# Patient Record
Sex: FEMALE | Race: WHITE | Employment: OTHER | URBAN - NONMETROPOLITAN AREA
[De-identification: names, ages, dates, MRNs, and addresses within clinical notes are randomized per-mention and may not be internally consistent; named-entity substitution may affect disease eponyms.]

---

## 2017-05-01 ENCOUNTER — TELEPHONE (OUTPATIENT)
Dept: GENERAL RADIOLOGY | Age: 67
End: 2017-05-01

## 2017-05-01 DIAGNOSIS — R92.8 ABNORMALITY OF RIGHT BREAST ON SCREENING MAMMOGRAM: Primary | ICD-10-CM

## 2017-05-08 ENCOUNTER — TELEPHONE (OUTPATIENT)
Dept: GENERAL RADIOLOGY | Age: 67
End: 2017-05-08

## 2017-05-13 ENCOUNTER — TELEPHONE (OUTPATIENT)
Dept: GENERAL RADIOLOGY | Age: 67
End: 2017-05-13

## 2017-05-16 ENCOUNTER — HOSPITAL ENCOUNTER (OUTPATIENT)
Age: 67
Setting detail: SPECIMEN
Discharge: HOME | End: 2017-05-16
Payer: MEDICARE

## 2017-05-16 ENCOUNTER — OFFICE VISIT (OUTPATIENT)
Dept: SURGERY | Age: 67
End: 2017-05-16
Payer: MEDICARE

## 2017-05-16 VITALS
DIASTOLIC BLOOD PRESSURE: 74 MMHG | WEIGHT: 188 LBS | HEIGHT: 67 IN | SYSTOLIC BLOOD PRESSURE: 112 MMHG | HEART RATE: 62 BPM | BODY MASS INDEX: 29.51 KG/M2

## 2017-05-16 DIAGNOSIS — R92.8 ABNORMAL MAMMOGRAM OF RIGHT BREAST: Primary | ICD-10-CM

## 2017-05-16 PROCEDURE — 4040F PNEUMOC VAC/ADMIN/RCVD: CPT | Performed by: SURGERY

## 2017-05-16 PROCEDURE — 3014F SCREEN MAMMO DOC REV: CPT | Performed by: SURGERY

## 2017-05-16 PROCEDURE — 1090F PRES/ABSN URINE INCON ASSESS: CPT | Performed by: SURGERY

## 2017-05-16 PROCEDURE — G8400 PT W/DXA NO RESULTS DOC: HCPCS | Performed by: SURGERY

## 2017-05-16 PROCEDURE — 1123F ACP DISCUSS/DSCN MKR DOCD: CPT | Performed by: SURGERY

## 2017-05-16 PROCEDURE — 99214 OFFICE O/P EST MOD 30 MIN: CPT | Performed by: SURGERY

## 2017-05-16 PROCEDURE — G8428 CUR MEDS NOT DOCUMENT: HCPCS | Performed by: SURGERY

## 2017-05-16 PROCEDURE — 1036F TOBACCO NON-USER: CPT | Performed by: SURGERY

## 2017-05-16 PROCEDURE — 3017F COLORECTAL CA SCREEN DOC REV: CPT | Performed by: SURGERY

## 2017-05-16 PROCEDURE — G8420 CALC BMI NORM PARAMETERS: HCPCS | Performed by: SURGERY

## 2017-05-19 LAB — SURGICAL PATHOLOGY REPORT: NORMAL

## 2017-06-01 ENCOUNTER — OFFICE VISIT (OUTPATIENT)
Dept: SURGERY | Age: 67
End: 2017-06-01
Payer: MEDICARE

## 2017-06-01 VITALS
HEART RATE: 60 BPM | DIASTOLIC BLOOD PRESSURE: 80 MMHG | SYSTOLIC BLOOD PRESSURE: 128 MMHG | HEIGHT: 68 IN | TEMPERATURE: 97 F | WEIGHT: 189 LBS | BODY MASS INDEX: 28.64 KG/M2

## 2017-06-01 DIAGNOSIS — R92.8 ABNORMAL MAMMOGRAM OF RIGHT BREAST: Primary | ICD-10-CM

## 2017-06-01 PROCEDURE — 99213 OFFICE O/P EST LOW 20 MIN: CPT | Performed by: SURGERY

## 2017-06-01 PROCEDURE — G8400 PT W/DXA NO RESULTS DOC: HCPCS | Performed by: SURGERY

## 2017-06-01 PROCEDURE — 1123F ACP DISCUSS/DSCN MKR DOCD: CPT | Performed by: SURGERY

## 2017-06-01 PROCEDURE — 3014F SCREEN MAMMO DOC REV: CPT | Performed by: SURGERY

## 2017-06-01 PROCEDURE — 1090F PRES/ABSN URINE INCON ASSESS: CPT | Performed by: SURGERY

## 2017-06-01 PROCEDURE — G8427 DOCREV CUR MEDS BY ELIG CLIN: HCPCS | Performed by: SURGERY

## 2017-06-01 PROCEDURE — 1036F TOBACCO NON-USER: CPT | Performed by: SURGERY

## 2017-06-01 PROCEDURE — 3017F COLORECTAL CA SCREEN DOC REV: CPT | Performed by: SURGERY

## 2017-06-01 PROCEDURE — G8420 CALC BMI NORM PARAMETERS: HCPCS | Performed by: SURGERY

## 2017-06-01 PROCEDURE — 4040F PNEUMOC VAC/ADMIN/RCVD: CPT | Performed by: SURGERY

## 2017-06-05 ENCOUNTER — NURSE ONLY (OUTPATIENT)
Dept: SURGERY | Age: 67
End: 2017-06-05

## 2017-06-05 ENCOUNTER — TELEPHONE (OUTPATIENT)
Dept: SURGERY | Age: 67
End: 2017-06-05

## 2017-06-05 DIAGNOSIS — N63.10 BREAST MASS, RIGHT: ICD-10-CM

## 2017-06-05 DIAGNOSIS — Z01.818 PRE-OP EXAM: Primary | ICD-10-CM

## 2017-06-05 DIAGNOSIS — Z01.818 PRE-OP EXAM: ICD-10-CM

## 2017-06-07 ENCOUNTER — APPOINTMENT (OUTPATIENT)
Dept: MAMMOGRAPHY | Age: 67
End: 2017-06-07
Attending: SURGERY
Payer: MEDICARE

## 2017-06-07 ENCOUNTER — ANESTHESIA EVENT (OUTPATIENT)
Dept: OPERATING ROOM | Age: 67
End: 2017-06-07
Payer: MEDICARE

## 2017-06-07 ENCOUNTER — HOSPITAL ENCOUNTER (OUTPATIENT)
Age: 67
Setting detail: OUTPATIENT SURGERY
Discharge: HOME | End: 2017-06-07
Attending: SURGERY | Admitting: SURGERY
Payer: MEDICARE

## 2017-06-07 ENCOUNTER — ANESTHESIA (OUTPATIENT)
Dept: OPERATING ROOM | Age: 67
End: 2017-06-07
Payer: MEDICARE

## 2017-06-07 VITALS
SYSTOLIC BLOOD PRESSURE: 105 MMHG | TEMPERATURE: 95.4 F | OXYGEN SATURATION: 99 % | RESPIRATION RATE: 6 BRPM | DIASTOLIC BLOOD PRESSURE: 69 MMHG

## 2017-06-07 VITALS
WEIGHT: 187.6 LBS | DIASTOLIC BLOOD PRESSURE: 58 MMHG | OXYGEN SATURATION: 99 % | SYSTOLIC BLOOD PRESSURE: 126 MMHG | HEIGHT: 68 IN | RESPIRATION RATE: 16 BRPM | TEMPERATURE: 97.9 F | BODY MASS INDEX: 28.43 KG/M2 | HEART RATE: 52 BPM

## 2017-06-07 PROCEDURE — 00400 ANES INTEGUMENTARY SYS NOS: CPT | Performed by: NURSE ANESTHETIST, CERTIFIED REGISTERED

## 2017-06-07 PROCEDURE — 3700000001 HC ADD 15 MINUTES (ANESTHESIA): Performed by: SURGERY

## 2017-06-07 PROCEDURE — 19125 EXCISION BREAST LESION: CPT | Performed by: SURGERY

## 2017-06-07 PROCEDURE — 2580000003 HC RX 258: Performed by: SURGERY

## 2017-06-07 PROCEDURE — 3600000003 HC SURGERY LEVEL 3 BASE: Performed by: SURGERY

## 2017-06-07 PROCEDURE — 76098 X-RAY EXAM SURGICAL SPECIMEN: CPT

## 2017-06-07 PROCEDURE — 7100000000 HC PACU RECOVERY - FIRST 15 MIN: Performed by: SURGERY

## 2017-06-07 PROCEDURE — 3600000013 HC SURGERY LEVEL 3 ADDTL 15MIN: Performed by: SURGERY

## 2017-06-07 PROCEDURE — 7100000011 HC PHASE II RECOVERY - ADDTL 15 MIN: Performed by: SURGERY

## 2017-06-07 PROCEDURE — 3700000000 HC ANESTHESIA ATTENDED CARE: Performed by: SURGERY

## 2017-06-07 PROCEDURE — 6360000002 HC RX W HCPCS: Performed by: NURSE ANESTHETIST, CERTIFIED REGISTERED

## 2017-06-07 PROCEDURE — 7100000001 HC PACU RECOVERY - ADDTL 15 MIN: Performed by: SURGERY

## 2017-06-07 PROCEDURE — 2500000003 HC RX 250 WO HCPCS: Performed by: SURGERY

## 2017-06-07 PROCEDURE — 88307 TISSUE EXAM BY PATHOLOGIST: CPT

## 2017-06-07 PROCEDURE — 76098 X-RAY EXAM SURGICAL SPECIMEN: CPT | Performed by: RADIOLOGY

## 2017-06-07 PROCEDURE — 19281 PERQ DEVICE BREAST 1ST IMAG: CPT | Performed by: RADIOLOGY

## 2017-06-07 PROCEDURE — 7100000010 HC PHASE II RECOVERY - FIRST 15 MIN: Performed by: SURGERY

## 2017-06-07 PROCEDURE — A4648 IMPLANTABLE TISSUE MARKER: HCPCS

## 2017-06-07 RX ORDER — SODIUM CHLORIDE 0.9 % (FLUSH) 0.9 %
10 SYRINGE (ML) INJECTION 2 TIMES DAILY
Status: DISCONTINUED | OUTPATIENT
Start: 2017-06-07 | End: 2017-06-07 | Stop reason: HOSPADM

## 2017-06-07 RX ORDER — SODIUM CHLORIDE, SODIUM LACTATE, POTASSIUM CHLORIDE, CALCIUM CHLORIDE 600; 310; 30; 20 MG/100ML; MG/100ML; MG/100ML; MG/100ML
INJECTION, SOLUTION INTRAVENOUS CONTINUOUS
Status: CANCELLED | OUTPATIENT
Start: 2017-06-07

## 2017-06-07 RX ORDER — PROPOFOL 10 MG/ML
INJECTION, EMULSION INTRAVENOUS PRN
Status: DISCONTINUED | OUTPATIENT
Start: 2017-06-07 | End: 2017-06-07 | Stop reason: SDUPTHER

## 2017-06-07 RX ORDER — HYDROCODONE BITARTRATE AND ACETAMINOPHEN 5; 325 MG/1; MG/1
1 TABLET ORAL EVERY 6 HOURS PRN
Qty: 25 TABLET | Refills: 0 | Status: SHIPPED | OUTPATIENT
Start: 2017-06-07 | End: 2017-11-06 | Stop reason: ALTCHOICE

## 2017-06-07 RX ORDER — MIDAZOLAM HYDROCHLORIDE 1 MG/ML
INJECTION INTRAMUSCULAR; INTRAVENOUS PRN
Status: DISCONTINUED | OUTPATIENT
Start: 2017-06-07 | End: 2017-06-07 | Stop reason: SDUPTHER

## 2017-06-07 RX ORDER — ONDANSETRON 2 MG/ML
INJECTION INTRAMUSCULAR; INTRAVENOUS PRN
Status: DISCONTINUED | OUTPATIENT
Start: 2017-06-07 | End: 2017-06-07 | Stop reason: SDUPTHER

## 2017-06-07 RX ORDER — SODIUM CHLORIDE, SODIUM LACTATE, POTASSIUM CHLORIDE, CALCIUM CHLORIDE 600; 310; 30; 20 MG/100ML; MG/100ML; MG/100ML; MG/100ML
INJECTION, SOLUTION INTRAVENOUS CONTINUOUS
Status: DISCONTINUED | OUTPATIENT
Start: 2017-06-07 | End: 2017-06-07 | Stop reason: HOSPADM

## 2017-06-07 RX ADMIN — Medication 10 ML: at 11:40

## 2017-06-07 RX ADMIN — Medication 2 G: at 13:07

## 2017-06-07 RX ADMIN — PROPOFOL 200 MG: 10 INJECTION, EMULSION INTRAVENOUS at 13:09

## 2017-06-07 RX ADMIN — ONDANSETRON 4 MG: 2 INJECTION INTRAMUSCULAR; INTRAVENOUS at 13:33

## 2017-06-07 RX ADMIN — SODIUM CHLORIDE, POTASSIUM CHLORIDE, SODIUM LACTATE AND CALCIUM CHLORIDE: 600; 310; 30; 20 INJECTION, SOLUTION INTRAVENOUS at 12:58

## 2017-06-07 RX ADMIN — MIDAZOLAM 2 MG: 1 INJECTION INTRAMUSCULAR; INTRAVENOUS at 13:07

## 2017-06-07 ASSESSMENT — PULMONARY FUNCTION TESTS
PIF_VALUE: 3
PIF_VALUE: 4
PIF_VALUE: 10
PIF_VALUE: 1
PIF_VALUE: 3
PIF_VALUE: 5
PIF_VALUE: 5
PIF_VALUE: 3
PIF_VALUE: 3
PIF_VALUE: 4
PIF_VALUE: 9
PIF_VALUE: 2
PIF_VALUE: 2
PIF_VALUE: 4
PIF_VALUE: 2
PIF_VALUE: 3
PIF_VALUE: 3
PIF_VALUE: 13
PIF_VALUE: 3
PIF_VALUE: 1
PIF_VALUE: 1
PIF_VALUE: 4
PIF_VALUE: 2
PIF_VALUE: 1
PIF_VALUE: 2
PIF_VALUE: 1
PIF_VALUE: 9
PIF_VALUE: 2
PIF_VALUE: 1
PIF_VALUE: 12

## 2017-06-07 ASSESSMENT — PAIN SCALES - GENERAL
PAINLEVEL_OUTOF10: 0

## 2017-06-07 ASSESSMENT — PAIN - FUNCTIONAL ASSESSMENT: PAIN_FUNCTIONAL_ASSESSMENT: 0-10

## 2017-06-09 LAB — SURGICAL PATHOLOGY REPORT: NORMAL

## 2017-06-20 ENCOUNTER — OFFICE VISIT (OUTPATIENT)
Dept: SURGERY | Age: 67
End: 2017-06-20

## 2017-06-20 VITALS
BODY MASS INDEX: 28.95 KG/M2 | HEIGHT: 68 IN | DIASTOLIC BLOOD PRESSURE: 70 MMHG | TEMPERATURE: 98.9 F | SYSTOLIC BLOOD PRESSURE: 120 MMHG | WEIGHT: 191 LBS | HEART RATE: 64 BPM

## 2017-06-20 DIAGNOSIS — Z09 POSTOP CHECK: Primary | ICD-10-CM

## 2017-06-20 PROCEDURE — 1123F ACP DISCUSS/DSCN MKR DOCD: CPT | Performed by: SURGERY

## 2017-06-20 PROCEDURE — 1090F PRES/ABSN URINE INCON ASSESS: CPT | Performed by: SURGERY

## 2017-06-20 PROCEDURE — 4040F PNEUMOC VAC/ADMIN/RCVD: CPT | Performed by: SURGERY

## 2017-06-20 PROCEDURE — G8419 CALC BMI OUT NRM PARAM NOF/U: HCPCS | Performed by: SURGERY

## 2017-06-20 PROCEDURE — 3017F COLORECTAL CA SCREEN DOC REV: CPT | Performed by: SURGERY

## 2017-06-20 PROCEDURE — G8427 DOCREV CUR MEDS BY ELIG CLIN: HCPCS | Performed by: SURGERY

## 2017-06-20 PROCEDURE — 99024 POSTOP FOLLOW-UP VISIT: CPT | Performed by: SURGERY

## 2017-06-20 PROCEDURE — 1036F TOBACCO NON-USER: CPT | Performed by: SURGERY

## 2017-06-20 PROCEDURE — 3014F SCREEN MAMMO DOC REV: CPT | Performed by: SURGERY

## 2017-06-20 PROCEDURE — G8400 PT W/DXA NO RESULTS DOC: HCPCS | Performed by: SURGERY

## 2017-11-02 ENCOUNTER — HOSPITAL ENCOUNTER (OUTPATIENT)
Dept: LAB | Age: 67
Setting detail: SPECIMEN
Discharge: HOME | End: 2017-11-02
Payer: MEDICARE

## 2017-11-02 DIAGNOSIS — Z13.1 SCREENING FOR DIABETES MELLITUS: ICD-10-CM

## 2017-11-02 DIAGNOSIS — E55.9 VITAMIN D DEFICIENCY: ICD-10-CM

## 2017-11-02 DIAGNOSIS — Z13.220 SCREENING FOR HYPERLIPIDEMIA: ICD-10-CM

## 2017-11-02 DIAGNOSIS — Z11.59 NEED FOR HEPATITIS C SCREENING TEST: ICD-10-CM

## 2017-11-02 LAB
ANION GAP SERPL CALCULATED.3IONS-SCNC: 13 MMOL/L (ref 9–17)
BUN BLDV-MCNC: 17 MG/DL (ref 8–23)
BUN/CREAT BLD: 31 (ref 9–20)
CALCIUM SERPL-MCNC: 9.4 MG/DL (ref 8.6–10.4)
CHLORIDE BLD-SCNC: 108 MMOL/L (ref 98–107)
CHOLESTEROL/HDL RATIO: 2.4
CHOLESTEROL: 175 MG/DL
CO2: 25 MMOL/L (ref 20–31)
CREAT SERPL-MCNC: 0.54 MG/DL (ref 0.5–0.9)
GFR AFRICAN AMERICAN: >60 ML/MIN
GFR NON-AFRICAN AMERICAN: >60 ML/MIN
GFR SERPL CREATININE-BSD FRML MDRD: ABNORMAL ML/MIN/{1.73_M2}
GFR SERPL CREATININE-BSD FRML MDRD: ABNORMAL ML/MIN/{1.73_M2}
GLUCOSE BLD-MCNC: 94 MG/DL (ref 70–99)
HDLC SERPL-MCNC: 72 MG/DL
HEPATITIS C ANTIBODY: NONREACTIVE
LDL CHOLESTEROL: 91 MG/DL (ref 0–130)
POTASSIUM SERPL-SCNC: 4.1 MMOL/L (ref 3.7–5.3)
SODIUM BLD-SCNC: 146 MMOL/L (ref 135–144)
TRIGL SERPL-MCNC: 58 MG/DL
VITAMIN D 25-HYDROXY: 28.6 NG/ML (ref 30–100)
VLDLC SERPL CALC-MCNC: NORMAL MG/DL (ref 1–30)

## 2017-11-02 PROCEDURE — 82306 VITAMIN D 25 HYDROXY: CPT

## 2017-11-02 PROCEDURE — 80048 BASIC METABOLIC PNL TOTAL CA: CPT

## 2017-11-02 PROCEDURE — 36415 COLL VENOUS BLD VENIPUNCTURE: CPT

## 2017-11-02 PROCEDURE — 86803 HEPATITIS C AB TEST: CPT

## 2017-11-02 PROCEDURE — 80061 LIPID PANEL: CPT

## 2017-11-06 ENCOUNTER — OFFICE VISIT (OUTPATIENT)
Dept: FAMILY MEDICINE CLINIC | Age: 67
End: 2017-11-06
Payer: MEDICARE

## 2017-11-06 VITALS
BODY MASS INDEX: 29.13 KG/M2 | WEIGHT: 192.2 LBS | SYSTOLIC BLOOD PRESSURE: 124 MMHG | DIASTOLIC BLOOD PRESSURE: 78 MMHG | OXYGEN SATURATION: 97 % | HEIGHT: 68 IN | HEART RATE: 62 BPM | TEMPERATURE: 97.9 F

## 2017-11-06 DIAGNOSIS — M85.80 OSTEOPENIA, UNSPECIFIED LOCATION: Primary | ICD-10-CM

## 2017-11-06 DIAGNOSIS — E55.9 VITAMIN D DEFICIENCY: ICD-10-CM

## 2017-11-06 DIAGNOSIS — Z12.11 COLON CANCER SCREENING: ICD-10-CM

## 2017-11-06 PROCEDURE — 3014F SCREEN MAMMO DOC REV: CPT | Performed by: FAMILY MEDICINE

## 2017-11-06 PROCEDURE — 3017F COLORECTAL CA SCREEN DOC REV: CPT | Performed by: FAMILY MEDICINE

## 2017-11-06 PROCEDURE — 1090F PRES/ABSN URINE INCON ASSESS: CPT | Performed by: FAMILY MEDICINE

## 2017-11-06 PROCEDURE — G8400 PT W/DXA NO RESULTS DOC: HCPCS | Performed by: FAMILY MEDICINE

## 2017-11-06 PROCEDURE — G8484 FLU IMMUNIZE NO ADMIN: HCPCS | Performed by: FAMILY MEDICINE

## 2017-11-06 PROCEDURE — 99213 OFFICE O/P EST LOW 20 MIN: CPT | Performed by: FAMILY MEDICINE

## 2017-11-06 PROCEDURE — G8427 DOCREV CUR MEDS BY ELIG CLIN: HCPCS | Performed by: FAMILY MEDICINE

## 2017-11-06 PROCEDURE — 1036F TOBACCO NON-USER: CPT | Performed by: FAMILY MEDICINE

## 2017-11-06 PROCEDURE — 4040F PNEUMOC VAC/ADMIN/RCVD: CPT | Performed by: FAMILY MEDICINE

## 2017-11-06 PROCEDURE — G8417 CALC BMI ABV UP PARAM F/U: HCPCS | Performed by: FAMILY MEDICINE

## 2017-11-06 PROCEDURE — 1123F ACP DISCUSS/DSCN MKR DOCD: CPT | Performed by: FAMILY MEDICINE

## 2017-11-06 ASSESSMENT — ENCOUNTER SYMPTOMS
DIARRHEA: 0
CONSTIPATION: 0
BACK PAIN: 0
WHEEZING: 0
SHORTNESS OF BREATH: 0
COUGH: 0
ABDOMINAL PAIN: 0
CHEST TIGHTNESS: 0
SINUS PRESSURE: 0

## 2017-11-06 ASSESSMENT — PATIENT HEALTH QUESTIONNAIRE - PHQ9
SUM OF ALL RESPONSES TO PHQ9 QUESTIONS 1 & 2: 0
1. LITTLE INTEREST OR PLEASURE IN DOING THINGS: 0
2. FEELING DOWN, DEPRESSED OR HOPELESS: 0
SUM OF ALL RESPONSES TO PHQ QUESTIONS 1-9: 0

## 2017-11-06 NOTE — PROGRESS NOTES
[address] [facility]  Dept: [phone]  Dept Fax: [phone]  Loc: [phone]    Aishwarya Jean is a 74 y.o. female who presents today for her medical conditions/complaints as noted below. Aishwarya Jean is c/o of   Chief Complaint   Patient presents with    Annual Exam       HPI:     HPI Here today for her annual physical and to follow up on her osteopenia and Vit D deficiency. Osteopenia: she feels like she is doing well. She has not had any fractures. she stays active and tries to make sure she is exercising daily. She eats a fairly healthy diet with the occasional chocolate twinkie. She tries to eat a variety of foods containing calcium. Vit D def: stable; she has not had any issues with fatigue or bone pain. She does not tolerate the vit D because it makes her muscles ache. She has not been taking it this summer. No issues with vaginal dryness. Past Medical History:   Diagnosis Date    Diverticula of colon     Mild, sigmoid, asymptomatic.  Osteopenia     With low vitamin D level. Insurance doesn't cover routine DEXA scans. Pt took bisphosphonates for a year and didn't tolerate.  Pure hypercholesterolemia     Mild, diet controlled.  Unspecified vitamin D deficiency     Varicose veins of lower extremities           Social History   Substance Use Topics    Smoking status: Former Smoker     Packs/day: 0.50     Years: 7.00     Types: Cigarettes     Quit date: 1/1/1978    Smokeless tobacco: Never Used      Comment: Quit smoking in 1978.  Alcohol use Yes      Comment: Occasional alcohol. Current Outpatient Prescriptions   Medication Sig Dispense Refill    Probiotic Product (PROBIOTIC DAILY PO) Take by mouth       No current facility-administered medications for this visit. Allergies   Allergen Reactions    Bisphosphonates Other (See Comments)     Jaw pain.  Took both Fosamax and Boniva and tolerated neither over a years period for osteopenia. Subjective:      Review of Systems   Constitutional: Negative for activity change, appetite change, chills, fatigue and fever. HENT: Negative for congestion, sinus pressure and sneezing. Eyes: Negative for visual disturbance. Respiratory: Negative for cough, chest tightness, shortness of breath and wheezing. Cardiovascular: Negative for chest pain, palpitations and leg swelling. Gastrointestinal: Negative for abdominal pain, constipation and diarrhea. Endocrine: Negative for polydipsia, polyphagia and polyuria. Genitourinary: Negative for difficulty urinating. Musculoskeletal: Negative for back pain and myalgias. Skin: Negative for rash. Allergic/Immunologic: Negative for environmental allergies. Neurological: Negative for dizziness, syncope, weakness, light-headedness and headaches. Psychiatric/Behavioral: Negative for dysphoric mood and sleep disturbance. The patient is not nervous/anxious. Objective:     Physical Exam   Constitutional: She is oriented to person, place, and time. She appears well-developed and well-nourished. No distress. HENT:   Mouth/Throat: Oropharynx is clear and moist.   Eyes: Conjunctivae and EOM are normal. Pupils are equal, round, and reactive to light. Neck: Normal range of motion. Neck supple. No thyromegaly present. Cardiovascular: Normal rate, regular rhythm, normal heart sounds and intact distal pulses. No murmur heard. Pulmonary/Chest: Effort normal and breath sounds normal. No respiratory distress. She has no wheezes. She has no rales. Abdominal: Soft. Bowel sounds are normal. She exhibits no distension. There is no tenderness. There is no guarding. Musculoskeletal: Normal range of motion. She exhibits no edema. Lymphadenopathy:     She has no cervical adenopathy. Neurological: She is alert and oriented to person, place, and time. Skin: Skin is warm and dry. No rash noted. Psychiatric: She has a normal mood and affect. Her behavior is normal. Judgment normal.   Nursing note and vitals reviewed. /78   Pulse 62   Temp 97.9 °F (36.6 °C) (Tympanic)   Ht 5' 8\" (1.727 m)   Wt 192 lb 3.2 oz (87.2 kg)   SpO2 97%   BMI 29.22 kg/m²     Assessment:      1. Osteopenia, unspecified location     2. Vitamin D deficiency     3. Colon cancer screening  Blood Occult Screening FIT      Hospital Outpatient Visit on 11/02/2017   Component Date Value Ref Range Status    Hepatitis C Ab 11/02/2017 NONREACTIVE  NR Final    Cholesterol 11/02/2017 175  <200 mg/dL Final    HDL 11/02/2017 72  >40 mg/dL Final    LDL Cholesterol 11/02/2017 91  0 - 130 mg/dL Final    Chol/HDL Ratio 11/02/2017 2.4  <5 Final    Triglycerides 11/02/2017 58  <150 mg/dL Final    VLDL 11/02/2017 NOT REPORTED  1 - 30 mg/dL Final    Glucose 11/02/2017 94  70 - 99 mg/dL Final    BUN 11/02/2017 17  8 - 23 mg/dL Final    CREATININE 11/02/2017 0.54  0.50 - 0.90 mg/dL Final    Bun/Cre Ratio 11/02/2017 31* 9 - 20 Final    Calcium 11/02/2017 9.4  8.6 - 10.4 mg/dL Final    Sodium 11/02/2017 146* 135 - 144 mmol/L Final    Potassium 11/02/2017 4.1  3.7 - 5.3 mmol/L Final    Chloride 11/02/2017 108* 98 - 107 mmol/L Final    CO2 11/02/2017 25  20 - 31 mmol/L Final    Anion Gap 11/02/2017 13  9 - 17 mmol/L Final    GFR Non- 11/02/2017 >60  >60 mL/min Final    GFR  11/02/2017 >60  >60 mL/min Final    GFR Comment 11/02/2017        Final    GFR Staging 11/02/2017 NOT REPORTED   Final    Vit D, 25-Hydroxy 11/02/2017 28.6* 30.0 - 100.0 ng/mL Final            Plan:       Osteopenia: stable; she has not had any recent issues. I reminded her of the importance of getting enough calcium and Vit D and doing strength training exercises to help her bones stay strong.     Vit D def: stable; her vit D level is a tiny bit low so I recommended a multi vit hoping that would help improve her vit D level without causing the muscle aches. Health Maintenance reviewed - she was given a FIT test for colon cancer screening. Return in about 1 year (around 11/6/2018) for [address] Orders Placed This Encounter   Procedures    Blood Occult Screening FIT     Standing Status:   Future     Standing Expiration Date:   11/6/2018       Patient given educational materials - see patient instructions. Discussed use, benefit, and side effects of prescribed medications. All patient questions answered. Pt voiced understanding. Reviewed health maintenance. Instructed to continue current medications, diet and exercise. Patient agreed with treatment plan. Follow up as directed.      Electronically signed by Anastacia Turner MD on 11/6/2017 at 6:15 PM

## 2017-11-16 ENCOUNTER — TELEPHONE (OUTPATIENT)
Dept: PRIMARY CARE CLINIC | Age: 67
End: 2017-11-16

## 2018-09-06 ENCOUNTER — OFFICE VISIT (OUTPATIENT)
Dept: PRIMARY CARE CLINIC | Age: 68
End: 2018-09-06
Payer: MEDICARE

## 2018-09-06 ENCOUNTER — HOSPITAL ENCOUNTER (OUTPATIENT)
Age: 68
Setting detail: SPECIMEN
Discharge: HOME | End: 2018-09-06
Payer: MEDICARE

## 2018-09-06 VITALS
OXYGEN SATURATION: 97 % | DIASTOLIC BLOOD PRESSURE: 72 MMHG | SYSTOLIC BLOOD PRESSURE: 130 MMHG | HEART RATE: 64 BPM | HEIGHT: 68 IN | WEIGHT: 200.8 LBS | TEMPERATURE: 98.4 F | BODY MASS INDEX: 30.43 KG/M2

## 2018-09-06 DIAGNOSIS — N30.01 ACUTE CYSTITIS WITH HEMATURIA: Primary | ICD-10-CM

## 2018-09-06 DIAGNOSIS — Z12.39 SCREENING FOR BREAST CANCER: ICD-10-CM

## 2018-09-06 DIAGNOSIS — N39.0 URINARY TRACT INFECTION WITHOUT HEMATURIA, SITE UNSPECIFIED: ICD-10-CM

## 2018-09-06 DIAGNOSIS — R30.0 DYSURIA: ICD-10-CM

## 2018-09-06 LAB
-: ABNORMAL
AMORPHOUS: ABNORMAL
BACTERIA: ABNORMAL
BILIRUBIN URINE: NEGATIVE
CASTS UA: ABNORMAL /LPF (ref 0–2)
COLOR: ABNORMAL
COMMENT UA: ABNORMAL
CRYSTALS, UA: ABNORMAL /HPF
EPITHELIAL CELLS UA: ABNORMAL /HPF (ref 0–5)
GLUCOSE URINE: NEGATIVE
KETONES, URINE: NEGATIVE
LEUKOCYTE ESTERASE, URINE: ABNORMAL
MUCUS: ABNORMAL
NITRITE, URINE: NEGATIVE
OTHER OBSERVATIONS UA: ABNORMAL
PH UA: 6 (ref 5–6)
PROTEIN UA: NEGATIVE
RBC UA: ABNORMAL /HPF (ref 0–4)
RENAL EPITHELIAL, UA: ABNORMAL /HPF
SPECIFIC GRAVITY UA: 1 (ref 1.01–1.02)
TRICHOMONAS: ABNORMAL
TURBIDITY: ABNORMAL
URINE HGB: ABNORMAL
UROBILINOGEN, URINE: NORMAL
WBC UA: >100 /HPF (ref 0–4)
YEAST: ABNORMAL

## 2018-09-06 PROCEDURE — 99214 OFFICE O/P EST MOD 30 MIN: CPT | Performed by: FAMILY MEDICINE

## 2018-09-06 PROCEDURE — 81001 URINALYSIS AUTO W/SCOPE: CPT

## 2018-09-06 PROCEDURE — 87086 URINE CULTURE/COLONY COUNT: CPT

## 2018-09-06 RX ORDER — NITROFURANTOIN 25; 75 MG/1; MG/1
100 CAPSULE ORAL 2 TIMES DAILY
Qty: 14 CAPSULE | Refills: 0 | Status: SHIPPED | OUTPATIENT
Start: 2018-09-06 | End: 2018-09-13

## 2018-09-06 ASSESSMENT — ENCOUNTER SYMPTOMS
COUGH: 0
NAUSEA: 0
BACK PAIN: 0
VOMITING: 0
SHORTNESS OF BREATH: 0

## 2018-09-06 ASSESSMENT — PATIENT HEALTH QUESTIONNAIRE - PHQ9
SUM OF ALL RESPONSES TO PHQ9 QUESTIONS 1 & 2: 0
SUM OF ALL RESPONSES TO PHQ QUESTIONS 1-9: 0
1. LITTLE INTEREST OR PLEASURE IN DOING THINGS: 0
SUM OF ALL RESPONSES TO PHQ QUESTIONS 1-9: 0
2. FEELING DOWN, DEPRESSED OR HOPELESS: 0

## 2018-09-06 NOTE — PROGRESS NOTES
[facility]      Hospital Outpatient Visit on 09/06/2018   Component Date Value Ref Range Status    Color, UA 09/06/2018 NOT REPORTED  YEL Final    Turbidity UA 09/06/2018 NOT REPORTED  CLEAR Final    Glucose, Ur 09/06/2018 NEGATIVE  NEG Final    Bilirubin Urine 09/06/2018 NEGATIVE  NEG Final    Ketones, Urine 09/06/2018 NEGATIVE  NEG Final    Specific Gravity, UA 09/06/2018 1.005* 1.010 - 1.025 Final    Urine Hgb 09/06/2018 3+* NEG Final    pH, UA 09/06/2018 6.0  5.0 - 6.0 Final    Protein, UA 09/06/2018 NEGATIVE  NEG Final    Urobilinogen, Urine 09/06/2018 Normal  NORM Final    Nitrite, Urine 09/06/2018 NEGATIVE  NEG Final    Leukocyte Esterase, Urine 09/06/2018 3+* NEG Final    Urinalysis Comments 09/06/2018 NOT REPORTED   Final    - 09/06/2018        Final    WBC, UA 09/06/2018 >100  0 - 4 /HPF Final    RBC, UA 09/06/2018 10 TO 25  0 - 4 /HPF Final    Casts UA 09/06/2018 NOT REPORTED  0 - 2 /LPF Final    Crystals UA 09/06/2018 NOT REPORTED  NONE /HPF Final    Epithelial Cells UA 09/06/2018 0 TO 4  0 - 5 /HPF Final    Renal Epithelial, Urine 09/06/2018 NOT REPORTED  0 /HPF Final    Bacteria, UA 09/06/2018 2+* NONE Final    Mucus, UA 09/06/2018 NOT REPORTED  NONE Final    Trichomonas, UA 09/06/2018 NOT REPORTED  NONE Final    Amorphous, UA 09/06/2018 NOT REPORTED  NONE Final    Other Observations UA 09/06/2018 Specimen Cultured* NREQ Final    Yeast, UA 09/06/2018 NOT REPORTED  NONE Final              Plan:       UTI: new; her UA shows she has a UTI. I will treat with macrobid for 7 days and follow up on the culture. If the culture shows resistance to macrobid then she will be notified and her antibiotic will be changed. I encouraged her to use tylenol or ibuprofen for pain. she was told to return to the clinic or ER if symptoms worsen, if she develops fevers or severe back pain. she understood and agreed with the plan.     Health Maintenance reviewed - mammogram ordered, patient to schedule appointment. Return if symptoms worsen or fail to improve. Orders Placed This Encounter   Procedures    NORIS DIGITAL SCREEN W OR WO CAD BILATERAL     Standing Status:   Future     Standing Expiration Date:   11/6/2019    Urinalysis Reflex to Culture     Standing Status:   Future     Number of Occurrences:   1     Standing Expiration Date:   10/6/2018     Order Specific Question:   SPECIFY(EX-CATH,MIDSTREAM,CYSTO,ETC)? Answer:   midstream     Orders Placed This Encounter   Medications    nitrofurantoin, macrocrystal-monohydrate, (MACROBID) 100 MG capsule     Sig: Take 1 capsule by mouth 2 times daily for 7 days     Dispense:  14 capsule     Refill:  0       Patient given educational materials - see patient instructions. Discussed use, benefit, and side effects of prescribed medications. All patient questions answered. Pt voiced understanding. Reviewed health maintenance. Instructed to continue current medications, diet and exercise. Patient agreed with treatment plan. Follow up as directed.      Electronically signed by Nandini Hughes MD on 9/6/2018 at 10:53 AM

## 2018-09-07 LAB
CULTURE: NO GROWTH
Lab: NORMAL
SPECIMEN DESCRIPTION: NORMAL
STATUS: NORMAL

## 2018-09-10 ENCOUNTER — HOSPITAL ENCOUNTER (OUTPATIENT)
Dept: ULTRASOUND IMAGING | Age: 68
Discharge: HOME | End: 2018-09-12
Payer: MEDICARE

## 2018-09-10 ENCOUNTER — TELEPHONE (OUTPATIENT)
Dept: SURGERY | Age: 68
End: 2018-09-10

## 2018-09-10 ENCOUNTER — HOSPITAL ENCOUNTER (OUTPATIENT)
Age: 68
Setting detail: SPECIMEN
Discharge: HOME | End: 2018-09-10
Payer: MEDICARE

## 2018-09-10 ENCOUNTER — HOSPITAL ENCOUNTER (OUTPATIENT)
Dept: MAMMOGRAPHY | Age: 68
Discharge: HOME | End: 2018-09-12
Payer: MEDICARE

## 2018-09-10 DIAGNOSIS — N63.0 BREAST LUMP IN FEMALE: Primary | ICD-10-CM

## 2018-09-10 DIAGNOSIS — R92.8 ABNORMAL MAMMOGRAM OF LEFT BREAST: Primary | ICD-10-CM

## 2018-09-10 DIAGNOSIS — R92.8 ABNORMAL MAMMOGRAM: ICD-10-CM

## 2018-09-10 DIAGNOSIS — R92.8 ABNORMAL MAMMOGRAM OF LEFT BREAST: ICD-10-CM

## 2018-09-10 DIAGNOSIS — D49.3 NEOPLASM OF BREAST: ICD-10-CM

## 2018-09-10 DIAGNOSIS — R92.8 ABNORMAL MAMMOGRAM OF RIGHT BREAST: ICD-10-CM

## 2018-09-10 DIAGNOSIS — N63.0 BREAST LUMP IN FEMALE: ICD-10-CM

## 2018-09-10 DIAGNOSIS — Z12.39 SCREENING FOR BREAST CANCER: ICD-10-CM

## 2018-09-10 PROCEDURE — 88360 TUMOR IMMUNOHISTOCHEM/MANUAL: CPT

## 2018-09-10 PROCEDURE — 76642 ULTRASOUND BREAST LIMITED: CPT

## 2018-09-10 PROCEDURE — 19084 BX BREAST ADD LESION US IMAG: CPT

## 2018-09-10 PROCEDURE — 77066 DX MAMMO INCL CAD BI: CPT

## 2018-09-10 PROCEDURE — 19083 BX BREAST 1ST LESION US IMAG: CPT

## 2018-09-10 PROCEDURE — 88377 M/PHMTRC ALYS ISHQUANT/SEMIQ: CPT

## 2018-09-10 PROCEDURE — 77065 DX MAMMO INCL CAD UNI: CPT

## 2018-09-10 PROCEDURE — 88305 TISSUE EXAM BY PATHOLOGIST: CPT

## 2018-09-12 ENCOUNTER — INITIAL CONSULT (OUTPATIENT)
Dept: SURGERY | Age: 68
End: 2018-09-12
Payer: MEDICARE

## 2018-09-12 ENCOUNTER — HOSPITAL ENCOUNTER (OUTPATIENT)
Dept: LAB | Age: 68
Setting detail: SPECIMEN
Discharge: HOME | End: 2018-09-12
Payer: MEDICARE

## 2018-09-12 VITALS
HEIGHT: 68 IN | WEIGHT: 202 LBS | HEART RATE: 76 BPM | TEMPERATURE: 98.5 F | DIASTOLIC BLOOD PRESSURE: 80 MMHG | BODY MASS INDEX: 30.62 KG/M2 | SYSTOLIC BLOOD PRESSURE: 128 MMHG

## 2018-09-12 DIAGNOSIS — C50.919 MALIGNANT NEOPLASM OF FEMALE BREAST, UNSPECIFIED ESTROGEN RECEPTOR STATUS, UNSPECIFIED LATERALITY, UNSPECIFIED SITE OF BREAST (HCC): Primary | ICD-10-CM

## 2018-09-12 DIAGNOSIS — C50.919 MALIGNANT NEOPLASM OF FEMALE BREAST, UNSPECIFIED ESTROGEN RECEPTOR STATUS, UNSPECIFIED LATERALITY, UNSPECIFIED SITE OF BREAST (HCC): ICD-10-CM

## 2018-09-12 LAB
ABSOLUTE EOS #: 0.1 K/UL (ref 0–0.4)
ABSOLUTE IMMATURE GRANULOCYTE: NORMAL K/UL (ref 0–0.3)
ABSOLUTE LYMPH #: 2.1 K/UL (ref 1–4.8)
ABSOLUTE MONO #: 0.6 K/UL (ref 0.1–1.2)
ALBUMIN SERPL-MCNC: 4.5 G/DL (ref 3.5–5.2)
ALBUMIN/GLOBULIN RATIO: 1.6 (ref 1–2.5)
ALP BLD-CCNC: 68 U/L (ref 35–104)
ALT SERPL-CCNC: 21 U/L (ref 5–33)
ANION GAP SERPL CALCULATED.3IONS-SCNC: 11 MMOL/L (ref 9–17)
AST SERPL-CCNC: 22 U/L
BASOPHILS # BLD: 1 % (ref 0–1)
BASOPHILS ABSOLUTE: 0 K/UL (ref 0–0.2)
BILIRUB SERPL-MCNC: 0.27 MG/DL (ref 0.3–1.2)
BUN BLDV-MCNC: 14 MG/DL (ref 8–23)
BUN/CREAT BLD: 22 (ref 9–20)
CALCIUM SERPL-MCNC: 9.7 MG/DL (ref 8.6–10.4)
CHLORIDE BLD-SCNC: 103 MMOL/L (ref 98–107)
CO2: 28 MMOL/L (ref 20–31)
CREAT SERPL-MCNC: 0.65 MG/DL (ref 0.5–0.9)
DIFFERENTIAL TYPE: NORMAL
EOSINOPHILS RELATIVE PERCENT: 1 % (ref 1–7)
GFR AFRICAN AMERICAN: >60 ML/MIN
GFR NON-AFRICAN AMERICAN: >60 ML/MIN
GFR SERPL CREATININE-BSD FRML MDRD: ABNORMAL ML/MIN/{1.73_M2}
GFR SERPL CREATININE-BSD FRML MDRD: ABNORMAL ML/MIN/{1.73_M2}
GLUCOSE BLD-MCNC: 100 MG/DL (ref 70–99)
HCT VFR BLD CALC: 41.3 % (ref 36–46)
HEMOGLOBIN: 13.8 G/DL (ref 12–16)
IMMATURE GRANULOCYTES: NORMAL %
LYMPHOCYTES # BLD: 33 % (ref 16–46)
MCH RBC QN AUTO: 31 PG (ref 26–34)
MCHC RBC AUTO-ENTMCNC: 33.4 G/DL (ref 31–37)
MCV RBC AUTO: 92.7 FL (ref 80–100)
MONOCYTES # BLD: 10 % (ref 4–11)
NRBC AUTOMATED: NORMAL PER 100 WBC
PDW BLD-RTO: 13.2 % (ref 11–14.5)
PLATELET # BLD: 248 K/UL (ref 140–450)
PLATELET ESTIMATE: NORMAL
PMV BLD AUTO: 9.4 FL (ref 6–12)
POTASSIUM SERPL-SCNC: 4.1 MMOL/L (ref 3.7–5.3)
RBC # BLD: 4.45 M/UL (ref 4–5.2)
RBC # BLD: NORMAL 10*6/UL
SEG NEUTROPHILS: 55 % (ref 43–77)
SEGMENTED NEUTROPHILS ABSOLUTE COUNT: 3.4 K/UL (ref 1.8–7.7)
SODIUM BLD-SCNC: 142 MMOL/L (ref 135–144)
TOTAL PROTEIN: 7.3 G/DL (ref 6.4–8.3)
WBC # BLD: 6.2 K/UL (ref 3.5–11)
WBC # BLD: NORMAL 10*3/UL

## 2018-09-12 PROCEDURE — 99214 OFFICE O/P EST MOD 30 MIN: CPT | Performed by: SURGERY

## 2018-09-12 PROCEDURE — 36415 COLL VENOUS BLD VENIPUNCTURE: CPT

## 2018-09-12 PROCEDURE — 85025 COMPLETE CBC W/AUTO DIFF WBC: CPT

## 2018-09-12 PROCEDURE — 80053 COMPREHEN METABOLIC PANEL: CPT

## 2018-09-12 NOTE — PROGRESS NOTES
tatianna      CC:    Chief Complaint   Patient presents with    Follow-up     bilateral breast biopsies         HPI:    Ronal Will is a 79 y.o. female who presents bilateral breast masses. she is status post multiple bilateral  US guided bx's. The following path is:      -- Diagnosis --   1.  RIGHT BREAST, CORE NEEDLE BIOPSIES:            -  WELL-DIFFERENTIATED INFILTRATING DUCTAL CARCINOMA, 0.6 CM   IN LARGEST INTACT CORE FRAGMENT.            -  ESTROGEN AND PROGESTERONE RECEPTOR IMMUNOSTAINS POSITIVE   IN INVASIVE TUMOR.       -  HER-2 FISH PENDING. 2.  LEFT BREAST, 2:00, N+4, CORE NEEDLE BIOPSIES:            -  MODERATELY DIFFERENTIATED INFILTRATING DUCTAL CARCINOMA,   0.6 CM IN LARGEST INTACT CORE FRAGMENT.       -  FOCAL DUCTAL CARCINOMA IN SITU, INTERMEDIATE GRADE, CRIBRIFORM   AND SOLID TYPE.            -  ESTROGEN AND PROGESTERONE RECEPTOR IMMUNOSTAINS POSITIVE   IN INVASIVE TUMOR.       -  HER-2 FISH PENDING. 3.  LEFT BREAST, 1:00, N+6, CORE NEEDLE BIOPSIES:            -  POORLY DIFFERENTIATED INFILTRATING DUCTAL CARCINOMA, 1.3   CM GREATEST DIMENSION IN LARGEST INTACT CORE FRAGMENT.       -  FOCAL DUCTAL CARCINOMA IN SITU, INTERMEDIATE GRADE, SOLID   TYPE.           -  ESTROGEN AND PROGESTERONE RECEPTOR IMMUNOSTAINS POSITIVE   IN INVASIVE TUMOR.       -  HER-2 FISH PENDING. 4.  LYMPH NODE, LEFT AXILLA, CORE NEEDLE BIOPSIES:       -  INFILTRATING DUCTAL CARCINOMA. -  INVASIVE TUMOR POSITIVE FOR ESTROGEN RECEPTOR AND   NEGATIVE FOR PROGESTERONE RECEPTOR IMMUNOSTAINS.       -  LYMPH NODE AND BENIGN BREAST ARCHITECTURE NOT IDENTIFIED. -- Diagnosis Comment --   SPECIMEN #4: CORRELATION WITH THE RADIOLOGIC PLACEMENT OF THE BIOPSY   WOULD BE NECESSARY TO DETERMINE IF THIS REPRESENTS A LYMPH NODE WITH   METASTATIC CARCINOMA, VERSUS CARCINOMA INVOLVING THE AXILLARY TAIL OF   THE BREAST. NO INTRINSIC LYMPH NODE OR BENIGN BREAST ARCHITECTURE IS   PRESENT FOR CONFIRMATION.                Pain: No Cyclical: NA    Nipple Drainage: No      BREAST IMAGING:    Mammograms and ultrasounds    BREAST HISTORY:    Last Menstrual Period: 20    Menarche: age 17    1st Term Pregnancy: age 25    HRT? Yes,  How long? 1 year after hysterectomy    Previous Breast Biopsy(ies): No,  Atypia: no    Previous Breast Cancer: No  Ovarian: No  Endometrial: No    Family History of Breast and Gyn Cancer: (list any, or put none):    Sister at age 58       Review of Systems:    General:  Fever: Negative  Weight Change:Negative  Night Sweats: Negative    Eye:  Blurry Vision:Negative  Double Vision: Negative    Ent:  Headaches: Negative  Sore throat: Negative    Allergy/Immunology:  Hives: Negative  Latex allergy: Negative    Hematology/Lymphatic:  Bleeding Problems: Negative  Blood Clots: Negative  Swollen Lymph Nodes: Negative    Lungs:  Cough: Negative  SOB: Negative  Wheezing:Negative    Cardiovascular:  Chest Pain: Negative  Palpitations:Negative    GI:   Decreased Appetite: Negative  Heartburn: positive for occasional  Dysphagia: Negative  Nausea/Vomiting: Negative  Abdominal Pain: Negative  Change in Bowels:Negative  Constipation: Negative  Diarrhea: Negative  Rectal Bleeding: Negative    :   Dysuria: recent UTI  Increase Urinary Frequency/Urgency: Negative    Neuro:  Seizures: Negative  Confusion: Negative      PAST MEDICAL HISTORY:    Family History   Problem Relation Age of Onset    Hypertension Mother         Renal failure.  Diabetes Mother         Type 2 at end of life.  Heart Failure Father     Coronary Art Dis Father         Status post CABG    Diabetes Father         Type 2, very late in life.     Breast Cancer Sister     Hypertension Brother     Hypertension Sister        Social History     Social History    Marital status:      Spouse name: N/A    Number of children: N/A    Years of education: N/A     Occupational History    retired  [employer]

## 2018-09-14 ENCOUNTER — INITIAL CONSULT (OUTPATIENT)
Dept: ONCOLOGY | Age: 68
End: 2018-09-14
Payer: MEDICARE

## 2018-09-14 VITALS
SYSTOLIC BLOOD PRESSURE: 133 MMHG | WEIGHT: 198.56 LBS | DIASTOLIC BLOOD PRESSURE: 72 MMHG | HEART RATE: 63 BPM | BODY MASS INDEX: 31.17 KG/M2 | TEMPERATURE: 97.9 F | HEIGHT: 67 IN

## 2018-09-14 DIAGNOSIS — Z51.11 ENCOUNTER FOR ANTINEOPLASTIC CHEMOTHERAPY: ICD-10-CM

## 2018-09-14 DIAGNOSIS — C50.211 CARCINOMA OF UPPER-INNER QUADRANT OF RIGHT BREAST IN FEMALE, ESTROGEN RECEPTOR POSITIVE (HCC): ICD-10-CM

## 2018-09-14 DIAGNOSIS — C50.412 CARCINOMA OF UPPER-OUTER QUADRANT OF FEMALE BREAST, LEFT (HCC): Primary | ICD-10-CM

## 2018-09-14 DIAGNOSIS — Z17.0 CARCINOMA OF UPPER-INNER QUADRANT OF RIGHT BREAST IN FEMALE, ESTROGEN RECEPTOR POSITIVE (HCC): ICD-10-CM

## 2018-09-14 PROCEDURE — 99205 OFFICE O/P NEW HI 60 MIN: CPT | Performed by: INTERNAL MEDICINE

## 2018-09-14 PROCEDURE — 99201 HC NEW PT, E/M LEVEL 1: CPT | Performed by: INTERNAL MEDICINE

## 2018-09-14 NOTE — PROGRESS NOTES
Patient ID: Filemon Vivar, 1950, [id number], 79 y.o. Referred by : Dr. Mahendra Quezada  Reason for consultation:   Diagnosis of bilateral breast cancer  Left invasive ductal carcinoma, ER/SD positive and HER-2/kelsy pending, 2 foci 2.0 cm and 2.1 cm, clinical staging T2 N1 M0, stage II  Right breast cancer T1b N0 M0, stage I, ER/SD positive HER-2 pending  HISTORY OF PRESENT ILLNESS:    Oncologic History:  Lyndon Tom is a 74-year-old female with a family history of breast cancer was seen during initial consultation visit for newly diagnosed bilateral breast cancer. Patient had abnormal mammogram about a year ago and the biopsy showed ductal papilloma so she had excision and was being followed by Gen. surgery. Recently she noted a lump in her left breast and had a mammogram and ultrasound which confirmed 2 lesions in her left breast measuring 2.0 and 2.1 cm and one lesion in the right breast measuring 0.8 cm. Also 2 lymph nodes noted in the left axilla. Patient underwent biopsy of these agents including axillary lymph node which confirmed presence of invasive ductal carcinoma and on the biopsies. She is here for further recommendations. She does not have many medical conditions and her ECOG performance status is 0. She is physically active and not on any regular medications. She has history of breast cancer in her sister was diagnosed at the age of 58 and also reports history of breast cancer in her grandmother. She quit smoking in 1978 and drinks alcohol occasionally. She has had hysterectomy and oophorectomy. Past Medical History:   Diagnosis Date    Diverticula of colon     Mild, sigmoid, asymptomatic.  Osteopenia     With low vitamin D level. Insurance doesn't cover routine DEXA scans. Pt took bisphosphonates for a year and didn't tolerate.     Unspecified vitamin D deficiency     Varicose veins of lower extremities      Past Surgical History:   Procedure Laterality Date    BREAST BIOPSY Right 6/7/2017    Right Breast biopsy w/ needle placement -arrive 11am -to x-ray 12pm performed by Mary Saldivar MD at [address] [place] [address]  3/21/2005    Screening. Negative. Due for repeat 2015.  DILATION AND CURETTAGE OF UTERUS  7/22/1976    Status post miscarriage.  DILATION AND CURETTAGE OF UTERUS  9/1/1995    bleeding    HYSTERECTOMY, TOTAL ABDOMINAL      KENIA AND BSO  11/7/1995    fibroids    TONSILLECTOMY  1978    Subsequent hemorrhage and cautery. Allergies   Allergen Reactions    Bisphosphonates Other (See Comments)     Jaw pain. Took both Fosamax and Boniva and tolerated neither over a years period for osteopenia. No current outpatient prescriptions on file. No current facility-administered medications for this visit. Social History     Social History    Marital status:      Spouse name: N/A    Number of children: N/A    Years of education: N/A     Occupational History    retired [employer]     Social History Main Topics    Smoking status: Former Smoker     Packs/day: 0.50     Years: 7.00     Types: Cigarettes     Quit date: 1/1/1978    Smokeless tobacco: Never Used      Comment: Quit smoking in 1978.  Alcohol use Yes      Comment: Occasional alcohol.  Drug use: No    Sexual activity: Not on file     Other Topics Concern    Not on file     Social History Narrative    No narrative on file       Family History   Problem Relation Age of Onset    Hypertension Mother         Renal failure.  Diabetes Mother         Type 2 at end of life.  Heart Failure Father     Coronary Art Dis Father         Status post CABG    Diabetes Father         Type 2, very late in life.  Breast Cancer Sister     Hypertension Brother     Hypertension Sister      REVIEW OF SYSTEM:   Constitutional: No fever or chills.  No night sweats, no weight loss   Eyes: No eye discharge, double vision, or eye pain   HEENT: negative for sore mouth, sore throat, hoarseness and voice change   Respiratory: negative for cough , sputum, dyspnea, wheezing, hemoptysis, chest pain   Cardiovascular: negative for chest pain, dyspnea, palpitations, orthopnea, PND   Gastrointestinal: negative for nausea, vomiting, diarrhea, constipation, abdominal pain, Dysphagia, hematemesis and hematochezia   Genitourinary: negative for frequency, dysuria, nocturia, urinary incontinence, and hematuria   Integument: negative for rash, skin lesions, bruises.    Hematologic/Lymphatic: negative for easy bruising, bleeding, lymphadenopathy, petechiae and swelling/edema   Endocrine: negative for heat or cold intolerance, tremor, weight changes, change in bowel habits and hair loss   Musculoskeletal: negative for myalgias, arthralgias, pain, joint swelling,and bone pain   Neurological: negative for headaches, dizziness, seizures, weakness, numbness   OBJECTIVE:         Vitals:    09/14/18 1407   BP: 133/72   Pulse: 63   Temp: 97.9 °F (36.6 °C)   PHYSICAL EXAM:   General appearance - well appearing, no in pain or distress   Mental status - alert and cooperative   Eyes - pupils equal and reactive, extraocular eye movements intact   Ears - bilateral TM's and external ear canals normal   Mouth - mucous membranes moist, pharynx normal without lesions   Neck - supple, no significant adenopathy   Lymphatics - Mild palpable lymphadenopathy in the left axilla, no hepatosplenomegaly   Chest - clear to auscultation, no wheezes, rales or rhonchi, symmetric air entry   2 cm lump palpable in left breast  Heart - normal rate, regular rhythm, normal S1, S2, no murmurs, rubs, clicks or gallops   Abdomen - soft, nontender, nondistended, no masses or organomegaly   Neurological - alert, oriented, normal speech, no focal findings or movement disorder noted   Musculoskeletal - no joint tenderness, deformity or swelling   Extremities - peripheral pulses normal, no pedal edema, no clubbing or cyanosis   Skin - normal coloration and turgor, no rashes, no suspicious skin lesions noted   LABORATORY DATA:     Lab Results   Component Value Date    WBC 6.2 09/12/2018    HGB 13.8 09/12/2018    HCT 41.3 09/12/2018    MCV 92.7 09/12/2018     09/12/2018    LYMPHOPCT 33 09/12/2018    RBC 4.45 09/12/2018    MCH 31.0 09/12/2018    MCHC 33.4 09/12/2018    RDW 13.2 09/12/2018    MONOPCT 10 09/12/2018    BASOPCT 1 09/12/2018    NEUTROABS 3.40 09/12/2018    LYMPHSABS 2.10 09/12/2018    MONOSABS 0.60 09/12/2018    EOSABS 0.10 09/12/2018    BASOSABS 0.00 09/12/2018       Chemistry        Component Value Date/Time     09/12/2018 1547    K 4.1 09/12/2018 1547     09/12/2018 1547    CO2 28 09/12/2018 1547    BUN 14 09/12/2018 1547    CREATININE 0.65 09/12/2018 1547        Component Value Date/Time    CALCIUM 9.7 09/12/2018 1547    ALKPHOS 68 09/12/2018 1547    AST 22 09/12/2018 1547    ALT 21 09/12/2018 1547    BILITOT 0.27 (L) 09/12/2018 1547        PATHOLOGY DATA:   Received: 9/11/2018   Reported: 9/12/2018 17:30     -- Diagnosis --   1.  RIGHT BREAST, CORE NEEDLE BIOPSIES:            -  WELL-DIFFERENTIATED INFILTRATING DUCTAL CARCINOMA, 0.6 CM   IN LARGEST INTACT CORE FRAGMENT.            -  ESTROGEN AND PROGESTERONE RECEPTOR IMMUNOSTAINS POSITIVE   IN INVASIVE TUMOR.       -  HER-2 FISH PENDING. 2.  LEFT BREAST, 2:00, N+4, CORE NEEDLE BIOPSIES:            -  MODERATELY DIFFERENTIATED INFILTRATING DUCTAL CARCINOMA,   0.6 CM IN LARGEST INTACT CORE FRAGMENT.       -  FOCAL DUCTAL CARCINOMA IN SITU, INTERMEDIATE GRADE, CRIBRIFORM   AND SOLID TYPE.            -  ESTROGEN AND PROGESTERONE RECEPTOR IMMUNOSTAINS POSITIVE   IN INVASIVE TUMOR.       -  HER-2 FISH PENDING.      3.  LEFT BREAST, 1:00, N+6, CORE NEEDLE BIOPSIES:            -  POORLY DIFFERENTIATED INFILTRATING DUCTAL CARCINOMA, 1.3   CM GREATEST DIMENSION IN LARGEST INTACT CORE FRAGMENT.       -  FOCAL DUCTAL CARCINOMA IN SITU, INTERMEDIATE GRADE, SOLID   TYPE.            -  ESTROGEN AND PROGESTERONE RECEPTOR IMMUNOSTAINS POSITIVE   IN INVASIVE TUMOR.       -  HER-2 FISH PENDING. 4.  LYMPH NODE, LEFT AXILLA, CORE NEEDLE BIOPSIES:       -  INFILTRATING DUCTAL CARCINOMA.          -  INVASIVE TUMOR POSITIVE FOR ESTROGEN RECEPTOR AND   NEGATIVE FOR PROGESTERONE RECEPTOR IMMUNOSTAINS.     -  LYMPH NODE AND BENIGN BREAST ARCHITECTURE NOT IDENTIFIED. -- Diagnosis Comment --   SPECIMEN #4: CORRELATION WITH THE RADIOLOGIC PLACEMENT OF THE BIOPSY   WOULD BE NECESSARY TO DETERMINE IF THIS REPRESENTS A LYMPH NODE WITH   METASTATIC CARCINOMA, VERSUS CARCINOMA INVOLVING THE AXILLARY TAIL OF   THE BREAST.  NO INTRINSIC LYMPH NODE OR BENIGN BREAST ARCHITECTURE IS   PRESENT FOR CONFIRMATION.        IMAGING DATA:    Ultrasound breast 9/10/18  FINDINGS:   DIAGNOSTIC MAMMOGRAM: The breasts are composed of scattered fibroglandular   tissue.  Underlying the area palpable concern in the upper outer left breast,   2 masses are identified measuring up to 1.6 cm anteriorly and with a more   suspicious spiculated 2.4 cm mass with adjacent distortion.  No abnormal   calcifications are identified.  In the right breast, a spiculated 1 cm mass   is identified in the lower inner quadrant, 6 cm from the nipple.  No   associated microcalcifications.       TARGETED LEFT ULTRASOUND: Ultrasound was performed in the area of palpable   concern, identifying 2 adjacent irregular hypoechoic masses measuring 1.7 x   2.0 x 1.4 cm in the 2 o'clock position, 4 cm from the nipple and 1.8 x 2.1 x   2.0 cm in the 1 o'clock position, 6 cm from the nipple.       Ultrasound in the left axilla was also performed identifying a probable   abnormal lymph node in the axillary tail measuring 2.1 x 1.6 x 2.2 cm.  This   has an irregular appearance with a hyperechoic rim and no identifiable fatty   hilum. Wallace Dover is an adjacent abnormal lymph node measuring 1.7 x 1.0 x 2.0 cm   with a small fatty hilum and focal cortical thickening.       TARGETED RIGHT ULTRASOUND: Ultrasound was performed in the area of   mammographic concern in the lower inner quadrant.  A suspicious irregular   hypoechoic mass measuring 0.7 x 0.8 x 0.8 cm is present in the 3 o'clock   position, 7 cm from the nipple.  This corresponds to the mammographic finding.       Ultrasound imaging of the right axilla was also performed without   identifiable lymph nodes.           Impression   1. 2 noncalcified suspicious masses in the area palpable concern in the 1-2   o'clock position of the left breast with sonographic correlate measuring up   to 2.0 cm and 2.1 cm.       2. Left axillary ultrasound identifies 2 abnormal lymph nodes.       3.  New suspicious noncalcified mass in the lower inner quadrant of the right   breast with corresponding sonographic 0.8 cm mass in the 3 o'clock position,   7 cm from the nipple.       4.  No identifiable lymph nodes in the right axilla.       Ultrasound-guided biopsy of the left breast masses, a left axillary lymph   node, and right breast mass is recommended.       BIRADS:   BIRADS - CATEGORY 5       Findings are highly suggestive of malignancy.  Biopsy should be considered at   this time. ASSESSMENT:      In total, we spent greater than 80 minutes in face-to-face consultation with the patient and the majority of this time was spent in education, counseling, and coordination of care. During our encounter, we personally reviewed the above history and evaluation, including radiology and pathology data, in detail  Tawanda Garrett is a 42-year-old female with bilateral breast cancer, she has a family history of breast cancer in her sister and also grandmother. She would qualify genetic testing and I will refer her to genetic counselor. I discussed the pathology results, imaging studies, diagnosis, prognosis, treatment options with patient and her .   Tawanda Garrett has clinical stage II, T2 N1 M0 disease in her left breast and she has clinical stage I T1 N0 M0 disease in her right breast.  Given the fact that these lesions appeared within very short period of time as last year she had excisional biopsy of the lesion and negative mammograms. I would consider staging scans at this point to rule out any metastatic disease  If the workup is negative for metastatic disease. options include neoadjuvant chemotherapy followed by surgery which could include possible mastectomy on left side and possible lumpectomy on right. Patient is considering bilateral mastectomy which is reasonable however the results of genetic counseling would be helpful in this regard. We do not have the results of HER-2/kelsy testing yet and the choice of neoadjuvant chemotherapy would be based on that. In anticipation of neoadjuvant chemotherapy. I will plan for port placement. I discussed the risk and benefits, side effects with patient and her . During today's visit, the patient and the family had a number of reasonable questions which were answered to their satisfaction. They verbalized understanding of the information provided and they agreed to proceed as outlined above. PLAN:   Plan for new adjuvant chemotherapy  HER-2/kelsy testing awaited  Referral to genetic counseling  Port placement with Gen. Surgery  Echocardiogram  I will get CT scan of the chest abdomen pelvis and bone scan  I will see her in [place] in 2 weeks to discuss further chemotherapy    Benson Wiggins MD  Hematologist/Medical Oncologist          This note is created with the assistance of a speech recognition program.  While intending to generate a document that actually reflects the content of the visit, the document can still have some errors including those of syntax and sound a like substitutions which may escape proof reading. It such instances, actual meaning can be extrapolated by contextual diversion.

## 2018-09-17 ENCOUNTER — TELEPHONE (OUTPATIENT)
Dept: ONCOLOGY | Age: 68
End: 2018-09-17

## 2018-09-17 ENCOUNTER — NURSE ONLY (OUTPATIENT)
Dept: SURGERY | Age: 68
End: 2018-09-17

## 2018-09-17 ENCOUNTER — TELEPHONE (OUTPATIENT)
Dept: SURGERY | Age: 68
End: 2018-09-17

## 2018-09-17 VITALS
BODY MASS INDEX: 31.23 KG/M2 | HEART RATE: 60 BPM | HEIGHT: 67 IN | DIASTOLIC BLOOD PRESSURE: 78 MMHG | WEIGHT: 199 LBS | SYSTOLIC BLOOD PRESSURE: 130 MMHG

## 2018-09-17 DIAGNOSIS — C50.911 BILATERAL MALIGNANT NEOPLASM OF BREAST IN FEMALE, UNSPECIFIED ESTROGEN RECEPTOR STATUS, UNSPECIFIED SITE OF BREAST (HCC): Primary | ICD-10-CM

## 2018-09-17 DIAGNOSIS — C50.912 BILATERAL MALIGNANT NEOPLASM OF BREAST IN FEMALE, UNSPECIFIED ESTROGEN RECEPTOR STATUS, UNSPECIFIED SITE OF BREAST (HCC): Primary | ICD-10-CM

## 2018-09-17 LAB — SURGICAL PATHOLOGY REPORT: NORMAL

## 2018-09-17 NOTE — PROGRESS NOTES
Instructions given, paperwork completed for port placement scheduled on 9/19/2018. Patient verbalizes understanding.

## 2018-09-17 NOTE — TELEPHONE ENCOUNTER
Per AVS, pt to have CT scans, bone scan, port placement and follow up at [facility] at the end of Sept. When all exams are complete. All correspondence faxed to Shi Nick at [facility] at [facility] received.

## 2018-09-17 NOTE — PROGRESS NOTES
Beulah Hunt is a 79 y.o. female          CC:    . Breast Cancer (Here to schedule port placement)      HISTORY OF PRESENT ILLNESS:              PAST MEDICAL HISTORY:        Family History   Problem Relation Age of Onset    Hypertension Mother         Renal failure.  Diabetes Mother         Type 2 at end of life.  Heart Failure Father     Coronary Art Dis Father         Status post CABG    Diabetes Father         Type 2, very late in life.  Breast Cancer Sister     Hypertension Brother     Hypertension Sister      Social History     Social History    Marital status:      Spouse name: N/A    Number of children: N/A    Years of education: N/A     Occupational History    retired [employer]     Social History Main Topics    Smoking status: Former Smoker     Packs/day: 0.50     Years: 7.00     Types: Cigarettes     Quit date: 1/1/1978    Smokeless tobacco: Never Used      Comment: Quit smoking in 1978.  Alcohol use Yes      Comment: Occasional alcohol.  Drug use: No    Sexual activity: Not on file     Other Topics Concern    Not on file     Social History Narrative    No narrative on file     Past Surgical History:   Procedure Laterality Date    BREAST BIOPSY Right 6/7/2017    Right Breast biopsy w/ needle placement -arrive 11am -to x-ray 12pm performed by Sandra Miner MD at [address] [place] [address]  3/21/2005    Screening. Negative. Due for repeat 2015.  DILATION AND CURETTAGE OF UTERUS  7/22/1976    Status post miscarriage.  DILATION AND CURETTAGE OF UTERUS  9/1/1995    bleeding    HYSTERECTOMY, TOTAL ABDOMINAL      KENIA AND BSO  11/7/1995    fibroids    TONSILLECTOMY  1978    Subsequent hemorrhage and cautery. Past Medical History:   Diagnosis Date    Diverticula of colon     Mild, sigmoid, asymptomatic.  Osteopenia     With low vitamin D level. Insurance doesn't cover routine DEXA scans. Pt took bisphosphonates for a year and didn't tolerate.     Unspecified vitamin D deficiency     Varicose veins of lower extremities      No current outpatient prescriptions on file prior to visit. No current facility-administered medications on file prior to visit.       Allergies as of 09/17/2018 - Review Complete 09/17/2018   Allergen Reaction Noted    Bisphosphonates Other (See Comments) 10/07/2014           ASSESS MENT:          PLAN: Port placement

## 2018-09-17 NOTE — TELEPHONE ENCOUNTER
[facility] SPECIALTY [facility]    Pre-Operative Evaluation/Consultation    Name:  Alicia Addison                                         Age:  79 y.o. MRN:  [id number]       :  1950   Date:  2018         Sex: female    There were no encounter diagnoses. Surgeon:  Dr. Royal Oquendo  Procedure (Planned):  Port placement  Date Scheduled surgery: 2018    Attending : No att. providers found    Primary Physician: [place]  Cardiologist: None    Type of Anesthesia Requested: whatever you and Dr Royal Oquendo decide    Patient Medical history: Allergies   Allergen Reactions    Bisphosphonates Other (See Comments)     Jaw pain. Took both Fosamax and Boniva and tolerated neither over a years period for osteopenia. Patient Active Problem List   Diagnosis    Osteopenia    Diverticula of colon    Varicose veins of both lower extremities    Vitamin D deficiency     Past Medical History:   Diagnosis Date    Diverticula of colon     Mild, sigmoid, asymptomatic.  Osteopenia     With low vitamin D level. Insurance doesn't cover routine DEXA scans. Pt took bisphosphonates for a year and didn't tolerate.  Unspecified vitamin D deficiency     Varicose veins of lower extremities      Past Surgical History:   Procedure Laterality Date    BREAST BIOPSY Right 2017    Right Breast biopsy w/ needle placement -arrive 11am -to x-ray 12pm performed by Fab Amaya MD at [facility]  3/21/2005    Screening. Negative. Due for repeat 2015.  DILATION AND CURETTAGE OF UTERUS  1976    Status post miscarriage.  DILATION AND CURETTAGE OF UTERUS  1995    bleeding    HYSTERECTOMY, TOTAL ABDOMINAL      KENIA AND BSO  1995    fibroids    TONSILLECTOMY      Subsequent hemorrhage and cautery.      Social History   Substance Use Topics    Smoking status: Former Smoker     Packs/day: 0.50     Years: 7.00     Types: Cigarettes     Quit date: 1978    Smokeless tobacco: Never Used      Comment: Quit smoking in 1978.  Alcohol use Yes      Comment: Occasional alcohol. Medications:  No current outpatient prescriptions on file. No current facility-administered medications for this visit. Scheduled Meds:  Continuous Infusions:  PRN Meds:. Prior to Admission medications    Not on File     Vital Signs (Current) [unfilled]    Weight:   Wt Readings from Last 1 Encounters:   09/17/18 199 lb (90.3 kg)     Height:   Ht Readings from Last 1 Encounters:   09/17/18 5' 7.01\" (1.702 m)      BMI:  There is no height or weight on file to calculate BMI. Estimated body mass index is 31.16 kg/m² as calculated from the following:    Height as of an earlier encounter on 9/17/18: 5' 7.01\" (1.702 m). Weight as of an earlier encounter on 9/17/18: 199 lb (90.3 kg). body mass index is unknown because there is no height or weight on file. Cardiac Clearance: None   Medical Clearance:None   Appointment for surgery Clearance scheduled for:None     Preoperative Testing: These are the current and completed labs:  CBC:   Lab Results   Component Value Date    WBC 6.2 09/12/2018    RBC 4.45 09/12/2018    HGB 13.8 09/12/2018    HCT 41.3 09/12/2018    MCV 92.7 09/12/2018    RDW 13.2 09/12/2018     09/12/2018     CMP:   Lab Results   Component Value Date     09/12/2018    K 4.1 09/12/2018     09/12/2018    CO2 28 09/12/2018    BUN 14 09/12/2018    CREATININE 0.65 09/12/2018    GFRAA >60 09/12/2018    LABGLOM >60 09/12/2018    GLUCOSE 100 09/12/2018    PROT 7.3 09/12/2018    CALCIUM 9.7 09/12/2018    BILITOT 0.27 09/12/2018    ALKPHOS 68 09/12/2018    AST 22 09/12/2018    ALT 21 09/12/2018     POC Tests: No results for input(s): POCGLU, POCNA, POCK, POCCL, POCBUN, POCHEMO, POCHCT in the last 72 hours.   Coags  No results found for: PROTIME, INR, APTT  HCG (If Applicable) No results found for: PREGTESTUR, PREGSERUM, HCG, HCGQUANT   ABGs No results found for: PHART, PO2ART, [id number], [id number], BEART,

## 2018-09-19 ENCOUNTER — ANESTHESIA (OUTPATIENT)
Dept: OPERATING ROOM | Age: 68
End: 2018-09-19
Payer: MEDICARE

## 2018-09-19 ENCOUNTER — HOSPITAL ENCOUNTER (OUTPATIENT)
Age: 68
Setting detail: OUTPATIENT SURGERY
Discharge: HOME | End: 2018-09-19
Attending: SURGERY | Admitting: SURGERY
Payer: MEDICARE

## 2018-09-19 ENCOUNTER — ANESTHESIA EVENT (OUTPATIENT)
Dept: OPERATING ROOM | Age: 68
End: 2018-09-19
Payer: MEDICARE

## 2018-09-19 ENCOUNTER — APPOINTMENT (OUTPATIENT)
Dept: GENERAL RADIOLOGY | Age: 68
End: 2018-09-19
Attending: SURGERY
Payer: MEDICARE

## 2018-09-19 VITALS
DIASTOLIC BLOOD PRESSURE: 51 MMHG | RESPIRATION RATE: 16 BRPM | SYSTOLIC BLOOD PRESSURE: 104 MMHG | OXYGEN SATURATION: 97 %

## 2018-09-19 VITALS
OXYGEN SATURATION: 99 % | WEIGHT: 196.8 LBS | DIASTOLIC BLOOD PRESSURE: 62 MMHG | TEMPERATURE: 97.2 F | HEIGHT: 67 IN | SYSTOLIC BLOOD PRESSURE: 129 MMHG | RESPIRATION RATE: 16 BRPM | HEART RATE: 60 BPM | BODY MASS INDEX: 30.89 KG/M2

## 2018-09-19 DIAGNOSIS — G89.18 POSTOPERATIVE PAIN: Primary | ICD-10-CM

## 2018-09-19 DIAGNOSIS — J93.9 PNEUMOTHORAX ON RIGHT: Primary | ICD-10-CM

## 2018-09-19 PROCEDURE — 77001 FLUOROGUIDE FOR VEIN DEVICE: CPT | Performed by: SURGERY

## 2018-09-19 PROCEDURE — 2580000003 HC RX 258: Performed by: SURGERY

## 2018-09-19 PROCEDURE — 6360000002 HC RX W HCPCS: Performed by: SURGERY

## 2018-09-19 PROCEDURE — 3700000000 HC ANESTHESIA ATTENDED CARE: Performed by: SURGERY

## 2018-09-19 PROCEDURE — 77001 FLUOROGUIDE FOR VEIN DEVICE: CPT

## 2018-09-19 PROCEDURE — 3700000001 HC ADD 15 MINUTES (ANESTHESIA): Performed by: SURGERY

## 2018-09-19 PROCEDURE — 6360000002 HC RX W HCPCS: Performed by: NURSE ANESTHETIST, CERTIFIED REGISTERED

## 2018-09-19 PROCEDURE — 71045 X-RAY EXAM CHEST 1 VIEW: CPT

## 2018-09-19 PROCEDURE — 00532 ANES ACCESS CTR VENOUS CRCJ: CPT | Performed by: NURSE ANESTHETIST, CERTIFIED REGISTERED

## 2018-09-19 PROCEDURE — 2709999900 HC NON-CHARGEABLE SUPPLY: Performed by: SURGERY

## 2018-09-19 PROCEDURE — 3600000012 HC SURGERY LEVEL 2 ADDTL 15MIN: Performed by: SURGERY

## 2018-09-19 PROCEDURE — 7100000010 HC PHASE II RECOVERY - FIRST 15 MIN: Performed by: SURGERY

## 2018-09-19 PROCEDURE — 36561 INSERT TUNNELED CV CATH: CPT | Performed by: SURGERY

## 2018-09-19 PROCEDURE — 3600000002 HC SURGERY LEVEL 2 BASE: Performed by: SURGERY

## 2018-09-19 PROCEDURE — C1788 PORT, INDWELLING, IMP: HCPCS | Performed by: SURGERY

## 2018-09-19 PROCEDURE — 6360000002 HC RX W HCPCS

## 2018-09-19 PROCEDURE — 7100000011 HC PHASE II RECOVERY - ADDTL 15 MIN: Performed by: SURGERY

## 2018-09-19 DEVICE — PORT PWR INJ TI SIL ATTCH SIL CATHETER 96FRX66CM L W 10FR: Type: IMPLANTABLE DEVICE | Site: NECK | Status: FUNCTIONAL

## 2018-09-19 RX ORDER — FENTANYL CITRATE 50 UG/ML
25 INJECTION, SOLUTION INTRAMUSCULAR; INTRAVENOUS EVERY 5 MIN PRN
Status: DISCONTINUED | OUTPATIENT
Start: 2018-09-19 | End: 2018-09-19 | Stop reason: HOSPADM

## 2018-09-19 RX ORDER — MORPHINE SULFATE 2 MG/ML
2 INJECTION, SOLUTION INTRAMUSCULAR; INTRAVENOUS EVERY 5 MIN PRN
Status: DISCONTINUED | OUTPATIENT
Start: 2018-09-19 | End: 2018-09-19 | Stop reason: HOSPADM

## 2018-09-19 RX ORDER — ONDANSETRON 2 MG/ML
INJECTION INTRAMUSCULAR; INTRAVENOUS PRN
Status: DISCONTINUED | OUTPATIENT
Start: 2018-09-19 | End: 2018-09-19 | Stop reason: SDUPTHER

## 2018-09-19 RX ORDER — SODIUM CHLORIDE 0.9 % (FLUSH) 0.9 %
10 SYRINGE (ML) INJECTION EVERY 12 HOURS SCHEDULED
Status: DISCONTINUED | OUTPATIENT
Start: 2018-09-19 | End: 2018-09-19 | Stop reason: HOSPADM

## 2018-09-19 RX ORDER — FENTANYL CITRATE 50 UG/ML
50 INJECTION, SOLUTION INTRAMUSCULAR; INTRAVENOUS EVERY 5 MIN PRN
Status: DISCONTINUED | OUTPATIENT
Start: 2018-09-19 | End: 2018-09-19 | Stop reason: HOSPADM

## 2018-09-19 RX ORDER — ONDANSETRON 2 MG/ML
4 INJECTION INTRAMUSCULAR; INTRAVENOUS
Status: DISCONTINUED | OUTPATIENT
Start: 2018-09-19 | End: 2018-09-19 | Stop reason: HOSPADM

## 2018-09-19 RX ORDER — PROPOFOL 10 MG/ML
INJECTION, EMULSION INTRAVENOUS PRN
Status: DISCONTINUED | OUTPATIENT
Start: 2018-09-19 | End: 2018-09-19 | Stop reason: SDUPTHER

## 2018-09-19 RX ORDER — FENTANYL CITRATE 50 UG/ML
INJECTION, SOLUTION INTRAMUSCULAR; INTRAVENOUS PRN
Status: DISCONTINUED | OUTPATIENT
Start: 2018-09-19 | End: 2018-09-19 | Stop reason: SDUPTHER

## 2018-09-19 RX ORDER — MIDAZOLAM HYDROCHLORIDE 1 MG/ML
INJECTION INTRAMUSCULAR; INTRAVENOUS PRN
Status: DISCONTINUED | OUTPATIENT
Start: 2018-09-19 | End: 2018-09-19 | Stop reason: SDUPTHER

## 2018-09-19 RX ORDER — DIPHENHYDRAMINE HYDROCHLORIDE 50 MG/ML
12.5 INJECTION INTRAMUSCULAR; INTRAVENOUS
Status: DISCONTINUED | OUTPATIENT
Start: 2018-09-19 | End: 2018-09-19 | Stop reason: HOSPADM

## 2018-09-19 RX ORDER — HYDROCODONE BITARTRATE AND ACETAMINOPHEN 5; 325 MG/1; MG/1
2 TABLET ORAL PRN
Status: DISCONTINUED | OUTPATIENT
Start: 2018-09-19 | End: 2018-09-19 | Stop reason: HOSPADM

## 2018-09-19 RX ORDER — MORPHINE SULFATE 2 MG/ML
1 INJECTION, SOLUTION INTRAMUSCULAR; INTRAVENOUS EVERY 5 MIN PRN
Status: DISCONTINUED | OUTPATIENT
Start: 2018-09-19 | End: 2018-09-19 | Stop reason: HOSPADM

## 2018-09-19 RX ORDER — HYDROCODONE BITARTRATE AND ACETAMINOPHEN 5; 325 MG/1; MG/1
1 TABLET ORAL PRN
Status: DISCONTINUED | OUTPATIENT
Start: 2018-09-19 | End: 2018-09-19 | Stop reason: HOSPADM

## 2018-09-19 RX ORDER — HEPARIN SODIUM 5000 [USP'U]/ML
INJECTION, SOLUTION INTRAVENOUS; SUBCUTANEOUS PRN
Status: DISCONTINUED | OUTPATIENT
Start: 2018-09-19 | End: 2018-09-19 | Stop reason: HOSPADM

## 2018-09-19 RX ORDER — SODIUM CHLORIDE 0.9 % (FLUSH) 0.9 %
10 SYRINGE (ML) INJECTION PRN
Status: DISCONTINUED | OUTPATIENT
Start: 2018-09-19 | End: 2018-09-19 | Stop reason: HOSPADM

## 2018-09-19 RX ORDER — PROPOFOL 10 MG/ML
INJECTION, EMULSION INTRAVENOUS CONTINUOUS PRN
Status: DISCONTINUED | OUTPATIENT
Start: 2018-09-19 | End: 2018-09-19 | Stop reason: SDUPTHER

## 2018-09-19 RX ORDER — SODIUM CHLORIDE, SODIUM LACTATE, POTASSIUM CHLORIDE, CALCIUM CHLORIDE 600; 310; 30; 20 MG/100ML; MG/100ML; MG/100ML; MG/100ML
INJECTION, SOLUTION INTRAVENOUS CONTINUOUS
Status: DISCONTINUED | OUTPATIENT
Start: 2018-09-19 | End: 2018-09-19 | Stop reason: HOSPADM

## 2018-09-19 RX ORDER — MEPERIDINE HYDROCHLORIDE 50 MG/ML
12.5 INJECTION INTRAMUSCULAR; INTRAVENOUS; SUBCUTANEOUS EVERY 5 MIN PRN
Status: DISCONTINUED | OUTPATIENT
Start: 2018-09-19 | End: 2018-09-19 | Stop reason: HOSPADM

## 2018-09-19 RX ORDER — HYDROCODONE BITARTRATE AND ACETAMINOPHEN 5; 325 MG/1; MG/1
1 TABLET ORAL EVERY 6 HOURS PRN
Qty: 25 TABLET | Refills: 0 | Status: SHIPPED | OUTPATIENT
Start: 2018-09-19 | End: 2018-09-24

## 2018-09-19 RX ORDER — KETOROLAC TROMETHAMINE 30 MG/ML
INJECTION, SOLUTION INTRAMUSCULAR; INTRAVENOUS PRN
Status: DISCONTINUED | OUTPATIENT
Start: 2018-09-19 | End: 2018-09-19 | Stop reason: SDUPTHER

## 2018-09-19 RX ORDER — DEXAMETHASONE SODIUM PHOSPHATE 4 MG/ML
INJECTION, SOLUTION INTRA-ARTICULAR; INTRALESIONAL; INTRAMUSCULAR; INTRAVENOUS; SOFT TISSUE PRN
Status: DISCONTINUED | OUTPATIENT
Start: 2018-09-19 | End: 2018-09-19 | Stop reason: SDUPTHER

## 2018-09-19 RX ADMIN — SODIUM CHLORIDE, POTASSIUM CHLORIDE, SODIUM LACTATE AND CALCIUM CHLORIDE: 600; 310; 30; 20 INJECTION, SOLUTION INTRAVENOUS at 11:57

## 2018-09-19 RX ADMIN — PROPOFOL 50 MG: 10 INJECTION, EMULSION INTRAVENOUS at 14:42

## 2018-09-19 RX ADMIN — SODIUM CHLORIDE, POTASSIUM CHLORIDE, SODIUM LACTATE AND CALCIUM CHLORIDE: 600; 310; 30; 20 INJECTION, SOLUTION INTRAVENOUS at 15:34

## 2018-09-19 RX ADMIN — DEXAMETHASONE SODIUM PHOSPHATE 4 MG: 4 INJECTION, SOLUTION INTRAMUSCULAR; INTRAVENOUS at 15:24

## 2018-09-19 RX ADMIN — FENTANYL CITRATE 100 MCG: 50 INJECTION, SOLUTION INTRAMUSCULAR; INTRAVENOUS at 14:35

## 2018-09-19 RX ADMIN — ENOXAPARIN SODIUM 40 MG: 40 INJECTION SUBCUTANEOUS at 15:09

## 2018-09-19 RX ADMIN — PHENYLEPHRINE HYDROCHLORIDE 200 MCG: 10 INJECTION INTRAVENOUS at 15:06

## 2018-09-19 RX ADMIN — Medication 2 G: at 15:00

## 2018-09-19 RX ADMIN — PHENYLEPHRINE HYDROCHLORIDE 200 MCG: 10 INJECTION INTRAVENOUS at 15:15

## 2018-09-19 RX ADMIN — ONDANSETRON 4 MG: 2 INJECTION INTRAMUSCULAR; INTRAVENOUS at 15:24

## 2018-09-19 RX ADMIN — MIDAZOLAM HYDROCHLORIDE 2 MG: 1 INJECTION, SOLUTION INTRAMUSCULAR; INTRAVENOUS at 14:35

## 2018-09-19 RX ADMIN — KETOROLAC TROMETHAMINE 30 MG: 30 INJECTION, SOLUTION INTRAMUSCULAR; INTRAVENOUS at 15:37

## 2018-09-19 RX ADMIN — PROPOFOL 200 MCG/KG/MIN: 10 INJECTION, EMULSION INTRAVENOUS at 14:42

## 2018-09-19 ASSESSMENT — PAIN - FUNCTIONAL ASSESSMENT: PAIN_FUNCTIONAL_ASSESSMENT: 0-10

## 2018-09-19 ASSESSMENT — PAIN SCALES - GENERAL
PAINLEVEL_OUTOF10: 0

## 2018-09-19 NOTE — PROGRESS NOTES
Pt post op CXR showed small apical PTX. The case went very smooth and vein was canulated on first pass. But clearly has small PTX, she is asymptomatic. Will dc home, if any cp or sob RTC to ER  Otherwise, will have come back to get cXR in am.  Discussed with my office and will check on it.

## 2018-09-19 NOTE — ANESTHESIA PRE PROCEDURE
Department of Anesthesiology  Preprocedure Note       Name:  Sloane Rosales   Age:  79 y.o.  :  1950                                          MRN:  [id number]         Date:  2018      Surgeon: Ranjith Solo):  Nelia Cason MD    Procedure: Procedure(s): Port Placement    Medications prior to admission:   Prior to Admission medications    Not on File       Current medications:    No current facility-administered medications for this visit. No current outpatient prescriptions on file. Facility-Administered Medications Ordered in Other Visits   Medication Dose Route Frequency Provider Last Rate Last Dose    lactated ringers infusion   Intravenous Continuous Nelia Cason  mL/hr at 18 1157         Allergies: Allergies   Allergen Reactions    Bisphosphonates Other (See Comments)     Jaw pain. Took both Fosamax and Boniva and tolerated neither over a years period for osteopenia. Problem List:    Patient Active Problem List   Diagnosis Code    Osteopenia M85.80    Diverticula of colon K57.30    Varicose veins of both lower extremities I83.93    Vitamin D deficiency E55.9       Past Medical History:        Diagnosis Date    Diverticula of colon     Mild, sigmoid, asymptomatic.  Osteopenia     With low vitamin D level. Insurance doesn't cover routine DEXA scans. Pt took bisphosphonates for a year and didn't tolerate.  Unspecified vitamin D deficiency     Varicose veins of lower extremities        Past Surgical History:        Procedure Laterality Date    BREAST BIOPSY Right 2017    Right Breast biopsy w/ needle placement -arrive 11am -to x-ray 12pm performed by Nelia Cason MD at [address] [place] [address]  3/21/2005    Screening. Negative. Due for repeat 2015.  DILATION AND CURETTAGE OF UTERUS  1976    Status post miscarriage.     DILATION AND CURETTAGE OF UTERUS  1995    bleeding    HYSTERECTOMY, TOTAL ABDOMINAL      KENIA AND BSO  1995    fibroids    TONSILLECTOMY  1978    Subsequent hemorrhage and cautery. Social History:    Social History   Substance Use Topics    Smoking status: Former Smoker     Packs/day: 0.50     Years: 7.00     Types: Cigarettes     Quit date: 1/1/1978    Smokeless tobacco: Never Used      Comment: Quit smoking in 1978.  Alcohol use Yes      Comment: Occasional alcohol. Counseling given: Not Answered      Vital Signs (Current): There were no vitals filed for this visit. BP Readings from Last 3 Encounters:   09/19/18 (!) 148/67   09/17/18 130/78   09/14/18 133/72       NPO Status:                                                                                 BMI:   Wt Readings from Last 3 Encounters:   09/19/18 196 lb 12.8 oz (89.3 kg)   09/17/18 199 lb (90.3 kg)   09/14/18 198 lb 9 oz (90.1 kg)     There is no height or weight on file to calculate BMI.    CBC:   Lab Results   Component Value Date    WBC 6.2 09/12/2018    RBC 4.45 09/12/2018    HGB 13.8 09/12/2018    HCT 41.3 09/12/2018    MCV 92.7 09/12/2018    RDW 13.2 09/12/2018     09/12/2018       CMP:   Lab Results   Component Value Date     09/12/2018    K 4.1 09/12/2018     09/12/2018    CO2 28 09/12/2018    BUN 14 09/12/2018    CREATININE 0.65 09/12/2018    GFRAA >60 09/12/2018    LABGLOM >60 09/12/2018    GLUCOSE 100 09/12/2018    PROT 7.3 09/12/2018    CALCIUM 9.7 09/12/2018    BILITOT 0.27 09/12/2018    ALKPHOS 68 09/12/2018    AST 22 09/12/2018    ALT 21 09/12/2018       POC Tests: No results for input(s): POCGLU, POCNA, POCK, POCCL, POCBUN, POCHEMO, POCHCT in the last 72 hours.     Coags: No results found for: PROTIME, INR, APTT    HCG (If Applicable): No results found for: PREGTESTUR, PREGSERUM, HCG, HCGQUANT     ABGs: No results found for: PHART, PO2ART, TLQ3AEW, PKY6YEH, BEART, J5CXNVOM     Type & Screen (If Applicable):  No results found for: LABABO, from the medications is complete and the patient has returned to baseline status. Pt verbalized an understanding and agreed to proceed.    )  Induction: intravenous. Anesthetic plan and risks discussed with patient.       Plan discussed with surgical team.                  ELICIA Wilson - CRNA   9/19/2018

## 2018-09-19 NOTE — PROGRESS NOTES
Dr. Marylene Blamer notified of chest xray results - small right pneumothorax. Dr. Marylene Blamer in to speak with patient and spouse. Patient is to be discharged home, then return tomorrow morning for outpatient chest x-ray. Discharge instructions reviewed with patient and family - all voice understanding.

## 2018-09-20 ENCOUNTER — HOSPITAL ENCOUNTER (OUTPATIENT)
Dept: GENERAL RADIOLOGY | Age: 68
Discharge: HOME | End: 2018-09-22
Payer: MEDICARE

## 2018-09-20 ENCOUNTER — TELEPHONE (OUTPATIENT)
Dept: SURGERY | Age: 68
End: 2018-09-20

## 2018-09-20 DIAGNOSIS — J93.9 PNEUMOTHORAX ON RIGHT: Primary | ICD-10-CM

## 2018-09-20 DIAGNOSIS — J93.9 PNEUMOTHORAX ON RIGHT: ICD-10-CM

## 2018-09-20 PROCEDURE — 71046 X-RAY EXAM CHEST 2 VIEWS: CPT

## 2018-09-21 ENCOUNTER — HOSPITAL ENCOUNTER (OUTPATIENT)
Dept: NUCLEAR MEDICINE | Age: 68
Discharge: HOME | End: 2018-09-23
Payer: MEDICARE

## 2018-09-21 ENCOUNTER — HOSPITAL ENCOUNTER (OUTPATIENT)
Dept: CT IMAGING | Age: 68
Discharge: HOME | End: 2018-09-23
Payer: MEDICARE

## 2018-09-21 DIAGNOSIS — Z17.0 MALIGNANT NEOPLASM OF UPPER-OUTER QUADRANT OF BOTH BREASTS IN FEMALE, ESTROGEN RECEPTOR POSITIVE (HCC): ICD-10-CM

## 2018-09-21 DIAGNOSIS — C50.412 CARCINOMA OF UPPER-OUTER QUADRANT OF FEMALE BREAST, LEFT (HCC): ICD-10-CM

## 2018-09-21 DIAGNOSIS — C50.411 MALIGNANT NEOPLASM OF UPPER-OUTER QUADRANT OF BOTH BREASTS IN FEMALE, ESTROGEN RECEPTOR POSITIVE (HCC): ICD-10-CM

## 2018-09-21 DIAGNOSIS — C50.412 MALIGNANT NEOPLASM OF UPPER-OUTER QUADRANT OF BOTH BREASTS IN FEMALE, ESTROGEN RECEPTOR POSITIVE (HCC): ICD-10-CM

## 2018-09-21 PROCEDURE — 78306 BONE IMAGING WHOLE BODY: CPT

## 2018-09-21 PROCEDURE — 6360000004 HC RX CONTRAST MEDICATION: Performed by: INTERNAL MEDICINE

## 2018-09-21 PROCEDURE — 74177 CT ABD & PELVIS W/CONTRAST: CPT

## 2018-09-21 PROCEDURE — 2580000003 HC RX 258: Performed by: INTERNAL MEDICINE

## 2018-09-21 PROCEDURE — 3430000000 HC RX DIAGNOSTIC RADIOPHARMACEUTICAL: Performed by: INTERNAL MEDICINE

## 2018-09-21 PROCEDURE — 71260 CT THORAX DX C+: CPT

## 2018-09-21 PROCEDURE — A9503 TC99M MEDRONATE: HCPCS | Performed by: INTERNAL MEDICINE

## 2018-09-21 RX ORDER — 0.9 % SODIUM CHLORIDE 0.9 %
60 INTRAVENOUS SOLUTION INTRAVENOUS ONCE
Status: COMPLETED | OUTPATIENT
Start: 2018-09-21 | End: 2018-09-21

## 2018-09-21 RX ORDER — SODIUM CHLORIDE 0.9 % (FLUSH) 0.9 %
10 SYRINGE (ML) INJECTION PRN
Status: DISCONTINUED | OUTPATIENT
Start: 2018-09-21 | End: 2018-09-24 | Stop reason: HOSPADM

## 2018-09-21 RX ORDER — TC 99M MEDRONATE 20 MG/10ML
25.9 INJECTION, POWDER, LYOPHILIZED, FOR SOLUTION INTRAVENOUS
Status: COMPLETED | OUTPATIENT
Start: 2018-09-21 | End: 2018-09-21

## 2018-09-21 RX ADMIN — SODIUM CHLORIDE 60 ML: 9 INJECTION, SOLUTION INTRAVENOUS at 11:13

## 2018-09-21 RX ADMIN — Medication 10 ML: at 11:14

## 2018-09-21 RX ADMIN — IOHEXOL 50 ML: 240 INJECTION, SOLUTION INTRATHECAL; INTRAVASCULAR; INTRAVENOUS; ORAL at 11:14

## 2018-09-21 RX ADMIN — Medication 25.9 MILLICURIE: at 09:50

## 2018-09-21 RX ADMIN — IOPAMIDOL 100 ML: 755 INJECTION, SOLUTION INTRAVENOUS at 11:14

## 2018-09-24 ENCOUNTER — TELEPHONE (OUTPATIENT)
Dept: ONCOLOGY | Age: 68
End: 2018-09-24

## 2018-09-24 ENCOUNTER — HOSPITAL ENCOUNTER (OUTPATIENT)
Dept: GENERAL RADIOLOGY | Age: 68
Discharge: HOME | End: 2018-09-26
Payer: MEDICARE

## 2018-09-24 ENCOUNTER — OFFICE VISIT (OUTPATIENT)
Dept: ONCOLOGY | Age: 68
End: 2018-09-24
Payer: MEDICARE

## 2018-09-24 VITALS
TEMPERATURE: 98.2 F | HEART RATE: 78 BPM | WEIGHT: 198.2 LBS | DIASTOLIC BLOOD PRESSURE: 80 MMHG | BODY MASS INDEX: 31.11 KG/M2 | HEIGHT: 67 IN | SYSTOLIC BLOOD PRESSURE: 138 MMHG

## 2018-09-24 DIAGNOSIS — Z17.0 MALIGNANT NEOPLASM OF UPPER-OUTER QUADRANT OF BOTH BREASTS IN FEMALE, ESTROGEN RECEPTOR POSITIVE (HCC): Primary | ICD-10-CM

## 2018-09-24 DIAGNOSIS — C50.412 MALIGNANT NEOPLASM OF UPPER-OUTER QUADRANT OF BOTH BREASTS IN FEMALE, ESTROGEN RECEPTOR POSITIVE (HCC): Primary | ICD-10-CM

## 2018-09-24 DIAGNOSIS — C50.411 MALIGNANT NEOPLASM OF UPPER-OUTER QUADRANT OF BOTH BREASTS IN FEMALE, ESTROGEN RECEPTOR POSITIVE (HCC): Primary | ICD-10-CM

## 2018-09-24 DIAGNOSIS — J93.9 PNEUMOTHORAX ON RIGHT: ICD-10-CM

## 2018-09-24 PROCEDURE — 99215 OFFICE O/P EST HI 40 MIN: CPT | Performed by: INTERNAL MEDICINE

## 2018-09-24 PROCEDURE — 71046 X-RAY EXAM CHEST 2 VIEWS: CPT

## 2018-09-24 NOTE — PROGRESS NOTES
left breast  Heart - normal rate, regular rhythm, normal S1, S2, no murmurs, rubs, clicks or gallops   Abdomen - soft, nontender, nondistended, no masses or organomegaly   Neurological - alert, oriented, normal speech, no focal findings or movement disorder noted   Musculoskeletal - no joint tenderness, deformity or swelling   Extremities - peripheral pulses normal, no pedal edema, no clubbing or cyanosis   Skin - normal coloration and turgor, no rashes, no suspicious skin lesions noted   LABORATORY DATA:     Lab Results   Component Value Date    WBC 6.2 09/12/2018    HGB 13.8 09/12/2018    HCT 41.3 09/12/2018    MCV 92.7 09/12/2018     09/12/2018    LYMPHOPCT 33 09/12/2018    RBC 4.45 09/12/2018    MCH 31.0 09/12/2018    MCHC 33.4 09/12/2018    RDW 13.2 09/12/2018    MONOPCT 10 09/12/2018    BASOPCT 1 09/12/2018    NEUTROABS 3.40 09/12/2018    LYMPHSABS 2.10 09/12/2018    MONOSABS 0.60 09/12/2018    EOSABS 0.10 09/12/2018    BASOSABS 0.00 09/12/2018       Chemistry        Component Value Date/Time     09/12/2018 1547    K 4.1 09/12/2018 1547     09/12/2018 1547    CO2 28 09/12/2018 1547    BUN 14 09/12/2018 1547    CREATININE 0.65 09/12/2018 1547        Component Value Date/Time    CALCIUM 9.7 09/12/2018 1547    ALKPHOS 68 09/12/2018 1547    AST 22 09/12/2018 1547    ALT 21 09/12/2018 1547    BILITOT 0.27 (L) 09/12/2018 1547        PATHOLOGY DATA:   Received: 9/11/2018   Reported: 9/12/2018 17:30     -- Diagnosis --   1.  RIGHT BREAST, CORE NEEDLE BIOPSIES:            -  WELL-DIFFERENTIATED INFILTRATING DUCTAL CARCINOMA, 0.6 CM   IN LARGEST INTACT CORE FRAGMENT.            -  ESTROGEN AND PROGESTERONE RECEPTOR IMMUNOSTAINS POSITIVE   IN INVASIVE TUMOR.       -  HER-2 FISH PENDING.      2.  LEFT BREAST, 2:00, N+4, CORE NEEDLE BIOPSIES:            -  MODERATELY DIFFERENTIATED INFILTRATING DUCTAL CARCINOMA,   0.6 CM IN LARGEST INTACT CORE FRAGMENT.       -  FOCAL DUCTAL CARCINOMA IN SITU, 2.1 x   2.0 cm in the 1 o'clock position, 6 cm from the nipple.       Ultrasound in the left axilla was also performed identifying a probable   abnormal lymph node in the axillary tail measuring 2.1 x 1.6 x 2.2 cm.  This   has an irregular appearance with a hyperechoic rim and no identifiable fatty   hilum. Venda Rosebud is an adjacent abnormal lymph node measuring 1.7 x 1.0 x 2.0 cm   with a small fatty hilum and focal cortical thickening.       TARGETED RIGHT ULTRASOUND: Ultrasound was performed in the area of   mammographic concern in the lower inner quadrant.  A suspicious irregular   hypoechoic mass measuring 0.7 x 0.8 x 0.8 cm is present in the 3 o'clock   position, 7 cm from the nipple.  This corresponds to the mammographic finding.       Ultrasound imaging of the right axilla was also performed without   identifiable lymph nodes.           Impression   1. 2 noncalcified suspicious masses in the area palpable concern in the 1-2   o'clock position of the left breast with sonographic correlate measuring up   to 2.0 cm and 2.1 cm.       2. Left axillary ultrasound identifies 2 abnormal lymph nodes.       3.  New suspicious noncalcified mass in the lower inner quadrant of the right   breast with corresponding sonographic 0.8 cm mass in the 3 o'clock position,   7 cm from the nipple.       4.  No identifiable lymph nodes in the right axilla.       Ultrasound-guided biopsy of the left breast masses, a left axillary lymph   node, and right breast mass is recommended.       BIRADS:   BIRADS - CATEGORY 5       Findings are highly suggestive of malignancy.  Biopsy should be considered at   this time. Bone scan 9/21/18  Impression   No evidence to suggest osseous metastatic disease.      CT chest abdomen pelvis 9/21/18  Impression   1.  Known bilateral breast masses and left axillary lymphadenopathy are   demonstrated.  No evidence for distant metastatic disease in the chest,   abdomen, or pelvis.       2.  Tiny right history  Echocardiogram scheduled for this week  Return to clinic one week after cycle 1    Benson Campbell MD  Hematologist/Medical Oncologist          This note is created with the assistance of a speech recognition program.  While intending to generate a document that actually reflects the content of the visit, the document can still have some errors including those of syntax and sound a like substitutions which may escape proof reading. It such instances, actual meaning can be extrapolated by contextual diversion.

## 2018-09-25 ENCOUNTER — HOSPITAL ENCOUNTER (OUTPATIENT)
Dept: NON INVASIVE DIAGNOSTICS | Age: 68
Discharge: HOME | End: 2018-09-25
Payer: MEDICARE

## 2018-09-25 LAB
LV EF: 60 %
LVEF MODALITY: NORMAL

## 2018-09-25 PROCEDURE — 93306 TTE W/DOPPLER COMPLETE: CPT

## 2018-10-01 ENCOUNTER — TELEPHONE (OUTPATIENT)
Dept: ONCOLOGY | Age: 68
End: 2018-10-01

## 2018-10-03 ENCOUNTER — HOSPITAL ENCOUNTER (OUTPATIENT)
Age: 68
Setting detail: OBSERVATION
Discharge: HOME | End: 2018-10-04
Attending: EMERGENCY MEDICINE | Admitting: FAMILY MEDICINE
Payer: MEDICARE

## 2018-10-03 ENCOUNTER — HOSPITAL ENCOUNTER (OUTPATIENT)
Dept: INFUSION THERAPY | Age: 68
Discharge: HOME | End: 2018-10-03
Payer: MEDICARE

## 2018-10-03 VITALS
HEIGHT: 67 IN | BODY MASS INDEX: 30.92 KG/M2 | OXYGEN SATURATION: 97 % | WEIGHT: 197 LBS | DIASTOLIC BLOOD PRESSURE: 69 MMHG | RESPIRATION RATE: 16 BRPM | HEART RATE: 70 BPM | TEMPERATURE: 98.2 F | SYSTOLIC BLOOD PRESSURE: 135 MMHG

## 2018-10-03 DIAGNOSIS — E86.0 DEHYDRATION: ICD-10-CM

## 2018-10-03 DIAGNOSIS — C50.411 MALIGNANT NEOPLASM OF UPPER-OUTER QUADRANT OF BOTH BREASTS IN FEMALE, ESTROGEN RECEPTOR POSITIVE (HCC): ICD-10-CM

## 2018-10-03 DIAGNOSIS — T50.905A DRUG SIDE EFFECTS, INITIAL ENCOUNTER: ICD-10-CM

## 2018-10-03 DIAGNOSIS — Z17.0 MALIGNANT NEOPLASM OF UPPER-OUTER QUADRANT OF BOTH BREASTS IN FEMALE, ESTROGEN RECEPTOR POSITIVE (HCC): ICD-10-CM

## 2018-10-03 DIAGNOSIS — R11.2 NAUSEA AND VOMITING, INTRACTABILITY OF VOMITING NOT SPECIFIED, UNSPECIFIED VOMITING TYPE: Primary | ICD-10-CM

## 2018-10-03 DIAGNOSIS — C50.412 MALIGNANT NEOPLASM OF UPPER-OUTER QUADRANT OF BOTH BREASTS IN FEMALE, ESTROGEN RECEPTOR POSITIVE (HCC): ICD-10-CM

## 2018-10-03 LAB
ABSOLUTE EOS #: 0.1 K/UL (ref 0–0.4)
ABSOLUTE IMMATURE GRANULOCYTE: NORMAL K/UL (ref 0–0.3)
ABSOLUTE LYMPH #: 1.7 K/UL (ref 1–4.8)
ABSOLUTE MONO #: 0.7 K/UL (ref 0.1–1.2)
ALBUMIN SERPL-MCNC: 4.5 G/DL (ref 3.5–5.2)
ALBUMIN/GLOBULIN RATIO: 1.7 (ref 1–2.5)
ALP BLD-CCNC: 67 U/L (ref 35–104)
ALT SERPL-CCNC: 22 U/L (ref 5–33)
ANION GAP SERPL CALCULATED.3IONS-SCNC: 10 MMOL/L (ref 9–17)
AST SERPL-CCNC: 21 U/L
BASOPHILS # BLD: 1 % (ref 0–1)
BASOPHILS ABSOLUTE: 0.1 K/UL (ref 0–0.2)
BILIRUB SERPL-MCNC: 0.41 MG/DL (ref 0.3–1.2)
BUN BLDV-MCNC: 14 MG/DL (ref 8–23)
BUN/CREAT BLD: 23 (ref 9–20)
CALCIUM SERPL-MCNC: 9.5 MG/DL (ref 8.6–10.4)
CHLORIDE BLD-SCNC: 106 MMOL/L (ref 98–107)
CO2: 25 MMOL/L (ref 20–31)
CREAT SERPL-MCNC: 0.61 MG/DL (ref 0.5–0.9)
DIFFERENTIAL TYPE: NORMAL
EOSINOPHILS RELATIVE PERCENT: 1 % (ref 1–7)
GFR AFRICAN AMERICAN: >60 ML/MIN
GFR NON-AFRICAN AMERICAN: >60 ML/MIN
GFR SERPL CREATININE-BSD FRML MDRD: ABNORMAL ML/MIN/{1.73_M2}
GFR SERPL CREATININE-BSD FRML MDRD: ABNORMAL ML/MIN/{1.73_M2}
GLUCOSE BLD-MCNC: 93 MG/DL (ref 70–99)
HCT VFR BLD CALC: 41.1 % (ref 36–46)
HEMOGLOBIN: 13.6 G/DL (ref 12–16)
IMMATURE GRANULOCYTES: NORMAL %
LYMPHOCYTES # BLD: 26 % (ref 16–46)
MCH RBC QN AUTO: 30.7 PG (ref 26–34)
MCHC RBC AUTO-ENTMCNC: 33.2 G/DL (ref 31–37)
MCV RBC AUTO: 92.7 FL (ref 80–100)
MONOCYTES # BLD: 10 % (ref 4–11)
NRBC AUTOMATED: NORMAL PER 100 WBC
PDW BLD-RTO: 13.6 % (ref 11–14.5)
PLATELET # BLD: 239 K/UL (ref 140–450)
PLATELET ESTIMATE: NORMAL
PMV BLD AUTO: 9.7 FL (ref 6–12)
POTASSIUM SERPL-SCNC: 4.4 MMOL/L (ref 3.7–5.3)
RBC # BLD: 4.43 M/UL (ref 4–5.2)
RBC # BLD: NORMAL 10*6/UL
SEG NEUTROPHILS: 62 % (ref 43–77)
SEGMENTED NEUTROPHILS ABSOLUTE COUNT: 4.2 K/UL (ref 1.8–7.7)
SODIUM BLD-SCNC: 141 MMOL/L (ref 135–144)
TOTAL PROTEIN: 7.2 G/DL (ref 6.4–8.3)
WBC # BLD: 6.7 K/UL (ref 3.5–11)
WBC # BLD: NORMAL 10*3/UL

## 2018-10-03 PROCEDURE — 84484 ASSAY OF TROPONIN QUANT: CPT

## 2018-10-03 PROCEDURE — 36591 DRAW BLOOD OFF VENOUS DEVICE: CPT

## 2018-10-03 PROCEDURE — 2580000003 HC RX 258: Performed by: INTERNAL MEDICINE

## 2018-10-03 PROCEDURE — 80053 COMPREHEN METABOLIC PANEL: CPT

## 2018-10-03 PROCEDURE — 2580000003 HC RX 258: Performed by: EMERGENCY MEDICINE

## 2018-10-03 PROCEDURE — 99285 EMERGENCY DEPT VISIT HI MDM: CPT

## 2018-10-03 PROCEDURE — 96374 THER/PROPH/DIAG INJ IV PUSH: CPT

## 2018-10-03 PROCEDURE — 96367 TX/PROPH/DG ADDL SEQ IV INF: CPT

## 2018-10-03 PROCEDURE — 96375 TX/PRO/DX INJ NEW DRUG ADDON: CPT

## 2018-10-03 PROCEDURE — 96377 APPLICATON ON-BODY INJECTOR: CPT

## 2018-10-03 PROCEDURE — 6360000002 HC RX W HCPCS: Performed by: INTERNAL MEDICINE

## 2018-10-03 PROCEDURE — 85025 COMPLETE CBC W/AUTO DIFF WBC: CPT

## 2018-10-03 PROCEDURE — 96409 CHEMO IV PUSH SNGL DRUG: CPT

## 2018-10-03 PROCEDURE — 6360000002 HC RX W HCPCS: Performed by: EMERGENCY MEDICINE

## 2018-10-03 PROCEDURE — 96413 CHEMO IV INFUSION 1 HR: CPT

## 2018-10-03 PROCEDURE — 93005 ELECTROCARDIOGRAM TRACING: CPT

## 2018-10-03 RX ORDER — HEPARIN SODIUM (PORCINE) LOCK FLUSH IV SOLN 100 UNIT/ML 100 UNIT/ML
500 SOLUTION INTRAVENOUS PRN
Status: DISCONTINUED | OUTPATIENT
Start: 2018-10-03 | End: 2018-10-04 | Stop reason: HOSPADM

## 2018-10-03 RX ORDER — DIPHENHYDRAMINE HYDROCHLORIDE 50 MG/ML
50 INJECTION INTRAMUSCULAR; INTRAVENOUS ONCE
Status: CANCELLED | OUTPATIENT
Start: 2018-10-03 | End: 2018-10-03

## 2018-10-03 RX ORDER — PROCHLORPERAZINE MALEATE 10 MG
10 TABLET ORAL EVERY 6 HOURS PRN
COMMUNITY
End: 2018-12-03 | Stop reason: SINTOL

## 2018-10-03 RX ORDER — METHYLPREDNISOLONE SODIUM SUCCINATE 125 MG/2ML
125 INJECTION, POWDER, LYOPHILIZED, FOR SOLUTION INTRAMUSCULAR; INTRAVENOUS ONCE
Status: CANCELLED | OUTPATIENT
Start: 2018-10-03 | End: 2018-10-03

## 2018-10-03 RX ORDER — 0.9 % SODIUM CHLORIDE 0.9 %
10 VIAL (ML) INJECTION ONCE
Status: CANCELLED | OUTPATIENT
Start: 2018-10-03 | End: 2018-10-03

## 2018-10-03 RX ORDER — SODIUM CHLORIDE 9 MG/ML
INJECTION, SOLUTION INTRAVENOUS CONTINUOUS
Status: CANCELLED | OUTPATIENT
Start: 2018-10-03

## 2018-10-03 RX ORDER — SODIUM CHLORIDE 0.9 % (FLUSH) 0.9 %
10 SYRINGE (ML) INJECTION PRN
Status: DISCONTINUED | OUTPATIENT
Start: 2018-10-03 | End: 2018-10-04 | Stop reason: HOSPADM

## 2018-10-03 RX ORDER — SODIUM CHLORIDE 0.9 % (FLUSH) 0.9 %
5 SYRINGE (ML) INJECTION PRN
Status: CANCELLED | OUTPATIENT
Start: 2018-10-03

## 2018-10-03 RX ORDER — ONDANSETRON 2 MG/ML
4 INJECTION INTRAMUSCULAR; INTRAVENOUS ONCE
Status: COMPLETED | OUTPATIENT
Start: 2018-10-03 | End: 2018-10-03

## 2018-10-03 RX ORDER — 0.9 % SODIUM CHLORIDE 0.9 %
1000 INTRAVENOUS SOLUTION INTRAVENOUS ONCE
Status: COMPLETED | OUTPATIENT
Start: 2018-10-03 | End: 2018-10-04

## 2018-10-03 RX ORDER — PALONOSETRON 0.05 MG/ML
0.25 INJECTION, SOLUTION INTRAVENOUS ONCE
Status: COMPLETED | OUTPATIENT
Start: 2018-10-03 | End: 2018-10-03

## 2018-10-03 RX ORDER — EPINEPHRINE 1 MG/ML
0.3 INJECTION, SOLUTION, CONCENTRATE INTRAVENOUS PRN
Status: CANCELLED | OUTPATIENT
Start: 2018-10-03

## 2018-10-03 RX ORDER — SODIUM CHLORIDE 9 MG/ML
INJECTION, SOLUTION INTRAVENOUS ONCE
Status: COMPLETED | OUTPATIENT
Start: 2018-10-03 | End: 2018-10-03

## 2018-10-03 RX ORDER — DOXORUBICIN HYDROCHLORIDE 2 MG/ML
60 INJECTION, SOLUTION INTRAVENOUS ONCE
Status: COMPLETED | OUTPATIENT
Start: 2018-10-03 | End: 2018-10-03

## 2018-10-03 RX ADMIN — HEPARIN SODIUM (PORCINE) LOCK FLUSH IV SOLN 100 UNIT/ML 500 UNITS: 100 SOLUTION at 11:00

## 2018-10-03 RX ADMIN — SODIUM CHLORIDE 150 MG: 9 INJECTION, SOLUTION INTRAVENOUS at 13:56

## 2018-10-03 RX ADMIN — PEGFILGRASTIM 6 MG: KIT SUBCUTANEOUS at 15:29

## 2018-10-03 RX ADMIN — SODIUM CHLORIDE: 9 INJECTION, SOLUTION INTRAVENOUS at 11:00

## 2018-10-03 RX ADMIN — HEPARIN SODIUM (PORCINE) LOCK FLUSH IV SOLN 100 UNIT/ML 500 UNITS: 100 SOLUTION at 15:33

## 2018-10-03 RX ADMIN — CYCLOPHOSPHAMIDE 1240 MG: 1 INJECTION, POWDER, FOR SOLUTION INTRAVENOUS; ORAL at 14:50

## 2018-10-03 RX ADMIN — DEXAMETHASONE SODIUM PHOSPHATE 12 MG: 10 INJECTION, SOLUTION INTRAMUSCULAR; INTRAVENOUS at 13:23

## 2018-10-03 RX ADMIN — ONDANSETRON HYDROCHLORIDE 4 MG: 2 SOLUTION INTRAMUSCULAR; INTRAVENOUS at 23:45

## 2018-10-03 RX ADMIN — PALONOSETRON HYDROCHLORIDE 0.25 MG: 0.25 INJECTION INTRAVENOUS at 13:16

## 2018-10-03 RX ADMIN — SODIUM CHLORIDE 1000 ML: 9 INJECTION, SOLUTION INTRAVENOUS at 23:45

## 2018-10-03 RX ADMIN — DOXORUBICIN HYDROCHLORIDE 124 MG: 2 INJECTION, SOLUTION INTRAVENOUS at 14:32

## 2018-10-03 NOTE — PROGRESS NOTES
Patient on unit for C1D1. Patient and her  here for chemotherapy teaching. Spent 60 minutes with them   Teaching sheets from [facility] and verbal information given on chemotherapy agents, action, administration and side effects. Provided chemotherapy folder, chemotherapy booklet and handouts, and nutrition handouts, handouts on fatigue, neutropenia, thrombocytopenia, bowel regimine, disposal of chemo waste. Drugs discussed: taxol, cytoxan, adriamycin  Discussed implanted port and explained each step with her as Port was accessed. No antiemetic ordered. Called Dr. James Michael to sign orders and order antiemetic for her message left.       Questions answered, support given

## 2018-10-03 NOTE — PROGRESS NOTES
Patient Stable all of chemo infused. Neulasta on body was administered per Jacobo Márquez RN. Patient and  was educated and sent home with care card about neulasta on body that comes with injector. Patient stable and discharged to home. Encouraged to call with any questions.

## 2018-10-04 ENCOUNTER — TELEPHONE (OUTPATIENT)
Dept: FAMILY MEDICINE CLINIC | Age: 68
End: 2018-10-04

## 2018-10-04 ENCOUNTER — APPOINTMENT (OUTPATIENT)
Dept: CT IMAGING | Age: 68
End: 2018-10-04
Payer: MEDICARE

## 2018-10-04 VITALS
SYSTOLIC BLOOD PRESSURE: 93 MMHG | TEMPERATURE: 97.6 F | RESPIRATION RATE: 16 BRPM | HEART RATE: 63 BPM | WEIGHT: 201 LBS | BODY MASS INDEX: 31.55 KG/M2 | OXYGEN SATURATION: 100 % | HEIGHT: 67 IN | DIASTOLIC BLOOD PRESSURE: 48 MMHG

## 2018-10-04 PROBLEM — R11.2 NAUSEA & VOMITING: Status: ACTIVE | Noted: 2018-10-04

## 2018-10-04 PROBLEM — K44.9 HIATAL HERNIA: Status: ACTIVE | Noted: 2018-10-04

## 2018-10-04 LAB
ABSOLUTE EOS #: 0 K/UL (ref 0–0.4)
ABSOLUTE EOS #: 0 K/UL (ref 0–0.4)
ABSOLUTE IMMATURE GRANULOCYTE: ABNORMAL K/UL (ref 0–0.3)
ABSOLUTE IMMATURE GRANULOCYTE: ABNORMAL K/UL (ref 0–0.3)
ABSOLUTE LYMPH #: 0.5 K/UL (ref 1–4.8)
ABSOLUTE LYMPH #: 0.6 K/UL (ref 1–4.8)
ABSOLUTE MONO #: 0 K/UL (ref 0.1–1.2)
ABSOLUTE MONO #: 0.3 K/UL (ref 0.1–1.2)
ALBUMIN SERPL-MCNC: 4.3 G/DL (ref 3.5–5.2)
ALBUMIN/GLOBULIN RATIO: 1.7 (ref 1–2.5)
ALP BLD-CCNC: 65 U/L (ref 35–104)
ALT SERPL-CCNC: 21 U/L (ref 5–33)
ANION GAP SERPL CALCULATED.3IONS-SCNC: 16 MMOL/L (ref 9–17)
ANION GAP SERPL CALCULATED.3IONS-SCNC: 8 MMOL/L (ref 9–17)
AST SERPL-CCNC: 20 U/L
BASOPHILS # BLD: 0 % (ref 0–1)
BASOPHILS # BLD: 0 % (ref 0–1)
BASOPHILS ABSOLUTE: 0 K/UL (ref 0–0.2)
BASOPHILS ABSOLUTE: 0 K/UL (ref 0–0.2)
BILIRUB SERPL-MCNC: 0.4 MG/DL (ref 0.3–1.2)
BUN BLDV-MCNC: 15 MG/DL (ref 8–23)
BUN BLDV-MCNC: 18 MG/DL (ref 8–23)
BUN/CREAT BLD: 24 (ref 9–20)
BUN/CREAT BLD: 28 (ref 9–20)
CALCIUM SERPL-MCNC: 8.9 MG/DL (ref 8.6–10.4)
CALCIUM SERPL-MCNC: 9.2 MG/DL (ref 8.6–10.4)
CHLORIDE BLD-SCNC: 103 MMOL/L (ref 98–107)
CHLORIDE BLD-SCNC: 108 MMOL/L (ref 98–107)
CO2: 19 MMOL/L (ref 20–31)
CO2: 23 MMOL/L (ref 20–31)
CREAT SERPL-MCNC: 0.62 MG/DL (ref 0.5–0.9)
CREAT SERPL-MCNC: 0.65 MG/DL (ref 0.5–0.9)
DIFFERENTIAL TYPE: ABNORMAL
DIFFERENTIAL TYPE: ABNORMAL
EOSINOPHILS RELATIVE PERCENT: 0 % (ref 1–7)
EOSINOPHILS RELATIVE PERCENT: 0 % (ref 1–7)
GFR AFRICAN AMERICAN: >60 ML/MIN
GFR AFRICAN AMERICAN: >60 ML/MIN
GFR NON-AFRICAN AMERICAN: >60 ML/MIN
GFR NON-AFRICAN AMERICAN: >60 ML/MIN
GFR SERPL CREATININE-BSD FRML MDRD: ABNORMAL ML/MIN/{1.73_M2}
GLUCOSE BLD-MCNC: 136 MG/DL (ref 70–99)
GLUCOSE BLD-MCNC: 211 MG/DL (ref 70–99)
HCT VFR BLD CALC: 36.5 % (ref 36–46)
HCT VFR BLD CALC: 38.7 % (ref 36–46)
HEMOGLOBIN: 11.8 G/DL (ref 12–16)
HEMOGLOBIN: 12.9 G/DL (ref 12–16)
IMMATURE GRANULOCYTES: ABNORMAL %
IMMATURE GRANULOCYTES: ABNORMAL %
LYMPHOCYTES # BLD: 10 % (ref 16–46)
LYMPHOCYTES # BLD: 10 % (ref 16–46)
MCH RBC QN AUTO: 30.3 PG (ref 26–34)
MCH RBC QN AUTO: 30.9 PG (ref 26–34)
MCHC RBC AUTO-ENTMCNC: 32.4 G/DL (ref 31–37)
MCHC RBC AUTO-ENTMCNC: 33.2 G/DL (ref 31–37)
MCV RBC AUTO: 93 FL (ref 80–100)
MCV RBC AUTO: 93.7 FL (ref 80–100)
MONOCYTES # BLD: 1 % (ref 4–11)
MONOCYTES # BLD: 4 % (ref 4–11)
NRBC AUTOMATED: ABNORMAL PER 100 WBC
NRBC AUTOMATED: ABNORMAL PER 100 WBC
PDW BLD-RTO: 13.2 % (ref 11–14.5)
PDW BLD-RTO: 13.4 % (ref 11–14.5)
PLATELET # BLD: 188 K/UL (ref 140–450)
PLATELET # BLD: 214 K/UL (ref 140–450)
PLATELET ESTIMATE: ABNORMAL
PLATELET ESTIMATE: ABNORMAL
PMV BLD AUTO: 10.3 FL (ref 6–12)
PMV BLD AUTO: 10.4 FL (ref 6–12)
POTASSIUM SERPL-SCNC: 3.6 MMOL/L (ref 3.7–5.3)
POTASSIUM SERPL-SCNC: 4.3 MMOL/L (ref 3.7–5.3)
RBC # BLD: 3.9 M/UL (ref 4–5.2)
RBC # BLD: 4.16 M/UL (ref 4–5.2)
RBC # BLD: ABNORMAL 10*6/UL
RBC # BLD: ABNORMAL 10*6/UL
SEG NEUTROPHILS: 86 % (ref 43–77)
SEG NEUTROPHILS: 89 % (ref 43–77)
SEGMENTED NEUTROPHILS ABSOLUTE COUNT: 4.8 K/UL (ref 1.8–7.7)
SEGMENTED NEUTROPHILS ABSOLUTE COUNT: 5.4 K/UL (ref 1.8–7.7)
SODIUM BLD-SCNC: 138 MMOL/L (ref 135–144)
SODIUM BLD-SCNC: 139 MMOL/L (ref 135–144)
TOTAL PROTEIN: 6.9 G/DL (ref 6.4–8.3)
TROPONIN INTERP: NORMAL
TROPONIN INTERP: NORMAL
TROPONIN T: <0.03 NG/ML
TROPONIN T: <0.03 NG/ML
WBC # BLD: 5.4 K/UL (ref 3.5–11)
WBC # BLD: 6.3 K/UL (ref 3.5–11)
WBC # BLD: ABNORMAL 10*3/UL
WBC # BLD: ABNORMAL 10*3/UL

## 2018-10-04 PROCEDURE — 96376 TX/PRO/DX INJ SAME DRUG ADON: CPT

## 2018-10-04 PROCEDURE — 2500000003 HC RX 250 WO HCPCS: Performed by: EMERGENCY MEDICINE

## 2018-10-04 PROCEDURE — S0028 INJECTION, FAMOTIDINE, 20 MG: HCPCS | Performed by: EMERGENCY MEDICINE

## 2018-10-04 PROCEDURE — G0378 HOSPITAL OBSERVATION PER HR: HCPCS

## 2018-10-04 PROCEDURE — 2500000003 HC RX 250 WO HCPCS: Performed by: NURSE PRACTITIONER

## 2018-10-04 PROCEDURE — 2580000003 HC RX 258: Performed by: NURSE PRACTITIONER

## 2018-10-04 PROCEDURE — 96375 TX/PRO/DX INJ NEW DRUG ADDON: CPT

## 2018-10-04 PROCEDURE — 85025 COMPLETE CBC W/AUTO DIFF WBC: CPT

## 2018-10-04 PROCEDURE — 99217 PR OBSERVATION CARE DISCHARGE MANAGEMENT: CPT | Performed by: INTERNAL MEDICINE

## 2018-10-04 PROCEDURE — 6360000002 HC RX W HCPCS: Performed by: EMERGENCY MEDICINE

## 2018-10-04 PROCEDURE — 6360000004 HC RX CONTRAST MEDICATION: Performed by: EMERGENCY MEDICINE

## 2018-10-04 PROCEDURE — 6360000002 HC RX W HCPCS: Performed by: NURSE PRACTITIONER

## 2018-10-04 PROCEDURE — 93005 ELECTROCARDIOGRAM TRACING: CPT

## 2018-10-04 PROCEDURE — 84484 ASSAY OF TROPONIN QUANT: CPT

## 2018-10-04 PROCEDURE — 6370000000 HC RX 637 (ALT 250 FOR IP): Performed by: EMERGENCY MEDICINE

## 2018-10-04 PROCEDURE — 36415 COLL VENOUS BLD VENIPUNCTURE: CPT

## 2018-10-04 PROCEDURE — S0028 INJECTION, FAMOTIDINE, 20 MG: HCPCS | Performed by: NURSE PRACTITIONER

## 2018-10-04 PROCEDURE — 96372 THER/PROPH/DIAG INJ SC/IM: CPT

## 2018-10-04 PROCEDURE — 71260 CT THORAX DX C+: CPT

## 2018-10-04 PROCEDURE — 80048 BASIC METABOLIC PNL TOTAL CA: CPT

## 2018-10-04 RX ORDER — LORATADINE 10 MG/1
10 CAPSULE, LIQUID FILLED ORAL ONCE
Status: COMPLETED | OUTPATIENT
Start: 2018-10-04 | End: 2018-10-04

## 2018-10-04 RX ORDER — ONDANSETRON 2 MG/ML
4 INJECTION INTRAMUSCULAR; INTRAVENOUS ONCE
Status: COMPLETED | OUTPATIENT
Start: 2018-10-04 | End: 2018-10-04

## 2018-10-04 RX ORDER — SODIUM CHLORIDE 0.9 % (FLUSH) 0.9 %
10 SYRINGE (ML) INJECTION PRN
Status: DISCONTINUED | OUTPATIENT
Start: 2018-10-04 | End: 2018-10-04 | Stop reason: HOSPADM

## 2018-10-04 RX ORDER — COVID-19 ANTIGEN TEST
440 KIT MISCELLANEOUS DAILY
Status: DISCONTINUED | OUTPATIENT
Start: 2018-10-04 | End: 2018-10-04 | Stop reason: HOSPADM

## 2018-10-04 RX ORDER — MAGNESIUM HYDROXIDE/ALUMINUM HYDROXICE/SIMETHICONE 120; 1200; 1200 MG/30ML; MG/30ML; MG/30ML
30 SUSPENSION ORAL EVERY 6 HOURS PRN
Status: DISCONTINUED | OUTPATIENT
Start: 2018-10-04 | End: 2018-10-04 | Stop reason: HOSPADM

## 2018-10-04 RX ORDER — SODIUM CHLORIDE 0.9 % (FLUSH) 0.9 %
10 SYRINGE (ML) INJECTION EVERY 12 HOURS SCHEDULED
Status: DISCONTINUED | OUTPATIENT
Start: 2018-10-04 | End: 2018-10-04 | Stop reason: HOSPADM

## 2018-10-04 RX ORDER — CALCIUM CARBONATE 200(500)MG
500 TABLET,CHEWABLE ORAL 3 TIMES DAILY PRN
Qty: 30 TABLET | Refills: 0 | COMMUNITY
Start: 2018-10-04 | End: 2018-11-03

## 2018-10-04 RX ORDER — LORATADINE 10 MG/1
10 CAPSULE, LIQUID FILLED ORAL DAILY
Status: ON HOLD | COMMUNITY
End: 2018-10-04 | Stop reason: HOSPADM

## 2018-10-04 RX ORDER — CALCIUM CARBONATE 200(500)MG
500 TABLET,CHEWABLE ORAL 3 TIMES DAILY PRN
Status: DISCONTINUED | OUTPATIENT
Start: 2018-10-04 | End: 2018-10-04 | Stop reason: HOSPADM

## 2018-10-04 RX ORDER — ONDANSETRON 2 MG/ML
4 INJECTION INTRAMUSCULAR; INTRAVENOUS EVERY 6 HOURS PRN
Status: DISCONTINUED | OUTPATIENT
Start: 2018-10-04 | End: 2018-10-04 | Stop reason: HOSPADM

## 2018-10-04 RX ORDER — COVID-19 ANTIGEN TEST
440 KIT MISCELLANEOUS DAILY
Status: ON HOLD | COMMUNITY
End: 2018-10-04 | Stop reason: HOSPADM

## 2018-10-04 RX ORDER — SODIUM CHLORIDE AND POTASSIUM CHLORIDE .9; .15 G/100ML; G/100ML
SOLUTION INTRAVENOUS CONTINUOUS
Status: DISCONTINUED | OUTPATIENT
Start: 2018-10-04 | End: 2018-10-04 | Stop reason: HOSPADM

## 2018-10-04 RX ADMIN — ONDANSETRON HYDROCHLORIDE 4 MG: 2 SOLUTION INTRAMUSCULAR; INTRAVENOUS at 02:25

## 2018-10-04 RX ADMIN — LORATADINE 10 MG: 10 CAPSULE, LIQUID FILLED ORAL at 11:27

## 2018-10-04 RX ADMIN — FAMOTIDINE 20 MG: 10 INJECTION INTRAVENOUS at 09:18

## 2018-10-04 RX ADMIN — Medication 10 ML: at 09:18

## 2018-10-04 RX ADMIN — Medication 440 MG: at 11:27

## 2018-10-04 RX ADMIN — FAMOTIDINE 20 MG: 10 INJECTION INTRAVENOUS at 00:12

## 2018-10-04 RX ADMIN — IOPAMIDOL 80 ML: 755 INJECTION, SOLUTION INTRAVENOUS at 01:11

## 2018-10-04 RX ADMIN — ANTACID TABLETS 500 MG: 500 TABLET, CHEWABLE ORAL at 02:37

## 2018-10-04 RX ADMIN — ENOXAPARIN SODIUM 40 MG: 40 INJECTION SUBCUTANEOUS at 09:18

## 2018-10-04 RX ADMIN — POTASSIUM CHLORIDE AND SODIUM CHLORIDE: 900; 150 INJECTION, SOLUTION INTRAVENOUS at 03:19

## 2018-10-04 ASSESSMENT — PAIN SCALES - GENERAL
PAINLEVEL_OUTOF10: 0
PAINLEVEL_OUTOF10: 0

## 2018-10-04 ASSESSMENT — ENCOUNTER SYMPTOMS
NAUSEA: 1
VOMITING: 1

## 2018-10-04 NOTE — DISCHARGE INSTR - DIET

## 2018-10-04 NOTE — PLAN OF CARE
Problem: Nausea/Vomiting:  Goal: Absence of nausea/vomiting  Absence of nausea/vomiting   Outcome: Ongoing    Goal: Able to drink  Able to drink   Outcome: Ongoing    Goal: Able to eat  Able to eat   Outcome: Ongoing    Goal: Ability to achieve adequate nutritional intake will improve  Ability to achieve adequate nutritional intake will improve   Outcome: Ongoing

## 2018-10-04 NOTE — PROGRESS NOTES
Hospitalist Progress Note    Patient:  Eris Ngo     YOB: 1950    MRN: [id number]   Admit date: 10/3/2018     Acct: [de-identified]     PCP: Marta Gunter MD    CC--Interval History: Nausea--vomiting--resolved----post chemotherapy for breast carcinoma----rec'd IVF antiemetics--orthostatics OK---home---10.4.2018    rec'd Neulasta    See below     All other ROS negative except noted in HPI    Diet:  DIET GENERAL;    Medications:  Scheduled Meds:   sodium chloride flush  10 mL Intravenous 2 times per day    enoxaparin  40 mg Subcutaneous Daily    famotidine (PEPCID) injection  20 mg Intravenous BID    Naproxen Sodium  440 mg Oral Daily     Continuous Infusions:   0.9% NaCl with KCl 20 mEq 100 mL/hr at 10/04/18 0319     PRN Meds:calcium carbonate, sodium chloride flush, magnesium hydroxide, ondansetron, aluminum & magnesium hydroxide-simethicone, prochlorperazine, GI cocktail    Objective:  Labs:  CBC with Differential:    Lab Results   Component Value Date    WBC 6.3 10/04/2018    RBC 3.90 10/04/2018    HGB 11.8 10/04/2018    HCT 36.5 10/04/2018     10/04/2018    MCV 93.7 10/04/2018    MCH 30.3 10/04/2018    MCHC 32.4 10/04/2018    RDW 13.4 10/04/2018    LYMPHOPCT 10 10/04/2018    MONOPCT 4 10/04/2018    BASOPCT 0 10/04/2018    MONOSABS 0.30 10/04/2018    LYMPHSABS 0.60 10/04/2018    EOSABS 0.00 10/04/2018    BASOSABS 0.00 10/04/2018    DIFFTYPE NOT REPORTED 10/04/2018     BMP:    Lab Results   Component Value Date     10/04/2018    K 4.3 10/04/2018     10/04/2018    CO2 23 10/04/2018    BUN 15 10/04/2018    LABALBU 4.3 10/03/2018    CREATININE 0.62 10/04/2018    CALCIUM 8.9 10/04/2018    GFRAA >60 10/04/2018    LABGLOM >60 10/04/2018    GLUCOSE 136 10/04/2018           Physical Exam:  Vitals: BP (!) 93/48   Pulse 63   Temp 97.6 °F (36.4 °C) (Tympanic)   Resp 16   Ht 5' 7\" (1.702 m)   Wt 201 lb (91.2 kg)   SpO2 100%   BMI 31.48 kg/m²   24 hour intake/output:  Intake/Output Summary (Last 24 hours) at 10/04/18 1412  Last data filed at 10/04/18 0806   Gross per 24 hour   Intake              514 ml   Output                0 ml   Net              514 ml     Last 3 weights: Wt Readings from Last 3 Encounters:   10/04/18 201 lb (91.2 kg)   10/03/18 197 lb (89.4 kg)   09/24/18 198 lb 3.2 oz (89.9 kg)     HEENT: Normocephalic and Atraumatic  Neck: Supple, No Masses, Tenderness, Nodularity and No Lymphadenopathy  Chest/Lungs: Clearer  to Auscultation ---expiratory Wheezes  Cardiac: Regular Rate and Rhythm  GI/Abdomen: Bowel Sounds Present and Soft, Non-tender, without Guarding or Rebound Tenderness  : Not examined  EXT/Skin: No Edema, No Cyanosis and No Clubbing  Neuro: Alert and Oriented, to Person, to Time, to Place, to Situation and No Localizing Signs/Symptoms      Assessment:    Active Problems:    Nausea & vomiting  Resolved Problems:    * No resolved hospital problems. *          Plan:  1. Home---10.4.2018  2. Home medications reviewed---no changes  3. Follow up Dr. Sonia Medina, [facility]  4. Follow up Dr. Brad Douglass,   5.   See orders    Electronically signed by Bruno Brewer on 10/4/2018 at 2:12 PM    Hospitalist

## 2018-10-04 NOTE — ED PROVIDER NOTES
normal.   Cardiovascular: Normal rate, regular rhythm, normal heart sounds and intact distal pulses. No murmur heard. Pulmonary/Chest: Breath sounds normal. She has no wheezes. She has no rales. Mild tachypnea and somewhat labored breathing. Abdominal: Soft. There is no tenderness. There is no rebound and no guarding. Musculoskeletal: Normal range of motion. She exhibits no edema or tenderness. Neurological: She is alert and oriented to person, place, and time. No cranial nerve deficit. She exhibits normal muscle tone. Coordination normal.   Skin: Skin is warm. No rash noted. She is diaphoretic. Psychiatric: She has a normal mood and affect. Her behavior is normal.   Vitals reviewed. DIFFERENTIAL DIAGNOSIS / MDM / EMERGENCY DEPARTMENT COURSE:     Patient is given IV fluids and antiemetics with only minimal relief. The patient continues to rub her chest and states that she feels that she has a significant reflux. She doesn't specifically complain of chest pain. The patient is high risk for pulmonary embolism due to her malignancy history as well as surgery within the last 4 weeks. Patient does not meet the perc criteria, the patient is considered green 16.2 and 30% risk between the modified Wells and Fleming scores for PE. The risks and benefits were discussed with the patient after she was feeling improved and the initial rounds of tests were available. She was agreeable to CTA of the chest is also evaluate other causes of her chest pain as well. The patient also recently had pneumothorax in the right chest related to her port placement. The study was negative for pulmonary embolism. The patient persisted to have symptoms and required additional doses of antiemetics. She continued to appear very unwell and diaphoretic during her several hour stay in the emergency department.   I discussed the case with her oncologist who recommended supportive care including IV hydration and electrolytes and Staging NOT REPORTED    Troponin   Result Value Ref Range    Troponin T <0.03 <0.03 ng/mL    Troponin Interp         Troponin   Result Value Ref Range    Troponin T <0.03 <0.03 ng/mL    Troponin Interp         EKG 12 Lead   Result Value Ref Range    Ventricular Rate 65 BPM    Atrial Rate 65 BPM    P-R Interval 166 ms    QRS Duration 92 ms    Q-T Interval 486 ms    QTc Calculation (Bazett) 505 ms    P Axis 54 degrees    R Axis 23 degrees    T Axis 59 degrees       EMERGENCY DEPARTMENT COURSE:   Vitals:    Vitals:    10/04/18 0122 10/04/18 0152 10/04/18 0222 10/04/18 0304   BP: (!) 124/58 131/60 (!) 116/58 (!) 122/55   Pulse: 68 63  62   Resp: 18   16   Temp:    97.3 °F (36.3 °C)   TempSrc:       SpO2: 96% 96% 96% 98%   Weight:    201 lb (91.2 kg)   Height:    5' 7\" (1.702 m)     -------------------------  BP: (!) 122/55, Temp: 97.3 °F (36.3 °C), Pulse: 62, Resp: 16      CONSULTS:  Oncology, hospitalist service    PROCEDURES:  None    FINAL IMPRESSION      1. Nausea and vomiting, intractability of vomiting not specified, unspecified vomiting type    2. Dehydration    3.  Drug side effects, initial encounter          DISPOSITION/PLAN   DISPOSITION Admitted 10/04/2018 02:08:59 AM      PATIENT REFERRED TO:  Susan King MD  [address] [place] [address]  [phone]            DISCHARGE MEDICATIONS:  Current Discharge Medication List        Active Hospital Problems    Diagnosis Date Noted    Nausea & vomiting [R11.2] 10/04/2018       (Please note that portions of this note were completed with a voice recognition program.  Efforts were made to edit the dictations but occasionally words are mis-transcribed.)    Colleen Mckeon MD, [facility]  Attending Emergency Medicine Physician        Colleen Mckeon MD  10/04/18 6044

## 2018-10-05 NOTE — DISCHARGE SUMMARY
[address]                   [address] [place] [address], [address] [place] [address]                                 DISCHARGE SUMMARY    PATIENT NAME: Hansel Schofield                       :        1950  MED REC NO:   [id number]                             ROOM:       0205  ACCOUNT NO:   [de-identified]                           ADMIT DATE: 10/03/2018  PROVIDER:     Karina Singh                  DISCHARGE DATE: 10/04/2018    ATTENDING PHYSICIAN OF HOSPITALIZATION:  Mary Oneill MD at discharge. PERSONAL PHYSICIAN:  Danya Duran MD.    The patient was seen by Dr. Viktoriya Coughlin, Hematology/Oncology, and followed by  Taylor Claire, General Surgery in the outpatient setting. DIAGNOSES:  1.  Nausea and vomiting following chemotherapy, 2018. 2.  Malignant neoplasm upper outer quadrant, both breasts, estrogen  receptor positive. Other medical problems, not a significant factor at this hospitalization. Osteopenia, diverticula of the colon, varicose veins lower extremities,  vitamin D deficiency, hiatal hernia. HISTORY OF PRESENT ILLNESS AND HOSPITAL COURSE:  The patient is a  61-year-old white female with bilateral breast carcinoma, ER positive. The  patient presented after chemotherapy with uncontrolled nausea and vomiting. The patient received IV fluids and antiemetics. The patient was back to  her baseline on 10/04/2018, and able to be discharged to home. The patient has had prior breast biopsies, at this point is receiving  chemotherapy with  surgery bilateral breast to follow. Prior smoker, none, quit in . LABORATORY DATA:  Around the time of discharge, white cell count 6.3,  hemoglobin 11.8, hematocrit 36.5, platelets 539,301. Sodium 139, potassium  4.3, chloride 108, CO2 23, BUN 15, creatinine 0.62, glucose 136. DISCHARGE INSTRUCTIONS/FOLLOWUP:  Discharged to home on 10/04/2018. DIET:  General, as tolerated. ACTIVITY:  As tolerated.     MEDICATIONS,

## 2018-10-07 LAB
EKG ATRIAL RATE: 61 BPM
EKG ATRIAL RATE: 65 BPM
EKG P AXIS: 47 DEGREES
EKG P AXIS: 54 DEGREES
EKG P-R INTERVAL: 166 MS
EKG P-R INTERVAL: 168 MS
EKG Q-T INTERVAL: 486 MS
EKG Q-T INTERVAL: 496 MS
EKG QRS DURATION: 92 MS
EKG QRS DURATION: 92 MS
EKG QTC CALCULATION (BAZETT): 499 MS
EKG QTC CALCULATION (BAZETT): 505 MS
EKG R AXIS: 19 DEGREES
EKG R AXIS: 23 DEGREES
EKG T AXIS: 49 DEGREES
EKG T AXIS: 59 DEGREES
EKG VENTRICULAR RATE: 61 BPM
EKG VENTRICULAR RATE: 65 BPM

## 2018-10-08 ENCOUNTER — OFFICE VISIT (OUTPATIENT)
Dept: ONCOLOGY | Age: 68
End: 2018-10-08
Payer: MEDICARE

## 2018-10-08 VITALS
OXYGEN SATURATION: 100 % | HEIGHT: 67 IN | DIASTOLIC BLOOD PRESSURE: 72 MMHG | BODY MASS INDEX: 31.23 KG/M2 | HEART RATE: 63 BPM | TEMPERATURE: 98.8 F | WEIGHT: 199 LBS | SYSTOLIC BLOOD PRESSURE: 131 MMHG

## 2018-10-08 DIAGNOSIS — Z17.0 MALIGNANT NEOPLASM OF UPPER-OUTER QUADRANT OF BOTH BREASTS IN FEMALE, ESTROGEN RECEPTOR POSITIVE (HCC): Primary | ICD-10-CM

## 2018-10-08 DIAGNOSIS — C50.411 MALIGNANT NEOPLASM OF UPPER-OUTER QUADRANT OF BOTH BREASTS IN FEMALE, ESTROGEN RECEPTOR POSITIVE (HCC): Primary | ICD-10-CM

## 2018-10-08 DIAGNOSIS — C50.412 MALIGNANT NEOPLASM OF UPPER-OUTER QUADRANT OF BOTH BREASTS IN FEMALE, ESTROGEN RECEPTOR POSITIVE (HCC): Primary | ICD-10-CM

## 2018-10-08 DIAGNOSIS — R11.2 NAUSEA AND VOMITING, INTRACTABILITY OF VOMITING NOT SPECIFIED, UNSPECIFIED VOMITING TYPE: ICD-10-CM

## 2018-10-08 PROCEDURE — 99215 OFFICE O/P EST HI 40 MIN: CPT | Performed by: INTERNAL MEDICINE

## 2018-10-08 RX ORDER — DEXAMETHASONE 4 MG/1
4 TABLET ORAL DAILY
COMMUNITY
End: 2019-01-24 | Stop reason: ALTCHOICE

## 2018-10-08 RX ORDER — MEPERIDINE HYDROCHLORIDE 50 MG/ML
12.5 INJECTION INTRAMUSCULAR; INTRAVENOUS; SUBCUTANEOUS ONCE
Status: CANCELLED | OUTPATIENT
Start: 2018-10-17 | End: 2018-10-17

## 2018-10-08 RX ORDER — DIPHENHYDRAMINE HYDROCHLORIDE 50 MG/ML
50 INJECTION INTRAMUSCULAR; INTRAVENOUS ONCE
Status: CANCELLED | OUTPATIENT
Start: 2018-10-17 | End: 2018-10-17

## 2018-10-08 RX ORDER — 0.9 % SODIUM CHLORIDE 0.9 %
10 VIAL (ML) INJECTION ONCE
Status: CANCELLED | OUTPATIENT
Start: 2018-10-17 | End: 2018-10-17

## 2018-10-08 RX ORDER — METHYLPREDNISOLONE SODIUM SUCCINATE 125 MG/2ML
125 INJECTION, POWDER, LYOPHILIZED, FOR SOLUTION INTRAMUSCULAR; INTRAVENOUS ONCE
Status: CANCELLED | OUTPATIENT
Start: 2018-10-17 | End: 2018-10-17

## 2018-10-08 RX ORDER — SODIUM CHLORIDE 9 MG/ML
INJECTION, SOLUTION INTRAVENOUS ONCE
Status: CANCELLED | OUTPATIENT
Start: 2018-10-17 | End: 2018-10-17

## 2018-10-08 RX ORDER — SODIUM CHLORIDE 0.9 % (FLUSH) 0.9 %
5 SYRINGE (ML) INJECTION PRN
Status: CANCELLED | OUTPATIENT
Start: 2018-10-17

## 2018-10-08 RX ORDER — ONDANSETRON 2 MG/ML
8 INJECTION INTRAMUSCULAR; INTRAVENOUS ONCE
Status: CANCELLED | OUTPATIENT
Start: 2018-10-17

## 2018-10-08 RX ORDER — HEPARIN SODIUM (PORCINE) LOCK FLUSH IV SOLN 100 UNIT/ML 100 UNIT/ML
500 SOLUTION INTRAVENOUS PRN
Status: CANCELLED | OUTPATIENT
Start: 2018-10-17

## 2018-10-08 RX ORDER — ONDANSETRON 4 MG/1
4 TABLET, ORALLY DISINTEGRATING ORAL EVERY 8 HOURS PRN
Qty: 90 TABLET | Refills: 0 | Status: SHIPPED | OUTPATIENT
Start: 2018-10-08 | End: 2019-02-05 | Stop reason: ALTCHOICE

## 2018-10-08 RX ORDER — SODIUM CHLORIDE 0.9 % (FLUSH) 0.9 %
10 SYRINGE (ML) INJECTION PRN
Status: CANCELLED | OUTPATIENT
Start: 2018-10-17

## 2018-10-08 RX ORDER — SODIUM CHLORIDE 9 MG/ML
INJECTION, SOLUTION INTRAVENOUS CONTINUOUS
Status: CANCELLED | OUTPATIENT
Start: 2018-10-17

## 2018-10-08 NOTE — PROGRESS NOTES
30 tablet 0    prochlorperazine (COMPAZINE) 10 MG tablet Take 10 mg by mouth every 6 hours as needed      dexamethasone (DECADRON) 4 MG tablet Take 4 mg by mouth daily Take 20mg 12 hours prior to chemo, and take 12mg 6 hours prior to chemo       No current facility-administered medications for this visit. Social History     Social History    Marital status:      Spouse name: N/A    Number of children: N/A    Years of education: N/A     Occupational History    retired [employer]     Social History Main Topics    Smoking status: Former Smoker     Packs/day: 0.50     Years: 7.00     Types: Cigarettes     Quit date: 1/1/1978    Smokeless tobacco: Never Used      Comment: Quit smoking in 1978.  Alcohol use Yes      Comment: Occasional alcohol.  Drug use: No    Sexual activity: Not on file     Other Topics Concern    Not on file     Social History Narrative    No narrative on file       Family History   Problem Relation Age of Onset    Hypertension Mother         Renal failure.  Diabetes Mother         Type 2 at end of life.  Heart Failure Father     Coronary Art Dis Father         Status post CABG    Diabetes Father         Type 2, very late in life.  Breast Cancer Sister     Hypertension Brother     Hypertension Sister      REVIEW OF SYSTEM:   Constitutional: No fever or chills.  No night sweats, no weight loss   Eyes: No eye discharge, double vision, or eye pain   HEENT: negative for sore mouth, sore throat, hoarseness and voice change   Respiratory: negative for cough , sputum, dyspnea, wheezing, hemoptysis, chest pain   Cardiovascular: negative for chest pain, dyspnea, palpitations, orthopnea, PND   Gastrointestinal: negative for nausea, vomiting, diarrhea, constipation, abdominal pain, Dysphagia, hematemesis and hematochezia   Genitourinary: negative for frequency, dysuria, nocturia, urinary incontinence, and hematuria   Integument: negative for rash, skin lesions, bruises.    Hematologic/Lymphatic: negative for easy bruising, bleeding, lymphadenopathy, petechiae and swelling/edema   Endocrine: negative for heat or cold intolerance, tremor, weight changes, change in bowel habits and hair loss   Musculoskeletal: negative for myalgias, arthralgias, pain, joint swelling,and bone pain   Neurological: negative for headaches, dizziness, seizures, weakness, numbness   OBJECTIVE:         Vitals:    10/08/18 1423   BP: 131/72   Pulse: 63   Temp: 98.8 °F (37.1 °C)   SpO2: 100%   PHYSICAL EXAM:   General appearance - well appearing, no in pain or distress   Mental status - alert and cooperative   Eyes - pupils equal and reactive, extraocular eye movements intact   Ears - bilateral TM's and external ear canals normal   Mouth - mucous membranes moist, pharynx normal without lesions   Neck - supple, no significant adenopathy   Lymphatics - Mild palpable lymphadenopathy in the left axilla, no hepatosplenomegaly   Chest - clear to auscultation, no wheezes, rales or rhonchi, symmetric air entry   2 cm lump palpable in left breast  Heart - normal rate, regular rhythm, normal S1, S2, no murmurs, rubs, clicks or gallops   Abdomen - soft, nontender, nondistended, no masses or organomegaly   Neurological - alert, oriented, normal speech, no focal findings or movement disorder noted   Musculoskeletal - no joint tenderness, deformity or swelling   Extremities - peripheral pulses normal, no pedal edema, no clubbing or cyanosis   Skin - normal coloration and turgor, no rashes, no suspicious skin lesions noted   LABORATORY DATA:     Lab Results   Component Value Date    WBC 6.3 10/04/2018    HGB 11.8 (L) 10/04/2018    HCT 36.5 10/04/2018    MCV 93.7 10/04/2018     10/04/2018    LYMPHOPCT 10 (L) 10/04/2018    RBC 3.90 (L) 10/04/2018    MCH 30.3 10/04/2018    MCHC 32.4 10/04/2018    RDW 13.4 10/04/2018    MONOPCT 4 10/04/2018    BASOPCT 0 10/04/2018    NEUTROABS 5.40 10/04/2018    LYMPHSABS

## 2018-10-15 ENCOUNTER — OFFICE VISIT (OUTPATIENT)
Dept: FAMILY MEDICINE CLINIC | Age: 68
End: 2018-10-15
Payer: MEDICARE

## 2018-10-15 VITALS
TEMPERATURE: 98.5 F | HEART RATE: 69 BPM | SYSTOLIC BLOOD PRESSURE: 102 MMHG | OXYGEN SATURATION: 99 % | HEIGHT: 67 IN | WEIGHT: 195.2 LBS | DIASTOLIC BLOOD PRESSURE: 70 MMHG | BODY MASS INDEX: 30.64 KG/M2

## 2018-10-15 DIAGNOSIS — Z17.0 MALIGNANT NEOPLASM OF UPPER-OUTER QUADRANT OF BOTH BREASTS IN FEMALE, ESTROGEN RECEPTOR POSITIVE (HCC): Primary | ICD-10-CM

## 2018-10-15 DIAGNOSIS — C50.411 MALIGNANT NEOPLASM OF UPPER-OUTER QUADRANT OF BOTH BREASTS IN FEMALE, ESTROGEN RECEPTOR POSITIVE (HCC): Primary | ICD-10-CM

## 2018-10-15 DIAGNOSIS — C50.412 MALIGNANT NEOPLASM OF UPPER-OUTER QUADRANT OF BOTH BREASTS IN FEMALE, ESTROGEN RECEPTOR POSITIVE (HCC): Primary | ICD-10-CM

## 2018-10-15 DIAGNOSIS — R11.2 NAUSEA AND VOMITING, INTRACTABILITY OF VOMITING NOT SPECIFIED, UNSPECIFIED VOMITING TYPE: ICD-10-CM

## 2018-10-15 PROCEDURE — 99495 TRANSJ CARE MGMT MOD F2F 14D: CPT | Performed by: FAMILY MEDICINE

## 2018-10-15 PROCEDURE — 1111F DSCHRG MED/CURRENT MED MERGE: CPT | Performed by: FAMILY MEDICINE

## 2018-10-15 ASSESSMENT — ENCOUNTER SYMPTOMS
ABDOMINAL PAIN: 0
VOMITING: 0
WHEEZING: 0
DIARRHEA: 0
NAUSEA: 0
CONSTIPATION: 0
CHEST TIGHTNESS: 0
COUGH: 0
SHORTNESS OF BREATH: 0

## 2018-10-15 NOTE — PROGRESS NOTES
Fadia June MD   Medication Sig Dispense Refill    ondansetron (ZOFRAN ODT) 4 MG disintegrating tablet Take 1 tablet by mouth every 8 hours as needed for Nausea or Vomiting 90 tablet 0    dexamethasone (DECADRON) 4 MG tablet Take 4 mg by mouth daily Take 20mg 12 hours prior to chemo, and take 12mg 6 hours prior to chemo      calcium carbonate (TUMS) 500 MG chewable tablet Take 1 tablet by mouth 3 times daily as needed for Heartburn 30 tablet 0        Medications patient taking as of now reconciled against medications ordered at time of hospital discharge: Yes    Chief Complaint   Patient presents with    Follow-Up from Hospital     PCU- nausea/ heartburn DOD 10-4-2018- today feeling well today but goes for Chemo Wednesday once a week for 12 weeks        HPI Here today for a hospital follow up. She was admitted on 10/3/18 for n/v and dehydration. She is feeling a lot better today. She is eating better since she has been home. She has not had any diarrhea. She had some heartburn periodically but a tums is working well to treat it. She is feeling pretty good emotionally. She has a good support system at home. She is sleeping well. Inpatient course: Discharge summary reviewed- see chart. Interval history/Current status: improving    Review of Systems   Constitutional: Negative for activity change, appetite change, chills, fatigue and fever. Eyes: Negative for visual disturbance. Respiratory: Negative for cough, chest tightness, shortness of breath and wheezing. Cardiovascular: Negative for chest pain, palpitations and leg swelling. Gastrointestinal: Negative for abdominal pain, constipation, diarrhea, nausea (resolved) and vomiting (resolved). Genitourinary: Negative for difficulty urinating. Neurological: Negative for dizziness, syncope, weakness, light-headedness and headaches. Psychiatric/Behavioral: Negative for dysphoric mood and sleep disturbance. The patient is not nervous/anxious. and she is not having any diarrhea. I encouraged her to call if she is having any issues with acid reflux.          Medical Decision Making: moderate complexity    Mateo Callejas MD  10/15/2018  2:21 PM

## 2018-10-17 ENCOUNTER — HOSPITAL ENCOUNTER (OUTPATIENT)
Dept: INFUSION THERAPY | Age: 68
Discharge: HOME | End: 2018-10-17
Payer: MEDICARE

## 2018-10-17 VITALS
WEIGHT: 196.2 LBS | OXYGEN SATURATION: 96 % | BODY MASS INDEX: 30.79 KG/M2 | RESPIRATION RATE: 16 BRPM | HEART RATE: 83 BPM | TEMPERATURE: 97.3 F | DIASTOLIC BLOOD PRESSURE: 78 MMHG | SYSTOLIC BLOOD PRESSURE: 118 MMHG | HEIGHT: 67 IN

## 2018-10-17 DIAGNOSIS — Z17.0 MALIGNANT NEOPLASM OF UPPER-OUTER QUADRANT OF BOTH BREASTS IN FEMALE, ESTROGEN RECEPTOR POSITIVE (HCC): ICD-10-CM

## 2018-10-17 DIAGNOSIS — C50.412 MALIGNANT NEOPLASM OF UPPER-OUTER QUADRANT OF BOTH BREASTS IN FEMALE, ESTROGEN RECEPTOR POSITIVE (HCC): ICD-10-CM

## 2018-10-17 DIAGNOSIS — C50.411 MALIGNANT NEOPLASM OF UPPER-OUTER QUADRANT OF BOTH BREASTS IN FEMALE, ESTROGEN RECEPTOR POSITIVE (HCC): ICD-10-CM

## 2018-10-17 LAB
ABSOLUTE BANDS #: 0.77 K/UL
ABSOLUTE EOS #: 0 K/UL (ref 0–0.4)
ABSOLUTE IMMATURE GRANULOCYTE: ABNORMAL K/UL (ref 0–0.3)
ABSOLUTE LYMPH #: 1.41 K/UL (ref 1–4.8)
ABSOLUTE MONO #: 0.13 K/UL (ref 0.1–1.2)
ALBUMIN SERPL-MCNC: 4.5 G/DL (ref 3.5–5.2)
ALBUMIN/GLOBULIN RATIO: 1.7 (ref 1–2.5)
ALP BLD-CCNC: 100 U/L (ref 35–104)
ALT SERPL-CCNC: 26 U/L (ref 5–33)
ANION GAP SERPL CALCULATED.3IONS-SCNC: 16 MMOL/L (ref 9–17)
AST SERPL-CCNC: 16 U/L
BANDS: 6 %
BASOPHILS # BLD: 0 % (ref 0–1)
BASOPHILS ABSOLUTE: 0 K/UL (ref 0–0.2)
BILIRUB SERPL-MCNC: 0.16 MG/DL (ref 0.3–1.2)
BUN BLDV-MCNC: 19 MG/DL (ref 8–23)
BUN/CREAT BLD: 31 (ref 9–20)
CALCIUM SERPL-MCNC: 9.6 MG/DL (ref 8.6–10.4)
CHLORIDE BLD-SCNC: 102 MMOL/L (ref 98–107)
CO2: 22 MMOL/L (ref 20–31)
CREAT SERPL-MCNC: 0.62 MG/DL (ref 0.5–0.9)
DIFFERENTIAL TYPE: ABNORMAL
EOSINOPHILS RELATIVE PERCENT: 0 % (ref 1–7)
GFR AFRICAN AMERICAN: >60 ML/MIN
GFR NON-AFRICAN AMERICAN: >60 ML/MIN
GFR SERPL CREATININE-BSD FRML MDRD: ABNORMAL ML/MIN/{1.73_M2}
GFR SERPL CREATININE-BSD FRML MDRD: ABNORMAL ML/MIN/{1.73_M2}
GLUCOSE BLD-MCNC: 224 MG/DL (ref 70–99)
HCT VFR BLD CALC: 38.6 % (ref 36–46)
HEMOGLOBIN: 12.7 G/DL (ref 12–16)
IMMATURE GRANULOCYTES: ABNORMAL %
LYMPHOCYTES # BLD: 11 % (ref 16–46)
MCH RBC QN AUTO: 30.9 PG (ref 26–34)
MCHC RBC AUTO-ENTMCNC: 33 G/DL (ref 31–37)
MCV RBC AUTO: 93.4 FL (ref 80–100)
METAMYELOCYTES ABSOLUTE COUNT: 0.26 K/UL
METAMYELOCYTES: 2 %
MONOCYTES # BLD: 1 % (ref 4–11)
MORPHOLOGY: ABNORMAL
MYELOCYTES ABSOLUTE COUNT: 0.13 K/UL
MYELOCYTES: 1 %
NRBC AUTOMATED: ABNORMAL PER 100 WBC
PDW BLD-RTO: 13.7 % (ref 11–14.5)
PLATELET # BLD: 164 K/UL (ref 140–450)
PLATELET ESTIMATE: ABNORMAL
PMV BLD AUTO: 9.7 FL (ref 6–12)
POTASSIUM SERPL-SCNC: 4.2 MMOL/L (ref 3.7–5.3)
RBC # BLD: 4.13 M/UL (ref 4–5.2)
RBC # BLD: ABNORMAL 10*6/UL
SEG NEUTROPHILS: 79 % (ref 43–77)
SEGMENTED NEUTROPHILS ABSOLUTE COUNT: 10.1 K/UL (ref 1.8–7.7)
SODIUM BLD-SCNC: 140 MMOL/L (ref 135–144)
TOTAL PROTEIN: 7.1 G/DL (ref 6.4–8.3)
TSH SERPL DL<=0.05 MIU/L-ACNC: 0.37 MIU/L (ref 0.3–5)
WBC # BLD: 12.8 K/UL (ref 3.5–11)
WBC # BLD: ABNORMAL 10*3/UL

## 2018-10-17 PROCEDURE — 80053 COMPREHEN METABOLIC PANEL: CPT

## 2018-10-17 PROCEDURE — 6360000002 HC RX W HCPCS: Performed by: INTERNAL MEDICINE

## 2018-10-17 PROCEDURE — 2500000003 HC RX 250 WO HCPCS: Performed by: INTERNAL MEDICINE

## 2018-10-17 PROCEDURE — 2580000003 HC RX 258: Performed by: INTERNAL MEDICINE

## 2018-10-17 PROCEDURE — 85025 COMPLETE CBC W/AUTO DIFF WBC: CPT

## 2018-10-17 PROCEDURE — 84443 ASSAY THYROID STIM HORMONE: CPT

## 2018-10-17 PROCEDURE — S0028 INJECTION, FAMOTIDINE, 20 MG: HCPCS | Performed by: INTERNAL MEDICINE

## 2018-10-17 PROCEDURE — 96415 CHEMO IV INFUSION ADDL HR: CPT

## 2018-10-17 PROCEDURE — 96413 CHEMO IV INFUSION 1 HR: CPT

## 2018-10-17 PROCEDURE — 96375 TX/PRO/DX INJ NEW DRUG ADDON: CPT

## 2018-10-17 PROCEDURE — 36591 DRAW BLOOD OFF VENOUS DEVICE: CPT

## 2018-10-17 RX ORDER — DIPHENHYDRAMINE HYDROCHLORIDE 50 MG/ML
50 INJECTION INTRAMUSCULAR; INTRAVENOUS ONCE
Status: COMPLETED | OUTPATIENT
Start: 2018-10-17 | End: 2018-10-17

## 2018-10-17 RX ORDER — SODIUM CHLORIDE 9 MG/ML
INJECTION, SOLUTION INTRAVENOUS ONCE
Status: COMPLETED | OUTPATIENT
Start: 2018-10-17 | End: 2018-10-17

## 2018-10-17 RX ORDER — DEXAMETHASONE SODIUM PHOSPHATE 10 MG/ML
10 INJECTION, SOLUTION INTRAMUSCULAR; INTRAVENOUS ONCE
Status: COMPLETED | OUTPATIENT
Start: 2018-10-17 | End: 2018-10-17

## 2018-10-17 RX ORDER — SODIUM CHLORIDE 0.9 % (FLUSH) 0.9 %
10 SYRINGE (ML) INJECTION PRN
Status: DISCONTINUED | OUTPATIENT
Start: 2018-10-17 | End: 2018-10-18 | Stop reason: HOSPADM

## 2018-10-17 RX ORDER — HEPARIN SODIUM (PORCINE) LOCK FLUSH IV SOLN 100 UNIT/ML 100 UNIT/ML
500 SOLUTION INTRAVENOUS PRN
Status: DISCONTINUED | OUTPATIENT
Start: 2018-10-17 | End: 2018-10-18 | Stop reason: HOSPADM

## 2018-10-17 RX ORDER — ONDANSETRON 2 MG/ML
8 INJECTION INTRAMUSCULAR; INTRAVENOUS ONCE
Status: COMPLETED | OUTPATIENT
Start: 2018-10-17 | End: 2018-10-17

## 2018-10-17 RX ADMIN — HEPARIN SODIUM (PORCINE) LOCK FLUSH IV SOLN 100 UNIT/ML 500 UNITS: 100 SOLUTION at 13:02

## 2018-10-17 RX ADMIN — FAMOTIDINE 20 MG: 10 INJECTION, SOLUTION INTRAVENOUS at 10:49

## 2018-10-17 RX ADMIN — PACLITAXEL 162 MG: 6 INJECTION, SOLUTION INTRAVENOUS at 11:27

## 2018-10-17 RX ADMIN — SODIUM CHLORIDE: 9 INJECTION, SOLUTION INTRAVENOUS at 09:35

## 2018-10-17 RX ADMIN — DEXAMETHASONE SODIUM PHOSPHATE 10 MG: 10 INJECTION, SOLUTION INTRAMUSCULAR; INTRAVENOUS at 10:51

## 2018-10-17 RX ADMIN — DIPHENHYDRAMINE HYDROCHLORIDE 50 MG: 50 INJECTION, SOLUTION INTRAMUSCULAR; INTRAVENOUS at 10:46

## 2018-10-17 RX ADMIN — ONDANSETRON 8 MG: 2 INJECTION INTRAMUSCULAR; INTRAVENOUS at 10:55

## 2018-10-17 RX ADMIN — Medication 10 ML: at 09:34

## 2018-10-17 NOTE — PROGRESS NOTES
Chemotherapy infused and d/c'd. Patient tolerated infusion without any difficulty. Mediport flushed with 30 ml NSS and 5ml heparin. Brown needle d/c'd, band aid to site. Patient talking with staff and denies any complaints.

## 2018-10-23 RX ORDER — SODIUM CHLORIDE 0.9 % (FLUSH) 0.9 %
5 SYRINGE (ML) INJECTION PRN
Status: CANCELLED | OUTPATIENT
Start: 2018-10-24

## 2018-10-23 RX ORDER — MEPERIDINE HYDROCHLORIDE 50 MG/ML
12.5 INJECTION INTRAMUSCULAR; INTRAVENOUS; SUBCUTANEOUS ONCE
Status: CANCELLED | OUTPATIENT
Start: 2018-10-24 | End: 2018-10-24

## 2018-10-23 RX ORDER — 0.9 % SODIUM CHLORIDE 0.9 %
10 VIAL (ML) INJECTION ONCE
Status: CANCELLED | OUTPATIENT
Start: 2018-10-24 | End: 2018-10-24

## 2018-10-23 RX ORDER — METHYLPREDNISOLONE SODIUM SUCCINATE 125 MG/2ML
125 INJECTION, POWDER, LYOPHILIZED, FOR SOLUTION INTRAMUSCULAR; INTRAVENOUS ONCE
Status: CANCELLED | OUTPATIENT
Start: 2018-10-24 | End: 2018-10-24

## 2018-10-23 RX ORDER — EPINEPHRINE 1 MG/ML
0.3 INJECTION, SOLUTION, CONCENTRATE INTRAVENOUS PRN
Status: CANCELLED | OUTPATIENT
Start: 2018-10-24

## 2018-10-23 RX ORDER — SODIUM CHLORIDE 9 MG/ML
INJECTION, SOLUTION INTRAVENOUS CONTINUOUS
Status: CANCELLED | OUTPATIENT
Start: 2018-10-24

## 2018-10-23 RX ORDER — DIPHENHYDRAMINE HYDROCHLORIDE 50 MG/ML
50 INJECTION INTRAMUSCULAR; INTRAVENOUS ONCE
Status: CANCELLED | OUTPATIENT
Start: 2018-10-24 | End: 2018-10-24

## 2018-10-24 ENCOUNTER — HOSPITAL ENCOUNTER (OUTPATIENT)
Dept: INFUSION THERAPY | Age: 68
Discharge: HOME | End: 2018-10-24
Payer: MEDICARE

## 2018-10-24 VITALS
DIASTOLIC BLOOD PRESSURE: 65 MMHG | TEMPERATURE: 98.2 F | HEIGHT: 67 IN | SYSTOLIC BLOOD PRESSURE: 149 MMHG | BODY MASS INDEX: 30.51 KG/M2 | RESPIRATION RATE: 16 BRPM | HEART RATE: 97 BPM | WEIGHT: 194.4 LBS | OXYGEN SATURATION: 97 %

## 2018-10-24 DIAGNOSIS — C50.411 MALIGNANT NEOPLASM OF UPPER-OUTER QUADRANT OF BOTH BREASTS IN FEMALE, ESTROGEN RECEPTOR POSITIVE (HCC): ICD-10-CM

## 2018-10-24 DIAGNOSIS — C50.412 MALIGNANT NEOPLASM OF UPPER-OUTER QUADRANT OF BOTH BREASTS IN FEMALE, ESTROGEN RECEPTOR POSITIVE (HCC): ICD-10-CM

## 2018-10-24 DIAGNOSIS — Z17.0 MALIGNANT NEOPLASM OF UPPER-OUTER QUADRANT OF BOTH BREASTS IN FEMALE, ESTROGEN RECEPTOR POSITIVE (HCC): ICD-10-CM

## 2018-10-24 LAB
ABSOLUTE EOS #: 0 K/UL (ref 0–0.4)
ABSOLUTE IMMATURE GRANULOCYTE: ABNORMAL K/UL (ref 0–0.3)
ABSOLUTE LYMPH #: 0.5 K/UL (ref 1–4.8)
ABSOLUTE MONO #: 0 K/UL (ref 0.1–1.2)
ALBUMIN SERPL-MCNC: 4.4 G/DL (ref 3.5–5.2)
ALBUMIN/GLOBULIN RATIO: 1.8 (ref 1–2.5)
ALP BLD-CCNC: 67 U/L (ref 35–104)
ALT SERPL-CCNC: 31 U/L (ref 5–33)
ANION GAP SERPL CALCULATED.3IONS-SCNC: 13 MMOL/L (ref 9–17)
AST SERPL-CCNC: 18 U/L
BASOPHILS # BLD: 0 % (ref 0–1)
BASOPHILS ABSOLUTE: 0 K/UL (ref 0–0.2)
BILIRUB SERPL-MCNC: 0.39 MG/DL (ref 0.3–1.2)
BUN BLDV-MCNC: 22 MG/DL (ref 8–23)
BUN/CREAT BLD: 40 (ref 9–20)
CALCIUM SERPL-MCNC: 9.5 MG/DL (ref 8.6–10.4)
CHLORIDE BLD-SCNC: 103 MMOL/L (ref 98–107)
CO2: 22 MMOL/L (ref 20–31)
CREAT SERPL-MCNC: 0.55 MG/DL (ref 0.5–0.9)
DIFFERENTIAL TYPE: ABNORMAL
EOSINOPHILS RELATIVE PERCENT: 0 % (ref 1–7)
GFR AFRICAN AMERICAN: >60 ML/MIN
GFR NON-AFRICAN AMERICAN: >60 ML/MIN
GFR SERPL CREATININE-BSD FRML MDRD: ABNORMAL ML/MIN/{1.73_M2}
GFR SERPL CREATININE-BSD FRML MDRD: ABNORMAL ML/MIN/{1.73_M2}
GLUCOSE BLD-MCNC: 218 MG/DL (ref 70–99)
HCT VFR BLD CALC: 37.2 % (ref 36–46)
HEMOGLOBIN: 12.1 G/DL (ref 12–16)
IMMATURE GRANULOCYTES: ABNORMAL %
LYMPHOCYTES # BLD: 10 % (ref 16–46)
MCH RBC QN AUTO: 30 PG (ref 26–34)
MCHC RBC AUTO-ENTMCNC: 32.5 G/DL (ref 31–37)
MCV RBC AUTO: 92.4 FL (ref 80–100)
MONOCYTES # BLD: 0 % (ref 4–11)
NRBC AUTOMATED: ABNORMAL PER 100 WBC
PDW BLD-RTO: 13.6 % (ref 11–14.5)
PLATELET # BLD: 385 K/UL (ref 140–450)
PLATELET ESTIMATE: ABNORMAL
PMV BLD AUTO: 9.6 FL (ref 6–12)
POTASSIUM SERPL-SCNC: 4 MMOL/L (ref 3.7–5.3)
RBC # BLD: 4.03 M/UL (ref 4–5.2)
RBC # BLD: ABNORMAL 10*6/UL
SEG NEUTROPHILS: 90 % (ref 43–77)
SEGMENTED NEUTROPHILS ABSOLUTE COUNT: 4.1 K/UL (ref 1.8–7.7)
SODIUM BLD-SCNC: 138 MMOL/L (ref 135–144)
TOTAL PROTEIN: 6.8 G/DL (ref 6.4–8.3)
WBC # BLD: 4.7 K/UL (ref 3.5–11)
WBC # BLD: ABNORMAL 10*3/UL

## 2018-10-24 PROCEDURE — 80053 COMPREHEN METABOLIC PANEL: CPT

## 2018-10-24 PROCEDURE — 96375 TX/PRO/DX INJ NEW DRUG ADDON: CPT

## 2018-10-24 PROCEDURE — 85025 COMPLETE CBC W/AUTO DIFF WBC: CPT

## 2018-10-24 PROCEDURE — 36591 DRAW BLOOD OFF VENOUS DEVICE: CPT

## 2018-10-24 PROCEDURE — 2500000003 HC RX 250 WO HCPCS: Performed by: INTERNAL MEDICINE

## 2018-10-24 PROCEDURE — 2580000003 HC RX 258: Performed by: INTERNAL MEDICINE

## 2018-10-24 PROCEDURE — 96413 CHEMO IV INFUSION 1 HR: CPT

## 2018-10-24 PROCEDURE — S0028 INJECTION, FAMOTIDINE, 20 MG: HCPCS | Performed by: INTERNAL MEDICINE

## 2018-10-24 PROCEDURE — 36415 COLL VENOUS BLD VENIPUNCTURE: CPT

## 2018-10-24 PROCEDURE — 6360000002 HC RX W HCPCS: Performed by: INTERNAL MEDICINE

## 2018-10-24 RX ORDER — DEXAMETHASONE SODIUM PHOSPHATE 10 MG/ML
10 INJECTION, SOLUTION INTRAMUSCULAR; INTRAVENOUS ONCE
Status: COMPLETED | OUTPATIENT
Start: 2018-10-24 | End: 2018-10-24

## 2018-10-24 RX ORDER — HEPARIN SODIUM (PORCINE) LOCK FLUSH IV SOLN 100 UNIT/ML 100 UNIT/ML
500 SOLUTION INTRAVENOUS PRN
Status: DISCONTINUED | OUTPATIENT
Start: 2018-10-24 | End: 2018-10-25 | Stop reason: HOSPADM

## 2018-10-24 RX ORDER — SODIUM CHLORIDE 9 MG/ML
INJECTION, SOLUTION INTRAVENOUS ONCE
Status: COMPLETED | OUTPATIENT
Start: 2018-10-24 | End: 2018-10-24

## 2018-10-24 RX ORDER — ONDANSETRON 2 MG/ML
8 INJECTION INTRAMUSCULAR; INTRAVENOUS ONCE
Status: COMPLETED | OUTPATIENT
Start: 2018-10-24 | End: 2018-10-24

## 2018-10-24 RX ORDER — SODIUM CHLORIDE 0.9 % (FLUSH) 0.9 %
10 SYRINGE (ML) INJECTION PRN
Status: DISCONTINUED | OUTPATIENT
Start: 2018-10-24 | End: 2018-10-25 | Stop reason: HOSPADM

## 2018-10-24 RX ORDER — DIPHENHYDRAMINE HYDROCHLORIDE 50 MG/ML
50 INJECTION INTRAMUSCULAR; INTRAVENOUS ONCE
Status: COMPLETED | OUTPATIENT
Start: 2018-10-24 | End: 2018-10-24

## 2018-10-24 RX ADMIN — DEXAMETHASONE SODIUM PHOSPHATE 10 MG: 10 INJECTION, SOLUTION INTRAMUSCULAR; INTRAVENOUS at 10:58

## 2018-10-24 RX ADMIN — PACLITAXEL 162 MG: 6 INJECTION, SOLUTION INTRAVENOUS at 11:32

## 2018-10-24 RX ADMIN — HEPARIN SODIUM (PORCINE) LOCK FLUSH IV SOLN 100 UNIT/ML 500 UNITS: 100 SOLUTION at 12:41

## 2018-10-24 RX ADMIN — SODIUM CHLORIDE: 9 INJECTION, SOLUTION INTRAVENOUS at 09:35

## 2018-10-24 RX ADMIN — DIPHENHYDRAMINE HYDROCHLORIDE 50 MG: 50 INJECTION, SOLUTION INTRAMUSCULAR; INTRAVENOUS at 10:56

## 2018-10-24 RX ADMIN — ONDANSETRON 8 MG: 2 INJECTION INTRAMUSCULAR; INTRAVENOUS at 10:56

## 2018-10-24 RX ADMIN — FAMOTIDINE 20 MG: 10 INJECTION, SOLUTION INTRAVENOUS at 11:01

## 2018-10-31 ENCOUNTER — HOSPITAL ENCOUNTER (OUTPATIENT)
Dept: INFUSION THERAPY | Age: 68
Discharge: HOME | End: 2018-10-31
Payer: MEDICARE

## 2018-10-31 VITALS
TEMPERATURE: 99 F | SYSTOLIC BLOOD PRESSURE: 128 MMHG | HEIGHT: 67 IN | WEIGHT: 195.4 LBS | HEART RATE: 94 BPM | BODY MASS INDEX: 30.67 KG/M2 | DIASTOLIC BLOOD PRESSURE: 58 MMHG | RESPIRATION RATE: 16 BRPM

## 2018-10-31 DIAGNOSIS — Z17.0 MALIGNANT NEOPLASM OF UPPER-OUTER QUADRANT OF BOTH BREASTS IN FEMALE, ESTROGEN RECEPTOR POSITIVE (HCC): ICD-10-CM

## 2018-10-31 DIAGNOSIS — C50.412 MALIGNANT NEOPLASM OF UPPER-OUTER QUADRANT OF BOTH BREASTS IN FEMALE, ESTROGEN RECEPTOR POSITIVE (HCC): ICD-10-CM

## 2018-10-31 DIAGNOSIS — C50.411 MALIGNANT NEOPLASM OF UPPER-OUTER QUADRANT OF BOTH BREASTS IN FEMALE, ESTROGEN RECEPTOR POSITIVE (HCC): ICD-10-CM

## 2018-10-31 LAB
ABSOLUTE EOS #: 0 K/UL (ref 0–0.4)
ABSOLUTE IMMATURE GRANULOCYTE: ABNORMAL K/UL (ref 0–0.3)
ABSOLUTE LYMPH #: 0.5 K/UL (ref 1–4.8)
ABSOLUTE MONO #: 0 K/UL (ref 0.1–1.2)
ALBUMIN SERPL-MCNC: 4.4 G/DL (ref 3.5–5.2)
ALBUMIN/GLOBULIN RATIO: 1.8 (ref 1–2.5)
ALP BLD-CCNC: 64 U/L (ref 35–104)
ALT SERPL-CCNC: 59 U/L (ref 5–33)
ANION GAP SERPL CALCULATED.3IONS-SCNC: 15 MMOL/L (ref 9–17)
AST SERPL-CCNC: 19 U/L
BASOPHILS # BLD: 0 % (ref 0–1)
BASOPHILS ABSOLUTE: 0 K/UL (ref 0–0.2)
BILIRUB SERPL-MCNC: 0.31 MG/DL (ref 0.3–1.2)
BUN BLDV-MCNC: 20 MG/DL (ref 8–23)
BUN/CREAT BLD: 34 (ref 9–20)
CALCIUM SERPL-MCNC: 9.4 MG/DL (ref 8.6–10.4)
CHLORIDE BLD-SCNC: 105 MMOL/L (ref 98–107)
CO2: 21 MMOL/L (ref 20–31)
CREAT SERPL-MCNC: 0.59 MG/DL (ref 0.5–0.9)
DIFFERENTIAL TYPE: ABNORMAL
EOSINOPHILS RELATIVE PERCENT: 0 % (ref 1–7)
GFR AFRICAN AMERICAN: >60 ML/MIN
GFR NON-AFRICAN AMERICAN: >60 ML/MIN
GFR SERPL CREATININE-BSD FRML MDRD: ABNORMAL ML/MIN/{1.73_M2}
GFR SERPL CREATININE-BSD FRML MDRD: ABNORMAL ML/MIN/{1.73_M2}
GLUCOSE BLD-MCNC: 192 MG/DL (ref 70–99)
HCT VFR BLD CALC: 36.4 % (ref 36–46)
HEMOGLOBIN: 11.8 G/DL (ref 12–16)
IMMATURE GRANULOCYTES: ABNORMAL %
LYMPHOCYTES # BLD: 10 % (ref 16–46)
MCH RBC QN AUTO: 30.7 PG (ref 26–34)
MCHC RBC AUTO-ENTMCNC: 32.5 G/DL (ref 31–37)
MCV RBC AUTO: 94.3 FL (ref 80–100)
MONOCYTES # BLD: 1 % (ref 4–11)
NRBC AUTOMATED: ABNORMAL PER 100 WBC
PDW BLD-RTO: 14 % (ref 11–14.5)
PLATELET # BLD: 379 K/UL (ref 140–450)
PLATELET ESTIMATE: ABNORMAL
PMV BLD AUTO: 9.2 FL (ref 6–12)
POTASSIUM SERPL-SCNC: 4.2 MMOL/L (ref 3.7–5.3)
RBC # BLD: 3.86 M/UL (ref 4–5.2)
RBC # BLD: ABNORMAL 10*6/UL
SEG NEUTROPHILS: 89 % (ref 43–77)
SEGMENTED NEUTROPHILS ABSOLUTE COUNT: 3.9 K/UL (ref 1.8–7.7)
SODIUM BLD-SCNC: 141 MMOL/L (ref 135–144)
TOTAL PROTEIN: 6.8 G/DL (ref 6.4–8.3)
WBC # BLD: 4.4 K/UL (ref 3.5–11)
WBC # BLD: ABNORMAL 10*3/UL

## 2018-10-31 PROCEDURE — 96413 CHEMO IV INFUSION 1 HR: CPT

## 2018-10-31 PROCEDURE — 2500000003 HC RX 250 WO HCPCS: Performed by: INTERNAL MEDICINE

## 2018-10-31 PROCEDURE — 96375 TX/PRO/DX INJ NEW DRUG ADDON: CPT

## 2018-10-31 PROCEDURE — 80053 COMPREHEN METABOLIC PANEL: CPT

## 2018-10-31 PROCEDURE — 2580000003 HC RX 258: Performed by: INTERNAL MEDICINE

## 2018-10-31 PROCEDURE — 36415 COLL VENOUS BLD VENIPUNCTURE: CPT

## 2018-10-31 PROCEDURE — 85025 COMPLETE CBC W/AUTO DIFF WBC: CPT

## 2018-10-31 PROCEDURE — 36591 DRAW BLOOD OFF VENOUS DEVICE: CPT

## 2018-10-31 PROCEDURE — S0028 INJECTION, FAMOTIDINE, 20 MG: HCPCS | Performed by: INTERNAL MEDICINE

## 2018-10-31 PROCEDURE — 6360000002 HC RX W HCPCS: Performed by: INTERNAL MEDICINE

## 2018-10-31 RX ORDER — SODIUM CHLORIDE 9 MG/ML
INJECTION, SOLUTION INTRAVENOUS ONCE
Status: COMPLETED | OUTPATIENT
Start: 2018-10-31 | End: 2018-10-31

## 2018-10-31 RX ORDER — METHYLPREDNISOLONE SODIUM SUCCINATE 125 MG/2ML
125 INJECTION, POWDER, LYOPHILIZED, FOR SOLUTION INTRAMUSCULAR; INTRAVENOUS ONCE
Status: CANCELLED | OUTPATIENT
Start: 2018-10-31 | End: 2018-10-31

## 2018-10-31 RX ORDER — 0.9 % SODIUM CHLORIDE 0.9 %
10 VIAL (ML) INJECTION ONCE
Status: CANCELLED | OUTPATIENT
Start: 2018-10-31 | End: 2018-10-31

## 2018-10-31 RX ORDER — MEPERIDINE HYDROCHLORIDE 50 MG/ML
12.5 INJECTION INTRAMUSCULAR; INTRAVENOUS; SUBCUTANEOUS ONCE
Status: CANCELLED | OUTPATIENT
Start: 2018-10-31 | End: 2018-10-31

## 2018-10-31 RX ORDER — SODIUM CHLORIDE 9 MG/ML
INJECTION, SOLUTION INTRAVENOUS CONTINUOUS
Status: CANCELLED | OUTPATIENT
Start: 2018-10-31

## 2018-10-31 RX ORDER — DEXAMETHASONE SODIUM PHOSPHATE 10 MG/ML
10 INJECTION, SOLUTION INTRAMUSCULAR; INTRAVENOUS EVERY 6 HOURS
Status: DISCONTINUED | OUTPATIENT
Start: 2018-10-31 | End: 2018-11-01 | Stop reason: HOSPADM

## 2018-10-31 RX ORDER — EPINEPHRINE 1 MG/ML
0.3 INJECTION, SOLUTION, CONCENTRATE INTRAVENOUS PRN
Status: CANCELLED | OUTPATIENT
Start: 2018-10-31

## 2018-10-31 RX ORDER — HEPARIN SODIUM (PORCINE) LOCK FLUSH IV SOLN 100 UNIT/ML 100 UNIT/ML
500 SOLUTION INTRAVENOUS PRN
Status: DISCONTINUED | OUTPATIENT
Start: 2018-10-31 | End: 2018-11-01 | Stop reason: HOSPADM

## 2018-10-31 RX ORDER — DIPHENHYDRAMINE HYDROCHLORIDE 50 MG/ML
50 INJECTION INTRAMUSCULAR; INTRAVENOUS ONCE
Status: COMPLETED | OUTPATIENT
Start: 2018-10-31 | End: 2018-10-31

## 2018-10-31 RX ORDER — DIPHENHYDRAMINE HYDROCHLORIDE 50 MG/ML
50 INJECTION INTRAMUSCULAR; INTRAVENOUS ONCE
Status: CANCELLED | OUTPATIENT
Start: 2018-10-31 | End: 2018-10-31

## 2018-10-31 RX ORDER — SODIUM CHLORIDE 0.9 % (FLUSH) 0.9 %
10 SYRINGE (ML) INJECTION PRN
Status: DISCONTINUED | OUTPATIENT
Start: 2018-10-31 | End: 2018-11-01 | Stop reason: HOSPADM

## 2018-10-31 RX ORDER — ONDANSETRON 2 MG/ML
8 INJECTION INTRAMUSCULAR; INTRAVENOUS ONCE
Status: COMPLETED | OUTPATIENT
Start: 2018-10-31 | End: 2018-10-31

## 2018-10-31 RX ORDER — DEXAMETHASONE SODIUM PHOSPHATE 10 MG/ML
10 INJECTION, SOLUTION INTRAMUSCULAR; INTRAVENOUS ONCE
Status: DISCONTINUED | OUTPATIENT
Start: 2018-10-31 | End: 2018-10-31 | Stop reason: RX

## 2018-10-31 RX ORDER — SODIUM CHLORIDE 0.9 % (FLUSH) 0.9 %
5 SYRINGE (ML) INJECTION PRN
Status: CANCELLED | OUTPATIENT
Start: 2018-10-31

## 2018-10-31 RX ADMIN — DEXAMETHASONE SODIUM PHOSPHATE 10 MG: 10 INJECTION, SOLUTION INTRAMUSCULAR; INTRAVENOUS at 10:29

## 2018-10-31 RX ADMIN — SODIUM CHLORIDE: 9 INJECTION, SOLUTION INTRAVENOUS at 09:25

## 2018-10-31 RX ADMIN — PACLITAXEL 162 MG: 6 INJECTION, SOLUTION INTRAVENOUS at 11:07

## 2018-10-31 RX ADMIN — HEPARIN SODIUM (PORCINE) LOCK FLUSH IV SOLN 100 UNIT/ML 500 UNITS: 100 SOLUTION at 12:21

## 2018-10-31 RX ADMIN — ONDANSETRON 8 MG: 2 INJECTION INTRAMUSCULAR; INTRAVENOUS at 10:33

## 2018-10-31 RX ADMIN — DIPHENHYDRAMINE HYDROCHLORIDE 50 MG: 50 INJECTION, SOLUTION INTRAMUSCULAR; INTRAVENOUS at 10:36

## 2018-10-31 RX ADMIN — FAMOTIDINE 20 MG: 10 INJECTION, SOLUTION INTRAVENOUS at 10:27

## 2018-10-31 ASSESSMENT — PAIN SCALES - GENERAL: PAINLEVEL_OUTOF10: 0

## 2018-10-31 NOTE — PROGRESS NOTES
Labs within limits to treat. Orders are signed and released. Sitting in chair talking to sister and .

## 2018-10-31 NOTE — PROGRESS NOTES
VAD accessed per protocol with 3/4 inch 20 gauge gramajo needle. Flushed easily with saline 10 ml. Good blood return. Labs drawn. Secured accessed port with transparent dressing. IV infusing NSS. Patient has bright affect and no complaints.

## 2018-11-05 ENCOUNTER — OFFICE VISIT (OUTPATIENT)
Dept: ONCOLOGY | Age: 68
End: 2018-11-05
Payer: MEDICARE

## 2018-11-05 VITALS
HEIGHT: 67 IN | BODY MASS INDEX: 30.45 KG/M2 | DIASTOLIC BLOOD PRESSURE: 72 MMHG | SYSTOLIC BLOOD PRESSURE: 130 MMHG | HEART RATE: 74 BPM | WEIGHT: 194 LBS | OXYGEN SATURATION: 98 %

## 2018-11-05 DIAGNOSIS — C50.412 MALIGNANT NEOPLASM OF UPPER-OUTER QUADRANT OF BOTH BREASTS IN FEMALE, ESTROGEN RECEPTOR POSITIVE (HCC): Primary | ICD-10-CM

## 2018-11-05 DIAGNOSIS — Z17.0 MALIGNANT NEOPLASM OF UPPER-OUTER QUADRANT OF BOTH BREASTS IN FEMALE, ESTROGEN RECEPTOR POSITIVE (HCC): Primary | ICD-10-CM

## 2018-11-05 DIAGNOSIS — C50.411 MALIGNANT NEOPLASM OF UPPER-OUTER QUADRANT OF BOTH BREASTS IN FEMALE, ESTROGEN RECEPTOR POSITIVE (HCC): Primary | ICD-10-CM

## 2018-11-05 PROCEDURE — 99215 OFFICE O/P EST HI 40 MIN: CPT | Performed by: INTERNAL MEDICINE

## 2018-11-05 NOTE — PROGRESS NOTES
medications for this visit. Social History     Social History    Marital status:      Spouse name: N/A    Number of children: N/A    Years of education: N/A     Occupational History    retired [employer]     Social History Main Topics    Smoking status: Former Smoker     Packs/day: 0.50     Years: 7.00     Types: Cigarettes     Quit date: 1/1/1978    Smokeless tobacco: Never Used      Comment: Quit smoking in 1978.  Alcohol use Yes      Comment: Occasional alcohol.  Drug use: No    Sexual activity: Not on file     Other Topics Concern    Not on file     Social History Narrative    No narrative on file       Family History   Problem Relation Age of Onset    Hypertension Mother         Renal failure.  Diabetes Mother         Type 2 at end of life.  Heart Failure Father     Coronary Art Dis Father         Status post CABG    Diabetes Father         Type 2, very late in life.  Breast Cancer Sister     Hypertension Brother     Hypertension Sister      REVIEW OF SYSTEM:   Constitutional: No fever or chills. No night sweats, no weight loss   Eyes: No eye discharge, double vision, or eye pain   HEENT: negative for sore mouth, sore throat, hoarseness and voice change   Respiratory: negative for cough , sputum, dyspnea, wheezing, hemoptysis, chest pain   Cardiovascular: negative for chest pain, dyspnea, palpitations, orthopnea, PND   Gastrointestinal: negative for nausea, vomiting, diarrhea, constipation, abdominal pain, Dysphagia, hematemesis and hematochezia   Genitourinary: negative for frequency, dysuria, nocturia, urinary incontinence, and hematuria   Integument: negative for rash, skin lesions, bruises.    Hematologic/Lymphatic: negative for easy bruising, bleeding, lymphadenopathy, petechiae and swelling/edema   Endocrine: negative for heat or cold intolerance, tremor, weight changes, change in bowel habits and hair loss   Musculoskeletal: negative for myalgias,

## 2018-11-07 ENCOUNTER — HOSPITAL ENCOUNTER (OUTPATIENT)
Dept: INFUSION THERAPY | Age: 68
Discharge: HOME | End: 2018-11-07
Payer: MEDICARE

## 2018-11-07 VITALS
OXYGEN SATURATION: 95 % | BODY MASS INDEX: 30.54 KG/M2 | HEIGHT: 67 IN | RESPIRATION RATE: 16 BRPM | TEMPERATURE: 98.6 F | HEART RATE: 84 BPM | DIASTOLIC BLOOD PRESSURE: 59 MMHG | WEIGHT: 194.6 LBS | SYSTOLIC BLOOD PRESSURE: 130 MMHG

## 2018-11-07 DIAGNOSIS — C50.411 MALIGNANT NEOPLASM OF UPPER-OUTER QUADRANT OF BOTH BREASTS IN FEMALE, ESTROGEN RECEPTOR POSITIVE (HCC): ICD-10-CM

## 2018-11-07 DIAGNOSIS — C50.412 MALIGNANT NEOPLASM OF UPPER-OUTER QUADRANT OF BOTH BREASTS IN FEMALE, ESTROGEN RECEPTOR POSITIVE (HCC): ICD-10-CM

## 2018-11-07 DIAGNOSIS — Z17.0 MALIGNANT NEOPLASM OF UPPER-OUTER QUADRANT OF BOTH BREASTS IN FEMALE, ESTROGEN RECEPTOR POSITIVE (HCC): ICD-10-CM

## 2018-11-07 LAB
ABSOLUTE EOS #: 0 K/UL (ref 0–0.4)
ABSOLUTE IMMATURE GRANULOCYTE: ABNORMAL K/UL (ref 0–0.3)
ABSOLUTE LYMPH #: 0.45 K/UL (ref 1–4.8)
ABSOLUTE MONO #: 0.03 K/UL (ref 0.1–1.2)
ALBUMIN SERPL-MCNC: 4.4 G/DL (ref 3.5–5.2)
ALBUMIN/GLOBULIN RATIO: 1.6 (ref 1–2.5)
ALP BLD-CCNC: 65 U/L (ref 35–104)
ALT SERPL-CCNC: 48 U/L (ref 5–33)
ANION GAP SERPL CALCULATED.3IONS-SCNC: 12 MMOL/L (ref 9–17)
AST SERPL-CCNC: 19 U/L
BASOPHILS # BLD: 0 % (ref 0–1)
BASOPHILS ABSOLUTE: 0 K/UL (ref 0–0.2)
BILIRUB SERPL-MCNC: 0.28 MG/DL (ref 0.3–1.2)
BUN BLDV-MCNC: 23 MG/DL (ref 8–23)
BUN/CREAT BLD: 37 (ref 9–20)
CALCIUM SERPL-MCNC: 9.7 MG/DL (ref 8.6–10.4)
CHLORIDE BLD-SCNC: 104 MMOL/L (ref 98–107)
CO2: 22 MMOL/L (ref 20–31)
CREAT SERPL-MCNC: 0.62 MG/DL (ref 0.5–0.9)
DIFFERENTIAL TYPE: ABNORMAL
EOSINOPHILS RELATIVE PERCENT: 0 % (ref 1–7)
GFR AFRICAN AMERICAN: >60 ML/MIN
GFR NON-AFRICAN AMERICAN: >60 ML/MIN
GFR SERPL CREATININE-BSD FRML MDRD: ABNORMAL ML/MIN/{1.73_M2}
GFR SERPL CREATININE-BSD FRML MDRD: ABNORMAL ML/MIN/{1.73_M2}
GLUCOSE BLD-MCNC: 180 MG/DL (ref 70–99)
HCT VFR BLD CALC: 36.2 % (ref 36–46)
HEMOGLOBIN: 11.8 G/DL (ref 12–16)
IMMATURE GRANULOCYTES: ABNORMAL %
LYMPHOCYTES # BLD: 14 % (ref 16–46)
MCH RBC QN AUTO: 30.9 PG (ref 26–34)
MCHC RBC AUTO-ENTMCNC: 32.7 G/DL (ref 31–37)
MCV RBC AUTO: 94.4 FL (ref 80–100)
MONOCYTES # BLD: 1 % (ref 4–11)
MORPHOLOGY: ABNORMAL
NRBC AUTOMATED: ABNORMAL PER 100 WBC
PDW BLD-RTO: 14.4 % (ref 11–14.5)
PLATELET # BLD: 276 K/UL (ref 140–450)
PLATELET ESTIMATE: ABNORMAL
PMV BLD AUTO: 9.2 FL (ref 6–12)
POTASSIUM SERPL-SCNC: 4.3 MMOL/L (ref 3.7–5.3)
RBC # BLD: 3.83 M/UL (ref 4–5.2)
RBC # BLD: ABNORMAL 10*6/UL
SEG NEUTROPHILS: 85 % (ref 43–77)
SEGMENTED NEUTROPHILS ABSOLUTE COUNT: 2.72 K/UL (ref 1.8–7.7)
SODIUM BLD-SCNC: 138 MMOL/L (ref 135–144)
TOTAL PROTEIN: 7.1 G/DL (ref 6.4–8.3)
WBC # BLD: 3.2 K/UL (ref 3.5–11)
WBC # BLD: ABNORMAL 10*3/UL

## 2018-11-07 PROCEDURE — 96413 CHEMO IV INFUSION 1 HR: CPT

## 2018-11-07 PROCEDURE — S0028 INJECTION, FAMOTIDINE, 20 MG: HCPCS | Performed by: INTERNAL MEDICINE

## 2018-11-07 PROCEDURE — 2500000003 HC RX 250 WO HCPCS: Performed by: INTERNAL MEDICINE

## 2018-11-07 PROCEDURE — 36591 DRAW BLOOD OFF VENOUS DEVICE: CPT

## 2018-11-07 PROCEDURE — 85025 COMPLETE CBC W/AUTO DIFF WBC: CPT

## 2018-11-07 PROCEDURE — 80053 COMPREHEN METABOLIC PANEL: CPT

## 2018-11-07 PROCEDURE — 96375 TX/PRO/DX INJ NEW DRUG ADDON: CPT

## 2018-11-07 PROCEDURE — 2580000003 HC RX 258: Performed by: INTERNAL MEDICINE

## 2018-11-07 PROCEDURE — 6360000002 HC RX W HCPCS: Performed by: INTERNAL MEDICINE

## 2018-11-07 RX ORDER — DEXAMETHASONE SODIUM PHOSPHATE 10 MG/ML
10 INJECTION, SOLUTION INTRAMUSCULAR; INTRAVENOUS EVERY 6 HOURS
Status: DISCONTINUED | OUTPATIENT
Start: 2018-11-07 | End: 2018-11-08 | Stop reason: HOSPADM

## 2018-11-07 RX ORDER — DIPHENHYDRAMINE HYDROCHLORIDE 50 MG/ML
50 INJECTION INTRAMUSCULAR; INTRAVENOUS ONCE
Status: CANCELLED | OUTPATIENT
Start: 2018-11-07 | End: 2018-11-07

## 2018-11-07 RX ORDER — SODIUM CHLORIDE 0.9 % (FLUSH) 0.9 %
5 SYRINGE (ML) INJECTION PRN
Status: CANCELLED | OUTPATIENT
Start: 2018-11-07

## 2018-11-07 RX ORDER — SODIUM CHLORIDE 9 MG/ML
INJECTION, SOLUTION INTRAVENOUS ONCE
Status: DISCONTINUED | OUTPATIENT
Start: 2018-11-07 | End: 2018-11-08 | Stop reason: HOSPADM

## 2018-11-07 RX ORDER — SODIUM CHLORIDE 0.9 % (FLUSH) 0.9 %
10 SYRINGE (ML) INJECTION PRN
Status: DISCONTINUED | OUTPATIENT
Start: 2018-11-07 | End: 2018-11-08 | Stop reason: HOSPADM

## 2018-11-07 RX ORDER — MEPERIDINE HYDROCHLORIDE 50 MG/ML
12.5 INJECTION INTRAMUSCULAR; INTRAVENOUS; SUBCUTANEOUS ONCE
Status: CANCELLED | OUTPATIENT
Start: 2018-11-07 | End: 2018-11-07

## 2018-11-07 RX ORDER — DEXAMETHASONE SODIUM PHOSPHATE 10 MG/ML
10 INJECTION, SOLUTION INTRAMUSCULAR; INTRAVENOUS ONCE
Status: DISCONTINUED | OUTPATIENT
Start: 2018-11-07 | End: 2018-11-07 | Stop reason: RX

## 2018-11-07 RX ORDER — HEPARIN SODIUM (PORCINE) LOCK FLUSH IV SOLN 100 UNIT/ML 100 UNIT/ML
500 SOLUTION INTRAVENOUS PRN
Status: DISCONTINUED | OUTPATIENT
Start: 2018-11-07 | End: 2018-11-08 | Stop reason: HOSPADM

## 2018-11-07 RX ORDER — EPINEPHRINE 1 MG/ML
0.3 INJECTION, SOLUTION, CONCENTRATE INTRAVENOUS PRN
Status: CANCELLED | OUTPATIENT
Start: 2018-11-07

## 2018-11-07 RX ORDER — ONDANSETRON 2 MG/ML
8 INJECTION INTRAMUSCULAR; INTRAVENOUS ONCE
Status: COMPLETED | OUTPATIENT
Start: 2018-11-07 | End: 2018-11-07

## 2018-11-07 RX ORDER — DIPHENHYDRAMINE HYDROCHLORIDE 50 MG/ML
50 INJECTION INTRAMUSCULAR; INTRAVENOUS ONCE
Status: COMPLETED | OUTPATIENT
Start: 2018-11-07 | End: 2018-11-07

## 2018-11-07 RX ORDER — METHYLPREDNISOLONE SODIUM SUCCINATE 125 MG/2ML
125 INJECTION, POWDER, LYOPHILIZED, FOR SOLUTION INTRAMUSCULAR; INTRAVENOUS ONCE
Status: CANCELLED | OUTPATIENT
Start: 2018-11-07 | End: 2018-11-07

## 2018-11-07 RX ORDER — 0.9 % SODIUM CHLORIDE 0.9 %
10 VIAL (ML) INJECTION ONCE
Status: CANCELLED | OUTPATIENT
Start: 2018-11-07 | End: 2018-11-07

## 2018-11-07 RX ORDER — SODIUM CHLORIDE 9 MG/ML
INJECTION, SOLUTION INTRAVENOUS CONTINUOUS
Status: CANCELLED | OUTPATIENT
Start: 2018-11-07

## 2018-11-07 RX ADMIN — ONDANSETRON 8 MG: 2 INJECTION INTRAMUSCULAR; INTRAVENOUS at 11:30

## 2018-11-07 RX ADMIN — DIPHENHYDRAMINE HYDROCHLORIDE 50 MG: 50 INJECTION, SOLUTION INTRAMUSCULAR; INTRAVENOUS at 11:26

## 2018-11-07 RX ADMIN — HEPARIN SODIUM (PORCINE) LOCK FLUSH IV SOLN 100 UNIT/ML 500 UNITS: 100 SOLUTION at 13:09

## 2018-11-07 RX ADMIN — FAMOTIDINE 20 MG: 10 INJECTION, SOLUTION INTRAVENOUS at 11:24

## 2018-11-07 RX ADMIN — PACLITAXEL 162 MG: 6 INJECTION, SOLUTION INTRAVENOUS at 12:00

## 2018-11-07 RX ADMIN — SODIUM CHLORIDE: 9 INJECTION, SOLUTION INTRAVENOUS at 10:10

## 2018-11-07 RX ADMIN — DEXAMETHASONE SODIUM PHOSPHATE 10 MG: 10 INJECTION, SOLUTION INTRAMUSCULAR; INTRAVENOUS at 11:22

## 2018-11-07 NOTE — PROGRESS NOTES
Infusion complete. Flushed VAD and IV  with 50 ml of saline and 5 ml of heparin flush. D/C gramajo needle. Band aid to site. Patient stable.
Labs back called and left message with Dr. Lola Araya to sign orders.
Patient sitting talking to sister chemo infusing no complaints.
Patient stable and discharged form unit.
Premedications given.
Taxol infusing. Patient talking with sister, will continue to monitor.
VAD accessed per protocol with 3/4 inch 20 gauge gramajo needle. Flushed easily with saline 10 ml. Good blood return. Labs drawn. Secured accessed port with transparent dressing. IV infusing NSS. Patient saw Dr. Karin Wilburn yesterday. Reports bloody nose when blows nose notices blood. And vomits x1 the day after chemo.
Stable

## 2018-11-14 ENCOUNTER — HOSPITAL ENCOUNTER (OUTPATIENT)
Dept: INFUSION THERAPY | Age: 68
Discharge: HOME | End: 2018-11-14
Payer: MEDICARE

## 2018-11-14 VITALS
SYSTOLIC BLOOD PRESSURE: 155 MMHG | BODY MASS INDEX: 30.42 KG/M2 | DIASTOLIC BLOOD PRESSURE: 61 MMHG | WEIGHT: 193.8 LBS | TEMPERATURE: 97.9 F | RESPIRATION RATE: 16 BRPM | HEIGHT: 67 IN | HEART RATE: 82 BPM | OXYGEN SATURATION: 97 %

## 2018-11-14 DIAGNOSIS — C50.412 MALIGNANT NEOPLASM OF UPPER-OUTER QUADRANT OF BOTH BREASTS IN FEMALE, ESTROGEN RECEPTOR POSITIVE (HCC): ICD-10-CM

## 2018-11-14 DIAGNOSIS — Z17.0 MALIGNANT NEOPLASM OF UPPER-OUTER QUADRANT OF BOTH BREASTS IN FEMALE, ESTROGEN RECEPTOR POSITIVE (HCC): ICD-10-CM

## 2018-11-14 DIAGNOSIS — C50.411 MALIGNANT NEOPLASM OF UPPER-OUTER QUADRANT OF BOTH BREASTS IN FEMALE, ESTROGEN RECEPTOR POSITIVE (HCC): ICD-10-CM

## 2018-11-14 LAB
ABSOLUTE EOS #: 0 K/UL (ref 0–0.4)
ABSOLUTE IMMATURE GRANULOCYTE: ABNORMAL K/UL (ref 0–0.3)
ABSOLUTE LYMPH #: 0.5 K/UL (ref 1–4.8)
ABSOLUTE MONO #: 0.1 K/UL (ref 0.1–1.2)
ALBUMIN SERPL-MCNC: 4.1 G/DL (ref 3.5–5.2)
ALBUMIN/GLOBULIN RATIO: 1.4 (ref 1–2.5)
ALP BLD-CCNC: 58 U/L (ref 35–104)
ALT SERPL-CCNC: 33 U/L (ref 5–33)
ANION GAP SERPL CALCULATED.3IONS-SCNC: 13 MMOL/L (ref 9–17)
AST SERPL-CCNC: 17 U/L
BASOPHILS # BLD: 0 % (ref 0–1)
BASOPHILS ABSOLUTE: 0 K/UL (ref 0–0.2)
BILIRUB SERPL-MCNC: 0.31 MG/DL (ref 0.3–1.2)
BUN BLDV-MCNC: 26 MG/DL (ref 8–23)
BUN/CREAT BLD: 43 (ref 9–20)
CALCIUM SERPL-MCNC: 9.6 MG/DL (ref 8.6–10.4)
CHLORIDE BLD-SCNC: 102 MMOL/L (ref 98–107)
CO2: 23 MMOL/L (ref 20–31)
CREAT SERPL-MCNC: 0.6 MG/DL (ref 0.5–0.9)
DIFFERENTIAL TYPE: ABNORMAL
EOSINOPHILS RELATIVE PERCENT: 0 % (ref 1–7)
GFR AFRICAN AMERICAN: >60 ML/MIN
GFR NON-AFRICAN AMERICAN: >60 ML/MIN
GFR SERPL CREATININE-BSD FRML MDRD: ABNORMAL ML/MIN/{1.73_M2}
GFR SERPL CREATININE-BSD FRML MDRD: ABNORMAL ML/MIN/{1.73_M2}
GLUCOSE BLD-MCNC: 184 MG/DL (ref 70–99)
HCT VFR BLD CALC: 36 % (ref 36–46)
HEMOGLOBIN: 11.9 G/DL (ref 12–16)
IMMATURE GRANULOCYTES: ABNORMAL %
LYMPHOCYTES # BLD: 12 % (ref 16–46)
MCH RBC QN AUTO: 31.1 PG (ref 26–34)
MCHC RBC AUTO-ENTMCNC: 33 G/DL (ref 31–37)
MCV RBC AUTO: 94.2 FL (ref 80–100)
MONOCYTES # BLD: 1 % (ref 4–11)
NRBC AUTOMATED: ABNORMAL PER 100 WBC
PDW BLD-RTO: 14.5 % (ref 11–14.5)
PLATELET # BLD: 257 K/UL (ref 140–450)
PLATELET ESTIMATE: ABNORMAL
PMV BLD AUTO: 9.3 FL (ref 6–12)
POTASSIUM SERPL-SCNC: 4 MMOL/L (ref 3.7–5.3)
RBC # BLD: 3.83 M/UL (ref 4–5.2)
RBC # BLD: ABNORMAL 10*6/UL
SEG NEUTROPHILS: 87 % (ref 43–77)
SEGMENTED NEUTROPHILS ABSOLUTE COUNT: 3.5 K/UL (ref 1.8–7.7)
SODIUM BLD-SCNC: 138 MMOL/L (ref 135–144)
TOTAL PROTEIN: 7 G/DL (ref 6.4–8.3)
WBC # BLD: 4.1 K/UL (ref 3.5–11)
WBC # BLD: ABNORMAL 10*3/UL

## 2018-11-14 PROCEDURE — 96375 TX/PRO/DX INJ NEW DRUG ADDON: CPT

## 2018-11-14 PROCEDURE — 36591 DRAW BLOOD OFF VENOUS DEVICE: CPT

## 2018-11-14 PROCEDURE — 6360000002 HC RX W HCPCS: Performed by: INTERNAL MEDICINE

## 2018-11-14 PROCEDURE — 96413 CHEMO IV INFUSION 1 HR: CPT

## 2018-11-14 PROCEDURE — 2500000003 HC RX 250 WO HCPCS: Performed by: INTERNAL MEDICINE

## 2018-11-14 PROCEDURE — 80053 COMPREHEN METABOLIC PANEL: CPT

## 2018-11-14 PROCEDURE — S0028 INJECTION, FAMOTIDINE, 20 MG: HCPCS | Performed by: INTERNAL MEDICINE

## 2018-11-14 PROCEDURE — 85025 COMPLETE CBC W/AUTO DIFF WBC: CPT

## 2018-11-14 PROCEDURE — 2580000003 HC RX 258: Performed by: INTERNAL MEDICINE

## 2018-11-14 RX ORDER — DEXAMETHASONE SODIUM PHOSPHATE 10 MG/ML
10 INJECTION, SOLUTION INTRAMUSCULAR; INTRAVENOUS ONCE
Status: COMPLETED | OUTPATIENT
Start: 2018-11-14 | End: 2018-11-14

## 2018-11-14 RX ORDER — ONDANSETRON 2 MG/ML
8 INJECTION INTRAMUSCULAR; INTRAVENOUS ONCE
Status: COMPLETED | OUTPATIENT
Start: 2018-11-14 | End: 2018-11-14

## 2018-11-14 RX ORDER — HEPARIN SODIUM (PORCINE) LOCK FLUSH IV SOLN 100 UNIT/ML 100 UNIT/ML
500 SOLUTION INTRAVENOUS PRN
Status: DISCONTINUED | OUTPATIENT
Start: 2018-11-14 | End: 2018-11-15 | Stop reason: HOSPADM

## 2018-11-14 RX ORDER — MEPERIDINE HYDROCHLORIDE 50 MG/ML
12.5 INJECTION INTRAMUSCULAR; INTRAVENOUS; SUBCUTANEOUS ONCE
Status: CANCELLED | OUTPATIENT
Start: 2018-11-14 | End: 2018-11-14

## 2018-11-14 RX ORDER — EPINEPHRINE 1 MG/ML
0.3 INJECTION, SOLUTION, CONCENTRATE INTRAVENOUS PRN
Status: CANCELLED | OUTPATIENT
Start: 2018-11-14

## 2018-11-14 RX ORDER — DIPHENHYDRAMINE HYDROCHLORIDE 50 MG/ML
50 INJECTION INTRAMUSCULAR; INTRAVENOUS ONCE
Status: CANCELLED | OUTPATIENT
Start: 2018-11-14 | End: 2018-11-14

## 2018-11-14 RX ORDER — 0.9 % SODIUM CHLORIDE 0.9 %
10 VIAL (ML) INJECTION ONCE
Status: CANCELLED | OUTPATIENT
Start: 2018-11-14 | End: 2018-11-14

## 2018-11-14 RX ORDER — METHYLPREDNISOLONE SODIUM SUCCINATE 125 MG/2ML
125 INJECTION, POWDER, LYOPHILIZED, FOR SOLUTION INTRAMUSCULAR; INTRAVENOUS ONCE
Status: CANCELLED | OUTPATIENT
Start: 2018-11-14 | End: 2018-11-14

## 2018-11-14 RX ORDER — SODIUM CHLORIDE 0.9 % (FLUSH) 0.9 %
10 SYRINGE (ML) INJECTION PRN
Status: DISCONTINUED | OUTPATIENT
Start: 2018-11-14 | End: 2018-11-15 | Stop reason: HOSPADM

## 2018-11-14 RX ORDER — SODIUM CHLORIDE 0.9 % (FLUSH) 0.9 %
5 SYRINGE (ML) INJECTION PRN
Status: CANCELLED | OUTPATIENT
Start: 2018-11-14

## 2018-11-14 RX ORDER — DIPHENHYDRAMINE HYDROCHLORIDE 50 MG/ML
50 INJECTION INTRAMUSCULAR; INTRAVENOUS ONCE
Status: COMPLETED | OUTPATIENT
Start: 2018-11-14 | End: 2018-11-14

## 2018-11-14 RX ORDER — SODIUM CHLORIDE 9 MG/ML
INJECTION, SOLUTION INTRAVENOUS CONTINUOUS
Status: CANCELLED | OUTPATIENT
Start: 2018-11-14

## 2018-11-14 RX ORDER — SODIUM CHLORIDE 9 MG/ML
INJECTION, SOLUTION INTRAVENOUS ONCE
Status: COMPLETED | OUTPATIENT
Start: 2018-11-14 | End: 2018-11-14

## 2018-11-14 RX ADMIN — DEXAMETHASONE SODIUM PHOSPHATE 10 MG: 10 INJECTION, SOLUTION INTRAMUSCULAR; INTRAVENOUS at 11:08

## 2018-11-14 RX ADMIN — DIPHENHYDRAMINE HYDROCHLORIDE 50 MG: 50 INJECTION, SOLUTION INTRAMUSCULAR; INTRAVENOUS at 10:57

## 2018-11-14 RX ADMIN — SODIUM CHLORIDE: 9 INJECTION, SOLUTION INTRAVENOUS at 10:15

## 2018-11-14 RX ADMIN — PACLITAXEL 162 MG: 6 INJECTION, SOLUTION INTRAVENOUS at 11:40

## 2018-11-14 RX ADMIN — HEPARIN SODIUM (PORCINE) LOCK FLUSH IV SOLN 100 UNIT/ML 500 UNITS: 100 SOLUTION at 13:01

## 2018-11-14 RX ADMIN — Medication 10 ML: at 10:14

## 2018-11-14 RX ADMIN — ONDANSETRON 8 MG: 2 INJECTION INTRAMUSCULAR; INTRAVENOUS at 11:01

## 2018-11-14 RX ADMIN — FAMOTIDINE 20 MG: 10 INJECTION, SOLUTION INTRAVENOUS at 11:04

## 2018-11-14 ASSESSMENT — PAIN SCALES - GENERAL: PAINLEVEL_OUTOF10: 0

## 2018-11-14 NOTE — PROGRESS NOTES
Infusion complete. Flushed VAD with 40 ml of saline and 5 ml of heparin flush. D/C gramajo needle. Band aid to site. Patient stable no complaints. Discharged to home with sister.

## 2018-11-21 ENCOUNTER — HOSPITAL ENCOUNTER (OUTPATIENT)
Dept: INFUSION THERAPY | Age: 68
Discharge: HOME | End: 2018-11-21
Payer: MEDICARE

## 2018-11-21 VITALS
OXYGEN SATURATION: 96 % | HEART RATE: 81 BPM | RESPIRATION RATE: 16 BRPM | BODY MASS INDEX: 30.32 KG/M2 | WEIGHT: 193.2 LBS | HEIGHT: 67 IN | SYSTOLIC BLOOD PRESSURE: 142 MMHG | TEMPERATURE: 97.5 F | DIASTOLIC BLOOD PRESSURE: 63 MMHG

## 2018-11-21 DIAGNOSIS — C50.412 MALIGNANT NEOPLASM OF UPPER-OUTER QUADRANT OF BOTH BREASTS IN FEMALE, ESTROGEN RECEPTOR POSITIVE (HCC): ICD-10-CM

## 2018-11-21 DIAGNOSIS — C50.411 MALIGNANT NEOPLASM OF UPPER-OUTER QUADRANT OF BOTH BREASTS IN FEMALE, ESTROGEN RECEPTOR POSITIVE (HCC): ICD-10-CM

## 2018-11-21 DIAGNOSIS — Z17.0 MALIGNANT NEOPLASM OF UPPER-OUTER QUADRANT OF BOTH BREASTS IN FEMALE, ESTROGEN RECEPTOR POSITIVE (HCC): ICD-10-CM

## 2018-11-21 LAB
ABSOLUTE EOS #: 0 K/UL (ref 0–0.4)
ABSOLUTE IMMATURE GRANULOCYTE: ABNORMAL K/UL (ref 0–0.3)
ABSOLUTE LYMPH #: 0.5 K/UL (ref 1–4.8)
ABSOLUTE MONO #: 0.1 K/UL (ref 0.1–1.2)
ALBUMIN SERPL-MCNC: 4.1 G/DL (ref 3.5–5.2)
ALBUMIN/GLOBULIN RATIO: 1.4 (ref 1–2.5)
ALP BLD-CCNC: 61 U/L (ref 35–104)
ALT SERPL-CCNC: 28 U/L (ref 5–33)
ANION GAP SERPL CALCULATED.3IONS-SCNC: 15 MMOL/L (ref 9–17)
AST SERPL-CCNC: 18 U/L
BASOPHILS # BLD: 0 % (ref 0–1)
BASOPHILS ABSOLUTE: 0 K/UL (ref 0–0.2)
BILIRUB SERPL-MCNC: 0.28 MG/DL (ref 0.3–1.2)
BUN BLDV-MCNC: 18 MG/DL (ref 8–23)
BUN/CREAT BLD: 32 (ref 9–20)
CALCIUM SERPL-MCNC: 9.6 MG/DL (ref 8.6–10.4)
CHLORIDE BLD-SCNC: 104 MMOL/L (ref 98–107)
CO2: 23 MMOL/L (ref 20–31)
CREAT SERPL-MCNC: 0.57 MG/DL (ref 0.5–0.9)
DIFFERENTIAL TYPE: ABNORMAL
EOSINOPHILS RELATIVE PERCENT: 0 % (ref 1–7)
GFR AFRICAN AMERICAN: >60 ML/MIN
GFR NON-AFRICAN AMERICAN: >60 ML/MIN
GFR SERPL CREATININE-BSD FRML MDRD: ABNORMAL ML/MIN/{1.73_M2}
GFR SERPL CREATININE-BSD FRML MDRD: ABNORMAL ML/MIN/{1.73_M2}
GLUCOSE BLD-MCNC: 212 MG/DL (ref 70–99)
HCT VFR BLD CALC: 35.2 % (ref 36–46)
HEMOGLOBIN: 11.7 G/DL (ref 12–16)
IMMATURE GRANULOCYTES: ABNORMAL %
LYMPHOCYTES # BLD: 16 % (ref 16–46)
MCH RBC QN AUTO: 31.2 PG (ref 26–34)
MCHC RBC AUTO-ENTMCNC: 33.3 G/DL (ref 31–37)
MCV RBC AUTO: 93.5 FL (ref 80–100)
MONOCYTES # BLD: 2 % (ref 4–11)
NRBC AUTOMATED: ABNORMAL PER 100 WBC
PDW BLD-RTO: 14.9 % (ref 11–14.5)
PLATELET # BLD: 269 K/UL (ref 140–450)
PLATELET ESTIMATE: ABNORMAL
PMV BLD AUTO: 9.2 FL (ref 6–12)
POTASSIUM SERPL-SCNC: 4.1 MMOL/L (ref 3.7–5.3)
RBC # BLD: 3.77 M/UL (ref 4–5.2)
RBC # BLD: ABNORMAL 10*6/UL
SEG NEUTROPHILS: 82 % (ref 43–77)
SEGMENTED NEUTROPHILS ABSOLUTE COUNT: 2.6 K/UL (ref 1.8–7.7)
SODIUM BLD-SCNC: 142 MMOL/L (ref 135–144)
TOTAL PROTEIN: 7 G/DL (ref 6.4–8.3)
WBC # BLD: 3.2 K/UL (ref 3.5–11)
WBC # BLD: ABNORMAL 10*3/UL

## 2018-11-21 PROCEDURE — 96413 CHEMO IV INFUSION 1 HR: CPT

## 2018-11-21 PROCEDURE — 2500000003 HC RX 250 WO HCPCS: Performed by: INTERNAL MEDICINE

## 2018-11-21 PROCEDURE — 6360000002 HC RX W HCPCS: Performed by: INTERNAL MEDICINE

## 2018-11-21 PROCEDURE — 85025 COMPLETE CBC W/AUTO DIFF WBC: CPT

## 2018-11-21 PROCEDURE — 2580000003 HC RX 258: Performed by: INTERNAL MEDICINE

## 2018-11-21 PROCEDURE — 80053 COMPREHEN METABOLIC PANEL: CPT

## 2018-11-21 PROCEDURE — 96375 TX/PRO/DX INJ NEW DRUG ADDON: CPT

## 2018-11-21 PROCEDURE — S0028 INJECTION, FAMOTIDINE, 20 MG: HCPCS | Performed by: INTERNAL MEDICINE

## 2018-11-21 PROCEDURE — 36591 DRAW BLOOD OFF VENOUS DEVICE: CPT

## 2018-11-21 RX ORDER — 0.9 % SODIUM CHLORIDE 0.9 %
10 VIAL (ML) INJECTION ONCE
Status: CANCELLED | OUTPATIENT
Start: 2018-11-21 | End: 2018-11-21

## 2018-11-21 RX ORDER — DEXAMETHASONE SODIUM PHOSPHATE 10 MG/ML
10 INJECTION, SOLUTION INTRAMUSCULAR; INTRAVENOUS ONCE
Status: COMPLETED | OUTPATIENT
Start: 2018-11-21 | End: 2018-11-21

## 2018-11-21 RX ORDER — MEPERIDINE HYDROCHLORIDE 50 MG/ML
12.5 INJECTION INTRAMUSCULAR; INTRAVENOUS; SUBCUTANEOUS ONCE
Status: CANCELLED | OUTPATIENT
Start: 2018-11-28 | End: 2018-11-28

## 2018-11-21 RX ORDER — SODIUM CHLORIDE 0.9 % (FLUSH) 0.9 %
5 SYRINGE (ML) INJECTION PRN
Status: CANCELLED | OUTPATIENT
Start: 2018-11-21

## 2018-11-21 RX ORDER — SODIUM CHLORIDE 9 MG/ML
INJECTION, SOLUTION INTRAVENOUS ONCE
Status: CANCELLED | OUTPATIENT
Start: 2018-11-28 | End: 2018-11-28

## 2018-11-21 RX ORDER — HEPARIN SODIUM (PORCINE) LOCK FLUSH IV SOLN 100 UNIT/ML 100 UNIT/ML
500 SOLUTION INTRAVENOUS PRN
Status: CANCELLED | OUTPATIENT
Start: 2018-11-28

## 2018-11-21 RX ORDER — DIPHENHYDRAMINE HYDROCHLORIDE 50 MG/ML
50 INJECTION INTRAMUSCULAR; INTRAVENOUS ONCE
Status: CANCELLED | OUTPATIENT
Start: 2018-11-28 | End: 2018-11-28

## 2018-11-21 RX ORDER — 0.9 % SODIUM CHLORIDE 0.9 %
10 VIAL (ML) INJECTION ONCE
Status: CANCELLED | OUTPATIENT
Start: 2018-11-28 | End: 2018-11-28

## 2018-11-21 RX ORDER — SODIUM CHLORIDE 9 MG/ML
INJECTION, SOLUTION INTRAVENOUS CONTINUOUS
Status: CANCELLED | OUTPATIENT
Start: 2018-11-28

## 2018-11-21 RX ORDER — MEPERIDINE HYDROCHLORIDE 50 MG/ML
12.5 INJECTION INTRAMUSCULAR; INTRAVENOUS; SUBCUTANEOUS ONCE
Status: CANCELLED | OUTPATIENT
Start: 2018-11-21 | End: 2018-11-21

## 2018-11-21 RX ORDER — DEXAMETHASONE SODIUM PHOSPHATE 10 MG/ML
10 INJECTION, SOLUTION INTRAMUSCULAR; INTRAVENOUS ONCE
Status: CANCELLED | OUTPATIENT
Start: 2018-11-28

## 2018-11-21 RX ORDER — ONDANSETRON 2 MG/ML
8 INJECTION INTRAMUSCULAR; INTRAVENOUS ONCE
Status: CANCELLED | OUTPATIENT
Start: 2018-11-28

## 2018-11-21 RX ORDER — SODIUM CHLORIDE 0.9 % (FLUSH) 0.9 %
10 SYRINGE (ML) INJECTION PRN
Status: DISCONTINUED | OUTPATIENT
Start: 2018-11-21 | End: 2018-11-22 | Stop reason: HOSPADM

## 2018-11-21 RX ORDER — DIPHENHYDRAMINE HYDROCHLORIDE 50 MG/ML
50 INJECTION INTRAMUSCULAR; INTRAVENOUS ONCE
Status: COMPLETED | OUTPATIENT
Start: 2018-11-21 | End: 2018-11-21

## 2018-11-21 RX ORDER — SODIUM CHLORIDE 9 MG/ML
INJECTION, SOLUTION INTRAVENOUS CONTINUOUS
Status: CANCELLED | OUTPATIENT
Start: 2018-11-21

## 2018-11-21 RX ORDER — METHYLPREDNISOLONE SODIUM SUCCINATE 125 MG/2ML
125 INJECTION, POWDER, LYOPHILIZED, FOR SOLUTION INTRAMUSCULAR; INTRAVENOUS ONCE
Status: CANCELLED | OUTPATIENT
Start: 2018-11-21 | End: 2018-11-21

## 2018-11-21 RX ORDER — SODIUM CHLORIDE 0.9 % (FLUSH) 0.9 %
5 SYRINGE (ML) INJECTION PRN
Status: CANCELLED | OUTPATIENT
Start: 2018-11-28

## 2018-11-21 RX ORDER — EPINEPHRINE 1 MG/ML
0.3 INJECTION, SOLUTION, CONCENTRATE INTRAVENOUS PRN
Status: CANCELLED | OUTPATIENT
Start: 2018-11-21

## 2018-11-21 RX ORDER — HEPARIN SODIUM (PORCINE) LOCK FLUSH IV SOLN 100 UNIT/ML 100 UNIT/ML
500 SOLUTION INTRAVENOUS PRN
Status: DISCONTINUED | OUTPATIENT
Start: 2018-11-21 | End: 2018-11-22 | Stop reason: HOSPADM

## 2018-11-21 RX ORDER — METHYLPREDNISOLONE SODIUM SUCCINATE 125 MG/2ML
125 INJECTION, POWDER, LYOPHILIZED, FOR SOLUTION INTRAMUSCULAR; INTRAVENOUS ONCE
Status: CANCELLED | OUTPATIENT
Start: 2018-11-28 | End: 2018-11-28

## 2018-11-21 RX ORDER — SODIUM CHLORIDE 0.9 % (FLUSH) 0.9 %
10 SYRINGE (ML) INJECTION PRN
Status: CANCELLED | OUTPATIENT
Start: 2018-11-28

## 2018-11-21 RX ORDER — EPINEPHRINE 1 MG/ML
0.3 INJECTION, SOLUTION, CONCENTRATE INTRAVENOUS PRN
Status: CANCELLED | OUTPATIENT
Start: 2018-11-28

## 2018-11-21 RX ORDER — DIPHENHYDRAMINE HYDROCHLORIDE 50 MG/ML
50 INJECTION INTRAMUSCULAR; INTRAVENOUS ONCE
Status: CANCELLED | OUTPATIENT
Start: 2018-11-21 | End: 2018-11-21

## 2018-11-21 RX ORDER — SODIUM CHLORIDE 9 MG/ML
INJECTION, SOLUTION INTRAVENOUS ONCE
Status: COMPLETED | OUTPATIENT
Start: 2018-11-21 | End: 2018-11-21

## 2018-11-21 RX ORDER — ONDANSETRON 2 MG/ML
8 INJECTION INTRAMUSCULAR; INTRAVENOUS ONCE
Status: COMPLETED | OUTPATIENT
Start: 2018-11-21 | End: 2018-11-21

## 2018-11-21 RX ADMIN — DEXAMETHASONE SODIUM PHOSPHATE 10 MG: 10 INJECTION, SOLUTION INTRAMUSCULAR; INTRAVENOUS at 11:03

## 2018-11-21 RX ADMIN — FAMOTIDINE 20 MG: 10 INJECTION INTRAVENOUS at 11:09

## 2018-11-21 RX ADMIN — PACLITAXEL 162 MG: 6 INJECTION, SOLUTION INTRAVENOUS at 11:40

## 2018-11-21 RX ADMIN — ONDANSETRON 8 MG: 2 INJECTION INTRAMUSCULAR; INTRAVENOUS at 11:01

## 2018-11-21 RX ADMIN — SODIUM CHLORIDE: 9 INJECTION, SOLUTION INTRAVENOUS at 09:38

## 2018-11-21 RX ADMIN — HEPARIN SODIUM (PORCINE) LOCK FLUSH IV SOLN 100 UNIT/ML 500 UNITS: 100 SOLUTION at 12:50

## 2018-11-21 RX ADMIN — DIPHENHYDRAMINE HYDROCHLORIDE 50 MG: 50 INJECTION, SOLUTION INTRAMUSCULAR; INTRAVENOUS at 11:06

## 2018-11-21 NOTE — PROGRESS NOTES
Port flushed with 5 ml of heparin and de-accessed. Pt. Tolerated well and denies any concerns at this time. Pt. Left ambulatory from infusion center with  at side.

## 2018-11-21 NOTE — PROGRESS NOTES
VAD accessed with a 3/4\" gramajo needle under sterile technique. Good blood return noted, flushed with 10 ml of NS and discarded 10 ml of blood. Blood specimen obtained for lab. NS infusing per order. Secured with a tegaderm dressing. Patient tolerated well. Resting in chair with feet elevated. Denies any needs at this time.

## 2018-11-28 ENCOUNTER — HOSPITAL ENCOUNTER (OUTPATIENT)
Dept: INFUSION THERAPY | Age: 68
Discharge: HOME | End: 2018-11-28
Payer: MEDICARE

## 2018-11-28 VITALS
BODY MASS INDEX: 30.04 KG/M2 | DIASTOLIC BLOOD PRESSURE: 58 MMHG | OXYGEN SATURATION: 94 % | TEMPERATURE: 97.4 F | HEIGHT: 67 IN | RESPIRATION RATE: 16 BRPM | SYSTOLIC BLOOD PRESSURE: 119 MMHG | WEIGHT: 191.4 LBS | HEART RATE: 81 BPM

## 2018-11-28 DIAGNOSIS — C50.411 MALIGNANT NEOPLASM OF UPPER-OUTER QUADRANT OF BOTH BREASTS IN FEMALE, ESTROGEN RECEPTOR POSITIVE (HCC): ICD-10-CM

## 2018-11-28 DIAGNOSIS — Z17.0 MALIGNANT NEOPLASM OF UPPER-OUTER QUADRANT OF BOTH BREASTS IN FEMALE, ESTROGEN RECEPTOR POSITIVE (HCC): ICD-10-CM

## 2018-11-28 DIAGNOSIS — C50.412 MALIGNANT NEOPLASM OF UPPER-OUTER QUADRANT OF BOTH BREASTS IN FEMALE, ESTROGEN RECEPTOR POSITIVE (HCC): ICD-10-CM

## 2018-11-28 LAB
ABSOLUTE EOS #: 0 K/UL (ref 0–0.4)
ABSOLUTE IMMATURE GRANULOCYTE: ABNORMAL K/UL (ref 0–0.3)
ABSOLUTE LYMPH #: 0.6 K/UL (ref 1–4.8)
ABSOLUTE MONO #: 0.1 K/UL (ref 0.1–1.2)
ALBUMIN SERPL-MCNC: 4.4 G/DL (ref 3.5–5.2)
ALBUMIN/GLOBULIN RATIO: 1.6 (ref 1–2.5)
ALP BLD-CCNC: 62 U/L (ref 35–104)
ALT SERPL-CCNC: 24 U/L (ref 5–33)
ANION GAP SERPL CALCULATED.3IONS-SCNC: 16 MMOL/L (ref 9–17)
AST SERPL-CCNC: 17 U/L
BASOPHILS # BLD: 0 % (ref 0–1)
BASOPHILS ABSOLUTE: 0 K/UL (ref 0–0.2)
BILIRUB SERPL-MCNC: 0.36 MG/DL (ref 0.3–1.2)
BUN BLDV-MCNC: 20 MG/DL (ref 8–23)
BUN/CREAT BLD: 31 (ref 9–20)
CALCIUM SERPL-MCNC: 9.8 MG/DL (ref 8.6–10.4)
CHLORIDE BLD-SCNC: 103 MMOL/L (ref 98–107)
CO2: 22 MMOL/L (ref 20–31)
CREAT SERPL-MCNC: 0.65 MG/DL (ref 0.5–0.9)
DIFFERENTIAL TYPE: ABNORMAL
EOSINOPHILS RELATIVE PERCENT: 0 % (ref 1–7)
GFR AFRICAN AMERICAN: >60 ML/MIN
GFR NON-AFRICAN AMERICAN: >60 ML/MIN
GFR SERPL CREATININE-BSD FRML MDRD: ABNORMAL ML/MIN/{1.73_M2}
GFR SERPL CREATININE-BSD FRML MDRD: ABNORMAL ML/MIN/{1.73_M2}
GLUCOSE BLD-MCNC: 188 MG/DL (ref 70–99)
HCT VFR BLD CALC: 36 % (ref 36–46)
HEMOGLOBIN: 11.8 G/DL (ref 12–16)
IMMATURE GRANULOCYTES: ABNORMAL %
LYMPHOCYTES # BLD: 14 % (ref 16–46)
MCH RBC QN AUTO: 31 PG (ref 26–34)
MCHC RBC AUTO-ENTMCNC: 32.8 G/DL (ref 31–37)
MCV RBC AUTO: 94.6 FL (ref 80–100)
MONOCYTES # BLD: 2 % (ref 4–11)
NRBC AUTOMATED: ABNORMAL PER 100 WBC
PDW BLD-RTO: 15.2 % (ref 11–14.5)
PLATELET # BLD: 301 K/UL (ref 140–450)
PLATELET ESTIMATE: ABNORMAL
PMV BLD AUTO: 8.9 FL (ref 6–12)
POTASSIUM SERPL-SCNC: 4.2 MMOL/L (ref 3.7–5.3)
RBC # BLD: 3.81 M/UL (ref 4–5.2)
RBC # BLD: ABNORMAL 10*6/UL
SEG NEUTROPHILS: 84 % (ref 43–77)
SEGMENTED NEUTROPHILS ABSOLUTE COUNT: 3.2 K/UL (ref 1.8–7.7)
SODIUM BLD-SCNC: 141 MMOL/L (ref 135–144)
TOTAL PROTEIN: 7.1 G/DL (ref 6.4–8.3)
WBC # BLD: 3.9 K/UL (ref 3.5–11)
WBC # BLD: ABNORMAL 10*3/UL

## 2018-11-28 PROCEDURE — 85025 COMPLETE CBC W/AUTO DIFF WBC: CPT

## 2018-11-28 PROCEDURE — 96375 TX/PRO/DX INJ NEW DRUG ADDON: CPT

## 2018-11-28 PROCEDURE — S0028 INJECTION, FAMOTIDINE, 20 MG: HCPCS | Performed by: INTERNAL MEDICINE

## 2018-11-28 PROCEDURE — 36591 DRAW BLOOD OFF VENOUS DEVICE: CPT

## 2018-11-28 PROCEDURE — 2580000003 HC RX 258: Performed by: INTERNAL MEDICINE

## 2018-11-28 PROCEDURE — 6360000002 HC RX W HCPCS: Performed by: INTERNAL MEDICINE

## 2018-11-28 PROCEDURE — 80053 COMPREHEN METABOLIC PANEL: CPT

## 2018-11-28 PROCEDURE — 2500000003 HC RX 250 WO HCPCS: Performed by: INTERNAL MEDICINE

## 2018-11-28 PROCEDURE — 96413 CHEMO IV INFUSION 1 HR: CPT

## 2018-11-28 RX ORDER — DEXAMETHASONE SODIUM PHOSPHATE 10 MG/ML
10 INJECTION, SOLUTION INTRAMUSCULAR; INTRAVENOUS ONCE
Status: COMPLETED | OUTPATIENT
Start: 2018-11-28 | End: 2018-11-28

## 2018-11-28 RX ORDER — 0.9 % SODIUM CHLORIDE 0.9 %
10 VIAL (ML) INJECTION ONCE
Status: DISCONTINUED | OUTPATIENT
Start: 2018-11-28 | End: 2018-11-29 | Stop reason: HOSPADM

## 2018-11-28 RX ORDER — SODIUM CHLORIDE 0.9 % (FLUSH) 0.9 %
10 SYRINGE (ML) INJECTION PRN
Status: DISCONTINUED | OUTPATIENT
Start: 2018-11-28 | End: 2018-11-29 | Stop reason: HOSPADM

## 2018-11-28 RX ORDER — ONDANSETRON 2 MG/ML
8 INJECTION INTRAMUSCULAR; INTRAVENOUS ONCE
Status: COMPLETED | OUTPATIENT
Start: 2018-11-28 | End: 2018-11-28

## 2018-11-28 RX ORDER — HEPARIN SODIUM (PORCINE) LOCK FLUSH IV SOLN 100 UNIT/ML 100 UNIT/ML
500 SOLUTION INTRAVENOUS PRN
Status: DISCONTINUED | OUTPATIENT
Start: 2018-11-28 | End: 2018-11-29 | Stop reason: HOSPADM

## 2018-11-28 RX ORDER — DIPHENHYDRAMINE HYDROCHLORIDE 50 MG/ML
50 INJECTION INTRAMUSCULAR; INTRAVENOUS ONCE
Status: COMPLETED | OUTPATIENT
Start: 2018-11-28 | End: 2018-11-28

## 2018-11-28 RX ORDER — SODIUM CHLORIDE 9 MG/ML
INJECTION, SOLUTION INTRAVENOUS ONCE
Status: COMPLETED | OUTPATIENT
Start: 2018-11-28 | End: 2018-11-28

## 2018-11-28 RX ADMIN — FAMOTIDINE 20 MG: 10 INJECTION, SOLUTION INTRAVENOUS at 10:25

## 2018-11-28 RX ADMIN — DEXAMETHASONE SODIUM PHOSPHATE 10 MG: 10 INJECTION INTRAMUSCULAR; INTRAVENOUS at 10:23

## 2018-11-28 RX ADMIN — SODIUM CHLORIDE: 9 INJECTION, SOLUTION INTRAVENOUS at 09:35

## 2018-11-28 RX ADMIN — Medication 10 ML: at 09:34

## 2018-11-28 RX ADMIN — PACLITAXEL 162 MG: 6 INJECTION INTRAVENOUS at 11:13

## 2018-11-28 RX ADMIN — DIPHENHYDRAMINE HYDROCHLORIDE 50 MG: 50 INJECTION, SOLUTION INTRAMUSCULAR; INTRAVENOUS at 10:31

## 2018-11-28 RX ADMIN — HEPARIN SODIUM (PORCINE) LOCK FLUSH IV SOLN 100 UNIT/ML 500 UNITS: 100 SOLUTION at 12:22

## 2018-11-28 RX ADMIN — ONDANSETRON 8 MG: 2 INJECTION INTRAMUSCULAR; INTRAVENOUS at 10:28

## 2018-11-28 ASSESSMENT — PAIN SCALES - GENERAL: PAINLEVEL_OUTOF10: 0

## 2018-11-28 NOTE — PROGRESS NOTES
Patient at infusion center for chemotherapy. VAD accessed per protocol using 20 gauge 3/4\" gramajo needle. Flushes easily. Labs drawn. NSS infusing. Patient states food hasn't been tasting good and is down 2 pounds. Other than taste changes and fatigue patient states she is feeling fine.

## 2018-11-28 NOTE — PROGRESS NOTES
Chemotherapy infused and d/c'd. Patient tolerated infusion without any difficulty. Mediport flushed with 30 ml NSS and 5 ml heparin. Brown needle d/c'd, band aid to site. Patient denies any complaints and discharged to home with sister.

## 2018-12-03 ENCOUNTER — OFFICE VISIT (OUTPATIENT)
Dept: ONCOLOGY | Age: 68
End: 2018-12-03
Payer: MEDICARE

## 2018-12-03 VITALS
OXYGEN SATURATION: 98 % | HEART RATE: 98 BPM | DIASTOLIC BLOOD PRESSURE: 60 MMHG | SYSTOLIC BLOOD PRESSURE: 104 MMHG | BODY MASS INDEX: 29.82 KG/M2 | WEIGHT: 190 LBS | TEMPERATURE: 99.8 F | HEIGHT: 67 IN

## 2018-12-03 DIAGNOSIS — Z17.0 MALIGNANT NEOPLASM OF UPPER-OUTER QUADRANT OF BOTH BREASTS IN FEMALE, ESTROGEN RECEPTOR POSITIVE (HCC): Primary | ICD-10-CM

## 2018-12-03 DIAGNOSIS — C50.411 MALIGNANT NEOPLASM OF UPPER-OUTER QUADRANT OF BOTH BREASTS IN FEMALE, ESTROGEN RECEPTOR POSITIVE (HCC): Primary | ICD-10-CM

## 2018-12-03 DIAGNOSIS — C50.412 MALIGNANT NEOPLASM OF UPPER-OUTER QUADRANT OF BOTH BREASTS IN FEMALE, ESTROGEN RECEPTOR POSITIVE (HCC): Primary | ICD-10-CM

## 2018-12-03 PROCEDURE — 99215 OFFICE O/P EST HI 40 MIN: CPT | Performed by: INTERNAL MEDICINE

## 2018-12-03 RX ORDER — ONDANSETRON 2 MG/ML
8 INJECTION INTRAMUSCULAR; INTRAVENOUS ONCE
Status: CANCELLED | OUTPATIENT
Start: 2018-12-12

## 2018-12-03 RX ORDER — SODIUM CHLORIDE 9 MG/ML
INJECTION, SOLUTION INTRAVENOUS ONCE
Status: CANCELLED | OUTPATIENT
Start: 2018-12-05 | End: 2018-12-05

## 2018-12-03 RX ORDER — DIPHENHYDRAMINE HYDROCHLORIDE 50 MG/ML
50 INJECTION INTRAMUSCULAR; INTRAVENOUS ONCE
Status: CANCELLED | OUTPATIENT
Start: 2018-12-05 | End: 2018-12-05

## 2018-12-03 RX ORDER — DIPHENHYDRAMINE HYDROCHLORIDE 50 MG/ML
50 INJECTION INTRAMUSCULAR; INTRAVENOUS ONCE
Status: CANCELLED | OUTPATIENT
Start: 2018-12-19 | End: 2018-12-19

## 2018-12-03 RX ORDER — SODIUM CHLORIDE 9 MG/ML
INJECTION, SOLUTION INTRAVENOUS CONTINUOUS
Status: CANCELLED | OUTPATIENT
Start: 2018-12-12

## 2018-12-03 RX ORDER — SODIUM CHLORIDE 0.9 % (FLUSH) 0.9 %
10 SYRINGE (ML) INJECTION PRN
Status: CANCELLED | OUTPATIENT
Start: 2018-12-12

## 2018-12-03 RX ORDER — 0.9 % SODIUM CHLORIDE 0.9 %
10 VIAL (ML) INJECTION ONCE
Status: CANCELLED | OUTPATIENT
Start: 2018-12-05 | End: 2018-12-05

## 2018-12-03 RX ORDER — ONDANSETRON 2 MG/ML
8 INJECTION INTRAMUSCULAR; INTRAVENOUS ONCE
Status: CANCELLED | OUTPATIENT
Start: 2018-12-19

## 2018-12-03 RX ORDER — METHYLPREDNISOLONE SODIUM SUCCINATE 125 MG/2ML
125 INJECTION, POWDER, LYOPHILIZED, FOR SOLUTION INTRAMUSCULAR; INTRAVENOUS ONCE
Status: CANCELLED | OUTPATIENT
Start: 2018-12-19 | End: 2018-12-19

## 2018-12-03 RX ORDER — 0.9 % SODIUM CHLORIDE 0.9 %
10 VIAL (ML) INJECTION ONCE
Status: CANCELLED | OUTPATIENT
Start: 2018-12-19 | End: 2018-12-19

## 2018-12-03 RX ORDER — DIPHENHYDRAMINE HYDROCHLORIDE 50 MG/ML
50 INJECTION INTRAMUSCULAR; INTRAVENOUS ONCE
Status: CANCELLED | OUTPATIENT
Start: 2018-12-12 | End: 2018-12-12

## 2018-12-03 RX ORDER — MEPERIDINE HYDROCHLORIDE 50 MG/ML
12.5 INJECTION INTRAMUSCULAR; INTRAVENOUS; SUBCUTANEOUS ONCE
Status: CANCELLED | OUTPATIENT
Start: 2018-12-05 | End: 2018-12-05

## 2018-12-03 RX ORDER — MEPERIDINE HYDROCHLORIDE 50 MG/ML
12.5 INJECTION INTRAMUSCULAR; INTRAVENOUS; SUBCUTANEOUS ONCE
Status: CANCELLED | OUTPATIENT
Start: 2018-12-12 | End: 2018-12-12

## 2018-12-03 RX ORDER — ONDANSETRON 2 MG/ML
8 INJECTION INTRAMUSCULAR; INTRAVENOUS ONCE
Status: CANCELLED | OUTPATIENT
Start: 2018-12-05

## 2018-12-03 RX ORDER — SODIUM CHLORIDE 0.9 % (FLUSH) 0.9 %
5 SYRINGE (ML) INJECTION PRN
Status: CANCELLED | OUTPATIENT
Start: 2018-12-05

## 2018-12-03 RX ORDER — HEPARIN SODIUM (PORCINE) LOCK FLUSH IV SOLN 100 UNIT/ML 100 UNIT/ML
500 SOLUTION INTRAVENOUS PRN
Status: CANCELLED | OUTPATIENT
Start: 2018-12-19

## 2018-12-03 RX ORDER — METHYLPREDNISOLONE SODIUM SUCCINATE 125 MG/2ML
125 INJECTION, POWDER, LYOPHILIZED, FOR SOLUTION INTRAMUSCULAR; INTRAVENOUS ONCE
Status: CANCELLED | OUTPATIENT
Start: 2018-12-05 | End: 2018-12-05

## 2018-12-03 RX ORDER — SODIUM CHLORIDE 9 MG/ML
INJECTION, SOLUTION INTRAVENOUS ONCE
Status: CANCELLED | OUTPATIENT
Start: 2018-12-19 | End: 2018-12-19

## 2018-12-03 RX ORDER — SODIUM CHLORIDE 0.9 % (FLUSH) 0.9 %
5 SYRINGE (ML) INJECTION PRN
Status: CANCELLED | OUTPATIENT
Start: 2018-12-19

## 2018-12-03 RX ORDER — SODIUM CHLORIDE 0.9 % (FLUSH) 0.9 %
10 SYRINGE (ML) INJECTION PRN
Status: CANCELLED | OUTPATIENT
Start: 2018-12-05

## 2018-12-03 RX ORDER — MEPERIDINE HYDROCHLORIDE 50 MG/ML
12.5 INJECTION INTRAMUSCULAR; INTRAVENOUS; SUBCUTANEOUS ONCE
Status: CANCELLED | OUTPATIENT
Start: 2018-12-19 | End: 2018-12-19

## 2018-12-03 RX ORDER — METHYLPREDNISOLONE SODIUM SUCCINATE 125 MG/2ML
125 INJECTION, POWDER, LYOPHILIZED, FOR SOLUTION INTRAMUSCULAR; INTRAVENOUS ONCE
Status: CANCELLED | OUTPATIENT
Start: 2018-12-12 | End: 2018-12-12

## 2018-12-03 RX ORDER — SODIUM CHLORIDE 0.9 % (FLUSH) 0.9 %
5 SYRINGE (ML) INJECTION PRN
Status: CANCELLED | OUTPATIENT
Start: 2018-12-12

## 2018-12-03 RX ORDER — SODIUM CHLORIDE 9 MG/ML
INJECTION, SOLUTION INTRAVENOUS CONTINUOUS
Status: CANCELLED | OUTPATIENT
Start: 2018-12-05

## 2018-12-03 RX ORDER — HEPARIN SODIUM (PORCINE) LOCK FLUSH IV SOLN 100 UNIT/ML 100 UNIT/ML
500 SOLUTION INTRAVENOUS PRN
Status: CANCELLED | OUTPATIENT
Start: 2018-12-05

## 2018-12-03 RX ORDER — SODIUM CHLORIDE 0.9 % (FLUSH) 0.9 %
10 SYRINGE (ML) INJECTION PRN
Status: CANCELLED | OUTPATIENT
Start: 2018-12-19

## 2018-12-03 RX ORDER — HEPARIN SODIUM (PORCINE) LOCK FLUSH IV SOLN 100 UNIT/ML 100 UNIT/ML
500 SOLUTION INTRAVENOUS PRN
Status: CANCELLED | OUTPATIENT
Start: 2018-12-12

## 2018-12-03 RX ORDER — SODIUM CHLORIDE 9 MG/ML
INJECTION, SOLUTION INTRAVENOUS CONTINUOUS
Status: CANCELLED | OUTPATIENT
Start: 2018-12-19

## 2018-12-03 RX ORDER — 0.9 % SODIUM CHLORIDE 0.9 %
10 VIAL (ML) INJECTION ONCE
Status: CANCELLED | OUTPATIENT
Start: 2018-12-12 | End: 2018-12-12

## 2018-12-03 RX ORDER — SODIUM CHLORIDE 9 MG/ML
INJECTION, SOLUTION INTRAVENOUS ONCE
Status: CANCELLED | OUTPATIENT
Start: 2018-12-12 | End: 2018-12-12

## 2018-12-03 NOTE — PROGRESS NOTES
Smoking status: Former Smoker     Packs/day: 0.50     Years: 7.00     Types: Cigarettes     Quit date: 1/1/1978    Smokeless tobacco: Never Used      Comment: Quit smoking in 1978.  Alcohol use Yes      Comment: Occasional alcohol.  Drug use: No    Sexual activity: Not on file     Other Topics Concern    Not on file     Social History Narrative    No narrative on file       Family History   Problem Relation Age of Onset    Hypertension Mother         Renal failure.  Diabetes Mother         Type 2 at end of life.  Heart Failure Father     Coronary Art Dis Father         Status post CABG    Diabetes Father         Type 2, very late in life.  Breast Cancer Sister     Hypertension Brother     Hypertension Sister      REVIEW OF SYSTEM:   Constitutional: No fever or chills. No night sweats, no weight loss   Eyes: No eye discharge, double vision, or eye pain   HEENT: negative for sore mouth, sore throat, hoarseness and voice change   Respiratory: negative for cough , sputum, dyspnea, wheezing, hemoptysis, chest pain   Cardiovascular: negative for chest pain, dyspnea, palpitations, orthopnea, PND   Gastrointestinal: negative for nausea, vomiting, diarrhea, constipation, abdominal pain, Dysphagia, hematemesis and hematochezia   Genitourinary: negative for frequency, dysuria, nocturia, urinary incontinence, and hematuria   Integument: negative for rash, skin lesions, bruises.    Hematologic/Lymphatic: negative for easy bruising, bleeding, lymphadenopathy, petechiae and swelling/edema   Endocrine: negative for heat or cold intolerance, tremor, weight changes, change in bowel habits and hair loss   Musculoskeletal: negative for myalgias, arthralgias, pain, joint swelling,and bone pain   Neurological: negative for headaches, dizziness, seizures, weakness, numbness   OBJECTIVE:         Vitals:    12/03/18 0924   BP: 104/60   Pulse: 98   Temp: 99.8 °F (37.7 °C)   SpO2: 98%   PHYSICAL EXAM:   General appearance - well appearing, no in pain or distress   Mental status - alert and cooperative   Eyes - pupils equal and reactive, extraocular eye movements intact   Ears - bilateral TM's and external ear canals normal   Mouth - mucous membranes moist, pharynx normal without lesions   Neck - supple, no significant adenopathy   Lymphatics - Mild palpable lymphadenopathy in the left axilla, no hepatosplenomegaly   Chest - clear to auscultation, no wheezes, rales or rhonchi, symmetric air entry   2 cm lump palpable in left breast  Heart - normal rate, regular rhythm, normal S1, S2, no murmurs, rubs, clicks or gallops   Abdomen - soft, nontender, nondistended, no masses or organomegaly   Neurological - alert, oriented, normal speech, no focal findings or movement disorder noted   Musculoskeletal - no joint tenderness, deformity or swelling   Extremities - peripheral pulses normal, no pedal edema, no clubbing or cyanosis   Skin - normal coloration and turgor, no rashes, no suspicious skin lesions noted   LABORATORY DATA:     Lab Results   Component Value Date    WBC 3.9 11/28/2018    HGB 11.8 (L) 11/28/2018    HCT 36.0 11/28/2018    MCV 94.6 11/28/2018     11/28/2018    LYMPHOPCT 14 (L) 11/28/2018    RBC 3.81 (L) 11/28/2018    MCH 31.0 11/28/2018    MCHC 32.8 11/28/2018    RDW 15.2 (H) 11/28/2018    MONOPCT 2 (L) 11/28/2018    BASOPCT 0 11/28/2018    NEUTROABS 3.20 11/28/2018    LYMPHSABS 0.60 (L) 11/28/2018    MONOSABS 0.10 11/28/2018    EOSABS 0.00 11/28/2018    BASOSABS 0.00 11/28/2018       Chemistry        Component Value Date/Time     11/28/2018 0928    K 4.2 11/28/2018 0928     11/28/2018 0928    CO2 22 11/28/2018 0928    BUN 20 11/28/2018 0928    CREATININE 0.65 11/28/2018 0928        Component Value Date/Time    CALCIUM 9.8 11/28/2018 0928    ALKPHOS 62 11/28/2018 0928    AST 17 11/28/2018 0928    ALT 24 11/28/2018 0928    BILITOT 0.36 11/28/2018 0928        PATHOLOGY DATA:   Received: 9/11/2018 left axillary lymph   node, and right breast mass is recommended.       BIRADS:   BIRADS - CATEGORY 5       Findings are highly suggestive of malignancy.  Biopsy should be considered at   this time. Bone scan 9/21/18  Impression   No evidence to suggest osseous metastatic disease. CT chest abdomen pelvis 9/21/18  Impression   1.  Known bilateral breast masses and left axillary lymphadenopathy are   demonstrated.  No evidence for distant metastatic disease in the chest,   abdomen, or pelvis.       2.  Tiny right hydropneumothorax, presumed related to recent right chest port   placement.       3.  Tiny hiatal hernia.       4.  There is a 1.5 cm right thyroid nodule.  Thyroid ultrasound is   recommended.       The result was communicated to the ordering physician, Dr. Kaylen Lay, at 1:42   p.m. on 09/21/2018.  This was communicated by the radiology communication   center Olivia Alonzo, as I was not available when Dr. Kaylen Lay returned the   phone call. ASSESSMENT:      I discussed the pathology results, imaging studies, diagnosis, prognosis, treatment options with patient and her . 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  Given the fact that these lesions appeared within very short period of time as last year she had excisional biopsy of the lesion and negative mammograms. I discussed option of neoadjuvant chemotherapy followed by surgery and patient is considering bilateral mastectomies. She will follow with radiation oncology after her surgery to consider adjuvant radiation. Unfortunately she had significant reaction to first cycle of AC chemotherapy. I will plan to use preoperative  weekly paclitaxel for 12 cycles. During today's visit, the patient and the family had a number of reasonable questions which were answered to their satisfaction.   They verbalized understanding of the information provided and

## 2018-12-05 ENCOUNTER — HOSPITAL ENCOUNTER (OUTPATIENT)
Dept: INFUSION THERAPY | Age: 68
Discharge: HOME | End: 2018-12-05
Payer: MEDICARE

## 2018-12-05 VITALS
OXYGEN SATURATION: 96 % | TEMPERATURE: 79 F | SYSTOLIC BLOOD PRESSURE: 119 MMHG | BODY MASS INDEX: 29.79 KG/M2 | DIASTOLIC BLOOD PRESSURE: 59 MMHG | HEART RATE: 79 BPM | RESPIRATION RATE: 16 BRPM | HEIGHT: 67 IN | WEIGHT: 189.8 LBS

## 2018-12-05 DIAGNOSIS — C50.411 MALIGNANT NEOPLASM OF UPPER-OUTER QUADRANT OF BOTH BREASTS IN FEMALE, ESTROGEN RECEPTOR POSITIVE (HCC): ICD-10-CM

## 2018-12-05 DIAGNOSIS — Z17.0 MALIGNANT NEOPLASM OF UPPER-OUTER QUADRANT OF BOTH BREASTS IN FEMALE, ESTROGEN RECEPTOR POSITIVE (HCC): ICD-10-CM

## 2018-12-05 DIAGNOSIS — C50.412 MALIGNANT NEOPLASM OF UPPER-OUTER QUADRANT OF BOTH BREASTS IN FEMALE, ESTROGEN RECEPTOR POSITIVE (HCC): ICD-10-CM

## 2018-12-05 LAB
ABSOLUTE EOS #: 0 K/UL (ref 0–0.4)
ABSOLUTE IMMATURE GRANULOCYTE: ABNORMAL K/UL (ref 0–0.3)
ABSOLUTE LYMPH #: 0.6 K/UL (ref 1–4.8)
ABSOLUTE MONO #: 0.1 K/UL (ref 0.1–1.2)
ALBUMIN SERPL-MCNC: 4.1 G/DL (ref 3.5–5.2)
ALBUMIN/GLOBULIN RATIO: 1.4 (ref 1–2.5)
ALP BLD-CCNC: 64 U/L (ref 35–104)
ALT SERPL-CCNC: 22 U/L (ref 5–33)
ANION GAP SERPL CALCULATED.3IONS-SCNC: 12 MMOL/L (ref 9–17)
AST SERPL-CCNC: 16 U/L
BASOPHILS # BLD: 0 % (ref 0–1)
BASOPHILS ABSOLUTE: 0 K/UL (ref 0–0.2)
BILIRUB SERPL-MCNC: 0.32 MG/DL (ref 0.3–1.2)
BUN BLDV-MCNC: 22 MG/DL (ref 8–23)
BUN/CREAT BLD: 33 (ref 9–20)
CALCIUM SERPL-MCNC: 9.7 MG/DL (ref 8.6–10.4)
CHLORIDE BLD-SCNC: 105 MMOL/L (ref 98–107)
CO2: 22 MMOL/L (ref 20–31)
CREAT SERPL-MCNC: 0.67 MG/DL (ref 0.5–0.9)
DIFFERENTIAL TYPE: ABNORMAL
EOSINOPHILS RELATIVE PERCENT: 0 % (ref 1–7)
GFR AFRICAN AMERICAN: >60 ML/MIN
GFR NON-AFRICAN AMERICAN: >60 ML/MIN
GFR SERPL CREATININE-BSD FRML MDRD: ABNORMAL ML/MIN/{1.73_M2}
GFR SERPL CREATININE-BSD FRML MDRD: ABNORMAL ML/MIN/{1.73_M2}
GLUCOSE BLD-MCNC: 198 MG/DL (ref 70–99)
HCT VFR BLD CALC: 35.7 % (ref 36–46)
HEMOGLOBIN: 11.6 G/DL (ref 12–16)
IMMATURE GRANULOCYTES: ABNORMAL %
LYMPHOCYTES # BLD: 14 % (ref 16–46)
MCH RBC QN AUTO: 30.6 PG (ref 26–34)
MCHC RBC AUTO-ENTMCNC: 32.6 G/DL (ref 31–37)
MCV RBC AUTO: 94 FL (ref 80–100)
MONOCYTES # BLD: 3 % (ref 4–11)
NRBC AUTOMATED: ABNORMAL PER 100 WBC
PDW BLD-RTO: 15.3 % (ref 11–14.5)
PLATELET # BLD: 312 K/UL (ref 140–450)
PLATELET ESTIMATE: ABNORMAL
PMV BLD AUTO: 9.1 FL (ref 6–12)
POTASSIUM SERPL-SCNC: 4.4 MMOL/L (ref 3.7–5.3)
RBC # BLD: 3.8 M/UL (ref 4–5.2)
RBC # BLD: ABNORMAL 10*6/UL
SEG NEUTROPHILS: 83 % (ref 43–77)
SEGMENTED NEUTROPHILS ABSOLUTE COUNT: 3.5 K/UL (ref 1.8–7.7)
SODIUM BLD-SCNC: 139 MMOL/L (ref 135–144)
TOTAL PROTEIN: 7 G/DL (ref 6.4–8.3)
WBC # BLD: 4.2 K/UL (ref 3.5–11)
WBC # BLD: ABNORMAL 10*3/UL

## 2018-12-05 PROCEDURE — S0028 INJECTION, FAMOTIDINE, 20 MG: HCPCS | Performed by: INTERNAL MEDICINE

## 2018-12-05 PROCEDURE — 96375 TX/PRO/DX INJ NEW DRUG ADDON: CPT

## 2018-12-05 PROCEDURE — 85025 COMPLETE CBC W/AUTO DIFF WBC: CPT

## 2018-12-05 PROCEDURE — 36591 DRAW BLOOD OFF VENOUS DEVICE: CPT

## 2018-12-05 PROCEDURE — 80053 COMPREHEN METABOLIC PANEL: CPT

## 2018-12-05 PROCEDURE — 6360000002 HC RX W HCPCS: Performed by: INTERNAL MEDICINE

## 2018-12-05 PROCEDURE — 2500000003 HC RX 250 WO HCPCS: Performed by: INTERNAL MEDICINE

## 2018-12-05 PROCEDURE — 2580000003 HC RX 258: Performed by: INTERNAL MEDICINE

## 2018-12-05 PROCEDURE — 96413 CHEMO IV INFUSION 1 HR: CPT

## 2018-12-05 RX ORDER — HEPARIN SODIUM (PORCINE) LOCK FLUSH IV SOLN 100 UNIT/ML 100 UNIT/ML
500 SOLUTION INTRAVENOUS PRN
Status: CANCELLED | OUTPATIENT
Start: 2018-12-26

## 2018-12-05 RX ORDER — DEXAMETHASONE SODIUM PHOSPHATE 10 MG/ML
10 INJECTION, SOLUTION INTRAMUSCULAR; INTRAVENOUS ONCE
Status: CANCELLED | OUTPATIENT
Start: 2018-12-26

## 2018-12-05 RX ORDER — METHYLPREDNISOLONE SODIUM SUCCINATE 125 MG/2ML
125 INJECTION, POWDER, LYOPHILIZED, FOR SOLUTION INTRAMUSCULAR; INTRAVENOUS ONCE
Status: CANCELLED | OUTPATIENT
Start: 2018-12-26 | End: 2018-12-26

## 2018-12-05 RX ORDER — DIPHENHYDRAMINE HYDROCHLORIDE 50 MG/ML
50 INJECTION INTRAMUSCULAR; INTRAVENOUS ONCE
Status: COMPLETED | OUTPATIENT
Start: 2018-12-05 | End: 2018-12-05

## 2018-12-05 RX ORDER — MEPERIDINE HYDROCHLORIDE 50 MG/ML
12.5 INJECTION INTRAMUSCULAR; INTRAVENOUS; SUBCUTANEOUS ONCE
Status: CANCELLED | OUTPATIENT
Start: 2018-12-26 | End: 2018-12-26

## 2018-12-05 RX ORDER — DIPHENHYDRAMINE HYDROCHLORIDE 50 MG/ML
50 INJECTION INTRAMUSCULAR; INTRAVENOUS ONCE
Status: CANCELLED | OUTPATIENT
Start: 2018-12-26 | End: 2018-12-26

## 2018-12-05 RX ORDER — SODIUM CHLORIDE 0.9 % (FLUSH) 0.9 %
5 SYRINGE (ML) INJECTION PRN
Status: CANCELLED | OUTPATIENT
Start: 2018-12-26

## 2018-12-05 RX ORDER — SODIUM CHLORIDE 0.9 % (FLUSH) 0.9 %
10 SYRINGE (ML) INJECTION PRN
Status: CANCELLED | OUTPATIENT
Start: 2018-12-26

## 2018-12-05 RX ORDER — 0.9 % SODIUM CHLORIDE 0.9 %
10 VIAL (ML) INJECTION ONCE
Status: CANCELLED | OUTPATIENT
Start: 2018-12-26 | End: 2018-12-26

## 2018-12-05 RX ORDER — SODIUM CHLORIDE 9 MG/ML
INJECTION, SOLUTION INTRAVENOUS CONTINUOUS
Status: CANCELLED | OUTPATIENT
Start: 2018-12-26

## 2018-12-05 RX ORDER — SODIUM CHLORIDE 9 MG/ML
INJECTION, SOLUTION INTRAVENOUS ONCE
Status: COMPLETED | OUTPATIENT
Start: 2018-12-05 | End: 2018-12-05

## 2018-12-05 RX ORDER — ONDANSETRON 2 MG/ML
8 INJECTION INTRAMUSCULAR; INTRAVENOUS ONCE
Status: CANCELLED | OUTPATIENT
Start: 2018-12-26

## 2018-12-05 RX ORDER — ONDANSETRON 2 MG/ML
8 INJECTION INTRAMUSCULAR; INTRAVENOUS ONCE
Status: COMPLETED | OUTPATIENT
Start: 2018-12-05 | End: 2018-12-05

## 2018-12-05 RX ORDER — EPINEPHRINE 1 MG/ML
0.3 INJECTION, SOLUTION, CONCENTRATE INTRAVENOUS PRN
Status: CANCELLED | OUTPATIENT
Start: 2018-12-26

## 2018-12-05 RX ORDER — DEXAMETHASONE SODIUM PHOSPHATE 10 MG/ML
10 INJECTION, SOLUTION INTRAMUSCULAR; INTRAVENOUS ONCE
Status: COMPLETED | OUTPATIENT
Start: 2018-12-05 | End: 2018-12-05

## 2018-12-05 RX ORDER — HEPARIN SODIUM (PORCINE) LOCK FLUSH IV SOLN 100 UNIT/ML 100 UNIT/ML
500 SOLUTION INTRAVENOUS PRN
Status: DISCONTINUED | OUTPATIENT
Start: 2018-12-05 | End: 2018-12-06 | Stop reason: HOSPADM

## 2018-12-05 RX ORDER — SODIUM CHLORIDE 9 MG/ML
INJECTION, SOLUTION INTRAVENOUS ONCE
Status: CANCELLED | OUTPATIENT
Start: 2018-12-26 | End: 2018-12-26

## 2018-12-05 RX ORDER — SODIUM CHLORIDE 0.9 % (FLUSH) 0.9 %
10 SYRINGE (ML) INJECTION PRN
Status: DISCONTINUED | OUTPATIENT
Start: 2018-12-05 | End: 2018-12-06 | Stop reason: HOSPADM

## 2018-12-05 RX ADMIN — DIPHENHYDRAMINE HYDROCHLORIDE 50 MG: 50 INJECTION, SOLUTION INTRAMUSCULAR; INTRAVENOUS at 10:40

## 2018-12-05 RX ADMIN — PACLITAXEL 162 MG: 6 INJECTION, SOLUTION INTRAVENOUS at 11:33

## 2018-12-05 RX ADMIN — Medication 10 ML: at 09:44

## 2018-12-05 RX ADMIN — FAMOTIDINE 20 MG: 10 INJECTION INTRAVENOUS at 10:44

## 2018-12-05 RX ADMIN — DEXAMETHASONE SODIUM PHOSPHATE 10 MG: 10 INJECTION, SOLUTION INTRAMUSCULAR; INTRAVENOUS at 10:34

## 2018-12-05 RX ADMIN — SODIUM CHLORIDE: 9 INJECTION, SOLUTION INTRAVENOUS at 09:45

## 2018-12-05 RX ADMIN — HEPARIN SODIUM (PORCINE) LOCK FLUSH IV SOLN 100 UNIT/ML 500 UNITS: 100 SOLUTION at 12:39

## 2018-12-05 RX ADMIN — ONDANSETRON 8 MG: 2 INJECTION INTRAMUSCULAR; INTRAVENOUS at 10:37

## 2018-12-05 ASSESSMENT — PAIN SCALES - GENERAL: PAINLEVEL_OUTOF10: 0

## 2018-12-05 NOTE — PROGRESS NOTES
Infusion complete. Flushed VAD with 35 ml of saline and 5 ml of heparin flush. D/C gramajo needle. Band aid to site. Patient stable and discharged to home.

## 2018-12-05 NOTE — PROGRESS NOTES
VAD accessed per protocol with 3/4 inch 20 gauge gramajo needle. Flushed easily with saline 10 ml. Good blood return. Labs drawn. Secured accessed port with transparent dressing.   IV infusing NSS

## 2018-12-12 ENCOUNTER — HOSPITAL ENCOUNTER (OUTPATIENT)
Dept: INFUSION THERAPY | Age: 68
Discharge: HOME | End: 2018-12-12
Payer: MEDICARE

## 2018-12-12 VITALS
HEART RATE: 82 BPM | BODY MASS INDEX: 29.73 KG/M2 | HEIGHT: 67 IN | SYSTOLIC BLOOD PRESSURE: 114 MMHG | WEIGHT: 189.4 LBS | TEMPERATURE: 96.6 F | RESPIRATION RATE: 16 BRPM | DIASTOLIC BLOOD PRESSURE: 59 MMHG

## 2018-12-12 DIAGNOSIS — C50.412 MALIGNANT NEOPLASM OF UPPER-OUTER QUADRANT OF BOTH BREASTS IN FEMALE, ESTROGEN RECEPTOR POSITIVE (HCC): ICD-10-CM

## 2018-12-12 DIAGNOSIS — C50.411 MALIGNANT NEOPLASM OF UPPER-OUTER QUADRANT OF BOTH BREASTS IN FEMALE, ESTROGEN RECEPTOR POSITIVE (HCC): ICD-10-CM

## 2018-12-12 DIAGNOSIS — Z17.0 MALIGNANT NEOPLASM OF UPPER-OUTER QUADRANT OF BOTH BREASTS IN FEMALE, ESTROGEN RECEPTOR POSITIVE (HCC): ICD-10-CM

## 2018-12-12 LAB
ABSOLUTE EOS #: 0 K/UL (ref 0–0.4)
ABSOLUTE IMMATURE GRANULOCYTE: ABNORMAL K/UL (ref 0–0.3)
ABSOLUTE LYMPH #: 0.7 K/UL (ref 1–4.8)
ABSOLUTE MONO #: 0.08 K/UL (ref 0.1–1.2)
ALBUMIN SERPL-MCNC: 4.2 G/DL (ref 3.5–5.2)
ALBUMIN/GLOBULIN RATIO: 1.5 (ref 1–2.5)
ALP BLD-CCNC: 65 U/L (ref 35–104)
ALT SERPL-CCNC: 22 U/L (ref 5–33)
ANION GAP SERPL CALCULATED.3IONS-SCNC: 14 MMOL/L (ref 9–17)
AST SERPL-CCNC: 17 U/L
BASOPHILS # BLD: 0 % (ref 0–1)
BASOPHILS ABSOLUTE: 0 K/UL (ref 0–0.2)
BILIRUB SERPL-MCNC: 0.32 MG/DL (ref 0.3–1.2)
BUN BLDV-MCNC: 18 MG/DL (ref 8–23)
BUN/CREAT BLD: 26 (ref 9–20)
CALCIUM SERPL-MCNC: 9.7 MG/DL (ref 8.6–10.4)
CHLORIDE BLD-SCNC: 104 MMOL/L (ref 98–107)
CO2: 23 MMOL/L (ref 20–31)
CREAT SERPL-MCNC: 0.69 MG/DL (ref 0.5–0.9)
DIFFERENTIAL TYPE: ABNORMAL
EOSINOPHILS RELATIVE PERCENT: 0 % (ref 1–7)
GFR AFRICAN AMERICAN: >60 ML/MIN
GFR NON-AFRICAN AMERICAN: >60 ML/MIN
GFR SERPL CREATININE-BSD FRML MDRD: ABNORMAL ML/MIN/{1.73_M2}
GFR SERPL CREATININE-BSD FRML MDRD: ABNORMAL ML/MIN/{1.73_M2}
GLUCOSE BLD-MCNC: 197 MG/DL (ref 70–99)
HCT VFR BLD CALC: 35.5 % (ref 36–46)
HEMOGLOBIN: 11.6 G/DL (ref 12–16)
IMMATURE GRANULOCYTES: ABNORMAL %
LYMPHOCYTES # BLD: 18 % (ref 16–46)
MCH RBC QN AUTO: 30.5 PG (ref 26–34)
MCHC RBC AUTO-ENTMCNC: 32.6 G/DL (ref 31–37)
MCV RBC AUTO: 93.8 FL (ref 80–100)
MONOCYTES # BLD: 2 % (ref 4–11)
MORPHOLOGY: ABNORMAL
NRBC AUTOMATED: ABNORMAL PER 100 WBC
PDW BLD-RTO: 15.3 % (ref 11–14.5)
PLATELET # BLD: 319 K/UL (ref 140–450)
PLATELET ESTIMATE: ABNORMAL
PMV BLD AUTO: 9 FL (ref 6–12)
POTASSIUM SERPL-SCNC: 4.3 MMOL/L (ref 3.7–5.3)
RBC # BLD: 3.78 M/UL (ref 4–5.2)
RBC # BLD: ABNORMAL 10*6/UL
SEG NEUTROPHILS: 80 % (ref 43–77)
SEGMENTED NEUTROPHILS ABSOLUTE COUNT: 3.12 K/UL (ref 1.8–7.7)
SODIUM BLD-SCNC: 141 MMOL/L (ref 135–144)
TOTAL PROTEIN: 7 G/DL (ref 6.4–8.3)
WBC # BLD: 3.9 K/UL (ref 3.5–11)
WBC # BLD: ABNORMAL 10*3/UL

## 2018-12-12 PROCEDURE — 96375 TX/PRO/DX INJ NEW DRUG ADDON: CPT

## 2018-12-12 PROCEDURE — 96413 CHEMO IV INFUSION 1 HR: CPT

## 2018-12-12 PROCEDURE — 80053 COMPREHEN METABOLIC PANEL: CPT

## 2018-12-12 PROCEDURE — 6360000002 HC RX W HCPCS: Performed by: INTERNAL MEDICINE

## 2018-12-12 PROCEDURE — 2500000003 HC RX 250 WO HCPCS: Performed by: INTERNAL MEDICINE

## 2018-12-12 PROCEDURE — 85025 COMPLETE CBC W/AUTO DIFF WBC: CPT

## 2018-12-12 PROCEDURE — S0028 INJECTION, FAMOTIDINE, 20 MG: HCPCS | Performed by: INTERNAL MEDICINE

## 2018-12-12 PROCEDURE — 36591 DRAW BLOOD OFF VENOUS DEVICE: CPT

## 2018-12-12 PROCEDURE — 2580000003 HC RX 258: Performed by: INTERNAL MEDICINE

## 2018-12-12 RX ORDER — DIPHENHYDRAMINE HYDROCHLORIDE 50 MG/ML
50 INJECTION INTRAMUSCULAR; INTRAVENOUS ONCE
Status: COMPLETED | OUTPATIENT
Start: 2018-12-12 | End: 2018-12-12

## 2018-12-12 RX ORDER — HEPARIN SODIUM (PORCINE) LOCK FLUSH IV SOLN 100 UNIT/ML 100 UNIT/ML
500 SOLUTION INTRAVENOUS PRN
Status: DISCONTINUED | OUTPATIENT
Start: 2018-12-12 | End: 2018-12-13 | Stop reason: HOSPADM

## 2018-12-12 RX ORDER — 0.9 % SODIUM CHLORIDE 0.9 %
10 VIAL (ML) INJECTION ONCE
Status: DISCONTINUED | OUTPATIENT
Start: 2018-12-12 | End: 2018-12-13 | Stop reason: HOSPADM

## 2018-12-12 RX ORDER — ONDANSETRON 2 MG/ML
8 INJECTION INTRAMUSCULAR; INTRAVENOUS ONCE
Status: COMPLETED | OUTPATIENT
Start: 2018-12-12 | End: 2018-12-12

## 2018-12-12 RX ORDER — SODIUM CHLORIDE 0.9 % (FLUSH) 0.9 %
10 SYRINGE (ML) INJECTION PRN
Status: DISCONTINUED | OUTPATIENT
Start: 2018-12-12 | End: 2018-12-13 | Stop reason: HOSPADM

## 2018-12-12 RX ORDER — SODIUM CHLORIDE 9 MG/ML
INJECTION, SOLUTION INTRAVENOUS ONCE
Status: COMPLETED | OUTPATIENT
Start: 2018-12-12 | End: 2018-12-12

## 2018-12-12 RX ORDER — DEXAMETHASONE SODIUM PHOSPHATE 10 MG/ML
10 INJECTION, SOLUTION INTRAMUSCULAR; INTRAVENOUS ONCE
Status: COMPLETED | OUTPATIENT
Start: 2018-12-12 | End: 2018-12-12

## 2018-12-12 RX ADMIN — SODIUM CHLORIDE: 9 INJECTION, SOLUTION INTRAVENOUS at 09:35

## 2018-12-12 RX ADMIN — DEXAMETHASONE SODIUM PHOSPHATE 10 MG: 10 INJECTION, SOLUTION INTRAMUSCULAR; INTRAVENOUS at 10:13

## 2018-12-12 RX ADMIN — HEPARIN SODIUM (PORCINE) LOCK FLUSH IV SOLN 100 UNIT/ML 500 UNITS: 100 SOLUTION at 11:50

## 2018-12-12 RX ADMIN — DIPHENHYDRAMINE HYDROCHLORIDE 50 MG: 50 INJECTION, SOLUTION INTRAMUSCULAR; INTRAVENOUS at 10:18

## 2018-12-12 RX ADMIN — PACLITAXEL 162 MG: 300 INJECTION, SOLUTION INTRAVENOUS at 10:48

## 2018-12-12 RX ADMIN — FAMOTIDINE 20 MG: 10 INJECTION INTRAVENOUS at 10:16

## 2018-12-12 RX ADMIN — ONDANSETRON 8 MG: 2 INJECTION INTRAMUSCULAR; INTRAVENOUS at 10:10

## 2018-12-12 ASSESSMENT — PAIN SCALES - GENERAL: PAINLEVEL_OUTOF10: 0

## 2018-12-12 NOTE — PROGRESS NOTES
Taxol infused and d/c'd. Mediport flushed with 30 ml NSS and 5 ml heparin. Patient tolerated infusion without any difficulty. Brown needle d/c'd, band aid to site. Patient denies any complaints and discharged to home with sister.

## 2018-12-12 NOTE — PROGRESS NOTES
Patient at infusion center for chemotherapy. VAD accessed per protocol using 20 gauge 3/4\" gramajo needle. Flushes easily. Labs drawn. NSS infusing. Patient states that she felt more tired over the weekend than usual but states that she is feeling better today.

## 2018-12-17 ENCOUNTER — TELEPHONE (OUTPATIENT)
Dept: FAMILY MEDICINE CLINIC | Age: 68
End: 2018-12-17

## 2018-12-17 ENCOUNTER — HOSPITAL ENCOUNTER (OUTPATIENT)
Dept: LAB | Age: 68
Discharge: HOME | End: 2018-12-17
Payer: MEDICARE

## 2018-12-17 DIAGNOSIS — R30.0 DYSURIA: ICD-10-CM

## 2018-12-17 DIAGNOSIS — N30.00 ACUTE CYSTITIS WITHOUT HEMATURIA: Primary | ICD-10-CM

## 2018-12-17 LAB
-: ABNORMAL
AMORPHOUS: ABNORMAL
BACTERIA: ABNORMAL
BILIRUBIN URINE: NEGATIVE
CASTS UA: ABNORMAL /LPF (ref 0–2)
COLOR: ABNORMAL
COMMENT UA: ABNORMAL
CRYSTALS, UA: ABNORMAL /HPF
EPITHELIAL CELLS UA: ABNORMAL /HPF (ref 0–5)
GLUCOSE URINE: NEGATIVE
KETONES, URINE: NEGATIVE
LEUKOCYTE ESTERASE, URINE: ABNORMAL
MUCUS: ABNORMAL
NITRITE, URINE: NEGATIVE
OTHER OBSERVATIONS UA: ABNORMAL
PH UA: 7 (ref 5–6)
PROTEIN UA: NEGATIVE
RBC UA: ABNORMAL /HPF (ref 0–4)
RENAL EPITHELIAL, UA: ABNORMAL /HPF
SPECIFIC GRAVITY UA: 1.01 (ref 1.01–1.02)
TRICHOMONAS: ABNORMAL
TURBIDITY: ABNORMAL
URINE HGB: ABNORMAL
UROBILINOGEN, URINE: NORMAL
WBC UA: ABNORMAL /HPF (ref 0–4)
YEAST: ABNORMAL

## 2018-12-17 PROCEDURE — 87086 URINE CULTURE/COLONY COUNT: CPT

## 2018-12-17 PROCEDURE — 87088 URINE BACTERIA CULTURE: CPT

## 2018-12-17 PROCEDURE — 81001 URINALYSIS AUTO W/SCOPE: CPT

## 2018-12-17 PROCEDURE — 87186 SC STD MICRODIL/AGAR DIL: CPT

## 2018-12-17 RX ORDER — CEPHALEXIN 500 MG/1
500 CAPSULE ORAL 3 TIMES DAILY
Qty: 21 CAPSULE | Refills: 0 | Status: SHIPPED | OUTPATIENT
Start: 2018-12-17 | End: 2018-12-24

## 2018-12-19 ENCOUNTER — HOSPITAL ENCOUNTER (OUTPATIENT)
Dept: INFUSION THERAPY | Age: 68
Discharge: HOME | End: 2018-12-19
Payer: MEDICARE

## 2018-12-19 VITALS
HEART RATE: 88 BPM | TEMPERATURE: 97.7 F | OXYGEN SATURATION: 96 % | RESPIRATION RATE: 16 BRPM | WEIGHT: 186.6 LBS | SYSTOLIC BLOOD PRESSURE: 133 MMHG | DIASTOLIC BLOOD PRESSURE: 59 MMHG | HEIGHT: 67 IN | BODY MASS INDEX: 29.29 KG/M2

## 2018-12-19 DIAGNOSIS — C50.412 MALIGNANT NEOPLASM OF UPPER-OUTER QUADRANT OF BOTH BREASTS IN FEMALE, ESTROGEN RECEPTOR POSITIVE (HCC): ICD-10-CM

## 2018-12-19 DIAGNOSIS — Z17.0 MALIGNANT NEOPLASM OF UPPER-OUTER QUADRANT OF BOTH BREASTS IN FEMALE, ESTROGEN RECEPTOR POSITIVE (HCC): ICD-10-CM

## 2018-12-19 DIAGNOSIS — C50.411 MALIGNANT NEOPLASM OF UPPER-OUTER QUADRANT OF BOTH BREASTS IN FEMALE, ESTROGEN RECEPTOR POSITIVE (HCC): ICD-10-CM

## 2018-12-19 LAB
ABSOLUTE EOS #: 0 K/UL (ref 0–0.4)
ABSOLUTE IMMATURE GRANULOCYTE: ABNORMAL K/UL (ref 0–0.3)
ABSOLUTE LYMPH #: 0.71 K/UL (ref 1–4.8)
ABSOLUTE MONO #: 0.08 K/UL (ref 0.1–1.2)
ALBUMIN SERPL-MCNC: 4 G/DL (ref 3.5–5.2)
ALBUMIN/GLOBULIN RATIO: 1.4 (ref 1–2.5)
ALP BLD-CCNC: 66 U/L (ref 35–104)
ALT SERPL-CCNC: 23 U/L (ref 5–33)
ANION GAP SERPL CALCULATED.3IONS-SCNC: 14 MMOL/L (ref 9–17)
AST SERPL-CCNC: 18 U/L
BASOPHILS # BLD: 0 % (ref 0–1)
BASOPHILS ABSOLUTE: 0 K/UL (ref 0–0.2)
BILIRUB SERPL-MCNC: 0.32 MG/DL (ref 0.3–1.2)
BUN BLDV-MCNC: 20 MG/DL (ref 8–23)
BUN/CREAT BLD: 32 (ref 9–20)
CALCIUM SERPL-MCNC: 9.4 MG/DL (ref 8.6–10.4)
CHLORIDE BLD-SCNC: 105 MMOL/L (ref 98–107)
CO2: 22 MMOL/L (ref 20–31)
CREAT SERPL-MCNC: 0.62 MG/DL (ref 0.5–0.9)
CULTURE: ABNORMAL
DIFFERENTIAL TYPE: ABNORMAL
EOSINOPHILS RELATIVE PERCENT: 0 % (ref 1–7)
GFR AFRICAN AMERICAN: >60 ML/MIN
GFR NON-AFRICAN AMERICAN: >60 ML/MIN
GFR SERPL CREATININE-BSD FRML MDRD: ABNORMAL ML/MIN/{1.73_M2}
GFR SERPL CREATININE-BSD FRML MDRD: ABNORMAL ML/MIN/{1.73_M2}
GLUCOSE BLD-MCNC: 207 MG/DL (ref 70–99)
HCT VFR BLD CALC: 34.7 % (ref 36–46)
HEMOGLOBIN: 11.4 G/DL (ref 12–16)
IMMATURE GRANULOCYTES: ABNORMAL %
LYMPHOCYTES # BLD: 17 % (ref 16–46)
Lab: ABNORMAL
MCH RBC QN AUTO: 31 PG (ref 26–34)
MCHC RBC AUTO-ENTMCNC: 32.9 G/DL (ref 31–37)
MCV RBC AUTO: 94.2 FL (ref 80–100)
MONOCYTES # BLD: 2 % (ref 4–11)
MORPHOLOGY: ABNORMAL
NRBC AUTOMATED: ABNORMAL PER 100 WBC
ORGANISM: ABNORMAL
PDW BLD-RTO: 15.6 % (ref 11–14.5)
PLATELET # BLD: 299 K/UL (ref 140–450)
PLATELET ESTIMATE: ABNORMAL
PMV BLD AUTO: 9.2 FL (ref 6–12)
POTASSIUM SERPL-SCNC: 4 MMOL/L (ref 3.7–5.3)
RBC # BLD: 3.69 M/UL (ref 4–5.2)
RBC # BLD: ABNORMAL 10*6/UL
SEG NEUTROPHILS: 81 % (ref 43–77)
SEGMENTED NEUTROPHILS ABSOLUTE COUNT: 3.41 K/UL (ref 1.8–7.7)
SODIUM BLD-SCNC: 141 MMOL/L (ref 135–144)
SPECIMEN DESCRIPTION: ABNORMAL
STATUS: ABNORMAL
TOTAL PROTEIN: 6.8 G/DL (ref 6.4–8.3)
WBC # BLD: 4.2 K/UL (ref 3.5–11)
WBC # BLD: ABNORMAL 10*3/UL

## 2018-12-19 PROCEDURE — 96413 CHEMO IV INFUSION 1 HR: CPT

## 2018-12-19 PROCEDURE — 96375 TX/PRO/DX INJ NEW DRUG ADDON: CPT

## 2018-12-19 PROCEDURE — 2500000003 HC RX 250 WO HCPCS: Performed by: INTERNAL MEDICINE

## 2018-12-19 PROCEDURE — 6360000002 HC RX W HCPCS: Performed by: INTERNAL MEDICINE

## 2018-12-19 PROCEDURE — 36591 DRAW BLOOD OFF VENOUS DEVICE: CPT

## 2018-12-19 PROCEDURE — S0028 INJECTION, FAMOTIDINE, 20 MG: HCPCS | Performed by: INTERNAL MEDICINE

## 2018-12-19 PROCEDURE — 80053 COMPREHEN METABOLIC PANEL: CPT

## 2018-12-19 PROCEDURE — 85025 COMPLETE CBC W/AUTO DIFF WBC: CPT

## 2018-12-19 PROCEDURE — 2580000003 HC RX 258: Performed by: INTERNAL MEDICINE

## 2018-12-19 RX ORDER — SODIUM CHLORIDE 0.9 % (FLUSH) 0.9 %
10 SYRINGE (ML) INJECTION PRN
Status: CANCELLED | OUTPATIENT
Start: 2019-01-02

## 2018-12-19 RX ORDER — HEPARIN SODIUM (PORCINE) LOCK FLUSH IV SOLN 100 UNIT/ML 100 UNIT/ML
500 SOLUTION INTRAVENOUS PRN
Status: CANCELLED | OUTPATIENT
Start: 2019-01-02

## 2018-12-19 RX ORDER — SODIUM CHLORIDE 9 MG/ML
INJECTION, SOLUTION INTRAVENOUS ONCE
Status: COMPLETED | OUTPATIENT
Start: 2018-12-19 | End: 2018-12-19

## 2018-12-19 RX ORDER — DIPHENHYDRAMINE HYDROCHLORIDE 50 MG/ML
50 INJECTION INTRAMUSCULAR; INTRAVENOUS ONCE
Status: CANCELLED | OUTPATIENT
Start: 2019-01-02 | End: 2019-01-02

## 2018-12-19 RX ORDER — DIPHENHYDRAMINE HYDROCHLORIDE 50 MG/ML
50 INJECTION INTRAMUSCULAR; INTRAVENOUS ONCE
Status: COMPLETED | OUTPATIENT
Start: 2018-12-19 | End: 2018-12-19

## 2018-12-19 RX ORDER — EPINEPHRINE 1 MG/ML
0.3 INJECTION, SOLUTION, CONCENTRATE INTRAVENOUS PRN
Status: CANCELLED | OUTPATIENT
Start: 2019-01-02

## 2018-12-19 RX ORDER — METHYLPREDNISOLONE SODIUM SUCCINATE 125 MG/2ML
125 INJECTION, POWDER, LYOPHILIZED, FOR SOLUTION INTRAMUSCULAR; INTRAVENOUS ONCE
Status: CANCELLED | OUTPATIENT
Start: 2019-01-02 | End: 2019-01-02

## 2018-12-19 RX ORDER — MEPERIDINE HYDROCHLORIDE 50 MG/ML
12.5 INJECTION INTRAMUSCULAR; INTRAVENOUS; SUBCUTANEOUS ONCE
Status: CANCELLED | OUTPATIENT
Start: 2019-01-02 | End: 2019-01-02

## 2018-12-19 RX ORDER — SODIUM CHLORIDE 0.9 % (FLUSH) 0.9 %
10 SYRINGE (ML) INJECTION PRN
Status: DISCONTINUED | OUTPATIENT
Start: 2018-12-19 | End: 2018-12-20 | Stop reason: HOSPADM

## 2018-12-19 RX ORDER — DEXAMETHASONE SODIUM PHOSPHATE 10 MG/ML
10 INJECTION, SOLUTION INTRAMUSCULAR; INTRAVENOUS ONCE
Status: CANCELLED | OUTPATIENT
Start: 2019-01-02

## 2018-12-19 RX ORDER — SODIUM CHLORIDE 0.9 % (FLUSH) 0.9 %
5 SYRINGE (ML) INJECTION PRN
Status: CANCELLED | OUTPATIENT
Start: 2019-01-02

## 2018-12-19 RX ORDER — ONDANSETRON 2 MG/ML
8 INJECTION INTRAMUSCULAR; INTRAVENOUS ONCE
Status: COMPLETED | OUTPATIENT
Start: 2018-12-19 | End: 2018-12-19

## 2018-12-19 RX ORDER — HEPARIN SODIUM (PORCINE) LOCK FLUSH IV SOLN 100 UNIT/ML 100 UNIT/ML
500 SOLUTION INTRAVENOUS PRN
Status: DISCONTINUED | OUTPATIENT
Start: 2018-12-19 | End: 2018-12-20 | Stop reason: HOSPADM

## 2018-12-19 RX ORDER — DEXAMETHASONE SODIUM PHOSPHATE 10 MG/ML
10 INJECTION, SOLUTION INTRAMUSCULAR; INTRAVENOUS ONCE
Status: COMPLETED | OUTPATIENT
Start: 2018-12-19 | End: 2018-12-19

## 2018-12-19 RX ORDER — ONDANSETRON 2 MG/ML
8 INJECTION INTRAMUSCULAR; INTRAVENOUS ONCE
Status: CANCELLED | OUTPATIENT
Start: 2019-01-02

## 2018-12-19 RX ORDER — SODIUM CHLORIDE 9 MG/ML
INJECTION, SOLUTION INTRAVENOUS CONTINUOUS
Status: CANCELLED | OUTPATIENT
Start: 2019-01-02

## 2018-12-19 RX ORDER — 0.9 % SODIUM CHLORIDE 0.9 %
10 VIAL (ML) INJECTION ONCE
Status: COMPLETED | OUTPATIENT
Start: 2018-12-19 | End: 2018-12-19

## 2018-12-19 RX ORDER — 0.9 % SODIUM CHLORIDE 0.9 %
10 VIAL (ML) INJECTION ONCE
Status: CANCELLED | OUTPATIENT
Start: 2019-01-02 | End: 2019-01-02

## 2018-12-19 RX ORDER — SODIUM CHLORIDE 9 MG/ML
INJECTION, SOLUTION INTRAVENOUS ONCE
Status: CANCELLED | OUTPATIENT
Start: 2019-01-02 | End: 2019-01-02

## 2018-12-19 RX ADMIN — SODIUM CHLORIDE: 9 INJECTION, SOLUTION INTRAVENOUS at 09:30

## 2018-12-19 RX ADMIN — DIPHENHYDRAMINE HYDROCHLORIDE 50 MG: 50 INJECTION, SOLUTION INTRAMUSCULAR; INTRAVENOUS at 11:07

## 2018-12-19 RX ADMIN — PACLITAXEL 162 MG: 300 INJECTION, SOLUTION INTRAVENOUS at 11:39

## 2018-12-19 RX ADMIN — FAMOTIDINE 20 MG: 10 INJECTION, SOLUTION INTRAVENOUS at 11:05

## 2018-12-19 RX ADMIN — DEXAMETHASONE SODIUM PHOSPHATE 10 MG: 10 INJECTION, SOLUTION INTRAMUSCULAR; INTRAVENOUS at 11:02

## 2018-12-19 RX ADMIN — ONDANSETRON 8 MG: 2 INJECTION INTRAMUSCULAR; INTRAVENOUS at 11:00

## 2018-12-19 RX ADMIN — Medication 10 ML: at 09:30

## 2018-12-19 RX ADMIN — HEPARIN SODIUM (PORCINE) LOCK FLUSH IV SOLN 100 UNIT/ML 500 UNITS: 100 SOLUTION at 12:46

## 2018-12-19 ASSESSMENT — PAIN SCALES - GENERAL
PAINLEVEL_OUTOF10: 0
PAINLEVEL_OUTOF10: 0

## 2018-12-19 NOTE — PROGRESS NOTES
Patient at infusion center for chemotherapy. VAD accessed per protocol using 20 gauge 3/4\" gramajo needle. Flushes easily. Labs drawn. NSS infusing. Patient states that she has been more fatigues this week and has noticed a rash on her nose and side of nose. Patient states that the rash is getting better.

## 2018-12-19 NOTE — PROGRESS NOTES
Infusion complete. Flushed VAD and IV  with 35 ml of saline and VAD with 5 ml of heparin flush. D/C gramajo needle. Band aid to site. Patient stable and discharged to home with sister.

## 2018-12-26 ENCOUNTER — HOSPITAL ENCOUNTER (OUTPATIENT)
Dept: INFUSION THERAPY | Age: 68
Discharge: HOME | End: 2018-12-26
Payer: MEDICARE

## 2018-12-26 VITALS
SYSTOLIC BLOOD PRESSURE: 102 MMHG | OXYGEN SATURATION: 98 % | HEART RATE: 93 BPM | WEIGHT: 186.8 LBS | TEMPERATURE: 97.3 F | RESPIRATION RATE: 16 BRPM | BODY MASS INDEX: 29.32 KG/M2 | DIASTOLIC BLOOD PRESSURE: 65 MMHG | HEIGHT: 67 IN

## 2018-12-26 DIAGNOSIS — C50.411 MALIGNANT NEOPLASM OF UPPER-OUTER QUADRANT OF BOTH BREASTS IN FEMALE, ESTROGEN RECEPTOR POSITIVE (HCC): ICD-10-CM

## 2018-12-26 DIAGNOSIS — Z17.0 MALIGNANT NEOPLASM OF UPPER-OUTER QUADRANT OF BOTH BREASTS IN FEMALE, ESTROGEN RECEPTOR POSITIVE (HCC): ICD-10-CM

## 2018-12-26 DIAGNOSIS — C50.412 MALIGNANT NEOPLASM OF UPPER-OUTER QUADRANT OF BOTH BREASTS IN FEMALE, ESTROGEN RECEPTOR POSITIVE (HCC): ICD-10-CM

## 2018-12-26 LAB
ABSOLUTE EOS #: 0 K/UL (ref 0–0.4)
ABSOLUTE IMMATURE GRANULOCYTE: ABNORMAL K/UL (ref 0–0.3)
ABSOLUTE LYMPH #: 0.87 K/UL (ref 1–4.8)
ABSOLUTE MONO #: 0.05 K/UL (ref 0.1–1.2)
ALBUMIN SERPL-MCNC: 4.1 G/DL (ref 3.5–5.2)
ALBUMIN/GLOBULIN RATIO: 1.5 (ref 1–2.5)
ALP BLD-CCNC: 71 U/L (ref 35–104)
ALT SERPL-CCNC: 30 U/L (ref 5–33)
ANION GAP SERPL CALCULATED.3IONS-SCNC: 15 MMOL/L (ref 9–17)
AST SERPL-CCNC: 20 U/L
BASOPHILS # BLD: 0 % (ref 0–1)
BASOPHILS ABSOLUTE: 0 K/UL (ref 0–0.2)
BILIRUB SERPL-MCNC: 0.32 MG/DL (ref 0.3–1.2)
BUN BLDV-MCNC: 19 MG/DL (ref 8–23)
BUN/CREAT BLD: 32 (ref 9–20)
CALCIUM SERPL-MCNC: 9.6 MG/DL (ref 8.6–10.4)
CHLORIDE BLD-SCNC: 102 MMOL/L (ref 98–107)
CO2: 22 MMOL/L (ref 20–31)
CREAT SERPL-MCNC: 0.6 MG/DL (ref 0.5–0.9)
DIFFERENTIAL TYPE: ABNORMAL
EOSINOPHILS RELATIVE PERCENT: 0 % (ref 1–7)
GFR AFRICAN AMERICAN: >60 ML/MIN
GFR NON-AFRICAN AMERICAN: >60 ML/MIN
GFR SERPL CREATININE-BSD FRML MDRD: ABNORMAL ML/MIN/{1.73_M2}
GFR SERPL CREATININE-BSD FRML MDRD: ABNORMAL ML/MIN/{1.73_M2}
GLUCOSE BLD-MCNC: 200 MG/DL (ref 70–99)
HCT VFR BLD CALC: 35.7 % (ref 36–46)
HEMOGLOBIN: 11.6 G/DL (ref 12–16)
IMMATURE GRANULOCYTES: ABNORMAL %
LYMPHOCYTES # BLD: 19 % (ref 16–46)
MCH RBC QN AUTO: 30.5 PG (ref 26–34)
MCHC RBC AUTO-ENTMCNC: 32.5 G/DL (ref 31–37)
MCV RBC AUTO: 93.9 FL (ref 80–100)
MONOCYTES # BLD: 1 % (ref 4–11)
MORPHOLOGY: ABNORMAL
NRBC AUTOMATED: ABNORMAL PER 100 WBC
PDW BLD-RTO: 16.1 % (ref 11–14.5)
PLATELET # BLD: 319 K/UL (ref 140–450)
PLATELET ESTIMATE: ABNORMAL
PMV BLD AUTO: 9.2 FL (ref 6–12)
POTASSIUM SERPL-SCNC: 4.3 MMOL/L (ref 3.7–5.3)
RBC # BLD: 3.8 M/UL (ref 4–5.2)
RBC # BLD: ABNORMAL 10*6/UL
SEG NEUTROPHILS: 80 % (ref 43–77)
SEGMENTED NEUTROPHILS ABSOLUTE COUNT: 3.68 K/UL (ref 1.8–7.7)
SODIUM BLD-SCNC: 139 MMOL/L (ref 135–144)
TOTAL PROTEIN: 6.9 G/DL (ref 6.4–8.3)
WBC # BLD: 4.6 K/UL (ref 3.5–11)
WBC # BLD: ABNORMAL 10*3/UL

## 2018-12-26 PROCEDURE — 6360000002 HC RX W HCPCS: Performed by: INTERNAL MEDICINE

## 2018-12-26 PROCEDURE — S0028 INJECTION, FAMOTIDINE, 20 MG: HCPCS | Performed by: INTERNAL MEDICINE

## 2018-12-26 PROCEDURE — 36591 DRAW BLOOD OFF VENOUS DEVICE: CPT

## 2018-12-26 PROCEDURE — 2580000003 HC RX 258: Performed by: INTERNAL MEDICINE

## 2018-12-26 PROCEDURE — 85025 COMPLETE CBC W/AUTO DIFF WBC: CPT

## 2018-12-26 PROCEDURE — 80053 COMPREHEN METABOLIC PANEL: CPT

## 2018-12-26 PROCEDURE — 96413 CHEMO IV INFUSION 1 HR: CPT

## 2018-12-26 PROCEDURE — 96375 TX/PRO/DX INJ NEW DRUG ADDON: CPT

## 2018-12-26 PROCEDURE — 2500000003 HC RX 250 WO HCPCS: Performed by: INTERNAL MEDICINE

## 2018-12-26 RX ORDER — HEPARIN SODIUM (PORCINE) LOCK FLUSH IV SOLN 100 UNIT/ML 100 UNIT/ML
500 SOLUTION INTRAVENOUS PRN
Status: DISCONTINUED | OUTPATIENT
Start: 2018-12-26 | End: 2018-12-27 | Stop reason: HOSPADM

## 2018-12-26 RX ORDER — DIPHENHYDRAMINE HYDROCHLORIDE 50 MG/ML
50 INJECTION INTRAMUSCULAR; INTRAVENOUS ONCE
Status: COMPLETED | OUTPATIENT
Start: 2018-12-26 | End: 2018-12-26

## 2018-12-26 RX ORDER — 0.9 % SODIUM CHLORIDE 0.9 %
10 VIAL (ML) INJECTION ONCE
Status: DISCONTINUED | OUTPATIENT
Start: 2018-12-26 | End: 2018-12-27 | Stop reason: HOSPADM

## 2018-12-26 RX ORDER — SODIUM CHLORIDE 0.9 % (FLUSH) 0.9 %
10 SYRINGE (ML) INJECTION PRN
Status: DISCONTINUED | OUTPATIENT
Start: 2018-12-26 | End: 2018-12-27 | Stop reason: HOSPADM

## 2018-12-26 RX ORDER — SODIUM CHLORIDE 9 MG/ML
INJECTION, SOLUTION INTRAVENOUS ONCE
Status: COMPLETED | OUTPATIENT
Start: 2018-12-26 | End: 2018-12-26

## 2018-12-26 RX ORDER — ONDANSETRON 2 MG/ML
8 INJECTION INTRAMUSCULAR; INTRAVENOUS ONCE
Status: COMPLETED | OUTPATIENT
Start: 2018-12-26 | End: 2018-12-26

## 2018-12-26 RX ORDER — DEXAMETHASONE SODIUM PHOSPHATE 10 MG/ML
10 INJECTION, SOLUTION INTRAMUSCULAR; INTRAVENOUS ONCE
Status: COMPLETED | OUTPATIENT
Start: 2018-12-26 | End: 2018-12-26

## 2018-12-26 RX ADMIN — DEXAMETHASONE SODIUM PHOSPHATE 10 MG: 10 INJECTION, SOLUTION INTRAMUSCULAR; INTRAVENOUS at 10:28

## 2018-12-26 RX ADMIN — DIPHENHYDRAMINE HYDROCHLORIDE 50 MG: 50 INJECTION, SOLUTION INTRAMUSCULAR; INTRAVENOUS at 10:44

## 2018-12-26 RX ADMIN — Medication 10 ML: at 09:39

## 2018-12-26 RX ADMIN — ONDANSETRON 8 MG: 2 INJECTION INTRAMUSCULAR; INTRAVENOUS at 10:37

## 2018-12-26 RX ADMIN — SODIUM CHLORIDE: 9 INJECTION, SOLUTION INTRAVENOUS at 09:40

## 2018-12-26 RX ADMIN — HEPARIN SODIUM (PORCINE) LOCK FLUSH IV SOLN 100 UNIT/ML 500 UNITS: 100 SOLUTION at 12:25

## 2018-12-26 RX ADMIN — FAMOTIDINE 20 MG: 10 INJECTION, SOLUTION INTRAVENOUS at 10:33

## 2018-12-26 RX ADMIN — PACLITAXEL 162 MG: 6 INJECTION, SOLUTION INTRAVENOUS at 11:20

## 2018-12-26 ASSESSMENT — PAIN SCALES - GENERAL: PAINLEVEL_OUTOF10: 0

## 2018-12-26 NOTE — PROGRESS NOTES
Patient at infusion center for chemotherapy. VAD accessed per protocol using 20 gauge 3/4\" gramajo needle. Flushes easily. Labs drawn. NSS infusing. Patient complaints of fatigue and taste changes but this has not changed from last treatment.

## 2018-12-26 NOTE — PROGRESS NOTES
Chemotherapy infused and d/c'd. Mediport flushed with 30 ml NSS and 5ml heparin. Brown needle d/c'd, band aid to site. Patient tolerated infusion without any difficulty. Patient denies any complaints and discharged to home with sister.

## 2019-01-03 ENCOUNTER — HOSPITAL ENCOUNTER (OUTPATIENT)
Dept: INFUSION THERAPY | Age: 69
Discharge: HOME | End: 2019-01-03
Payer: MEDICARE

## 2019-01-03 VITALS
TEMPERATURE: 97.2 F | WEIGHT: 186.2 LBS | BODY MASS INDEX: 29.22 KG/M2 | HEART RATE: 91 BPM | SYSTOLIC BLOOD PRESSURE: 108 MMHG | HEIGHT: 67 IN | RESPIRATION RATE: 16 BRPM | DIASTOLIC BLOOD PRESSURE: 66 MMHG

## 2019-01-03 DIAGNOSIS — Z17.0 MALIGNANT NEOPLASM OF UPPER-OUTER QUADRANT OF BOTH BREASTS IN FEMALE, ESTROGEN RECEPTOR POSITIVE (HCC): ICD-10-CM

## 2019-01-03 DIAGNOSIS — C50.411 MALIGNANT NEOPLASM OF UPPER-OUTER QUADRANT OF BOTH BREASTS IN FEMALE, ESTROGEN RECEPTOR POSITIVE (HCC): ICD-10-CM

## 2019-01-03 DIAGNOSIS — C50.412 MALIGNANT NEOPLASM OF UPPER-OUTER QUADRANT OF BOTH BREASTS IN FEMALE, ESTROGEN RECEPTOR POSITIVE (HCC): ICD-10-CM

## 2019-01-03 LAB
ABSOLUTE EOS #: 0 K/UL (ref 0–0.4)
ABSOLUTE IMMATURE GRANULOCYTE: ABNORMAL K/UL (ref 0–0.3)
ABSOLUTE LYMPH #: 0.94 K/UL (ref 1–4.8)
ABSOLUTE MONO #: 0.06 K/UL (ref 0.1–1.2)
ALBUMIN SERPL-MCNC: 4.2 G/DL (ref 3.5–5.2)
ALBUMIN/GLOBULIN RATIO: 1.6 (ref 1–2.5)
ALP BLD-CCNC: 68 U/L (ref 35–104)
ALT SERPL-CCNC: 31 U/L (ref 5–33)
ANION GAP SERPL CALCULATED.3IONS-SCNC: 15 MMOL/L (ref 9–17)
AST SERPL-CCNC: 21 U/L
BASOPHILS # BLD: 0 % (ref 0–1)
BASOPHILS ABSOLUTE: 0 K/UL (ref 0–0.2)
BILIRUB SERPL-MCNC: 0.33 MG/DL (ref 0.3–1.2)
BUN BLDV-MCNC: 22 MG/DL (ref 8–23)
BUN/CREAT BLD: 33 (ref 9–20)
CALCIUM SERPL-MCNC: 9.5 MG/DL (ref 8.6–10.4)
CHLORIDE BLD-SCNC: 103 MMOL/L (ref 98–107)
CO2: 22 MMOL/L (ref 20–31)
CREAT SERPL-MCNC: 0.67 MG/DL (ref 0.5–0.9)
DIFFERENTIAL TYPE: ABNORMAL
EOSINOPHILS RELATIVE PERCENT: 0 % (ref 1–7)
GFR AFRICAN AMERICAN: >60 ML/MIN
GFR NON-AFRICAN AMERICAN: >60 ML/MIN
GFR SERPL CREATININE-BSD FRML MDRD: ABNORMAL ML/MIN/{1.73_M2}
GFR SERPL CREATININE-BSD FRML MDRD: ABNORMAL ML/MIN/{1.73_M2}
GLUCOSE BLD-MCNC: 225 MG/DL (ref 70–99)
HCT VFR BLD CALC: 36.1 % (ref 36–46)
HEMOGLOBIN: 11.7 G/DL (ref 12–16)
IMMATURE GRANULOCYTES: ABNORMAL %
LYMPHOCYTES # BLD: 17 % (ref 16–46)
MCH RBC QN AUTO: 30.6 PG (ref 26–34)
MCHC RBC AUTO-ENTMCNC: 32.4 G/DL (ref 31–37)
MCV RBC AUTO: 94.5 FL (ref 80–100)
MONOCYTES # BLD: 1 % (ref 4–11)
MORPHOLOGY: ABNORMAL
NRBC AUTOMATED: ABNORMAL PER 100 WBC
PDW BLD-RTO: 16.5 % (ref 11–14.5)
PLATELET # BLD: 325 K/UL (ref 140–450)
PLATELET ESTIMATE: ABNORMAL
PMV BLD AUTO: 9.5 FL (ref 6–12)
POTASSIUM SERPL-SCNC: 4.2 MMOL/L (ref 3.7–5.3)
RBC # BLD: 3.82 M/UL (ref 4–5.2)
RBC # BLD: ABNORMAL 10*6/UL
SEG NEUTROPHILS: 82 % (ref 43–77)
SEGMENTED NEUTROPHILS ABSOLUTE COUNT: 4.5 K/UL (ref 1.8–7.7)
SODIUM BLD-SCNC: 140 MMOL/L (ref 135–144)
TOTAL PROTEIN: 6.9 G/DL (ref 6.4–8.3)
WBC # BLD: 5.5 K/UL (ref 3.5–11)
WBC # BLD: ABNORMAL 10*3/UL

## 2019-01-03 PROCEDURE — S0028 INJECTION, FAMOTIDINE, 20 MG: HCPCS | Performed by: INTERNAL MEDICINE

## 2019-01-03 PROCEDURE — 80053 COMPREHEN METABOLIC PANEL: CPT

## 2019-01-03 PROCEDURE — 2580000003 HC RX 258: Performed by: INTERNAL MEDICINE

## 2019-01-03 PROCEDURE — 96375 TX/PRO/DX INJ NEW DRUG ADDON: CPT

## 2019-01-03 PROCEDURE — 85025 COMPLETE CBC W/AUTO DIFF WBC: CPT

## 2019-01-03 PROCEDURE — 96413 CHEMO IV INFUSION 1 HR: CPT

## 2019-01-03 PROCEDURE — 36591 DRAW BLOOD OFF VENOUS DEVICE: CPT

## 2019-01-03 PROCEDURE — 6360000002 HC RX W HCPCS: Performed by: INTERNAL MEDICINE

## 2019-01-03 RX ORDER — 0.9 % SODIUM CHLORIDE 0.9 %
10 VIAL (ML) INJECTION ONCE
Status: DISCONTINUED | OUTPATIENT
Start: 2019-01-03 | End: 2019-01-04 | Stop reason: HOSPADM

## 2019-01-03 RX ORDER — SODIUM CHLORIDE 0.9 % (FLUSH) 0.9 %
10 SYRINGE (ML) INJECTION PRN
Status: DISCONTINUED | OUTPATIENT
Start: 2019-01-03 | End: 2019-01-04 | Stop reason: HOSPADM

## 2019-01-03 RX ORDER — SODIUM CHLORIDE 9 MG/ML
INJECTION, SOLUTION INTRAVENOUS ONCE
Status: COMPLETED | OUTPATIENT
Start: 2019-01-03 | End: 2019-01-03

## 2019-01-03 RX ORDER — HEPARIN SODIUM (PORCINE) LOCK FLUSH IV SOLN 100 UNIT/ML 100 UNIT/ML
500 SOLUTION INTRAVENOUS PRN
Status: DISCONTINUED | OUTPATIENT
Start: 2019-01-03 | End: 2019-01-04 | Stop reason: HOSPADM

## 2019-01-03 RX ORDER — ONDANSETRON 2 MG/ML
8 INJECTION INTRAMUSCULAR; INTRAVENOUS ONCE
Status: COMPLETED | OUTPATIENT
Start: 2019-01-03 | End: 2019-01-03

## 2019-01-03 RX ORDER — DEXAMETHASONE SODIUM PHOSPHATE 10 MG/ML
10 INJECTION, SOLUTION INTRAMUSCULAR; INTRAVENOUS ONCE
Status: COMPLETED | OUTPATIENT
Start: 2019-01-03 | End: 2019-01-03

## 2019-01-03 RX ORDER — DIPHENHYDRAMINE HYDROCHLORIDE 50 MG/ML
50 INJECTION INTRAMUSCULAR; INTRAVENOUS ONCE
Status: COMPLETED | OUTPATIENT
Start: 2019-01-03 | End: 2019-01-03

## 2019-01-03 RX ADMIN — Medication 10 ML: at 11:11

## 2019-01-03 RX ADMIN — DIPHENHYDRAMINE HYDROCHLORIDE 50 MG: 50 INJECTION, SOLUTION INTRAMUSCULAR; INTRAVENOUS at 10:43

## 2019-01-03 RX ADMIN — ONDANSETRON 8 MG: 2 INJECTION INTRAMUSCULAR; INTRAVENOUS at 10:36

## 2019-01-03 RX ADMIN — DEXAMETHASONE SODIUM PHOSPHATE 10 MG: 10 INJECTION, SOLUTION INTRAMUSCULAR; INTRAVENOUS at 10:40

## 2019-01-03 RX ADMIN — SODIUM CHLORIDE: 9 INJECTION, SOLUTION INTRAVENOUS at 09:30

## 2019-01-03 RX ADMIN — HEPARIN SODIUM (PORCINE) LOCK FLUSH IV SOLN 100 UNIT/ML 500 UNITS: 100 SOLUTION at 12:21

## 2019-01-03 RX ADMIN — FAMOTIDINE 20 MG: 10 INJECTION, SOLUTION INTRAVENOUS at 10:39

## 2019-01-03 RX ADMIN — PACLITAXEL 162 MG: 6 INJECTION, SOLUTION INTRAVENOUS at 11:12

## 2019-01-03 ASSESSMENT — PAIN SCALES - GENERAL: PAINLEVEL_OUTOF10: 0

## 2019-01-03 NOTE — PROGRESS NOTES
Chemotherapy infused and d/c'd. Mediport flushed with 30 ml NSS and 5 ml heparin. Patient tolerated infusion without any difficulty. Brown needle d/c'd, band aid to site. Patient denies any complaints. Chemotherapy completion certificate given to patient and discharged to home.

## 2019-01-03 NOTE — PROGRESS NOTES
Patient at infusion center for chemotherapy. VAD accessed per protocol using 20 gauge 3/4\" gramajo needle. Flushes easily. Labs drawn. NSS infusing. Patient states that she feels bad for about two days after treatment and then starts to improve. Patient denies any complaints at this time.

## 2019-01-04 ENCOUNTER — HOSPITAL ENCOUNTER (OUTPATIENT)
Dept: MAMMOGRAPHY | Age: 69
Discharge: HOME | End: 2019-01-06
Payer: MEDICARE

## 2019-01-04 DIAGNOSIS — Z17.0 MALIGNANT NEOPLASM OF UPPER-OUTER QUADRANT OF BOTH BREASTS IN FEMALE, ESTROGEN RECEPTOR POSITIVE (HCC): ICD-10-CM

## 2019-01-04 DIAGNOSIS — C50.411 MALIGNANT NEOPLASM OF UPPER-OUTER QUADRANT OF BOTH BREASTS IN FEMALE, ESTROGEN RECEPTOR POSITIVE (HCC): ICD-10-CM

## 2019-01-04 DIAGNOSIS — C50.412 MALIGNANT NEOPLASM OF UPPER-OUTER QUADRANT OF BOTH BREASTS IN FEMALE, ESTROGEN RECEPTOR POSITIVE (HCC): ICD-10-CM

## 2019-01-04 PROCEDURE — 77066 DX MAMMO INCL CAD BI: CPT

## 2019-01-14 ENCOUNTER — OFFICE VISIT (OUTPATIENT)
Dept: ONCOLOGY | Age: 69
End: 2019-01-14
Payer: MEDICARE

## 2019-01-14 VITALS
HEART RATE: 84 BPM | BODY MASS INDEX: 29.44 KG/M2 | HEIGHT: 67 IN | SYSTOLIC BLOOD PRESSURE: 116 MMHG | DIASTOLIC BLOOD PRESSURE: 68 MMHG | TEMPERATURE: 97.9 F | WEIGHT: 187.6 LBS

## 2019-01-14 DIAGNOSIS — C50.412 MALIGNANT NEOPLASM OF UPPER-OUTER QUADRANT OF BOTH BREASTS IN FEMALE, ESTROGEN RECEPTOR POSITIVE (HCC): Primary | ICD-10-CM

## 2019-01-14 DIAGNOSIS — C50.411 MALIGNANT NEOPLASM OF UPPER-OUTER QUADRANT OF BOTH BREASTS IN FEMALE, ESTROGEN RECEPTOR POSITIVE (HCC): Primary | ICD-10-CM

## 2019-01-14 DIAGNOSIS — Z17.0 MALIGNANT NEOPLASM OF UPPER-OUTER QUADRANT OF BOTH BREASTS IN FEMALE, ESTROGEN RECEPTOR POSITIVE (HCC): Primary | ICD-10-CM

## 2019-01-14 PROCEDURE — 99215 OFFICE O/P EST HI 40 MIN: CPT | Performed by: INTERNAL MEDICINE

## 2019-01-14 RX ORDER — OMEPRAZOLE 20 MG/1
20 CAPSULE, DELAYED RELEASE ORAL DAILY
Qty: 30 CAPSULE | Refills: 3 | Status: SHIPPED | OUTPATIENT
Start: 2019-01-14 | End: 2019-04-12

## 2019-01-24 ENCOUNTER — INITIAL CONSULT (OUTPATIENT)
Dept: SURGERY | Age: 69
End: 2019-01-24
Payer: MEDICARE

## 2019-01-24 VITALS
DIASTOLIC BLOOD PRESSURE: 70 MMHG | SYSTOLIC BLOOD PRESSURE: 122 MMHG | HEIGHT: 67 IN | TEMPERATURE: 98.3 F | WEIGHT: 191 LBS | HEART RATE: 80 BPM | BODY MASS INDEX: 29.98 KG/M2

## 2019-01-24 DIAGNOSIS — C50.111 MALIGNANT NEOPLASM OF CENTRAL PORTION OF BOTH BREASTS IN FEMALE, ESTROGEN RECEPTOR POSITIVE (HCC): Primary | ICD-10-CM

## 2019-01-24 DIAGNOSIS — C50.112 MALIGNANT NEOPLASM OF CENTRAL PORTION OF BOTH BREASTS IN FEMALE, ESTROGEN RECEPTOR POSITIVE (HCC): Primary | ICD-10-CM

## 2019-01-24 DIAGNOSIS — Z17.0 MALIGNANT NEOPLASM OF CENTRAL PORTION OF BOTH BREASTS IN FEMALE, ESTROGEN RECEPTOR POSITIVE (HCC): Primary | ICD-10-CM

## 2019-01-24 PROCEDURE — 99214 OFFICE O/P EST MOD 30 MIN: CPT | Performed by: SURGERY

## 2019-01-28 ENCOUNTER — TELEPHONE (OUTPATIENT)
Dept: ONCOLOGY | Age: 69
End: 2019-01-28

## 2019-01-28 ENCOUNTER — INITIAL CONSULT (OUTPATIENT)
Dept: ONCOLOGY | Age: 69
End: 2019-01-28
Payer: MEDICARE

## 2019-01-28 DIAGNOSIS — Z80.3 FAMILY HISTORY OF BREAST CANCER: ICD-10-CM

## 2019-01-28 DIAGNOSIS — Z17.0 MALIGNANT NEOPLASM OF UPPER-OUTER QUADRANT OF BOTH BREASTS IN FEMALE, ESTROGEN RECEPTOR POSITIVE (HCC): Primary | ICD-10-CM

## 2019-01-28 DIAGNOSIS — C50.411 MALIGNANT NEOPLASM OF UPPER-OUTER QUADRANT OF BOTH BREASTS IN FEMALE, ESTROGEN RECEPTOR POSITIVE (HCC): Primary | ICD-10-CM

## 2019-01-28 DIAGNOSIS — C50.412 MALIGNANT NEOPLASM OF UPPER-OUTER QUADRANT OF BOTH BREASTS IN FEMALE, ESTROGEN RECEPTOR POSITIVE (HCC): Primary | ICD-10-CM

## 2019-01-28 PROCEDURE — 96040 PR GENETIC COUNSELING, EACH 30 MIN: CPT | Performed by: GENETIC COUNSELOR, MS

## 2019-02-05 ENCOUNTER — TELEPHONE (OUTPATIENT)
Dept: SURGERY | Age: 69
End: 2019-02-05

## 2019-02-05 DIAGNOSIS — Z01.810 ENCOUNTER FOR PRE-OPERATIVE CARDIOVASCULAR CLEARANCE: Primary | ICD-10-CM

## 2019-02-05 DIAGNOSIS — Z01.818 PRE-OP TESTING: ICD-10-CM

## 2019-02-05 DIAGNOSIS — Z17.0 MALIGNANT NEOPLASM OF UPPER-OUTER QUADRANT OF LEFT BREAST IN FEMALE, ESTROGEN RECEPTOR POSITIVE (HCC): ICD-10-CM

## 2019-02-05 DIAGNOSIS — C50.412 MALIGNANT NEOPLASM OF UPPER-OUTER QUADRANT OF LEFT BREAST IN FEMALE, ESTROGEN RECEPTOR POSITIVE (HCC): ICD-10-CM

## 2019-02-06 ENCOUNTER — TELEPHONE (OUTPATIENT)
Dept: SURGERY | Age: 69
End: 2019-02-06

## 2019-02-06 VITALS
BODY MASS INDEX: 29.03 KG/M2 | SYSTOLIC BLOOD PRESSURE: 130 MMHG | HEIGHT: 67 IN | TEMPERATURE: 98.7 F | HEART RATE: 68 BPM | DIASTOLIC BLOOD PRESSURE: 70 MMHG | WEIGHT: 184.97 LBS

## 2019-02-06 DIAGNOSIS — Z17.0 ESTROGEN RECEPTOR POSITIVE STATUS (ER+): ICD-10-CM

## 2019-02-06 DIAGNOSIS — C50.411 MALIGNANT NEOPLASM OF UPPER-OUTER QUADRANT OF RIGHT FEMALE BREAST, UNSPECIFIED ESTROGEN RECEPTOR STATUS (HCC): ICD-10-CM

## 2019-02-06 DIAGNOSIS — Z01.818 PRE-OP TESTING: Primary | ICD-10-CM

## 2019-02-06 DIAGNOSIS — C50.412 MALIGNANT NEOPLASM OF UPPER-OUTER QUADRANT OF LEFT FEMALE BREAST, UNSPECIFIED ESTROGEN RECEPTOR STATUS (HCC): ICD-10-CM

## 2019-02-06 DIAGNOSIS — N64.89 OTHER SPECIFIED DISORDERS OF BREAST: ICD-10-CM

## 2019-02-07 ENCOUNTER — HOSPITAL ENCOUNTER (OUTPATIENT)
Dept: LAB | Age: 69
Discharge: HOME | End: 2019-02-07
Payer: MEDICARE

## 2019-02-07 DIAGNOSIS — Z17.0 ESTROGEN RECEPTOR POSITIVE STATUS (ER+): ICD-10-CM

## 2019-02-07 DIAGNOSIS — C50.411 MALIGNANT NEOPLASM OF UPPER-OUTER QUADRANT OF RIGHT FEMALE BREAST, UNSPECIFIED ESTROGEN RECEPTOR STATUS (HCC): ICD-10-CM

## 2019-02-07 DIAGNOSIS — C50.412 MALIGNANT NEOPLASM OF UPPER-OUTER QUADRANT OF LEFT FEMALE BREAST, UNSPECIFIED ESTROGEN RECEPTOR STATUS (HCC): ICD-10-CM

## 2019-02-07 DIAGNOSIS — Z01.818 PRE-OP TESTING: ICD-10-CM

## 2019-02-07 DIAGNOSIS — N64.89 OTHER SPECIFIED DISORDERS OF BREAST: ICD-10-CM

## 2019-02-07 LAB
-: NORMAL
ABSOLUTE EOS #: 0.1 K/UL (ref 0–0.4)
ABSOLUTE IMMATURE GRANULOCYTE: ABNORMAL K/UL (ref 0–0.3)
ABSOLUTE LYMPH #: 1.6 K/UL (ref 1–4.8)
ABSOLUTE MONO #: 0.6 K/UL (ref 0.1–1.2)
ALBUMIN SERPL-MCNC: 4.3 G/DL (ref 3.5–5.2)
ALBUMIN/GLOBULIN RATIO: 2 (ref 1–2.5)
ALP BLD-CCNC: 64 U/L (ref 35–104)
ALT SERPL-CCNC: 24 U/L (ref 5–33)
AMORPHOUS: NORMAL
ANION GAP SERPL CALCULATED.3IONS-SCNC: 12 MMOL/L (ref 9–17)
AST SERPL-CCNC: 24 U/L
BACTERIA: NORMAL
BASOPHILS # BLD: 1 % (ref 0–1)
BASOPHILS ABSOLUTE: 0 K/UL (ref 0–0.2)
BILIRUB SERPL-MCNC: 0.37 MG/DL (ref 0.3–1.2)
BILIRUBIN URINE: NEGATIVE
BUN BLDV-MCNC: 16 MG/DL (ref 8–23)
BUN/CREAT BLD: 25 (ref 9–20)
CALCIUM SERPL-MCNC: 9.4 MG/DL (ref 8.6–10.4)
CASTS UA: NORMAL /LPF (ref 0–2)
CHLORIDE BLD-SCNC: 106 MMOL/L (ref 98–107)
CO2: 26 MMOL/L (ref 20–31)
COLOR: ABNORMAL
COMMENT UA: ABNORMAL
CREAT SERPL-MCNC: 0.64 MG/DL (ref 0.5–0.9)
CRYSTALS, UA: NORMAL /HPF
DIFFERENTIAL TYPE: ABNORMAL
EOSINOPHILS RELATIVE PERCENT: 1 % (ref 1–7)
EPITHELIAL CELLS UA: NORMAL /HPF (ref 0–5)
GFR AFRICAN AMERICAN: >60 ML/MIN
GFR NON-AFRICAN AMERICAN: >60 ML/MIN
GFR SERPL CREATININE-BSD FRML MDRD: ABNORMAL ML/MIN/{1.73_M2}
GFR SERPL CREATININE-BSD FRML MDRD: ABNORMAL ML/MIN/{1.73_M2}
GLUCOSE FASTING: 102 MG/DL (ref 70–99)
GLUCOSE URINE: NEGATIVE
HCT VFR BLD CALC: 37.6 % (ref 36–46)
HEMOGLOBIN: 12.1 G/DL (ref 12–16)
IMMATURE GRANULOCYTES: ABNORMAL %
INR BLD: 1
KETONES, URINE: NEGATIVE
LEUKOCYTE ESTERASE, URINE: NEGATIVE
LYMPHOCYTES # BLD: 31 % (ref 16–46)
MCH RBC QN AUTO: 30.6 PG (ref 26–34)
MCHC RBC AUTO-ENTMCNC: 32.2 G/DL (ref 31–37)
MCV RBC AUTO: 94.8 FL (ref 80–100)
MONOCYTES # BLD: 12 % (ref 4–11)
MUCUS: NORMAL
NITRITE, URINE: NEGATIVE
NRBC AUTOMATED: ABNORMAL PER 100 WBC
OTHER OBSERVATIONS UA: NORMAL
PDW BLD-RTO: 15 % (ref 11–14.5)
PH UA: 7.5 (ref 5–6)
PLATELET # BLD: 207 K/UL (ref 140–450)
PLATELET ESTIMATE: ABNORMAL
PMV BLD AUTO: 9.7 FL (ref 6–12)
POTASSIUM SERPL-SCNC: 4.3 MMOL/L (ref 3.7–5.3)
PROTEIN UA: NEGATIVE
PROTHROMBIN TIME: 10.8 SEC (ref 9.4–11.3)
RBC # BLD: 3.96 M/UL (ref 4–5.2)
RBC # BLD: ABNORMAL 10*6/UL
RBC UA: NORMAL /HPF (ref 0–4)
RENAL EPITHELIAL, UA: NORMAL /HPF
SEG NEUTROPHILS: 55 % (ref 43–77)
SEGMENTED NEUTROPHILS ABSOLUTE COUNT: 2.8 K/UL (ref 1.8–7.7)
SODIUM BLD-SCNC: 144 MMOL/L (ref 135–144)
SPECIFIC GRAVITY UA: 1.01 (ref 1.01–1.02)
TOTAL PROTEIN: 6.5 G/DL (ref 6.4–8.3)
TRICHOMONAS: NORMAL
TURBIDITY: ABNORMAL
URINE HGB: NEGATIVE
UROBILINOGEN, URINE: NORMAL
WBC # BLD: 5.2 K/UL (ref 3.5–11)
WBC # BLD: ABNORMAL 10*3/UL
WBC UA: NORMAL /HPF (ref 0–4)
YEAST: NORMAL

## 2019-02-07 PROCEDURE — 81001 URINALYSIS AUTO W/SCOPE: CPT

## 2019-02-07 PROCEDURE — 87086 URINE CULTURE/COLONY COUNT: CPT

## 2019-02-07 PROCEDURE — 85025 COMPLETE CBC W/AUTO DIFF WBC: CPT

## 2019-02-07 PROCEDURE — 85610 PROTHROMBIN TIME: CPT

## 2019-02-07 PROCEDURE — 36415 COLL VENOUS BLD VENIPUNCTURE: CPT

## 2019-02-07 PROCEDURE — 80053 COMPREHEN METABOLIC PANEL: CPT

## 2019-02-08 ENCOUNTER — OFFICE VISIT (OUTPATIENT)
Dept: CARDIOLOGY | Age: 69
End: 2019-02-08
Payer: MEDICARE

## 2019-02-08 VITALS
HEART RATE: 73 BPM | HEIGHT: 67 IN | WEIGHT: 184 LBS | DIASTOLIC BLOOD PRESSURE: 84 MMHG | BODY MASS INDEX: 28.88 KG/M2 | SYSTOLIC BLOOD PRESSURE: 134 MMHG

## 2019-02-08 DIAGNOSIS — Z01.818 PREOPERATIVE CLEARANCE: Primary | ICD-10-CM

## 2019-02-08 LAB
CULTURE: NORMAL
Lab: NORMAL
SPECIMEN DESCRIPTION: NORMAL
STATUS: NORMAL

## 2019-02-08 PROCEDURE — 99203 OFFICE O/P NEW LOW 30 MIN: CPT | Performed by: INTERNAL MEDICINE

## 2019-02-08 PROCEDURE — 93000 ELECTROCARDIOGRAM COMPLETE: CPT | Performed by: INTERNAL MEDICINE

## 2019-02-08 ASSESSMENT — ENCOUNTER SYMPTOMS
NAUSEA: 0
ABDOMINAL PAIN: 0
SHORTNESS OF BREATH: 0
VOMITING: 0
CHEST TIGHTNESS: 0

## 2019-02-11 ENCOUNTER — HOSPITAL ENCOUNTER (OUTPATIENT)
Dept: INFUSION THERAPY | Age: 69
Discharge: HOME | End: 2019-02-11
Payer: MEDICARE

## 2019-02-11 DIAGNOSIS — Z17.0 MALIGNANT NEOPLASM OF UPPER-OUTER QUADRANT OF LEFT BREAST IN FEMALE, ESTROGEN RECEPTOR POSITIVE (HCC): ICD-10-CM

## 2019-02-11 DIAGNOSIS — C50.412 MALIGNANT NEOPLASM OF UPPER-OUTER QUADRANT OF LEFT BREAST IN FEMALE, ESTROGEN RECEPTOR POSITIVE (HCC): ICD-10-CM

## 2019-02-11 PROCEDURE — 96523 IRRIG DRUG DELIVERY DEVICE: CPT

## 2019-02-11 PROCEDURE — 2580000003 HC RX 258: Performed by: INTERNAL MEDICINE

## 2019-02-11 PROCEDURE — 6360000002 HC RX W HCPCS: Performed by: INTERNAL MEDICINE

## 2019-02-11 RX ORDER — SODIUM CHLORIDE 0.9 % (FLUSH) 0.9 %
10 SYRINGE (ML) INJECTION PRN
Status: DISCONTINUED | OUTPATIENT
Start: 2019-02-11 | End: 2019-02-12 | Stop reason: HOSPADM

## 2019-02-11 RX ORDER — HEPARIN SODIUM (PORCINE) LOCK FLUSH IV SOLN 100 UNIT/ML 100 UNIT/ML
500 SOLUTION INTRAVENOUS PRN
Status: DISCONTINUED | OUTPATIENT
Start: 2019-02-11 | End: 2019-02-12 | Stop reason: HOSPADM

## 2019-02-11 RX ORDER — HEPARIN SODIUM (PORCINE) LOCK FLUSH IV SOLN 100 UNIT/ML 100 UNIT/ML
500 SOLUTION INTRAVENOUS PRN
Status: CANCELLED | OUTPATIENT
Start: 2019-02-11

## 2019-02-11 RX ORDER — SODIUM CHLORIDE 0.9 % (FLUSH) 0.9 %
10 SYRINGE (ML) INJECTION PRN
Status: CANCELLED | OUTPATIENT
Start: 2019-02-11

## 2019-02-11 RX ADMIN — Medication 10 ML: at 09:20

## 2019-02-11 RX ADMIN — HEPARIN SODIUM (PORCINE) LOCK FLUSH IV SOLN 100 UNIT/ML 500 UNITS: 100 SOLUTION at 09:20

## 2019-02-11 NOTE — PROGRESS NOTES
VAD accessed per protocol with 3/4 \" gramajo needle. Flushed easily with saline. Blood return noted. Flushed with 10 ml of NSS and 5 ml of Heparin flush. VAD D/C'd and Band-Aid to site. Patient stable and discharged to home. Patient has surgery scheduled with Dr. Claudell Kemps and Dr. Rock Drop  February 15th.

## 2019-02-13 ENCOUNTER — TELEPHONE (OUTPATIENT)
Dept: GENERAL RADIOLOGY | Age: 69
End: 2019-02-13

## 2019-02-14 ENCOUNTER — TELEPHONE (OUTPATIENT)
Dept: ONCOLOGY | Age: 69
End: 2019-02-14

## 2019-02-15 ENCOUNTER — ANESTHESIA EVENT (OUTPATIENT)
Dept: OPERATING ROOM | Age: 69
End: 2019-02-15
Payer: MEDICARE

## 2019-02-15 ENCOUNTER — HOSPITAL ENCOUNTER (OUTPATIENT)
Age: 69
Discharge: HOME | End: 2019-02-16
Attending: SURGERY | Admitting: SURGERY
Payer: MEDICARE

## 2019-02-15 ENCOUNTER — ANESTHESIA (OUTPATIENT)
Dept: OPERATING ROOM | Age: 69
End: 2019-02-15
Payer: MEDICARE

## 2019-02-15 ENCOUNTER — APPOINTMENT (OUTPATIENT)
Dept: NUCLEAR MEDICINE | Age: 69
End: 2019-02-15
Attending: SURGERY
Payer: MEDICARE

## 2019-02-15 VITALS
SYSTOLIC BLOOD PRESSURE: 129 MMHG | OXYGEN SATURATION: 98 % | DIASTOLIC BLOOD PRESSURE: 60 MMHG | TEMPERATURE: 98.1 F | RESPIRATION RATE: 17 BRPM

## 2019-02-15 DIAGNOSIS — Z90.13 S/P BILATERAL MASTECTOMY: Primary | ICD-10-CM

## 2019-02-15 PROBLEM — C50.919 BREAST CANCER IN FEMALE (HCC): Status: ACTIVE | Noted: 2019-02-15

## 2019-02-15 LAB
ABSOLUTE EOS #: 0 K/UL (ref 0–0.4)
ABSOLUTE IMMATURE GRANULOCYTE: ABNORMAL K/UL (ref 0–0.3)
ABSOLUTE LYMPH #: 0.67 K/UL (ref 1–4.8)
ABSOLUTE MONO #: 0.13 K/UL (ref 0.1–1.2)
ALBUMIN SERPL-MCNC: 3.7 G/DL (ref 3.5–5.2)
ALBUMIN/GLOBULIN RATIO: 2.1 (ref 1–2.5)
ALP BLD-CCNC: 52 U/L (ref 35–104)
ALT SERPL-CCNC: 23 U/L (ref 5–33)
ANION GAP SERPL CALCULATED.3IONS-SCNC: 13 MMOL/L (ref 9–17)
AST SERPL-CCNC: 23 U/L
BASOPHILS # BLD: 0 % (ref 0–1)
BASOPHILS ABSOLUTE: 0 K/UL (ref 0–0.2)
BILIRUB SERPL-MCNC: 0.28 MG/DL (ref 0.3–1.2)
BILIRUBIN DIRECT: 0.13 MG/DL
BILIRUBIN, INDIRECT: 0.15 MG/DL (ref 0–1)
BUN BLDV-MCNC: 17 MG/DL (ref 8–23)
CALCIUM SERPL-MCNC: 9.1 MG/DL (ref 8.6–10.4)
CHLORIDE BLD-SCNC: 107 MMOL/L (ref 98–107)
CO2: 24 MMOL/L (ref 20–31)
CREAT SERPL-MCNC: 0.62 MG/DL (ref 0.5–0.9)
DIFFERENTIAL TYPE: ABNORMAL
EOSINOPHILS RELATIVE PERCENT: 0 % (ref 1–7)
GFR AFRICAN AMERICAN: >60 ML/MIN
GFR NON-AFRICAN AMERICAN: >60 ML/MIN
GFR SERPL CREATININE-BSD FRML MDRD: ABNORMAL ML/MIN/{1.73_M2}
GFR SERPL CREATININE-BSD FRML MDRD: ABNORMAL ML/MIN/{1.73_M2}
GLUCOSE BLD-MCNC: 187 MG/DL (ref 70–99)
HCT VFR BLD CALC: 34.1 % (ref 36–46)
HEMOGLOBIN: 10.8 G/DL (ref 12–16)
IMMATURE GRANULOCYTES: ABNORMAL %
LYMPHOCYTES # BLD: 5 % (ref 16–46)
MAGNESIUM: 1.9 MG/DL (ref 1.6–2.6)
MCH RBC QN AUTO: 30.2 PG (ref 26–34)
MCHC RBC AUTO-ENTMCNC: 31.7 G/DL (ref 31–37)
MCV RBC AUTO: 95.2 FL (ref 80–100)
MONOCYTES # BLD: 1 % (ref 4–11)
NRBC AUTOMATED: ABNORMAL PER 100 WBC
PDW BLD-RTO: 14.3 % (ref 11–14.5)
PLATELET # BLD: 219 K/UL (ref 140–450)
PLATELET ESTIMATE: ABNORMAL
PMV BLD AUTO: 9.8 FL (ref 6–12)
POTASSIUM SERPL-SCNC: 3.8 MMOL/L (ref 3.7–5.3)
RBC # BLD: 3.58 M/UL (ref 4–5.2)
RBC # BLD: ABNORMAL 10*6/UL
SEG NEUTROPHILS: 94 % (ref 43–77)
SEGMENTED NEUTROPHILS ABSOLUTE COUNT: 12.5 K/UL (ref 1.8–7.7)
SODIUM BLD-SCNC: 144 MMOL/L (ref 135–144)
TOTAL PROTEIN: 5.5 G/DL (ref 6.4–8.3)
WBC # BLD: 13.3 K/UL (ref 3.5–11)
WBC # BLD: ABNORMAL 10*3/UL

## 2019-02-15 PROCEDURE — 36415 COLL VENOUS BLD VENIPUNCTURE: CPT

## 2019-02-15 PROCEDURE — A9541 TC99M SULFUR COLLOID: HCPCS | Performed by: SURGERY

## 2019-02-15 PROCEDURE — 2580000003 HC RX 258: Performed by: SURGERY

## 2019-02-15 PROCEDURE — 94150 VITAL CAPACITY TEST: CPT

## 2019-02-15 PROCEDURE — 2500000003 HC RX 250 WO HCPCS: Performed by: SURGERY

## 2019-02-15 PROCEDURE — 3700000000 HC ANESTHESIA ATTENDED CARE: Performed by: SURGERY

## 2019-02-15 PROCEDURE — 94664 DEMO&/EVAL PT USE INHALER: CPT

## 2019-02-15 PROCEDURE — 2709999900 HC NON-CHARGEABLE SUPPLY: Performed by: SURGERY

## 2019-02-15 PROCEDURE — 7100000001 HC PACU RECOVERY - ADDTL 15 MIN: Performed by: SURGERY

## 2019-02-15 PROCEDURE — 6360000002 HC RX W HCPCS

## 2019-02-15 PROCEDURE — 2500000003 HC RX 250 WO HCPCS

## 2019-02-15 PROCEDURE — 94760 N-INVAS EAR/PLS OXIMETRY 1: CPT

## 2019-02-15 PROCEDURE — 38792 RA TRACER ID OF SENTINL NODE: CPT

## 2019-02-15 PROCEDURE — 64450 NJX AA&/STRD OTHER PN/BRANCH: CPT | Performed by: NURSE ANESTHETIST, CERTIFIED REGISTERED

## 2019-02-15 PROCEDURE — C1789 PROSTHESIS, BREAST, IMP: HCPCS | Performed by: SURGERY

## 2019-02-15 PROCEDURE — 6360000002 HC RX W HCPCS: Performed by: SURGERY

## 2019-02-15 PROCEDURE — 19307 MAST MOD RAD: CPT | Performed by: SURGERY

## 2019-02-15 PROCEDURE — 78195 LYMPH SYSTEM IMAGING: CPT

## 2019-02-15 PROCEDURE — 82248 BILIRUBIN DIRECT: CPT

## 2019-02-15 PROCEDURE — 3430000000 HC RX DIAGNOSTIC RADIOPHARMACEUTICAL: Performed by: SURGERY

## 2019-02-15 PROCEDURE — 38525 BIOPSY/REMOVAL LYMPH NODES: CPT | Performed by: SURGERY

## 2019-02-15 PROCEDURE — 3600000014 HC SURGERY LEVEL 4 ADDTL 15MIN: Performed by: SURGERY

## 2019-02-15 PROCEDURE — 80053 COMPREHEN METABOLIC PANEL: CPT

## 2019-02-15 PROCEDURE — 19303 MAST SIMPLE COMPLETE: CPT | Performed by: SURGERY

## 2019-02-15 PROCEDURE — 83735 ASSAY OF MAGNESIUM: CPT

## 2019-02-15 PROCEDURE — 85025 COMPLETE CBC W/AUTO DIFF WBC: CPT

## 2019-02-15 PROCEDURE — 3600000004 HC SURGERY LEVEL 4 BASE: Performed by: SURGERY

## 2019-02-15 PROCEDURE — 3700000001 HC ADD 15 MINUTES (ANESTHESIA): Performed by: SURGERY

## 2019-02-15 PROCEDURE — 88307 TISSUE EXAM BY PATHOLOGIST: CPT

## 2019-02-15 PROCEDURE — 88309 TISSUE EXAM BY PATHOLOGIST: CPT

## 2019-02-15 PROCEDURE — 6360000002 HC RX W HCPCS: Performed by: NURSE ANESTHETIST, CERTIFIED REGISTERED

## 2019-02-15 PROCEDURE — 2780000010 HC IMPLANT OTHER: Performed by: SURGERY

## 2019-02-15 PROCEDURE — 2500000003 HC RX 250 WO HCPCS: Performed by: NURSE ANESTHETIST, CERTIFIED REGISTERED

## 2019-02-15 PROCEDURE — 99232 SBSQ HOSP IP/OBS MODERATE 35: CPT | Performed by: INTERNAL MEDICINE

## 2019-02-15 PROCEDURE — 7100000000 HC PACU RECOVERY - FIRST 15 MIN: Performed by: SURGERY

## 2019-02-15 DEVICE — IMPLANTABLE DEVICE: Type: IMPLANTABLE DEVICE | Site: BREAST | Status: FUNCTIONAL

## 2019-02-15 RX ORDER — LIDOCAINE HYDROCHLORIDE 20 MG/ML
INJECTION, SOLUTION INFILTRATION; PERINEURAL PRN
Status: DISCONTINUED | OUTPATIENT
Start: 2019-02-15 | End: 2019-02-15 | Stop reason: SDUPTHER

## 2019-02-15 RX ORDER — ROCURONIUM BROMIDE 10 MG/ML
INJECTION, SOLUTION INTRAVENOUS PRN
Status: DISCONTINUED | OUTPATIENT
Start: 2019-02-15 | End: 2019-02-15 | Stop reason: SDUPTHER

## 2019-02-15 RX ORDER — DIPHENHYDRAMINE HYDROCHLORIDE 50 MG/ML
INJECTION INTRAMUSCULAR; INTRAVENOUS PRN
Status: DISCONTINUED | OUTPATIENT
Start: 2019-02-15 | End: 2019-02-15 | Stop reason: SDUPTHER

## 2019-02-15 RX ORDER — SODIUM CHLORIDE 0.9 % (FLUSH) 0.9 %
10 SYRINGE (ML) INJECTION EVERY 12 HOURS SCHEDULED
Status: DISCONTINUED | OUTPATIENT
Start: 2019-02-15 | End: 2019-02-16 | Stop reason: HOSPADM

## 2019-02-15 RX ORDER — MEPERIDINE HYDROCHLORIDE 50 MG/ML
12.5 INJECTION INTRAMUSCULAR; INTRAVENOUS; SUBCUTANEOUS EVERY 5 MIN PRN
Status: DISCONTINUED | OUTPATIENT
Start: 2019-02-15 | End: 2019-02-15 | Stop reason: HOSPADM

## 2019-02-15 RX ORDER — ALBUTEROL SULFATE 2.5 MG/3ML
2.5 SOLUTION RESPIRATORY (INHALATION)
Status: DISCONTINUED | OUTPATIENT
Start: 2019-02-15 | End: 2019-02-16 | Stop reason: HOSPADM

## 2019-02-15 RX ORDER — MORPHINE SULFATE 4 MG/ML
4 INJECTION, SOLUTION INTRAMUSCULAR; INTRAVENOUS
Status: DISCONTINUED | OUTPATIENT
Start: 2019-02-15 | End: 2019-02-16

## 2019-02-15 RX ORDER — ONDANSETRON 2 MG/ML
4 INJECTION INTRAMUSCULAR; INTRAVENOUS
Status: DISCONTINUED | OUTPATIENT
Start: 2019-02-15 | End: 2019-02-15 | Stop reason: HOSPADM

## 2019-02-15 RX ORDER — MORPHINE SULFATE 2 MG/ML
2 INJECTION, SOLUTION INTRAMUSCULAR; INTRAVENOUS EVERY 5 MIN PRN
Status: DISCONTINUED | OUTPATIENT
Start: 2019-02-15 | End: 2019-02-15 | Stop reason: HOSPADM

## 2019-02-15 RX ORDER — FENTANYL CITRATE 50 UG/ML
25 INJECTION, SOLUTION INTRAMUSCULAR; INTRAVENOUS EVERY 5 MIN PRN
Status: DISCONTINUED | OUTPATIENT
Start: 2019-02-15 | End: 2019-02-15 | Stop reason: HOSPADM

## 2019-02-15 RX ORDER — DEXAMETHASONE SODIUM PHOSPHATE 10 MG/ML
INJECTION INTRAMUSCULAR; INTRAVENOUS PRN
Status: DISCONTINUED | OUTPATIENT
Start: 2019-02-15 | End: 2019-02-15 | Stop reason: SDUPTHER

## 2019-02-15 RX ORDER — SODIUM CHLORIDE 0.9 % (FLUSH) 0.9 %
10 SYRINGE (ML) INJECTION EVERY 12 HOURS SCHEDULED
Status: DISCONTINUED | OUTPATIENT
Start: 2019-02-15 | End: 2019-02-15 | Stop reason: HOSPADM

## 2019-02-15 RX ORDER — SODIUM CHLORIDE 0.9 % (FLUSH) 0.9 %
10 SYRINGE (ML) INJECTION PRN
Status: DISCONTINUED | OUTPATIENT
Start: 2019-02-15 | End: 2019-02-16 | Stop reason: HOSPADM

## 2019-02-15 RX ORDER — SODIUM CHLORIDE FOR INHALATION 0.9 %
3 VIAL, NEBULIZER (ML) INHALATION
Status: DISCONTINUED | OUTPATIENT
Start: 2019-02-15 | End: 2019-02-16 | Stop reason: HOSPADM

## 2019-02-15 RX ORDER — ROPIVACAINE HYDROCHLORIDE 10 MG/ML
INJECTION EPIDURAL; INFILTRATION; PERINEURAL PRN
Status: DISCONTINUED | OUTPATIENT
Start: 2019-02-15 | End: 2019-02-15 | Stop reason: SDUPTHER

## 2019-02-15 RX ORDER — ALBUTEROL SULFATE 2.5 MG/3ML
2.5 SOLUTION RESPIRATORY (INHALATION)
Status: DISCONTINUED | OUTPATIENT
Start: 2019-02-15 | End: 2019-02-15

## 2019-02-15 RX ORDER — DIPHENHYDRAMINE HYDROCHLORIDE 50 MG/ML
12.5 INJECTION INTRAMUSCULAR; INTRAVENOUS
Status: DISCONTINUED | OUTPATIENT
Start: 2019-02-15 | End: 2019-02-15 | Stop reason: HOSPADM

## 2019-02-15 RX ORDER — BUPIVACAINE HYDROCHLORIDE 2.5 MG/ML
INJECTION, SOLUTION EPIDURAL; INFILTRATION; INTRACAUDAL PRN
Status: DISCONTINUED | OUTPATIENT
Start: 2019-02-15 | End: 2019-02-15 | Stop reason: ALTCHOICE

## 2019-02-15 RX ORDER — ACETAMINOPHEN 325 MG/1
650 TABLET ORAL EVERY 4 HOURS PRN
Status: DISCONTINUED | OUTPATIENT
Start: 2019-02-15 | End: 2019-02-16 | Stop reason: HOSPADM

## 2019-02-15 RX ORDER — MORPHINE SULFATE 2 MG/ML
2 INJECTION, SOLUTION INTRAMUSCULAR; INTRAVENOUS
Status: DISCONTINUED | OUTPATIENT
Start: 2019-02-15 | End: 2019-02-16

## 2019-02-15 RX ORDER — FENTANYL CITRATE 50 UG/ML
50 INJECTION, SOLUTION INTRAMUSCULAR; INTRAVENOUS EVERY 5 MIN PRN
Status: DISCONTINUED | OUTPATIENT
Start: 2019-02-15 | End: 2019-02-15 | Stop reason: HOSPADM

## 2019-02-15 RX ORDER — MIDAZOLAM HYDROCHLORIDE 1 MG/ML
INJECTION INTRAMUSCULAR; INTRAVENOUS PRN
Status: DISCONTINUED | OUTPATIENT
Start: 2019-02-15 | End: 2019-02-15 | Stop reason: SDUPTHER

## 2019-02-15 RX ORDER — FENTANYL CITRATE 50 UG/ML
INJECTION, SOLUTION INTRAMUSCULAR; INTRAVENOUS PRN
Status: DISCONTINUED | OUTPATIENT
Start: 2019-02-15 | End: 2019-02-15 | Stop reason: SDUPTHER

## 2019-02-15 RX ORDER — EPHEDRINE SULFATE 50 MG/ML
INJECTION, SOLUTION INTRAVENOUS PRN
Status: DISCONTINUED | OUTPATIENT
Start: 2019-02-15 | End: 2019-02-15 | Stop reason: SDUPTHER

## 2019-02-15 RX ORDER — SODIUM CHLORIDE, SODIUM LACTATE, POTASSIUM CHLORIDE, CALCIUM CHLORIDE 600; 310; 30; 20 MG/100ML; MG/100ML; MG/100ML; MG/100ML
INJECTION, SOLUTION INTRAVENOUS CONTINUOUS
Status: DISCONTINUED | OUTPATIENT
Start: 2019-02-15 | End: 2019-02-15

## 2019-02-15 RX ORDER — DEXTROSE, SODIUM CHLORIDE, AND POTASSIUM CHLORIDE 5; .45; .15 G/100ML; G/100ML; G/100ML
INJECTION INTRAVENOUS CONTINUOUS
Status: DISCONTINUED | OUTPATIENT
Start: 2019-02-15 | End: 2019-02-16

## 2019-02-15 RX ORDER — IPRATROPIUM BROMIDE AND ALBUTEROL SULFATE 2.5; .5 MG/3ML; MG/3ML
1 SOLUTION RESPIRATORY (INHALATION)
Status: DISCONTINUED | OUTPATIENT
Start: 2019-02-15 | End: 2019-02-15

## 2019-02-15 RX ORDER — ACETAMINOPHEN 10 MG/ML
INJECTION, SOLUTION INTRAVENOUS PRN
Status: DISCONTINUED | OUTPATIENT
Start: 2019-02-15 | End: 2019-02-15 | Stop reason: SDUPTHER

## 2019-02-15 RX ORDER — ONDANSETRON 2 MG/ML
4 INJECTION INTRAMUSCULAR; INTRAVENOUS EVERY 6 HOURS PRN
Status: DISCONTINUED | OUTPATIENT
Start: 2019-02-15 | End: 2019-02-16 | Stop reason: HOSPADM

## 2019-02-15 RX ORDER — HYDROCODONE BITARTRATE AND ACETAMINOPHEN 5; 325 MG/1; MG/1
2 TABLET ORAL PRN
Status: DISCONTINUED | OUTPATIENT
Start: 2019-02-15 | End: 2019-02-15 | Stop reason: HOSPADM

## 2019-02-15 RX ORDER — ISOSULFAN BLUE 50 MG/5ML
INJECTION, SOLUTION SUBCUTANEOUS PRN
Status: DISCONTINUED | OUTPATIENT
Start: 2019-02-15 | End: 2019-02-15 | Stop reason: ALTCHOICE

## 2019-02-15 RX ORDER — MORPHINE SULFATE 2 MG/ML
1 INJECTION, SOLUTION INTRAMUSCULAR; INTRAVENOUS EVERY 5 MIN PRN
Status: DISCONTINUED | OUTPATIENT
Start: 2019-02-15 | End: 2019-02-15 | Stop reason: HOSPADM

## 2019-02-15 RX ORDER — SODIUM CHLORIDE 0.9 % (FLUSH) 0.9 %
10 SYRINGE (ML) INJECTION PRN
Status: DISCONTINUED | OUTPATIENT
Start: 2019-02-15 | End: 2019-02-15 | Stop reason: HOSPADM

## 2019-02-15 RX ORDER — PROPOFOL 10 MG/ML
INJECTION, EMULSION INTRAVENOUS PRN
Status: DISCONTINUED | OUTPATIENT
Start: 2019-02-15 | End: 2019-02-15 | Stop reason: SDUPTHER

## 2019-02-15 RX ORDER — ALBUTEROL SULFATE 2.5 MG/3ML
2.5 SOLUTION RESPIRATORY (INHALATION) EVERY 6 HOURS PRN
Status: DISCONTINUED | OUTPATIENT
Start: 2019-02-15 | End: 2019-02-16 | Stop reason: HOSPADM

## 2019-02-15 RX ORDER — HYDROCODONE BITARTRATE AND ACETAMINOPHEN 5; 325 MG/1; MG/1
1 TABLET ORAL PRN
Status: DISCONTINUED | OUTPATIENT
Start: 2019-02-15 | End: 2019-02-15 | Stop reason: HOSPADM

## 2019-02-15 RX ORDER — ONDANSETRON 2 MG/ML
INJECTION INTRAMUSCULAR; INTRAVENOUS PRN
Status: DISCONTINUED | OUTPATIENT
Start: 2019-02-15 | End: 2019-02-15 | Stop reason: SDUPTHER

## 2019-02-15 RX ADMIN — SODIUM CHLORIDE, POTASSIUM CHLORIDE, SODIUM LACTATE AND CALCIUM CHLORIDE: 600; 310; 30; 20 INJECTION, SOLUTION INTRAVENOUS at 14:00

## 2019-02-15 RX ADMIN — ONDANSETRON 4 MG: 2 INJECTION INTRAMUSCULAR; INTRAVENOUS at 11:57

## 2019-02-15 RX ADMIN — ROPIVACAINE HYDROCHLORIDE 10 ML: 10 INJECTION, SOLUTION EPIDURAL at 12:15

## 2019-02-15 RX ADMIN — CEFAZOLIN SODIUM 1 G: 1 INJECTION, POWDER, FOR SOLUTION INTRAMUSCULAR; INTRAVENOUS at 22:08

## 2019-02-15 RX ADMIN — FENTANYL CITRATE 100 MCG: 50 INJECTION, SOLUTION INTRAMUSCULAR; INTRAVENOUS at 11:45

## 2019-02-15 RX ADMIN — ACETAMINOPHEN 1000 MG: 10 INJECTION, SOLUTION INTRAVENOUS at 12:25

## 2019-02-15 RX ADMIN — LIDOCAINE HYDROCHLORIDE 80 MG: 20 INJECTION, SOLUTION INFILTRATION; PERINEURAL at 11:49

## 2019-02-15 RX ADMIN — ROCURONIUM BROMIDE 50 MG: 10 INJECTION INTRAVENOUS at 11:50

## 2019-02-15 RX ADMIN — SODIUM CHLORIDE, POTASSIUM CHLORIDE, SODIUM LACTATE AND CALCIUM CHLORIDE: 600; 310; 30; 20 INJECTION, SOLUTION INTRAVENOUS at 12:13

## 2019-02-15 RX ADMIN — SODIUM CHLORIDE, POTASSIUM CHLORIDE, SODIUM LACTATE AND CALCIUM CHLORIDE: 600; 310; 30; 20 INJECTION, SOLUTION INTRAVENOUS at 11:44

## 2019-02-15 RX ADMIN — Medication 2 G: at 14:34

## 2019-02-15 RX ADMIN — ONDANSETRON 4 MG: 2 INJECTION INTRAMUSCULAR; INTRAVENOUS at 21:53

## 2019-02-15 RX ADMIN — SODIUM CHLORIDE, POTASSIUM CHLORIDE, SODIUM LACTATE AND CALCIUM CHLORIDE: 600; 310; 30; 20 INJECTION, SOLUTION INTRAVENOUS at 11:00

## 2019-02-15 RX ADMIN — Medication 1 MG: at 13:12

## 2019-02-15 RX ADMIN — PROPOFOL 180 MG: 10 INJECTION, EMULSION INTRAVENOUS at 11:49

## 2019-02-15 RX ADMIN — MIDAZOLAM HYDROCHLORIDE 2 MG: 1 INJECTION, SOLUTION INTRAMUSCULAR; INTRAVENOUS at 11:45

## 2019-02-15 RX ADMIN — POTASSIUM CHLORIDE, DEXTROSE MONOHYDRATE AND SODIUM CHLORIDE: 150; 5; 450 INJECTION, SOLUTION INTRAVENOUS at 17:41

## 2019-02-15 RX ADMIN — EPHEDRINE SULFATE 10 MG: 50 INJECTION, SOLUTION INTRAVENOUS at 12:47

## 2019-02-15 RX ADMIN — PHENYLEPHRINE HYDROCHLORIDE 100 MCG: 10 INJECTION INTRAVENOUS at 12:27

## 2019-02-15 RX ADMIN — ONDANSETRON 4 MG: 2 INJECTION INTRAMUSCULAR; INTRAVENOUS at 15:48

## 2019-02-15 RX ADMIN — Medication 2 G: at 11:55

## 2019-02-15 RX ADMIN — ROPIVACAINE HYDROCHLORIDE 10 ML: 10 INJECTION, SOLUTION EPIDURAL at 12:10

## 2019-02-15 RX ADMIN — EPHEDRINE SULFATE 10 MG: 50 INJECTION, SOLUTION INTRAVENOUS at 12:40

## 2019-02-15 RX ADMIN — DEXAMETHASONE SODIUM PHOSPHATE 10 MG: 10 INJECTION INTRAMUSCULAR; INTRAVENOUS at 11:57

## 2019-02-15 RX ADMIN — PHENYLEPHRINE HYDROCHLORIDE 100 MCG: 10 INJECTION INTRAVENOUS at 12:07

## 2019-02-15 RX ADMIN — Medication 1 MILLICURIE: at 09:10

## 2019-02-15 RX ADMIN — EPHEDRINE SULFATE 10 MG: 50 INJECTION, SOLUTION INTRAVENOUS at 15:20

## 2019-02-15 RX ADMIN — Medication 0.5 MG: at 14:38

## 2019-02-15 RX ADMIN — Medication 0.5 MG: at 13:40

## 2019-02-15 RX ADMIN — DIPHENHYDRAMINE HYDROCHLORIDE 25 MG: 50 INJECTION, SOLUTION INTRAMUSCULAR; INTRAVENOUS at 11:57

## 2019-02-15 RX ADMIN — EPHEDRINE SULFATE 10 MG: 50 INJECTION, SOLUTION INTRAVENOUS at 15:17

## 2019-02-15 RX ADMIN — EPHEDRINE SULFATE 10 MG: 50 INJECTION, SOLUTION INTRAVENOUS at 12:31

## 2019-02-15 ASSESSMENT — PULMONARY FUNCTION TESTS
PIF_VALUE: 3
PIF_VALUE: 23
PIF_VALUE: 28
PIF_VALUE: 4
PIF_VALUE: 27
PIF_VALUE: 29
PIF_VALUE: 28
PIF_VALUE: 26
PIF_VALUE: 26
PIF_VALUE: 28
PIF_VALUE: 22
PIF_VALUE: 27
PIF_VALUE: 26
PIF_VALUE: 25
PIF_VALUE: 28
PIF_VALUE: 26
PIF_VALUE: 22
PIF_VALUE: 22
PIF_VALUE: 25
PIF_VALUE: 25
PIF_VALUE: 26
PIF_VALUE: 28
PIF_VALUE: 25
PIF_VALUE: 24
PIF_VALUE: 25
PIF_VALUE: 22
PIF_VALUE: 27
PIF_VALUE: 22
PIF_VALUE: 28
PIF_VALUE: 22
PIF_VALUE: 28
PIF_VALUE: 23
PIF_VALUE: 29
PIF_VALUE: 29
PIF_VALUE: 26
PIF_VALUE: 27
PIF_VALUE: 28
PIF_VALUE: 25
PIF_VALUE: 23
PIF_VALUE: 29
PIF_VALUE: 28
PIF_VALUE: 27
PIF_VALUE: 28
PIF_VALUE: 23
PIF_VALUE: 27
PIF_VALUE: 28
PIF_VALUE: 28
PIF_VALUE: 25
PIF_VALUE: 23
PIF_VALUE: 27
PIF_VALUE: 28
PIF_VALUE: 28
PIF_VALUE: 25
PIF_VALUE: 29
PIF_VALUE: 27
PIF_VALUE: 25
PIF_VALUE: 23
PIF_VALUE: 23
PIF_VALUE: 28
PIF_VALUE: 22
PIF_VALUE: 4
PIF_VALUE: 28
PIF_VALUE: 26
PIF_VALUE: 27
PIF_VALUE: 24
PIF_VALUE: 29
PIF_VALUE: 26
PIF_VALUE: 28
PIF_VALUE: 26
PIF_VALUE: 27
PIF_VALUE: 28
PIF_VALUE: 28
PIF_VALUE: 23
PIF_VALUE: 23
PIF_VALUE: 28
PIF_VALUE: 28
PIF_VALUE: 23
PIF_VALUE: 25
PIF_VALUE: 28
PIF_VALUE: 27
PIF_VALUE: 26
PIF_VALUE: 28
PIF_VALUE: 27
PIF_VALUE: 27
PIF_VALUE: 26
PIF_VALUE: 27
PIF_VALUE: 28
PIF_VALUE: 4
PIF_VALUE: 23
PIF_VALUE: 22
PIF_VALUE: 28
PIF_VALUE: 28
PIF_VALUE: 24
PIF_VALUE: 28
PIF_VALUE: 25
PIF_VALUE: 26
PIF_VALUE: 27
PIF_VALUE: 27
PIF_VALUE: 23
PIF_VALUE: 28
PIF_VALUE: 27
PIF_VALUE: 28
PIF_VALUE: 4
PIF_VALUE: 15
PIF_VALUE: 28
PIF_VALUE: 29
PIF_VALUE: 27
PIF_VALUE: 27
PIF_VALUE: 28
PIF_VALUE: 26
PIF_VALUE: 23
PIF_VALUE: 23
PIF_VALUE: 24
PIF_VALUE: 27
PIF_VALUE: 21
PIF_VALUE: 27
PIF_VALUE: 29
PIF_VALUE: 28
PIF_VALUE: 22
PIF_VALUE: 22
PIF_VALUE: 29
PIF_VALUE: 4
PIF_VALUE: 23
PIF_VALUE: 27
PIF_VALUE: 26
PIF_VALUE: 28
PIF_VALUE: 28
PIF_VALUE: 27
PIF_VALUE: 27
PIF_VALUE: 28
PIF_VALUE: 27
PIF_VALUE: 23
PIF_VALUE: 28
PIF_VALUE: 28
PIF_VALUE: 24
PIF_VALUE: 23
PIF_VALUE: 28
PIF_VALUE: 27
PIF_VALUE: 29
PIF_VALUE: 24
PIF_VALUE: 24
PIF_VALUE: 23
PIF_VALUE: 28
PIF_VALUE: 22
PIF_VALUE: 22
PIF_VALUE: 24
PIF_VALUE: 26
PIF_VALUE: 22
PIF_VALUE: 29
PIF_VALUE: 15
PIF_VALUE: 21
PIF_VALUE: 28
PIF_VALUE: 28
PIF_VALUE: 25
PIF_VALUE: 22
PIF_VALUE: 17
PIF_VALUE: 22
PIF_VALUE: 26
PIF_VALUE: 26
PIF_VALUE: 21
PIF_VALUE: 28
PIF_VALUE: 27
PIF_VALUE: 28
PIF_VALUE: 28
PIF_VALUE: 24
PIF_VALUE: 28
PIF_VALUE: 22
PIF_VALUE: 29
PIF_VALUE: 27
PIF_VALUE: 28
PIF_VALUE: 28
PIF_VALUE: 22
PIF_VALUE: 28
PIF_VALUE: 22
PIF_VALUE: 28
PIF_VALUE: 26
PIF_VALUE: 25
PIF_VALUE: 27
PIF_VALUE: 29
PIF_VALUE: 29
PIF_VALUE: 23
PIF_VALUE: 28
PIF_VALUE: 27
PIF_VALUE: 22
PIF_VALUE: 27
PIF_VALUE: 21
PIF_VALUE: 26
PIF_VALUE: 28
PIF_VALUE: 22
PIF_VALUE: 25
PIF_VALUE: 22
PIF_VALUE: 22
PIF_VALUE: 23
PIF_VALUE: 22
PIF_VALUE: 24
PIF_VALUE: 25
PIF_VALUE: 28
PIF_VALUE: 28
PIF_VALUE: 15
PIF_VALUE: 28
PIF_VALUE: 28
PIF_VALUE: 27
PIF_VALUE: 28
PIF_VALUE: 28
PIF_VALUE: 24
PIF_VALUE: 28
PIF_VALUE: 25
PIF_VALUE: 28
PIF_VALUE: 28
PIF_VALUE: 29
PIF_VALUE: 28
PIF_VALUE: 23
PIF_VALUE: 28
PIF_VALUE: 22
PIF_VALUE: 22
PIF_VALUE: 27
PIF_VALUE: 28
PIF_VALUE: 28
PIF_VALUE: 27
PIF_VALUE: 22
PIF_VALUE: 23
PIF_VALUE: 28
PIF_VALUE: 22
PIF_VALUE: 23
PIF_VALUE: 29
PIF_VALUE: 25
PIF_VALUE: 29
PIF_VALUE: 24
PIF_VALUE: 25
PIF_VALUE: 22
PIF_VALUE: 23
PIF_VALUE: 26
PIF_VALUE: 28
PIF_VALUE: 28
PIF_VALUE: 27
PIF_VALUE: 28

## 2019-02-15 ASSESSMENT — PAIN SCALES - GENERAL
PAINLEVEL_OUTOF10: 0

## 2019-02-15 ASSESSMENT — PAIN - FUNCTIONAL ASSESSMENT: PAIN_FUNCTIONAL_ASSESSMENT: 0-10

## 2019-02-16 VITALS
SYSTOLIC BLOOD PRESSURE: 134 MMHG | TEMPERATURE: 98.6 F | RESPIRATION RATE: 18 BRPM | HEART RATE: 82 BPM | HEIGHT: 67 IN | WEIGHT: 191.2 LBS | BODY MASS INDEX: 30.01 KG/M2 | DIASTOLIC BLOOD PRESSURE: 65 MMHG | OXYGEN SATURATION: 98 %

## 2019-02-16 LAB
ABSOLUTE EOS #: 0 K/UL (ref 0–0.4)
ABSOLUTE IMMATURE GRANULOCYTE: ABNORMAL K/UL (ref 0–0.3)
ABSOLUTE LYMPH #: 0.8 K/UL (ref 1–4.8)
ABSOLUTE MONO #: 0.9 K/UL (ref 0.1–1.2)
ALBUMIN SERPL-MCNC: 3.6 G/DL (ref 3.5–5.2)
ALBUMIN/GLOBULIN RATIO: 1.7 (ref 1–2.5)
ALP BLD-CCNC: 49 U/L (ref 35–104)
ALT SERPL-CCNC: 21 U/L (ref 5–33)
ANION GAP SERPL CALCULATED.3IONS-SCNC: 10 MMOL/L (ref 9–17)
AST SERPL-CCNC: 23 U/L
BASOPHILS # BLD: 0 % (ref 0–1)
BASOPHILS ABSOLUTE: 0 K/UL (ref 0–0.2)
BILIRUB SERPL-MCNC: 0.36 MG/DL (ref 0.3–1.2)
BUN BLDV-MCNC: 18 MG/DL (ref 8–23)
BUN/CREAT BLD: 33 (ref 9–20)
CALCIUM SERPL-MCNC: 9.2 MG/DL (ref 8.6–10.4)
CHLORIDE BLD-SCNC: 106 MMOL/L (ref 98–107)
CO2: 25 MMOL/L (ref 20–31)
CREAT SERPL-MCNC: 0.55 MG/DL (ref 0.5–0.9)
DIFFERENTIAL TYPE: ABNORMAL
EOSINOPHILS RELATIVE PERCENT: 0 % (ref 1–7)
GFR AFRICAN AMERICAN: >60 ML/MIN
GFR NON-AFRICAN AMERICAN: >60 ML/MIN
GFR SERPL CREATININE-BSD FRML MDRD: ABNORMAL ML/MIN/{1.73_M2}
GFR SERPL CREATININE-BSD FRML MDRD: ABNORMAL ML/MIN/{1.73_M2}
GLUCOSE BLD-MCNC: 167 MG/DL (ref 70–99)
HCT VFR BLD CALC: 32.7 % (ref 36–46)
HEMOGLOBIN: 10.5 G/DL (ref 12–16)
IMMATURE GRANULOCYTES: ABNORMAL %
LYMPHOCYTES # BLD: 9 % (ref 16–46)
MCH RBC QN AUTO: 30.4 PG (ref 26–34)
MCHC RBC AUTO-ENTMCNC: 32.2 G/DL (ref 31–37)
MCV RBC AUTO: 94.4 FL (ref 80–100)
MONOCYTES # BLD: 10 % (ref 4–11)
NRBC AUTOMATED: ABNORMAL PER 100 WBC
PDW BLD-RTO: 14.7 % (ref 11–14.5)
PLATELET # BLD: 199 K/UL (ref 140–450)
PLATELET ESTIMATE: ABNORMAL
PMV BLD AUTO: 10.2 FL (ref 6–12)
POTASSIUM SERPL-SCNC: 4.3 MMOL/L (ref 3.7–5.3)
RBC # BLD: 3.46 M/UL (ref 4–5.2)
RBC # BLD: ABNORMAL 10*6/UL
SEG NEUTROPHILS: 81 % (ref 43–77)
SEGMENTED NEUTROPHILS ABSOLUTE COUNT: 7.8 K/UL (ref 1.8–7.7)
SODIUM BLD-SCNC: 141 MMOL/L (ref 135–144)
TOTAL PROTEIN: 5.7 G/DL (ref 6.4–8.3)
WBC # BLD: 9.5 K/UL (ref 3.5–11)
WBC # BLD: ABNORMAL 10*3/UL

## 2019-02-16 PROCEDURE — 80053 COMPREHEN METABOLIC PANEL: CPT

## 2019-02-16 PROCEDURE — 2580000003 HC RX 258: Performed by: SURGERY

## 2019-02-16 PROCEDURE — 2500000003 HC RX 250 WO HCPCS: Performed by: SURGERY

## 2019-02-16 PROCEDURE — 99024 POSTOP FOLLOW-UP VISIT: CPT | Performed by: SURGERY

## 2019-02-16 PROCEDURE — 85025 COMPLETE CBC W/AUTO DIFF WBC: CPT

## 2019-02-16 PROCEDURE — 36415 COLL VENOUS BLD VENIPUNCTURE: CPT

## 2019-02-16 PROCEDURE — 6360000002 HC RX W HCPCS: Performed by: SURGERY

## 2019-02-16 PROCEDURE — 6370000000 HC RX 637 (ALT 250 FOR IP): Performed by: SURGERY

## 2019-02-16 RX ORDER — HYDROCODONE BITARTRATE AND ACETAMINOPHEN 5; 325 MG/1; MG/1
2 TABLET ORAL EVERY 4 HOURS PRN
Status: DISCONTINUED | OUTPATIENT
Start: 2019-02-16 | End: 2019-02-16 | Stop reason: HOSPADM

## 2019-02-16 RX ORDER — DOCUSATE SODIUM 100 MG/1
100 CAPSULE, LIQUID FILLED ORAL DAILY
Qty: 30 CAPSULE | Refills: 0 | Status: SHIPPED | OUTPATIENT
Start: 2019-02-16 | End: 2019-03-04

## 2019-02-16 RX ORDER — MORPHINE SULFATE 2 MG/ML
2 INJECTION, SOLUTION INTRAMUSCULAR; INTRAVENOUS
Status: DISCONTINUED | OUTPATIENT
Start: 2019-02-16 | End: 2019-02-16 | Stop reason: HOSPADM

## 2019-02-16 RX ORDER — TRAMADOL HYDROCHLORIDE 50 MG/1
50 TABLET ORAL EVERY 4 HOURS PRN
Qty: 30 TABLET | Refills: 0 | Status: SHIPPED | OUTPATIENT
Start: 2019-02-16 | End: 2019-02-23

## 2019-02-16 RX ORDER — ONDANSETRON 4 MG/1
4 TABLET, ORALLY DISINTEGRATING ORAL EVERY 6 HOURS PRN
Qty: 20 TABLET | Refills: 0 | Status: SHIPPED | OUTPATIENT
Start: 2019-02-16 | End: 2019-03-04

## 2019-02-16 RX ORDER — HYDROCODONE BITARTRATE AND ACETAMINOPHEN 5; 325 MG/1; MG/1
1 TABLET ORAL EVERY 4 HOURS PRN
Status: DISCONTINUED | OUTPATIENT
Start: 2019-02-16 | End: 2019-02-16 | Stop reason: HOSPADM

## 2019-02-16 RX ADMIN — ENOXAPARIN SODIUM 40 MG: 40 INJECTION SUBCUTANEOUS at 08:24

## 2019-02-16 RX ADMIN — ACETAMINOPHEN 650 MG: 325 TABLET ORAL at 10:24

## 2019-02-16 RX ADMIN — POTASSIUM CHLORIDE, DEXTROSE MONOHYDRATE AND SODIUM CHLORIDE: 150; 5; 450 INJECTION, SOLUTION INTRAVENOUS at 02:35

## 2019-02-16 RX ADMIN — ACETAMINOPHEN 650 MG: 325 TABLET ORAL at 02:50

## 2019-02-16 RX ADMIN — CEFAZOLIN SODIUM 1 G: 1 INJECTION, POWDER, FOR SOLUTION INTRAMUSCULAR; INTRAVENOUS at 05:39

## 2019-02-16 ASSESSMENT — PAIN DESCRIPTION - PAIN TYPE
TYPE: SURGICAL PAIN
TYPE: ACUTE PAIN

## 2019-02-16 ASSESSMENT — PAIN SCALES - GENERAL
PAINLEVEL_OUTOF10: 3
PAINLEVEL_OUTOF10: 0
PAINLEVEL_OUTOF10: 4
PAINLEVEL_OUTOF10: 0

## 2019-02-16 ASSESSMENT — PAIN DESCRIPTION - DESCRIPTORS
DESCRIPTORS: ACHING
DESCRIPTORS: ACHING;SORE

## 2019-02-16 ASSESSMENT — PAIN DESCRIPTION - LOCATION
LOCATION: BACK
LOCATION: BREAST

## 2019-02-16 ASSESSMENT — PAIN DESCRIPTION - FREQUENCY: FREQUENCY: INTERMITTENT

## 2019-02-16 ASSESSMENT — PAIN DESCRIPTION - ONSET: ONSET: ON-GOING

## 2019-02-16 ASSESSMENT — PAIN DESCRIPTION - ORIENTATION: ORIENTATION: LEFT

## 2019-02-16 ASSESSMENT — PAIN DESCRIPTION - PROGRESSION: CLINICAL_PROGRESSION: GRADUALLY IMPROVING

## 2019-02-18 ENCOUNTER — TELEPHONE (OUTPATIENT)
Dept: FAMILY MEDICINE CLINIC | Age: 69
End: 2019-02-18

## 2019-02-18 ENCOUNTER — HOSPITAL ENCOUNTER (OUTPATIENT)
Dept: NUCLEAR MEDICINE | Age: 69
Discharge: HOME | End: 2019-02-20
Payer: MEDICARE

## 2019-02-18 DIAGNOSIS — C50.411 MALIGNANT NEOPLASM OF UPPER-OUTER QUADRANT OF RIGHT FEMALE BREAST, UNSPECIFIED ESTROGEN RECEPTOR STATUS (HCC): ICD-10-CM

## 2019-02-18 PROCEDURE — 78195 LYMPH SYSTEM IMAGING: CPT

## 2019-02-20 ENCOUNTER — OFFICE VISIT (OUTPATIENT)
Dept: SURGERY | Age: 69
End: 2019-02-20

## 2019-02-20 VITALS
SYSTOLIC BLOOD PRESSURE: 120 MMHG | TEMPERATURE: 98.9 F | HEIGHT: 67 IN | DIASTOLIC BLOOD PRESSURE: 78 MMHG | BODY MASS INDEX: 29.51 KG/M2 | WEIGHT: 188 LBS | HEART RATE: 82 BPM

## 2019-02-20 DIAGNOSIS — Z09 POSTOP CHECK: Primary | ICD-10-CM

## 2019-02-20 LAB — SURGICAL PATHOLOGY REPORT: NORMAL

## 2019-02-20 PROCEDURE — 99024 POSTOP FOLLOW-UP VISIT: CPT | Performed by: SURGERY

## 2019-02-22 ENCOUNTER — OFFICE VISIT (OUTPATIENT)
Dept: SURGERY | Age: 69
End: 2019-02-22

## 2019-02-22 VITALS
SYSTOLIC BLOOD PRESSURE: 120 MMHG | BODY MASS INDEX: 28.55 KG/M2 | DIASTOLIC BLOOD PRESSURE: 76 MMHG | WEIGHT: 188.4 LBS | RESPIRATION RATE: 16 BRPM | HEIGHT: 68 IN | HEART RATE: 80 BPM

## 2019-02-22 DIAGNOSIS — Z98.890 S/P BREAST RECONSTRUCTION, BILATERAL: ICD-10-CM

## 2019-02-22 DIAGNOSIS — Z90.13 S/P BILATERAL MASTECTOMY: Primary | ICD-10-CM

## 2019-02-22 PROCEDURE — 99024 POSTOP FOLLOW-UP VISIT: CPT | Performed by: PLASTIC SURGERY

## 2019-03-04 ENCOUNTER — OFFICE VISIT (OUTPATIENT)
Dept: ONCOLOGY | Age: 69
End: 2019-03-04
Payer: MEDICARE

## 2019-03-04 VITALS
SYSTOLIC BLOOD PRESSURE: 126 MMHG | HEIGHT: 67 IN | HEART RATE: 80 BPM | DIASTOLIC BLOOD PRESSURE: 60 MMHG | BODY MASS INDEX: 29.51 KG/M2 | TEMPERATURE: 97.6 F | WEIGHT: 188 LBS

## 2019-03-04 DIAGNOSIS — C50.411 MALIGNANT NEOPLASM OF UPPER-OUTER QUADRANT OF BOTH BREASTS IN FEMALE, ESTROGEN RECEPTOR POSITIVE (HCC): Primary | ICD-10-CM

## 2019-03-04 DIAGNOSIS — C50.412 MALIGNANT NEOPLASM OF UPPER-OUTER QUADRANT OF BOTH BREASTS IN FEMALE, ESTROGEN RECEPTOR POSITIVE (HCC): Primary | ICD-10-CM

## 2019-03-04 DIAGNOSIS — Z17.0 MALIGNANT NEOPLASM OF UPPER-OUTER QUADRANT OF BOTH BREASTS IN FEMALE, ESTROGEN RECEPTOR POSITIVE (HCC): Primary | ICD-10-CM

## 2019-03-04 PROCEDURE — 99215 OFFICE O/P EST HI 40 MIN: CPT | Performed by: INTERNAL MEDICINE

## 2019-03-07 ENCOUNTER — OFFICE VISIT (OUTPATIENT)
Dept: SURGERY | Age: 69
End: 2019-03-07

## 2019-03-07 VITALS
HEART RATE: 80 BPM | WEIGHT: 190 LBS | SYSTOLIC BLOOD PRESSURE: 120 MMHG | BODY MASS INDEX: 29.82 KG/M2 | HEIGHT: 67 IN | DIASTOLIC BLOOD PRESSURE: 70 MMHG | TEMPERATURE: 97.4 F

## 2019-03-07 DIAGNOSIS — Z09 POSTOP CHECK: Primary | ICD-10-CM

## 2019-03-07 PROCEDURE — 99024 POSTOP FOLLOW-UP VISIT: CPT | Performed by: SURGERY

## 2019-03-08 ENCOUNTER — OFFICE VISIT (OUTPATIENT)
Dept: SURGERY | Age: 69
End: 2019-03-08

## 2019-03-08 VITALS
WEIGHT: 190 LBS | HEART RATE: 76 BPM | RESPIRATION RATE: 16 BRPM | BODY MASS INDEX: 29.82 KG/M2 | DIASTOLIC BLOOD PRESSURE: 70 MMHG | HEIGHT: 67 IN | SYSTOLIC BLOOD PRESSURE: 120 MMHG

## 2019-03-08 DIAGNOSIS — Z90.13 S/P BILATERAL MASTECTOMY: ICD-10-CM

## 2019-03-08 DIAGNOSIS — Z98.890 S/P BREAST RECONSTRUCTION, BILATERAL: Primary | ICD-10-CM

## 2019-03-08 PROCEDURE — 99024 POSTOP FOLLOW-UP VISIT: CPT | Performed by: PLASTIC SURGERY

## 2019-03-15 ENCOUNTER — HOSPITAL ENCOUNTER (OUTPATIENT)
Dept: LAB | Age: 69
Discharge: HOME | End: 2019-03-15
Payer: MEDICARE

## 2019-03-15 LAB
ABSOLUTE EOS #: 0.1 K/UL (ref 0–0.4)
ABSOLUTE IMMATURE GRANULOCYTE: ABNORMAL K/UL (ref 0–0.3)
ABSOLUTE LYMPH #: 1.6 K/UL (ref 1–4.8)
ABSOLUTE MONO #: 0.5 K/UL (ref 0.1–1.2)
BASOPHILS # BLD: 1 % (ref 0–1)
BASOPHILS ABSOLUTE: 0 K/UL (ref 0–0.2)
DIFFERENTIAL TYPE: ABNORMAL
EOSINOPHILS RELATIVE PERCENT: 2 % (ref 1–7)
HCT VFR BLD CALC: 39.5 % (ref 36–46)
HEMOGLOBIN: 12.5 G/DL (ref 12–16)
IMMATURE GRANULOCYTES: ABNORMAL %
LYMPHOCYTES # BLD: 40 % (ref 16–46)
MCH RBC QN AUTO: 29.4 PG (ref 26–34)
MCHC RBC AUTO-ENTMCNC: 31.7 G/DL (ref 31–37)
MCV RBC AUTO: 92.6 FL (ref 80–100)
MONOCYTES # BLD: 13 % (ref 4–11)
NRBC AUTOMATED: ABNORMAL PER 100 WBC
PDW BLD-RTO: 13.6 % (ref 11–14.5)
PLATELET # BLD: 244 K/UL (ref 140–450)
PLATELET ESTIMATE: ABNORMAL
PMV BLD AUTO: 9.8 FL (ref 6–12)
RBC # BLD: 4.26 M/UL (ref 4–5.2)
RBC # BLD: ABNORMAL 10*6/UL
SEG NEUTROPHILS: 44 % (ref 43–77)
SEGMENTED NEUTROPHILS ABSOLUTE COUNT: 1.8 K/UL (ref 1.8–7.7)
WBC # BLD: 3.9 K/UL (ref 3.5–11)
WBC # BLD: ABNORMAL 10*3/UL

## 2019-03-15 PROCEDURE — 36415 COLL VENOUS BLD VENIPUNCTURE: CPT

## 2019-03-15 PROCEDURE — 85025 COMPLETE CBC W/AUTO DIFF WBC: CPT

## 2019-03-22 ENCOUNTER — OFFICE VISIT (OUTPATIENT)
Dept: SURGERY | Age: 69
End: 2019-03-22

## 2019-03-22 VITALS
DIASTOLIC BLOOD PRESSURE: 70 MMHG | RESPIRATION RATE: 14 BRPM | BODY MASS INDEX: 30.04 KG/M2 | HEART RATE: 72 BPM | WEIGHT: 191.4 LBS | HEIGHT: 67 IN | SYSTOLIC BLOOD PRESSURE: 120 MMHG

## 2019-03-22 DIAGNOSIS — Z98.890 S/P BREAST RECONSTRUCTION, BILATERAL: Primary | ICD-10-CM

## 2019-03-22 PROCEDURE — 99024 POSTOP FOLLOW-UP VISIT: CPT | Performed by: PLASTIC SURGERY

## 2019-03-25 ENCOUNTER — HOSPITAL ENCOUNTER (OUTPATIENT)
Dept: INFUSION THERAPY | Age: 69
Discharge: HOME | End: 2019-03-25
Payer: MEDICARE

## 2019-03-25 ENCOUNTER — OFFICE VISIT (OUTPATIENT)
Dept: ONCOLOGY | Age: 69
End: 2019-03-25
Payer: MEDICARE

## 2019-03-25 DIAGNOSIS — Z15.09 MONOALLELIC MUTATION OF CHEK2 GENE IN FEMALE PATIENT: ICD-10-CM

## 2019-03-25 DIAGNOSIS — Z17.0 MALIGNANT NEOPLASM OF UPPER-OUTER QUADRANT OF LEFT BREAST IN FEMALE, ESTROGEN RECEPTOR POSITIVE (HCC): Primary | ICD-10-CM

## 2019-03-25 DIAGNOSIS — C50.412 MALIGNANT NEOPLASM OF UPPER-OUTER QUADRANT OF BOTH BREASTS IN FEMALE, ESTROGEN RECEPTOR POSITIVE (HCC): Primary | ICD-10-CM

## 2019-03-25 DIAGNOSIS — Z15.02 MONOALLELIC MUTATION OF CHEK2 GENE IN FEMALE PATIENT: ICD-10-CM

## 2019-03-25 DIAGNOSIS — C50.411 MALIGNANT NEOPLASM OF UPPER-OUTER QUADRANT OF BOTH BREASTS IN FEMALE, ESTROGEN RECEPTOR POSITIVE (HCC): Primary | ICD-10-CM

## 2019-03-25 DIAGNOSIS — Z80.3 FAMILY HISTORY OF BREAST CANCER: ICD-10-CM

## 2019-03-25 DIAGNOSIS — Z17.0 MALIGNANT NEOPLASM OF UPPER-OUTER QUADRANT OF BOTH BREASTS IN FEMALE, ESTROGEN RECEPTOR POSITIVE (HCC): Primary | ICD-10-CM

## 2019-03-25 DIAGNOSIS — Z15.89 MONOALLELIC MUTATION OF CHEK2 GENE IN FEMALE PATIENT: ICD-10-CM

## 2019-03-25 DIAGNOSIS — Z15.01 MONOALLELIC MUTATION OF CHEK2 GENE IN FEMALE PATIENT: ICD-10-CM

## 2019-03-25 DIAGNOSIS — C50.412 MALIGNANT NEOPLASM OF UPPER-OUTER QUADRANT OF LEFT BREAST IN FEMALE, ESTROGEN RECEPTOR POSITIVE (HCC): Primary | ICD-10-CM

## 2019-03-25 PROCEDURE — 2580000003 HC RX 258: Performed by: INTERNAL MEDICINE

## 2019-03-25 PROCEDURE — 6360000002 HC RX W HCPCS: Performed by: INTERNAL MEDICINE

## 2019-03-25 PROCEDURE — 96040 PR GENETIC COUNSELING, EACH 30 MIN: CPT | Performed by: GENETIC COUNSELOR, MS

## 2019-03-25 PROCEDURE — 96523 IRRIG DRUG DELIVERY DEVICE: CPT

## 2019-03-25 RX ORDER — SODIUM CHLORIDE 0.9 % (FLUSH) 0.9 %
10 SYRINGE (ML) INJECTION PRN
Status: CANCELLED | OUTPATIENT
Start: 2019-03-25

## 2019-03-25 RX ORDER — HEPARIN SODIUM (PORCINE) LOCK FLUSH IV SOLN 100 UNIT/ML 100 UNIT/ML
500 SOLUTION INTRAVENOUS PRN
Status: DISCONTINUED | OUTPATIENT
Start: 2019-03-25 | End: 2019-03-26 | Stop reason: HOSPADM

## 2019-03-25 RX ORDER — HEPARIN SODIUM (PORCINE) LOCK FLUSH IV SOLN 100 UNIT/ML 100 UNIT/ML
500 SOLUTION INTRAVENOUS PRN
Status: CANCELLED | OUTPATIENT
Start: 2019-03-25

## 2019-03-25 RX ORDER — SODIUM CHLORIDE 0.9 % (FLUSH) 0.9 %
10 SYRINGE (ML) INJECTION PRN
Status: DISCONTINUED | OUTPATIENT
Start: 2019-03-25 | End: 2019-03-26 | Stop reason: HOSPADM

## 2019-03-25 RX ADMIN — Medication 10 ML: at 13:36

## 2019-03-25 RX ADMIN — HEPARIN SODIUM (PORCINE) LOCK FLUSH IV SOLN 100 UNIT/ML 500 UNITS: 100 SOLUTION at 13:36

## 2019-04-01 ENCOUNTER — OFFICE VISIT (OUTPATIENT)
Dept: ONCOLOGY | Age: 69
End: 2019-04-01
Payer: MEDICARE

## 2019-04-01 VITALS
HEART RATE: 89 BPM | TEMPERATURE: 98.4 F | DIASTOLIC BLOOD PRESSURE: 62 MMHG | WEIGHT: 191 LBS | BODY MASS INDEX: 29.98 KG/M2 | HEIGHT: 67 IN | OXYGEN SATURATION: 96 % | SYSTOLIC BLOOD PRESSURE: 118 MMHG

## 2019-04-01 DIAGNOSIS — Z17.0 MALIGNANT NEOPLASM OF UPPER-OUTER QUADRANT OF BOTH BREASTS IN FEMALE, ESTROGEN RECEPTOR POSITIVE (HCC): Primary | ICD-10-CM

## 2019-04-01 DIAGNOSIS — C50.411 MALIGNANT NEOPLASM OF UPPER-OUTER QUADRANT OF BOTH BREASTS IN FEMALE, ESTROGEN RECEPTOR POSITIVE (HCC): Primary | ICD-10-CM

## 2019-04-01 DIAGNOSIS — C50.412 MALIGNANT NEOPLASM OF UPPER-OUTER QUADRANT OF BOTH BREASTS IN FEMALE, ESTROGEN RECEPTOR POSITIVE (HCC): Primary | ICD-10-CM

## 2019-04-01 PROCEDURE — 99215 OFFICE O/P EST HI 40 MIN: CPT | Performed by: INTERNAL MEDICINE

## 2019-04-01 RX ORDER — CAPECITABINE 500 MG/1
1000 TABLET, FILM COATED ORAL 2 TIMES DAILY
Qty: 112 TABLET | Refills: 2 | Status: SHIPPED | OUTPATIENT
Start: 2019-04-01 | End: 2019-04-15

## 2019-04-01 NOTE — PROGRESS NOTES
Patient ID: Juvencio Quiles, 1950, [id number], 79 y.o. Surgeon : Dr. Sonali Altamirano  Diagnosis:   Diagnosis of bilateral breast cancer  Left invasive ductal carcinoma, ER/DC positive and HER-2/kelsy Negative, 2 foci 2.0 cm and 2.1 cm, clinical staging T2 N1 M0, stage II  Right- breast cancer T1b N0 M0, stage I, ER/DC positive HER-2 negative  Started on neoadjuvant chemotherapy with Adriamycin plus cyclophosphamide on 10/3/18 unfortunately she had significant reaction with generalized tremors, shortness of breath and chest tightness after chemotherapy  Her chemotherapy was changed to weekly Taxol and she completed 12 cycles on 1/2/19  Had bilateral mastectomies on 2/15/19  Adjuvant radiation therapy to begin 4/2/19  HISTORY OF PRESENT ILLNESS:    Oncologic History:  Lyric Calzada is a 82-year-old female with a family history of breast cancer was seen during initial consultation visit for newly diagnosed bilateral breast cancer. Patient had abnormal mammogram about a year ago and the biopsy showed ductal papilloma so she had excision and was being followed by Gen. surgery. Recently she noted a lump in her left breast and had a mammogram and ultrasound which confirmed 2 lesions in her left breast measuring 2.0 and 2.1 cm and one lesion in the right breast measuring 0.8 cm. Also 2 lymph nodes noted in the left axilla. Patient underwent biopsy of these agents including axillary lymph node which confirmed presence of invasive ductal carcinoma and on the biopsies. She is here for further recommendations. She does not have many medical conditions and her ECOG performance status is 0. She is physically active and not on any regular medications. She has history of breast cancer in her sister was diagnosed at the age of 58 and also reports history of breast cancer in her grandmother. She quit smoking in 1978 and drinks alcohol occasionally. She has had hysterectomy and oophorectomy.   Interval history:  Patient is returning for follow-up visit. She is here for further recommendations. She has been evaluation by rad oncology and will start adjuvant RT tomorrow. She denied any pain. No fever chills. She does have mild fatigue. No fever chills She denied any nausea vomiting or fever chills   During this visit patient's allergy, social, medical, surgical history and medications were reviewed and updated. Allergies   Allergen Reactions    Boniva [Ibandronic Acid] Other (See Comments)     Jaw pain.  Fosamax [Alendronate Sodium] Other (See Comments)     Jaw pain. Current Outpatient Medications   Medication Sig Dispense Refill    omeprazole (PRILOSEC) 20 MG delayed release capsule Take 1 capsule by mouth daily (Patient taking differently: Take 20 mg by mouth daily as needed ) 30 capsule 3     No current facility-administered medications for this visit. Social History     Socioeconomic History    Marital status:      Spouse name: Not on file    Number of children: Not on file    Years of education: Not on file    Highest education level: Not on file   Occupational History    Occupation: retired     Employer: [employer] 22 resource strain: Not on file    Food insecurity:     Worry: Not on file     Inability: Not on file    Transportation needs:     Medical: Not on file     Non-medical: Not on file   Tobacco Use    Smoking status: Former Smoker     Packs/day: 0.50     Years: 7.00     Pack years: 3.50     Types: Cigarettes     Last attempt to quit: 1978     Years since quittin.2    Smokeless tobacco: Never Used    Tobacco comment: Quit smoking in . Substance and Sexual Activity    Alcohol use: No     Comment: Occasional alcohol.     Drug use: No    Sexual activity: Not on file   Lifestyle    Physical activity:     Days per week: Not on file     Minutes per session: Not on file    Stress: Not on file   Relationships    Social connections:     Talks on phone: Not on file     Gets together: Not on file     Attends Alevism service: Not on file     Active member of club or organization: Not on file     Attends meetings of clubs or organizations: Not on file     Relationship status: Not on file    Intimate partner violence:     Fear of current or ex partner: Not on file     Emotionally abused: Not on file     Physically abused: Not on file     Forced sexual activity: Not on file   Other Topics Concern    Not on file   Social History Narrative    Not on file       Family History   Problem Relation Age of Onset    Hypertension Mother         Renal failure.  Diabetes Mother         Type 2 at end of life.  Heart Failure Father     Coronary Art Dis Father         Status post CABG    Diabetes Father         Type 2, very late in life.  Breast Cancer Sister 58    Hypertension Brother     Hypertension Sister     Breast Cancer Paternal Aunt         62s    Breast Cancer Paternal Grandmother     Breast Cancer Paternal Cousin     Breast Cancer Paternal Aunt         62s    Breast Cancer Paternal Cousin         46s      REVIEW OF SYSTEM:   Constitutional: No fever or chills. No night sweats, no weight loss   Eyes: No eye discharge, double vision, or eye pain   HEENT: negative for sore mouth, sore throat, hoarseness and voice change   Respiratory: negative for cough , sputum, dyspnea, wheezing, hemoptysis, chest pain   Cardiovascular: negative for chest pain, dyspnea, palpitations, orthopnea, PND   Gastrointestinal: negative for nausea, vomiting, diarrhea, constipation, abdominal pain, Dysphagia, hematemesis and hematochezia   Genitourinary: negative for frequency, dysuria, nocturia, urinary incontinence, and hematuria   Integument: negative for rash, skin lesions, bruises.    Hematologic/Lymphatic: negative for easy bruising, bleeding, lymphadenopathy, petechiae and swelling/edema   Endocrine: negative for heat or cold intolerance, tremor, weight changes, 141 02/16/2019 0547    K 4.3 02/16/2019 0547     02/16/2019 0547    CO2 25 02/16/2019 0547    BUN 18 02/16/2019 0547    CREATININE 0.55 02/16/2019 0547        Component Value Date/Time    CALCIUM 9.2 02/16/2019 0547    ALKPHOS 49 02/16/2019 0547    AST 23 02/16/2019 0547    ALT 21 02/16/2019 0547    BILITOT 0.36 02/16/2019 0547        PATHOLOGY DATA:   Received: 9/11/2018   Reported: 9/12/2018 17:30     -- Diagnosis --   1.  RIGHT BREAST, CORE NEEDLE BIOPSIES:            -  WELL-DIFFERENTIATED INFILTRATING DUCTAL CARCINOMA, 0.6 CM   IN LARGEST INTACT CORE FRAGMENT.            -  ESTROGEN AND PROGESTERONE RECEPTOR IMMUNOSTAINS POSITIVE   IN INVASIVE TUMOR.       -  HER-2 FISH PENDING. 2.  LEFT BREAST, 2:00, N+4, CORE NEEDLE BIOPSIES:            -  MODERATELY DIFFERENTIATED INFILTRATING DUCTAL CARCINOMA,   0.6 CM IN LARGEST INTACT CORE FRAGMENT.       -  FOCAL DUCTAL CARCINOMA IN SITU, INTERMEDIATE GRADE, CRIBRIFORM   AND SOLID TYPE.            -  ESTROGEN AND PROGESTERONE RECEPTOR IMMUNOSTAINS POSITIVE   IN INVASIVE TUMOR.       -  HER-2 FISH PENDING. 3.  LEFT BREAST, 1:00, N+6, CORE NEEDLE BIOPSIES:            -  POORLY DIFFERENTIATED INFILTRATING DUCTAL CARCINOMA, 1.3   CM GREATEST DIMENSION IN LARGEST INTACT CORE FRAGMENT.       -  FOCAL DUCTAL CARCINOMA IN SITU, INTERMEDIATE GRADE, SOLID   TYPE.            -  ESTROGEN AND PROGESTERONE RECEPTOR IMMUNOSTAINS POSITIVE   IN INVASIVE TUMOR.       -  HER-2 FISH PENDING. 4.  LYMPH NODE, LEFT AXILLA, CORE NEEDLE BIOPSIES:       -  INFILTRATING DUCTAL CARCINOMA.          -  INVASIVE TUMOR POSITIVE FOR ESTROGEN RECEPTOR AND   NEGATIVE FOR PROGESTERONE RECEPTOR IMMUNOSTAINS.     -  LYMPH NODE AND BENIGN BREAST ARCHITECTURE NOT IDENTIFIED.      -- Diagnosis Comment --   SPECIMEN #4: CORRELATION WITH THE RADIOLOGIC PLACEMENT OF THE BIOPSY   WOULD BE NECESSARY TO DETERMINE IF THIS REPRESENTS A LYMPH NODE WITH   METASTATIC CARCINOMA, VERSUS CARCINOMA INVOLVING THE AXILLARY TAIL OF   THE BREAST.  NO INTRINSIC LYMPH NODE OR BENIGN BREAST ARCHITECTURE IS   PRESENT FOR CONFIRMATION. 2/15/19: Final Diagnosis   1. LYMPH NODE, EXCISION (RIGHT SENTINEL):        - NO NEOPLASM DETECTED. 2. BREAST, MASTECTOMY (RIGHT):        - SCANT RESIDUAL FOCUS OF INVASIVE DUCTAL CARCINOMA.      - DUCTAL CARCINOMA IN SITU.        - EXCISION MARGINS NEGATIVE FOR NEOPLASM. 3. BREAST, MASTECTOMY, INCLUDING AXILLARY LYMPH NODES (LEFT):        - INVASIVE DUCTAL CARCINOMA, 1.5 CM        - DUCTAL CARCINOMA IN SITU.        - EXCISION MARGINS NEGATIVE FOR NEOPLASM. Diagnosis Comment   BIOMARKER STUDIES WERE PERFORMED ON PRIOR BIOPSIES FROM TUMORS FROM   BOTH BREASTS ([id number]) -     RIGHT:        - POSITIVE ESTROGEN RECEPTOR.        - POSITIVE PROGESTERONE RECEPTOR.        - NEGATIVE FOR HER2 GENE AMPLIFICATION. LEFT:        - POSITIVE ESTROGEN RECEPTOR.        - POSITIVE PROGESTERONE RECEPTOR.        - NEGATIVE FOR HER2 GENE AMPLIFICATION. Procedures/Addenda   ADDENDUM     Date Ordered:     2/21/2019     Status: Signed Out        Date Complete:     2/21/2019     By: Micheal Peng M.D.        Date Reported:     2/21/2019       ADDENDUM DIAGNOSIS   3.  BREAST, MASTECTOMY, WITH AXILLARY LYMPH NODES (LEFT):        - NO CHANGE IN DIAGNOSIS. ADDENDUM COMMENT   THE CASE IS DISCUSSED WITH DR. CID, FEBRUARY 20, 2019.  SLIDES ARE   REVIEWED, WITH SPECIFIC ATTENTION TO THE 6 SLIDES FROM AXILLARY   REGION.  THESE REPRESENT 2 LYMPH NODES, INCLUDING A LARGER 4.5 CM   NODE, AND NEITHER CONTAINS NEOPLASM ON REVIEW.  THE GROSS MASTECTOMY   SPECIMEN IS THEN REEXAMINED WITH SPECIFIC ATTENTION TO THE AXILLARY   TISSUE, AS WELL AS THE BREAST ADJACENT TO THE AXILLARY REGION.  NO   ADDITIONAL GROSSLY IDENTIFIABLE LYMPH NODES ARE FOUND.  REPRESENTATIVE   SAMPLING OF 8 ADDITIONAL BLOCKS FROM THIS AXILLARY REGION IS SUBMITTED   FOR POSSIBLE IDENTIFICATION OF MINUTE NODES THAT MAY NOT BE GROSSLY   EVIDENT.  HOWEVER, NO ADDITIONAL NODES ARE DETECTED ON MICROSCOPIC   EXAM OF THESE ADDITIONAL BLOCKS.   IMAGING DATA:    Ultrasound breast 9/10/18  FINDINGS:   DIAGNOSTIC MAMMOGRAM: The breasts are composed of scattered fibroglandular   tissue.  Underlying the area palpable concern in the upper outer left breast,   2 masses are identified measuring up to 1.6 cm anteriorly and with a more   suspicious spiculated 2.4 cm mass with adjacent distortion.  No abnormal   calcifications are identified.  In the right breast, a spiculated 1 cm mass   is identified in the lower inner quadrant, 6 cm from the nipple.  No   associated microcalcifications.       TARGETED LEFT ULTRASOUND: Ultrasound was performed in the area of palpable   concern, identifying 2 adjacent irregular hypoechoic masses measuring 1.7 x   2.0 x 1.4 cm in the 2 o'clock position, 4 cm from the nipple and 1.8 x 2.1 x   2.0 cm in the 1 o'clock position, 6 cm from the nipple.       Ultrasound in the left axilla was also performed identifying a probable   abnormal lymph node in the axillary tail measuring 2.1 x 1.6 x 2.2 cm.  This   has an irregular appearance with a hyperechoic rim and no identifiable fatty   hilum. Orelia Dominic is an adjacent abnormal lymph node measuring 1.7 x 1.0 x 2.0 cm   with a small fatty hilum and focal cortical thickening.       TARGETED RIGHT ULTRASOUND: Ultrasound was performed in the area of   mammographic concern in the lower inner quadrant.  A suspicious irregular   hypoechoic mass measuring 0.7 x 0.8 x 0.8 cm is present in the 3 o'clock   position, 7 cm from the nipple.  This corresponds to the mammographic finding.       Ultrasound imaging of the right axilla was also performed without   identifiable lymph nodes.           Impression   1. 2 noncalcified suspicious masses in the area palpable concern in the 1-2   o'clock position of the left breast with sonographic correlate measuring up   to 2.0 cm and 2.1 cm.       2. Left axillary ultrasound identifies 2 abnormal lymph nodes.       3.  New suspicious noncalcified mass in the lower inner quadrant of the right   breast with corresponding sonographic 0.8 cm mass in the 3 o'clock position,   7 cm from the nipple.       4.  No identifiable lymph nodes in the right axilla.       Ultrasound-guided biopsy of the left breast masses, a left axillary lymph   node, and right breast mass is recommended.       BIRADS:   BIRADS - CATEGORY 5       Findings are highly suggestive of malignancy.  Biopsy should be considered at   this time. Bone scan 9/21/18  Impression   No evidence to suggest osseous metastatic disease. CT chest abdomen pelvis 9/21/18  Impression   1.  Known bilateral breast masses and left axillary lymphadenopathy are   demonstrated.  No evidence for distant metastatic disease in the chest,   abdomen, or pelvis.       2.  Tiny right hydropneumothorax, presumed related to recent right chest port   placement.       3.  Tiny hiatal hernia.       4.  There is a 1.5 cm right thyroid nodule.  Thyroid ultrasound is   recommended.       The result was communicated to the ordering physician, Dr. Lyndon Schneider, at 1:42   p.m. on 09/21/2018.  This was communicated by the radiology communication   center [facility]), as I was not available when Dr. Lyndon Schneider returned the   phone call. Mammogram 1/4/19  Impression   Mammographic evidence of response of therapy within both breasts as described   above.  No new lesions are identified.         ASSESSMENT:      I discussed the pathology results, imaging studies, diagnosis, prognosis, treatment options with patient and her . Chaparro Crawley has clinical stage II, T2 N1 M0 disease in her left breast and she has clinical stage I T1 N0 M0 disease in her right breast.  Given the fact that these lesions appeared within very short period of time as last year she had excisional biopsy of the lesion and negative mammograms.  I discussed option of neoadjuvant

## 2019-04-05 ENCOUNTER — TELEPHONE (OUTPATIENT)
Dept: ONCOLOGY | Age: 69
End: 2019-04-05

## 2019-04-05 NOTE — TELEPHONE ENCOUNTER
[address] received prescription for Xeloda 500 mg chemo tablets on 4-2-2019. They are calling for demographics on patient. Faxed Demographic sheet to secure fax # [phone]. Confirmation received.

## 2019-04-09 ENCOUNTER — HOSPITAL ENCOUNTER (OUTPATIENT)
Dept: LAB | Age: 69
Discharge: HOME | End: 2019-04-09
Payer: MEDICARE

## 2019-04-09 ENCOUNTER — OFFICE VISIT (OUTPATIENT)
Dept: SURGERY | Age: 69
End: 2019-04-09

## 2019-04-09 VITALS
BODY MASS INDEX: 30.29 KG/M2 | HEART RATE: 64 BPM | WEIGHT: 193 LBS | SYSTOLIC BLOOD PRESSURE: 126 MMHG | TEMPERATURE: 98.3 F | DIASTOLIC BLOOD PRESSURE: 62 MMHG | HEIGHT: 67 IN

## 2019-04-09 DIAGNOSIS — Z09 POSTOP CHECK: Primary | ICD-10-CM

## 2019-04-09 LAB
ABSOLUTE EOS #: 0.1 K/UL (ref 0–0.4)
ABSOLUTE IMMATURE GRANULOCYTE: NORMAL K/UL (ref 0–0.3)
ABSOLUTE LYMPH #: 1.3 K/UL (ref 1–4.8)
ABSOLUTE MONO #: 0.5 K/UL (ref 0.1–1.2)
BASOPHILS # BLD: 1 % (ref 0–1)
BASOPHILS ABSOLUTE: 0 K/UL (ref 0–0.2)
DIFFERENTIAL TYPE: NORMAL
EOSINOPHILS RELATIVE PERCENT: 1 % (ref 1–7)
HCT VFR BLD CALC: 39 % (ref 36–46)
HEMOGLOBIN: 12.7 G/DL (ref 12–16)
IMMATURE GRANULOCYTES: NORMAL %
LYMPHOCYTES # BLD: 31 % (ref 16–46)
MCH RBC QN AUTO: 29.8 PG (ref 26–34)
MCHC RBC AUTO-ENTMCNC: 32.5 G/DL (ref 31–37)
MCV RBC AUTO: 91.6 FL (ref 80–100)
MONOCYTES # BLD: 11 % (ref 4–11)
NRBC AUTOMATED: NORMAL PER 100 WBC
PDW BLD-RTO: 13.5 % (ref 11–14.5)
PLATELET # BLD: 209 K/UL (ref 140–450)
PLATELET ESTIMATE: NORMAL
PMV BLD AUTO: 9.7 FL (ref 6–12)
RBC # BLD: 4.26 M/UL (ref 4–5.2)
RBC # BLD: NORMAL 10*6/UL
SEG NEUTROPHILS: 56 % (ref 43–77)
SEGMENTED NEUTROPHILS ABSOLUTE COUNT: 2.5 K/UL (ref 1.8–7.7)
WBC # BLD: 4.3 K/UL (ref 3.5–11)
WBC # BLD: NORMAL 10*3/UL

## 2019-04-09 PROCEDURE — 85025 COMPLETE CBC W/AUTO DIFF WBC: CPT

## 2019-04-09 PROCEDURE — 99024 POSTOP FOLLOW-UP VISIT: CPT | Performed by: SURGERY

## 2019-04-09 PROCEDURE — 36415 COLL VENOUS BLD VENIPUNCTURE: CPT

## 2019-04-09 NOTE — PROGRESS NOTES
Pt here for follow up from bilat mastecomty and reconstruction. Doing very good.   Continues to follow up Dr. Jeremias Lira and we will see PRN

## 2019-04-12 ENCOUNTER — OFFICE VISIT (OUTPATIENT)
Dept: SURGERY | Age: 69
End: 2019-04-12

## 2019-04-12 VITALS
DIASTOLIC BLOOD PRESSURE: 60 MMHG | RESPIRATION RATE: 16 BRPM | WEIGHT: 191.2 LBS | HEIGHT: 68 IN | SYSTOLIC BLOOD PRESSURE: 115 MMHG | BODY MASS INDEX: 28.98 KG/M2 | HEART RATE: 80 BPM

## 2019-04-12 DIAGNOSIS — Z90.13 S/P BILATERAL MASTECTOMY: Primary | ICD-10-CM

## 2019-04-12 DIAGNOSIS — Z98.890 S/P BREAST RECONSTRUCTION, BILATERAL: ICD-10-CM

## 2019-04-12 PROCEDURE — 99024 POSTOP FOLLOW-UP VISIT: CPT | Performed by: PLASTIC SURGERY

## 2019-04-12 NOTE — PROGRESS NOTES
81year old white female returns today s/p 2/15/19 bilateral mastectomy with right sentinel node biopsy and left axillary dissection of lymph nodes with immediate reconstruction using expander implants. Patient denies pain, numbness, or tingling today. Patient started radiation therapy on 4/2/2019 and has since had a total of nine treatments, as of today. She is scheduled to complete 28 treatments total. I have reviewed the patient's medical history in detail and updated the computerized patient record.

## 2019-04-12 NOTE — PROGRESS NOTES
Subjective:      Patient ID: Garland Higginbotham is a 81 y.o. female. HPI  Patient returns today s/p 2/15/19 bilateral mastectomy with right sentinel node biopsy and left axillary dissection of lymph nodes with immediate reconstruction using expander implants. Patient denies pain, numbness, or tingling today.      Patient started radiation therapy on 2019 and has since had a total of nine treatments, as of today. She is scheduled to complete 28 treatments total.     Review of Systems    Objective:   Physical Exam   Constitutional: She is oriented to person, place, and time. She appears well-developed and well-nourished. HENT:   Head: Normocephalic and atraumatic. Eyes: Pupils are equal, round, and reactive to light. Conjunctivae and EOM are normal.   Pulmonary/Chest: Effort normal.   Neurological: She is alert and oriented to person, place, and time. Skin: Skin is warm and dry. Nursing note and vitals reviewed. Tolerating the radiation therapy well so far. She has had 9 treatments, 19 to go. Using creams to keep skin healthy. She is planned to be taking a chemotherapy pill, Xeloda, after radiation is done. She says she was told she could not expand while taking it which would be another 18 weeks. Assessment:      Bilateral breast reconstruction. Plan:      I will try to contact her oncologist, Dr. Faylene Galeazzi, to see if it is true that we cannot expand during the Xeloda or if it is OK. Will plan to see her 19, 2 weeks after Radiation therapy finished (19) to restart expansion if OK with Dr. Faylene Galeazzi.         Bertina Apgar, MD

## 2019-04-22 ENCOUNTER — TELEPHONE (OUTPATIENT)
Dept: ONCOLOGY | Age: 69
End: 2019-04-22

## 2019-04-22 NOTE — TELEPHONE ENCOUNTER
Called patient to check on her oncology care progress. Reminded her who I am and gave her my contact number. Patient completed chemotherapy 1/2/19, had bilateral mastectomy 2/15/19, adjuvant radiation therapy to start 4/2/19. Called to check in with patient see how radiation therapy was going for her. encouraged her to call back.

## 2019-04-23 ENCOUNTER — HOSPITAL ENCOUNTER (OUTPATIENT)
Dept: LAB | Age: 69
Discharge: HOME | End: 2019-04-23
Payer: MEDICARE

## 2019-04-23 LAB
ABSOLUTE EOS #: 0.1 K/UL (ref 0–0.4)
ABSOLUTE IMMATURE GRANULOCYTE: ABNORMAL K/UL (ref 0–0.3)
ABSOLUTE LYMPH #: 0.8 K/UL (ref 1–4.8)
ABSOLUTE MONO #: 0.5 K/UL (ref 0.1–1.2)
BASOPHILS # BLD: 1 % (ref 0–1)
BASOPHILS ABSOLUTE: 0 K/UL (ref 0–0.2)
DIFFERENTIAL TYPE: ABNORMAL
EOSINOPHILS RELATIVE PERCENT: 2 % (ref 1–7)
HCT VFR BLD CALC: 42.3 % (ref 36–46)
HEMOGLOBIN: 13.6 G/DL (ref 12–16)
IMMATURE GRANULOCYTES: ABNORMAL %
LYMPHOCYTES # BLD: 23 % (ref 16–46)
MCH RBC QN AUTO: 29.1 PG (ref 26–34)
MCHC RBC AUTO-ENTMCNC: 32.2 G/DL (ref 31–37)
MCV RBC AUTO: 90.3 FL (ref 80–100)
MONOCYTES # BLD: 14 % (ref 4–11)
NRBC AUTOMATED: ABNORMAL PER 100 WBC
PDW BLD-RTO: 13.3 % (ref 11–14.5)
PLATELET # BLD: 205 K/UL (ref 140–450)
PLATELET ESTIMATE: ABNORMAL
PMV BLD AUTO: 9.7 FL (ref 6–12)
RBC # BLD: 4.69 M/UL (ref 4–5.2)
RBC # BLD: ABNORMAL 10*6/UL
SEG NEUTROPHILS: 60 % (ref 43–77)
SEGMENTED NEUTROPHILS ABSOLUTE COUNT: 2.2 K/UL (ref 1.8–7.7)
WBC # BLD: 3.5 K/UL (ref 3.5–11)
WBC # BLD: ABNORMAL 10*3/UL

## 2019-04-23 PROCEDURE — 36415 COLL VENOUS BLD VENIPUNCTURE: CPT

## 2019-04-23 PROCEDURE — 85025 COMPLETE CBC W/AUTO DIFF WBC: CPT

## 2019-05-06 ENCOUNTER — HOSPITAL ENCOUNTER (OUTPATIENT)
Dept: INFUSION THERAPY | Age: 69
Discharge: HOME | End: 2019-05-06
Payer: MEDICARE

## 2019-05-06 ENCOUNTER — OFFICE VISIT (OUTPATIENT)
Dept: ONCOLOGY | Age: 69
End: 2019-05-06
Payer: MEDICARE

## 2019-05-06 VITALS
HEART RATE: 69 BPM | DIASTOLIC BLOOD PRESSURE: 70 MMHG | HEIGHT: 68 IN | BODY MASS INDEX: 29.4 KG/M2 | SYSTOLIC BLOOD PRESSURE: 117 MMHG | WEIGHT: 194 LBS | OXYGEN SATURATION: 98 %

## 2019-05-06 DIAGNOSIS — Z17.0 MALIGNANT NEOPLASM OF UPPER-OUTER QUADRANT OF BOTH BREASTS IN FEMALE, ESTROGEN RECEPTOR POSITIVE (HCC): Primary | ICD-10-CM

## 2019-05-06 DIAGNOSIS — Z17.0 MALIGNANT NEOPLASM OF UPPER-OUTER QUADRANT OF LEFT BREAST IN FEMALE, ESTROGEN RECEPTOR POSITIVE (HCC): Primary | ICD-10-CM

## 2019-05-06 DIAGNOSIS — C50.412 MALIGNANT NEOPLASM OF UPPER-OUTER QUADRANT OF BOTH BREASTS IN FEMALE, ESTROGEN RECEPTOR POSITIVE (HCC): Primary | ICD-10-CM

## 2019-05-06 DIAGNOSIS — C50.411 MALIGNANT NEOPLASM OF UPPER-OUTER QUADRANT OF BOTH BREASTS IN FEMALE, ESTROGEN RECEPTOR POSITIVE (HCC): Primary | ICD-10-CM

## 2019-05-06 DIAGNOSIS — C50.412 MALIGNANT NEOPLASM OF UPPER-OUTER QUADRANT OF LEFT BREAST IN FEMALE, ESTROGEN RECEPTOR POSITIVE (HCC): Primary | ICD-10-CM

## 2019-05-06 PROCEDURE — 99215 OFFICE O/P EST HI 40 MIN: CPT | Performed by: INTERNAL MEDICINE

## 2019-05-06 PROCEDURE — 96523 IRRIG DRUG DELIVERY DEVICE: CPT

## 2019-05-06 PROCEDURE — 2580000003 HC RX 258: Performed by: INTERNAL MEDICINE

## 2019-05-06 PROCEDURE — 6360000002 HC RX W HCPCS: Performed by: INTERNAL MEDICINE

## 2019-05-06 RX ORDER — HEPARIN SODIUM (PORCINE) LOCK FLUSH IV SOLN 100 UNIT/ML 100 UNIT/ML
500 SOLUTION INTRAVENOUS PRN
Status: DISCONTINUED | OUTPATIENT
Start: 2019-05-06 | End: 2019-05-07 | Stop reason: HOSPADM

## 2019-05-06 RX ORDER — HEPARIN SODIUM (PORCINE) LOCK FLUSH IV SOLN 100 UNIT/ML 100 UNIT/ML
500 SOLUTION INTRAVENOUS PRN
Status: CANCELLED | OUTPATIENT
Start: 2019-05-06

## 2019-05-06 RX ORDER — SODIUM CHLORIDE 0.9 % (FLUSH) 0.9 %
10 SYRINGE (ML) INJECTION PRN
Status: CANCELLED | OUTPATIENT
Start: 2019-05-06

## 2019-05-06 RX ORDER — SODIUM CHLORIDE 0.9 % (FLUSH) 0.9 %
10 SYRINGE (ML) INJECTION PRN
Status: DISCONTINUED | OUTPATIENT
Start: 2019-05-06 | End: 2019-05-07 | Stop reason: HOSPADM

## 2019-05-06 RX ADMIN — Medication 10 ML: at 10:21

## 2019-05-06 RX ADMIN — HEPARIN SODIUM (PORCINE) LOCK FLUSH IV SOLN 100 UNIT/ML 500 UNITS: 100 SOLUTION at 10:21

## 2019-05-06 NOTE — PROGRESS NOTES
VAD accessed per protocol with 3/4 inch 20 gauge gramajo needle. Flushes easy with good blood return. Labs Drawn. Flushed with 10 ml of normal saline and 5 ml of heparin flush. D/C'd 3/4 inch gramajo needle and band aide applied. Stable and discharged to home. Has appointment with Dr. Deejay Jarvis this afternoon.

## 2019-05-06 NOTE — PROGRESS NOTES
Patient ID: Eris Goldstein, 1950, [id number], 81 y.o. Surgeon : Dr. Danyell Olsen  Diagnosis:   Diagnosis of bilateral breast cancer  Left invasive ductal carcinoma, ER/OH positive and HER-2/kelsy Negative, 2 foci 2.0 cm and 2.1 cm, clinical staging T2 N1 M0, stage II  Right- breast cancer T1b N0 M0, stage I, ER/OH positive HER-2 negative  Started on neoadjuvant chemotherapy with Adriamycin plus cyclophosphamide on 10/3/18 unfortunately she had significant reaction with generalized tremors, shortness of breath and chest tightness after chemotherapy  Her chemotherapy was changed to weekly Taxol and she completed 12 cycles on 1/2/19  Had bilateral mastectomies on 2/15/19  Adjuvant radiation therapy started on 4/2/19 and will complete 5/9/19  HISTORY OF PRESENT ILLNESS:    Oncologic History:  Eran Prater is a 68-year-old female with a family history of breast cancer was seen during initial consultation visit for newly diagnosed bilateral breast cancer. Patient had abnormal mammogram about a year ago and the biopsy showed ductal papilloma so she had excision and was being followed by Gen. surgery. Recently she noted a lump in her left breast and had a mammogram and ultrasound which confirmed 2 lesions in her left breast measuring 2.0 and 2.1 cm and one lesion in the right breast measuring 0.8 cm. Also 2 lymph nodes noted in the left axilla. Patient underwent biopsy of these agents including axillary lymph node which confirmed presence of invasive ductal carcinoma and on the biopsies. She is here for further recommendations. She does not have many medical conditions and her ECOG performance status is 0. She is physically active and not on any regular medications. She has history of breast cancer in her sister was diagnosed at the age of 58 and also reports history of breast cancer in her grandmother. She quit smoking in 1978 and drinks alcohol occasionally. She has had hysterectomy and oophorectomy.   Interval history:  Patient is returning for follow-up visit. She is here for further recommendations. She is tolerating RT well. She has three more days left. She is following with plastic surgery of expansions. She denied any pain. No fever chills. She does have mild fatigue. She denied any nausea vomiting or fever chills   During this visit patient's allergy, social, medical, surgical history and medications were reviewed and updated. Allergies   Allergen Reactions    Boniva [Ibandronic Acid] Other (See Comments)     Jaw pain.  Fosamax [Alendronate Sodium] Other (See Comments)     Jaw pain. No current outpatient medications on file. No current facility-administered medications for this visit. Facility-Administered Medications Ordered in Other Visits   Medication Dose Route Frequency Provider Last Rate Last Dose    sodium chloride flush 0.9 % injection 10 mL  10 mL Intercatheter PRNORMA Samayoa MD   10 mL at 19 1021    heparin flush 100 UNIT/ML injection 500 Units  500 Units Intercatheter PRNORMA Samayoa MD   500 Units at 19 1021       Social History     Socioeconomic History    Marital status:      Spouse name: Not on file    Number of children: Not on file    Years of education: Not on file    Highest education level: Not on file   Occupational History    Occupation: retired     Employer: [employer] 22 resource strain: Not on file    Food insecurity:     Worry: Not on file     Inability: Not on file    Transportation needs:     Medical: Not on file     Non-medical: Not on file   Tobacco Use    Smoking status: Former Smoker     Packs/day: 0.50     Years: 7.00     Pack years: 3.50     Types: Cigarettes     Last attempt to quit: 1978     Years since quittin.3    Smokeless tobacco: Never Used    Tobacco comment: Quit smoking in .    Substance and Sexual Activity    Alcohol use: No     Comment: Occasional Integument: negative for rash, skin lesions, bruises.    Hematologic/Lymphatic: negative for easy bruising, bleeding, lymphadenopathy, petechiae and swelling/edema   Endocrine: negative for heat or cold intolerance, tremor, weight changes, change in bowel habits and hair loss   Musculoskeletal: negative for myalgias, arthralgias, pain, joint swelling,and bone pain   Neurological: negative for headaches, dizziness, seizures, weakness, numbness   OBJECTIVE:         Vitals:    05/06/19 1446   BP: 117/70   Pulse: 69   SpO2: 98%   PHYSICAL EXAM:   General appearance - well appearing, no in pain or distress   Mental status - alert and cooperative   Eyes - pupils equal and reactive, extraocular eye movements intact   Ears - bilateral TM's and external ear canals normal   Mouth - mucous membranes moist, pharynx normal without lesions   Neck - supple, no significant adenopathy   Lymphatics - Mild palpable lymphadenopathy in the left axilla, no hepatosplenomegaly   Chest - clear to auscultation, no wheezes, rales or rhonchi, symmetric air entry   Breast: biat breast surgically absent, drains in place  2 cm lump palpable in left breast  Heart - normal rate, regular rhythm, normal S1, S2, no murmurs, rubs, clicks or gallops   Abdomen - soft, nontender, nondistended, no masses or organomegaly   Neurological - alert, oriented, normal speech, no focal findings or movement disorder noted   Musculoskeletal - no joint tenderness, deformity or swelling   Extremities - peripheral pulses normal, no pedal edema, no clubbing or cyanosis   Skin - normal coloration and turgor, no rashes, no suspicious skin lesions noted   LABORATORY DATA:     Lab Results   Component Value Date    WBC 3.5 04/23/2019    HGB 13.6 04/23/2019    HCT 42.3 04/23/2019    MCV 90.3 04/23/2019     04/23/2019    LYMPHOPCT 23 04/23/2019    RBC 4.69 04/23/2019    MCH 29.1 04/23/2019    MCHC 32.2 04/23/2019    RDW 13.3 04/23/2019    MONOPCT 14 (H) 04/23/2019 BASOPCT 1 04/23/2019    NEUTROABS 2.20 04/23/2019    LYMPHSABS 0.80 (L) 04/23/2019    MONOSABS 0.50 04/23/2019    EOSABS 0.10 04/23/2019    BASOSABS 0.00 04/23/2019       Chemistry        Component Value Date/Time     02/16/2019 0547    K 4.3 02/16/2019 0547     02/16/2019 0547    CO2 25 02/16/2019 0547    BUN 18 02/16/2019 0547    CREATININE 0.55 02/16/2019 0547        Component Value Date/Time    CALCIUM 9.2 02/16/2019 0547    ALKPHOS 49 02/16/2019 0547    AST 23 02/16/2019 0547    ALT 21 02/16/2019 0547    BILITOT 0.36 02/16/2019 0547        PATHOLOGY DATA:   Received: 9/11/2018   Reported: 9/12/2018 17:30     -- Diagnosis --   1.  RIGHT BREAST, CORE NEEDLE BIOPSIES:            -  WELL-DIFFERENTIATED INFILTRATING DUCTAL CARCINOMA, 0.6 CM   IN LARGEST INTACT CORE FRAGMENT.            -  ESTROGEN AND PROGESTERONE RECEPTOR IMMUNOSTAINS POSITIVE   IN INVASIVE TUMOR.       -  HER-2 FISH PENDING. 2.  LEFT BREAST, 2:00, N+4, CORE NEEDLE BIOPSIES:            -  MODERATELY DIFFERENTIATED INFILTRATING DUCTAL CARCINOMA,   0.6 CM IN LARGEST INTACT CORE FRAGMENT.       -  FOCAL DUCTAL CARCINOMA IN SITU, INTERMEDIATE GRADE, CRIBRIFORM   AND SOLID TYPE.            -  ESTROGEN AND PROGESTERONE RECEPTOR IMMUNOSTAINS POSITIVE   IN INVASIVE TUMOR.       -  HER-2 FISH PENDING. 3.  LEFT BREAST, 1:00, N+6, CORE NEEDLE BIOPSIES:            -  POORLY DIFFERENTIATED INFILTRATING DUCTAL CARCINOMA, 1.3   CM GREATEST DIMENSION IN LARGEST INTACT CORE FRAGMENT.       -  FOCAL DUCTAL CARCINOMA IN SITU, INTERMEDIATE GRADE, SOLID   TYPE.            -  ESTROGEN AND PROGESTERONE RECEPTOR IMMUNOSTAINS POSITIVE   IN INVASIVE TUMOR.       -  HER-2 FISH PENDING. 4.  LYMPH NODE, LEFT AXILLA, CORE NEEDLE BIOPSIES:       -  INFILTRATING DUCTAL CARCINOMA.          -  INVASIVE TUMOR POSITIVE FOR ESTROGEN RECEPTOR AND   NEGATIVE FOR PROGESTERONE RECEPTOR IMMUNOSTAINS.        -  LYMPH NODE AND BENIGN BREAST ARCHITECTURE NOT IDENTIFIED. -- Diagnosis Comment --   SPECIMEN #4: CORRELATION WITH THE RADIOLOGIC PLACEMENT OF THE BIOPSY   WOULD BE NECESSARY TO DETERMINE IF THIS REPRESENTS A LYMPH NODE WITH   METASTATIC CARCINOMA, VERSUS CARCINOMA INVOLVING THE AXILLARY TAIL OF   THE BREAST.  NO INTRINSIC LYMPH NODE OR BENIGN BREAST ARCHITECTURE IS   PRESENT FOR CONFIRMATION. 2/15/19: Final Diagnosis   1. LYMPH NODE, EXCISION (RIGHT SENTINEL):        - NO NEOPLASM DETECTED. 2. BREAST, MASTECTOMY (RIGHT):        - SCANT RESIDUAL FOCUS OF INVASIVE DUCTAL CARCINOMA.      - DUCTAL CARCINOMA IN SITU.        - EXCISION MARGINS NEGATIVE FOR NEOPLASM. 3. BREAST, MASTECTOMY, INCLUDING AXILLARY LYMPH NODES (LEFT):        - INVASIVE DUCTAL CARCINOMA, 1.5 CM        - DUCTAL CARCINOMA IN SITU.        - EXCISION MARGINS NEGATIVE FOR NEOPLASM. Diagnosis Comment   BIOMARKER STUDIES WERE PERFORMED ON PRIOR BIOPSIES FROM TUMORS FROM   BOTH BREASTS ([id number]) -     RIGHT:        - POSITIVE ESTROGEN RECEPTOR.        - POSITIVE PROGESTERONE RECEPTOR.        - NEGATIVE FOR HER2 GENE AMPLIFICATION. LEFT:        - POSITIVE ESTROGEN RECEPTOR.        - POSITIVE PROGESTERONE RECEPTOR.        - NEGATIVE FOR HER2 GENE AMPLIFICATION. Procedures/Addenda   ADDENDUM     Date Ordered:     2/21/2019     Status: Signed Out        Date Complete:     2/21/2019     By: Tate Campoverde M.D.        Date Reported:     2/21/2019       ADDENDUM DIAGNOSIS   3.  BREAST, MASTECTOMY, WITH AXILLARY LYMPH NODES (LEFT):        - NO CHANGE IN DIAGNOSIS. ADDENDUM COMMENT   THE CASE IS DISCUSSED WITH DR. CID, FEBRUARY 20, 2019.  SLIDES ARE   REVIEWED, WITH SPECIFIC ATTENTION TO THE 6 SLIDES FROM AXILLARY   REGION.  THESE REPRESENT 2 LYMPH NODES, INCLUDING A LARGER 4.5 CM   NODE, AND NEITHER CONTAINS NEOPLASM ON REVIEW.  THE GROSS MASTECTOMY   SPECIMEN IS THEN REEXAMINED WITH SPECIFIC ATTENTION TO THE AXILLARY   TISSUE, AS WELL AS THE BREAST ADJACENT TO THE AXILLARY REGION.  NO   ADDITIONAL GROSSLY IDENTIFIABLE LYMPH NODES ARE FOUND.  REPRESENTATIVE   SAMPLING OF 8 ADDITIONAL BLOCKS FROM THIS AXILLARY REGION IS SUBMITTED   FOR POSSIBLE IDENTIFICATION OF MINUTE NODES THAT MAY NOT BE GROSSLY   EVIDENT.  HOWEVER, NO ADDITIONAL NODES ARE DETECTED ON MICROSCOPIC   EXAM OF THESE ADDITIONAL BLOCKS.   IMAGING DATA:    Ultrasound breast 9/10/18  FINDINGS:   DIAGNOSTIC MAMMOGRAM: The breasts are composed of scattered fibroglandular   tissue.  Underlying the area palpable concern in the upper outer left breast,   2 masses are identified measuring up to 1.6 cm anteriorly and with a more   suspicious spiculated 2.4 cm mass with adjacent distortion.  No abnormal   calcifications are identified.  In the right breast, a spiculated 1 cm mass   is identified in the lower inner quadrant, 6 cm from the nipple.  No   associated microcalcifications.       TARGETED LEFT ULTRASOUND: Ultrasound was performed in the area of palpable   concern, identifying 2 adjacent irregular hypoechoic masses measuring 1.7 x   2.0 x 1.4 cm in the 2 o'clock position, 4 cm from the nipple and 1.8 x 2.1 x   2.0 cm in the 1 o'clock position, 6 cm from the nipple.       Ultrasound in the left axilla was also performed identifying a probable   abnormal lymph node in the axillary tail measuring 2.1 x 1.6 x 2.2 cm.  This   has an irregular appearance with a hyperechoic rim and no identifiable fatty   hilum. Zeferino Teixeira is an adjacent abnormal lymph node measuring 1.7 x 1.0 x 2.0 cm   with a small fatty hilum and focal cortical thickening.       TARGETED RIGHT ULTRASOUND: Ultrasound was performed in the area of   mammographic concern in the lower inner quadrant.  A suspicious irregular   hypoechoic mass measuring 0.7 x 0.8 x 0.8 cm is present in the 3 o'clock   position, 7 cm from the nipple.  This corresponds to the mammographic finding.       Ultrasound imaging of the right axilla was also performed without   identifiable lymph nodes.           Impression   1. 2 noncalcified suspicious masses in the area palpable concern in the 1-2   o'clock position of the left breast with sonographic correlate measuring up   to 2.0 cm and 2.1 cm.       2. Left axillary ultrasound identifies 2 abnormal lymph nodes.       3.  New suspicious noncalcified mass in the lower inner quadrant of the right   breast with corresponding sonographic 0.8 cm mass in the 3 o'clock position,   7 cm from the nipple.       4.  No identifiable lymph nodes in the right axilla.       Ultrasound-guided biopsy of the left breast masses, a left axillary lymph   node, and right breast mass is recommended.       BIRADS:   BIRADS - CATEGORY 5       Findings are highly suggestive of malignancy.  Biopsy should be considered at   this time. Bone scan 9/21/18  Impression   No evidence to suggest osseous metastatic disease. CT chest abdomen pelvis 9/21/18  Impression   1.  Known bilateral breast masses and left axillary lymphadenopathy are   demonstrated.  No evidence for distant metastatic disease in the chest,   abdomen, or pelvis.       2.  Tiny right hydropneumothorax, presumed related to recent right chest port   placement.       3.  Tiny hiatal hernia.       4.  There is a 1.5 cm right thyroid nodule.  Thyroid ultrasound is   recommended.       The result was communicated to the ordering physician, Dr. Jossie Beatty, at 1:42   p.m. on 09/21/2018.  This was communicated by the radiology communication   center [facility], as I was not available when Dr. Jossie Beatty returned the   phone call. Mammogram 1/4/19  Impression   Mammographic evidence of response of therapy within both breasts as described   above.  No new lesions are identified.         ASSESSMENT:      I discussed the pathology results, imaging studies, diagnosis, prognosis, treatment options with patient and her .  [place] A[place]as has clinical stage II, T2 N1 M0 disease in her left breast and she has clinical stage I T1 N0 M0 disease in her right breast.  Given the fact that these lesions appeared within very short period of time as last year she had excisional biopsy of the lesion and negative mammograms. I discussed option of neoadjuvant chemotherapy followed by surgery and patient is considering bilateral mastectomies. Unfortunately she had significant reaction to first cycle of AC chemotherapy. I gave preoperative  weekly paclitaxel for 12 cycles. She has a good response based on the recent mammogram.  Previously biopsied mass within the right breast is not seen on recent mammogram.  I discussed the surgical pathology report. LNs showed no residual cancer, NO residual cancer noted in right breast, But left breast showed smaller residual invasive cancer 1.5 cm and she had negative margins. I discussed the data from [employer] trial in her 2 negative patients about adjuvant Xeloda in patients with residual carcinoma after neoadjuvant chemotherapy. This trail showed improved PFS and OS in patient who received adjuvant capecitabine in patient with residual disease. Given that she did not receive antracycle and cyclophosphamide chemo, I would favor using adjuvant xeloda. I discussed risk benefits. She will complete RT in 3-4 days. I will plan oral Xeloda after RT. During today's visit, the patient and the family had a number of reasonable questions which were answered to their satisfaction. They verbalized understanding of the information provided and they agreed to proceed as outlined above. PLAN:   She is getting radiation therapy  Will plan xeloda in 2-3 week and plan for 6 cycles   Chemo teaching  I discussed with plastic surgery that expansion is okay while on chemo but would need to avoid surgery  RTC 4 weeks      Benson Rodriguez MD  Hematologist/Medical Oncologist  I spent more than 40 minutes examining, evaluating, reviewing data and counseling the patient.   Greater than 50% of that time was spent face-to-face with the patient in counseling and coordinating her care. This note is created with the assistance of a speech recognition program.  While intending to generate a document that actually reflects the content of the visit, the document can still have some errors including those of syntax and sound a like substitutions which may escape proof reading. It such instances, actual meaning can be extrapolated by contextual diversion.

## 2019-05-07 NOTE — PROGRESS NOTES
Pt here for postop follow up B/L mastectomies with reconstruction. Currently getting radiation. Continues to do very good. Incisions look good. Continue to follow up with Dr. Piero Easton, plastic surgery  Continue to follow up with Dr. Kiran Gibson, oncology  And we will see PRN. Also discuss port removal.  Currently she wants to keep it in.

## 2019-05-16 ENCOUNTER — TELEPHONE (OUTPATIENT)
Dept: ONCOLOGY | Age: 69
End: 2019-05-16

## 2019-05-16 NOTE — TELEPHONE ENCOUNTER
I reviewed patient's visit notes from 5/6/19; I called patient and left message, reminded her who I am and that I saw Dr. Kevin Foreman note and I am up to speed on where she is at with treatment. I let her know that she can call me if she needs anything. I understand that  said port can be removed, encouraged the patient to let me know if Dr. Avi Quiles needs an order for that. I am here to help if she needs anything. Wished her well. Pt on pending.

## 2019-05-20 ENCOUNTER — HOSPITAL ENCOUNTER (OUTPATIENT)
Dept: INFUSION THERAPY | Age: 69
Discharge: HOME | End: 2019-05-20
Payer: MEDICARE

## 2019-05-20 DIAGNOSIS — Z17.0 MALIGNANT NEOPLASM OF UPPER-OUTER QUADRANT OF LEFT BREAST IN FEMALE, ESTROGEN RECEPTOR POSITIVE (HCC): ICD-10-CM

## 2019-05-20 DIAGNOSIS — C50.412 MALIGNANT NEOPLASM OF UPPER-OUTER QUADRANT OF LEFT BREAST IN FEMALE, ESTROGEN RECEPTOR POSITIVE (HCC): ICD-10-CM

## 2019-05-20 PROCEDURE — 99211 OFF/OP EST MAY X REQ PHY/QHP: CPT

## 2019-05-20 NOTE — PROGRESS NOTES
Patient and spouse here for oral chemotherapy teaching. Spent 20 minutes with them. [facility] and verbal information given on chemotherapy agents, action, administration and side effects. Drugs discussed: Xeloda    Reviewed anti emetic medications, pt has prescriptions    Questions answered, support given.

## 2019-05-24 ENCOUNTER — OFFICE VISIT (OUTPATIENT)
Dept: SURGERY | Age: 69
End: 2019-05-24

## 2019-05-24 VITALS
DIASTOLIC BLOOD PRESSURE: 62 MMHG | BODY MASS INDEX: 29.86 KG/M2 | HEART RATE: 74 BPM | WEIGHT: 197 LBS | HEIGHT: 68 IN | SYSTOLIC BLOOD PRESSURE: 128 MMHG

## 2019-05-24 DIAGNOSIS — Z98.890 S/P BREAST RECONSTRUCTION, BILATERAL: Primary | ICD-10-CM

## 2019-05-24 PROCEDURE — 99024 POSTOP FOLLOW-UP VISIT: CPT | Performed by: PLASTIC SURGERY

## 2019-05-24 ASSESSMENT — ENCOUNTER SYMPTOMS
TROUBLE SWALLOWING: 0
ABDOMINAL PAIN: 0
COUGH: 0
SHORTNESS OF BREATH: 0

## 2019-05-24 NOTE — PROGRESS NOTES
Subjective:      Patient ID: Eris Goldstein is a 81 y.o. female. HPI  Patient is now 2 weeks post radiation therapy. She still has a bit of radiation burn. She is using moisturizers. She is feeling well. Review of Systems   Constitutional: Negative. HENT: Negative for congestion and trouble swallowing. Respiratory: Negative for cough and shortness of breath. Cardiovascular: Negative for chest pain. Gastrointestinal: Negative for abdominal pain. Neurological: Negative for light-headedness and headaches. Objective:   Physical Exam   Constitutional: She is oriented to person, place, and time. She appears well-developed and well-nourished. HENT:   Head: Normocephalic and atraumatic. Eyes: Pupils are equal, round, and reactive to light. Conjunctivae and EOM are normal.   Pulmonary/Chest: Effort normal.   Left chest with reddish radiation burn. Crustiness is slowly sloughing. Tissues are soft and pliable. Neurological: She is alert and oriented to person, place, and time. Skin: Skin is warm and dry. Vitals reviewed. Assessment: In progress breast reconstruction. Plan:      Recheck in 3 weeks. At that time we will resume expansion.           Sumi Lozano MD

## 2019-05-28 ENCOUNTER — HOSPITAL ENCOUNTER (OUTPATIENT)
Dept: INFUSION THERAPY | Age: 69
Discharge: HOME | End: 2019-05-28
Payer: MEDICARE

## 2019-05-28 DIAGNOSIS — Z17.0 MALIGNANT NEOPLASM OF UPPER-OUTER QUADRANT OF LEFT BREAST IN FEMALE, ESTROGEN RECEPTOR POSITIVE (HCC): Primary | ICD-10-CM

## 2019-05-28 DIAGNOSIS — C50.412 MALIGNANT NEOPLASM OF UPPER-OUTER QUADRANT OF LEFT BREAST IN FEMALE, ESTROGEN RECEPTOR POSITIVE (HCC): Primary | ICD-10-CM

## 2019-05-28 LAB
ABSOLUTE EOS #: 0.11 K/UL (ref 0–0.4)
ABSOLUTE IMMATURE GRANULOCYTE: ABNORMAL K/UL (ref 0–0.3)
ABSOLUTE LYMPH #: 0.54 K/UL (ref 1–4.8)
ABSOLUTE MONO #: 0.58 K/UL (ref 0.1–1.2)
ALBUMIN SERPL-MCNC: 4.3 G/DL (ref 3.5–5.2)
ALBUMIN/GLOBULIN RATIO: 1.7 (ref 1–2.5)
ALP BLD-CCNC: 74 U/L (ref 35–104)
ALT SERPL-CCNC: 23 U/L (ref 5–33)
ANION GAP SERPL CALCULATED.3IONS-SCNC: 11 MMOL/L (ref 9–17)
AST SERPL-CCNC: 22 U/L
BASOPHILS # BLD: 0 % (ref 0–1)
BASOPHILS ABSOLUTE: 0 K/UL (ref 0–0.2)
BILIRUB SERPL-MCNC: 0.45 MG/DL (ref 0.3–1.2)
BUN BLDV-MCNC: 17 MG/DL (ref 8–23)
BUN/CREAT BLD: 28 (ref 9–20)
CALCIUM SERPL-MCNC: 9.5 MG/DL (ref 8.6–10.4)
CHLORIDE BLD-SCNC: 107 MMOL/L (ref 98–107)
CO2: 25 MMOL/L (ref 20–31)
CREAT SERPL-MCNC: 0.6 MG/DL (ref 0.5–0.9)
DIFFERENTIAL TYPE: ABNORMAL
EOSINOPHILS RELATIVE PERCENT: 3 % (ref 1–7)
GFR AFRICAN AMERICAN: >60 ML/MIN
GFR NON-AFRICAN AMERICAN: >60 ML/MIN
GFR SERPL CREATININE-BSD FRML MDRD: ABNORMAL ML/MIN/{1.73_M2}
GFR SERPL CREATININE-BSD FRML MDRD: ABNORMAL ML/MIN/{1.73_M2}
GLUCOSE BLD-MCNC: 91 MG/DL (ref 70–99)
HCT VFR BLD CALC: 39.4 % (ref 36–46)
HEMOGLOBIN: 13 G/DL (ref 12–16)
IMMATURE GRANULOCYTES: ABNORMAL %
LYMPHOCYTES # BLD: 15 % (ref 16–46)
MCH RBC QN AUTO: 29.2 PG (ref 26–34)
MCHC RBC AUTO-ENTMCNC: 33.1 G/DL (ref 31–37)
MCV RBC AUTO: 88.1 FL (ref 80–100)
MONOCYTES # BLD: 16 % (ref 4–11)
MORPHOLOGY: ABNORMAL
MORPHOLOGY: ABNORMAL
NRBC AUTOMATED: ABNORMAL PER 100 WBC
PDW BLD-RTO: 14.5 % (ref 11–14.5)
PLATELET # BLD: 204 K/UL (ref 140–450)
PLATELET ESTIMATE: ABNORMAL
PMV BLD AUTO: 9.3 FL (ref 6–12)
POTASSIUM SERPL-SCNC: 4.3 MMOL/L (ref 3.7–5.3)
RBC # BLD: 4.47 M/UL (ref 4–5.2)
RBC # BLD: ABNORMAL 10*6/UL
SEG NEUTROPHILS: 66 % (ref 43–77)
SEGMENTED NEUTROPHILS ABSOLUTE COUNT: 2.37 K/UL (ref 1.8–7.7)
SODIUM BLD-SCNC: 143 MMOL/L (ref 135–144)
TOTAL PROTEIN: 6.9 G/DL (ref 6.4–8.3)
WBC # BLD: 3.6 K/UL (ref 3.5–11)
WBC # BLD: ABNORMAL 10*3/UL

## 2019-05-28 PROCEDURE — 6360000002 HC RX W HCPCS: Performed by: INTERNAL MEDICINE

## 2019-05-28 PROCEDURE — 85025 COMPLETE CBC W/AUTO DIFF WBC: CPT

## 2019-05-28 PROCEDURE — 80053 COMPREHEN METABOLIC PANEL: CPT

## 2019-05-28 PROCEDURE — 36591 DRAW BLOOD OFF VENOUS DEVICE: CPT

## 2019-05-28 PROCEDURE — 2580000003 HC RX 258: Performed by: INTERNAL MEDICINE

## 2019-05-28 RX ORDER — SODIUM CHLORIDE 0.9 % (FLUSH) 0.9 %
10 SYRINGE (ML) INJECTION PRN
Status: DISCONTINUED | OUTPATIENT
Start: 2019-05-28 | End: 2019-05-29 | Stop reason: HOSPADM

## 2019-05-28 RX ORDER — HEPARIN SODIUM (PORCINE) LOCK FLUSH IV SOLN 100 UNIT/ML 100 UNIT/ML
500 SOLUTION INTRAVENOUS PRN
Status: DISCONTINUED | OUTPATIENT
Start: 2019-05-28 | End: 2019-05-29 | Stop reason: HOSPADM

## 2019-05-28 RX ORDER — SODIUM CHLORIDE 0.9 % (FLUSH) 0.9 %
10 SYRINGE (ML) INJECTION PRN
Status: CANCELLED | OUTPATIENT
Start: 2019-05-28

## 2019-05-28 RX ORDER — HEPARIN SODIUM (PORCINE) LOCK FLUSH IV SOLN 100 UNIT/ML 100 UNIT/ML
500 SOLUTION INTRAVENOUS PRN
Status: CANCELLED | OUTPATIENT
Start: 2019-05-28

## 2019-05-28 RX ADMIN — HEPARIN SODIUM (PORCINE) LOCK FLUSH IV SOLN 100 UNIT/ML 500 UNITS: 100 SOLUTION at 11:05

## 2019-05-28 RX ADMIN — Medication 10 ML: at 11:05

## 2019-05-28 NOTE — PROGRESS NOTES
VAD accessed per protocol using 20 gauge 3/4\" gramajo needle. Flushes easily. Labs drawn. Flushed with 10 ml normal saline and 5 ml Heplock solution. Gramajo needle dc'd. Bandaid to site. Patient tolerated procedure with out difficulty. Patient discharged to home with .

## 2019-06-10 NOTE — PROGRESS NOTES
3 [facility]   Hereditary Cancer Risk Assessment      Name: Basil Bolivar  YOB: 1950  Date of Results Disclosure: 3/25/19     HISTORY   Ms. Mary Velarde was seen for genetic counseling on 1/28/19 at the request of Dr. Rolly Sinha due to her personal and family history of cancer. At that time, Ms. Mary Velarde chose to pursue genetic testing for hereditary breast cancer. These results were discussed with Ms. Mary Velarde on 3/25/19. A summary of Ms. Coon's results and recommendations are below. 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 Please refer to genetic test report for technical details. Ms. 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 Women who carry a CHEK2 gene mutation have an increased risk for breast cancer compared to the 12.5% lifetime risk for women in the general population. The breast cancer risk for individuals with a CHEK2 mutation varies widely and is strongly influenced by family history. It is estimated that a woman with a CHEK2 mutation has  lifetime breast cancer risk of 23-48%. However, the risk may be higher if an individual has a strong family history of breast cancer. Currently, there is limited data to understand a woman's risk for a second or third breast cancer diagnosis. The risk for male breast cancer is increased; however, the exact risk is unclear at this time.      More recent data has also shown that CHEK2 carriers are at a moderately increased risk for colorectal cancer. Other studies have suggested an increased risk for other cancers, including prostate, gastric, and thyroid cancers. However, these studies are not conclusive and there are no medical management guidelines to address these risks. Recommendations for the management of breast and colorectal cancer risk are summarized below. RECOMMENDATIONS  Individuals who carry a CHEK2 mutation are encouraged to follow the [employer] (NCCN) Version 3.2019 guidelines for cancer screening and prevention as outlined below. FEMALE BREAST CANCER      NCCN Recommendation Age to Begin Frequency    Breast awareness - Women should be familiar with their breasts and promptly report changes to their healthcare provider. Periodic, consistent breast self-examination (BSE) may be beneficial  Individualized  N/A    Clinical Breast Examination  36years old* Every 6-12 months   Consider breast MRI with contrast  36years old*  Annual   Mammogram (consider tomosynthesis)  36years old*  Annual    Consider risk reducing mastectomy based on personal and family history of cancer Individualized  N/A   Consider risk reducing agents, such as chemoprevention (i.e. Tamoxifen)  Individualized  N/A    *age to begin screening based on the ages of breast cancer diagnoses in Ms. Coon's family. COLON CANCER      NCCN Recommendation Age to Begin Frequency   Colonoscopy  36years old or earlier based on family history  Every 5 years    *age to begin screening based on the ages of colon cancer diagnoses in Ms. Morochos family. OTHER CANCER     At this time, there is no clear evidence that suggests individuals with a CHEK2 gene mutation have an increased risk for other cancers. Thus, individuals should complete an annual physician exam and follow general population screening guidelines for all other cancers at the direction of their physician.      RECOMMENDATIONS FOR FAMILY MEMBERS   First degree treatment is complete. 4) We encourage Ms. Laamr Jorge to share her genetic test results with her family members. She is encouraged to contact her genetic counselor for any additional questions or support. 5) We encourage Ms. Lamar Jorge to contact us with updates to her personal and/or familys cancer history as this information may alter our assessment and/or recommendations. The [address] [place] [address] would be glad to offer our assistance should you have any questions or concerns about this information. Please feel free to contact us at [phone]. A total of 30 minutes were spent face to face with Ms. Lamar Jorge and 50% of the time was spent educating and counseling. Prudencio Auguste MS, [facility]   Licensed Genetic Counselor

## 2019-06-14 ENCOUNTER — OFFICE VISIT (OUTPATIENT)
Dept: SURGERY | Age: 69
End: 2019-06-14

## 2019-06-14 VITALS
SYSTOLIC BLOOD PRESSURE: 130 MMHG | HEART RATE: 86 BPM | DIASTOLIC BLOOD PRESSURE: 68 MMHG | HEIGHT: 67 IN | BODY MASS INDEX: 30.8 KG/M2 | WEIGHT: 196.21 LBS

## 2019-06-14 DIAGNOSIS — Z98.890 S/P BREAST RECONSTRUCTION, BILATERAL: Primary | ICD-10-CM

## 2019-06-14 PROCEDURE — 99024 POSTOP FOLLOW-UP VISIT: CPT | Performed by: PLASTIC SURGERY

## 2019-06-14 ASSESSMENT — ENCOUNTER SYMPTOMS
SHORTNESS OF BREATH: 0
COUGH: 0
TROUBLE SWALLOWING: 0
ABDOMINAL PAIN: 0

## 2019-06-17 ENCOUNTER — OFFICE VISIT (OUTPATIENT)
Dept: ONCOLOGY | Age: 69
End: 2019-06-17
Payer: MEDICARE

## 2019-06-17 ENCOUNTER — HOSPITAL ENCOUNTER (OUTPATIENT)
Dept: INFUSION THERAPY | Age: 69
Discharge: HOME | End: 2019-06-17
Payer: MEDICARE

## 2019-06-17 VITALS
HEART RATE: 72 BPM | HEIGHT: 67 IN | BODY MASS INDEX: 31.08 KG/M2 | SYSTOLIC BLOOD PRESSURE: 126 MMHG | WEIGHT: 198 LBS | TEMPERATURE: 99.5 F | DIASTOLIC BLOOD PRESSURE: 72 MMHG

## 2019-06-17 DIAGNOSIS — C50.411 MALIGNANT NEOPLASM OF UPPER-OUTER QUADRANT OF BOTH BREASTS IN FEMALE, ESTROGEN RECEPTOR POSITIVE (HCC): Primary | ICD-10-CM

## 2019-06-17 DIAGNOSIS — C50.412 MALIGNANT NEOPLASM OF UPPER-OUTER QUADRANT OF LEFT BREAST IN FEMALE, ESTROGEN RECEPTOR POSITIVE (HCC): Primary | ICD-10-CM

## 2019-06-17 DIAGNOSIS — C50.412 MALIGNANT NEOPLASM OF UPPER-OUTER QUADRANT OF BOTH BREASTS IN FEMALE, ESTROGEN RECEPTOR POSITIVE (HCC): Primary | ICD-10-CM

## 2019-06-17 DIAGNOSIS — Z17.0 MALIGNANT NEOPLASM OF UPPER-OUTER QUADRANT OF BOTH BREASTS IN FEMALE, ESTROGEN RECEPTOR POSITIVE (HCC): Primary | ICD-10-CM

## 2019-06-17 DIAGNOSIS — Z17.0 MALIGNANT NEOPLASM OF UPPER-OUTER QUADRANT OF LEFT BREAST IN FEMALE, ESTROGEN RECEPTOR POSITIVE (HCC): Primary | ICD-10-CM

## 2019-06-17 LAB
ABSOLUTE EOS #: 0.1 K/UL (ref 0–0.4)
ABSOLUTE IMMATURE GRANULOCYTE: ABNORMAL K/UL (ref 0–0.3)
ABSOLUTE LYMPH #: 0.7 K/UL (ref 1–4.8)
ABSOLUTE MONO #: 0.6 K/UL (ref 0.1–1.2)
ALBUMIN SERPL-MCNC: 4.3 G/DL (ref 3.5–5.2)
ALBUMIN/GLOBULIN RATIO: 2 (ref 1–2.5)
ALP BLD-CCNC: 64 U/L (ref 35–104)
ALT SERPL-CCNC: 34 U/L (ref 5–33)
ANION GAP SERPL CALCULATED.3IONS-SCNC: 10 MMOL/L (ref 9–17)
AST SERPL-CCNC: 32 U/L
BASOPHILS # BLD: 1 % (ref 0–1)
BASOPHILS ABSOLUTE: 0 K/UL (ref 0–0.2)
BILIRUB SERPL-MCNC: 0.5 MG/DL (ref 0.3–1.2)
BUN BLDV-MCNC: 14 MG/DL (ref 8–23)
BUN/CREAT BLD: 29 (ref 9–20)
CALCIUM SERPL-MCNC: 9.6 MG/DL (ref 8.6–10.4)
CHLORIDE BLD-SCNC: 107 MMOL/L (ref 98–107)
CO2: 25 MMOL/L (ref 20–31)
CREAT SERPL-MCNC: 0.49 MG/DL (ref 0.5–0.9)
DIFFERENTIAL TYPE: ABNORMAL
EOSINOPHILS RELATIVE PERCENT: 3 % (ref 1–7)
GFR AFRICAN AMERICAN: >60 ML/MIN
GFR NON-AFRICAN AMERICAN: >60 ML/MIN
GFR SERPL CREATININE-BSD FRML MDRD: ABNORMAL ML/MIN/{1.73_M2}
GFR SERPL CREATININE-BSD FRML MDRD: ABNORMAL ML/MIN/{1.73_M2}
GLUCOSE BLD-MCNC: 101 MG/DL (ref 70–99)
HCT VFR BLD CALC: 37.9 % (ref 36–46)
HEMOGLOBIN: 12.5 G/DL (ref 12–16)
IMMATURE GRANULOCYTES: ABNORMAL %
LYMPHOCYTES # BLD: 22 % (ref 16–46)
MCH RBC QN AUTO: 29.6 PG (ref 26–34)
MCHC RBC AUTO-ENTMCNC: 32.9 G/DL (ref 31–37)
MCV RBC AUTO: 90 FL (ref 80–100)
MONOCYTES # BLD: 17 % (ref 4–11)
NRBC AUTOMATED: ABNORMAL PER 100 WBC
PDW BLD-RTO: 15.6 % (ref 11–14.5)
PLATELET # BLD: 226 K/UL (ref 140–450)
PLATELET ESTIMATE: ABNORMAL
PMV BLD AUTO: 8.3 FL (ref 6–12)
POTASSIUM SERPL-SCNC: 4.1 MMOL/L (ref 3.7–5.3)
RBC # BLD: 4.21 M/UL (ref 4–5.2)
RBC # BLD: ABNORMAL 10*6/UL
SEG NEUTROPHILS: 57 % (ref 43–77)
SEGMENTED NEUTROPHILS ABSOLUTE COUNT: 1.9 K/UL (ref 1.8–7.7)
SODIUM BLD-SCNC: 142 MMOL/L (ref 135–144)
TOTAL PROTEIN: 6.5 G/DL (ref 6.4–8.3)
WBC # BLD: 3.4 K/UL (ref 3.5–11)
WBC # BLD: ABNORMAL 10*3/UL

## 2019-06-17 PROCEDURE — 6360000002 HC RX W HCPCS: Performed by: INTERNAL MEDICINE

## 2019-06-17 PROCEDURE — 99215 OFFICE O/P EST HI 40 MIN: CPT | Performed by: INTERNAL MEDICINE

## 2019-06-17 PROCEDURE — 80053 COMPREHEN METABOLIC PANEL: CPT

## 2019-06-17 PROCEDURE — 36591 DRAW BLOOD OFF VENOUS DEVICE: CPT

## 2019-06-17 PROCEDURE — 85025 COMPLETE CBC W/AUTO DIFF WBC: CPT

## 2019-06-17 PROCEDURE — 2580000003 HC RX 258: Performed by: INTERNAL MEDICINE

## 2019-06-17 RX ORDER — CAPECITABINE 500 MG/1
2000 TABLET, FILM COATED ORAL 2 TIMES DAILY
COMMUNITY
End: 2019-06-18 | Stop reason: SDUPTHER

## 2019-06-17 RX ORDER — HEPARIN SODIUM (PORCINE) LOCK FLUSH IV SOLN 100 UNIT/ML 100 UNIT/ML
500 SOLUTION INTRAVENOUS PRN
Status: CANCELLED | OUTPATIENT
Start: 2019-06-17

## 2019-06-17 RX ORDER — HEPARIN SODIUM (PORCINE) LOCK FLUSH IV SOLN 100 UNIT/ML 100 UNIT/ML
500 SOLUTION INTRAVENOUS PRN
Status: DISCONTINUED | OUTPATIENT
Start: 2019-06-17 | End: 2019-06-18 | Stop reason: HOSPADM

## 2019-06-17 RX ORDER — SODIUM CHLORIDE 0.9 % (FLUSH) 0.9 %
10 SYRINGE (ML) INJECTION PRN
Status: DISCONTINUED | OUTPATIENT
Start: 2019-06-17 | End: 2019-06-18 | Stop reason: HOSPADM

## 2019-06-17 RX ORDER — SODIUM CHLORIDE 0.9 % (FLUSH) 0.9 %
10 SYRINGE (ML) INJECTION PRN
Status: CANCELLED | OUTPATIENT
Start: 2019-06-17

## 2019-06-17 RX ADMIN — HEPARIN SODIUM (PORCINE) LOCK FLUSH IV SOLN 100 UNIT/ML 500 UNITS: 100 SOLUTION at 12:55

## 2019-06-17 RX ADMIN — Medication 10 ML: at 12:53

## 2019-06-17 RX ADMIN — Medication 10 ML: at 12:55

## 2019-06-17 NOTE — PATIENT INSTRUCTIONS
Chemo with xeloda as planned  Please call Pharmacy for enough refill for Xeloda for total 6 cycles   Labs and port flush today  Labs every 3 weeks  RTC 6

## 2019-06-17 NOTE — PROGRESS NOTES
Patient ID: Eris Goldstein, 1950, [id number], 81 y.o. Surgeon : Dr. Danyell Olsen  Diagnosis:   Diagnosis of bilateral breast cancer  Left invasive ductal carcinoma, ER/WI positive and HER-2/kelsy Negative, 2 foci 2.0 cm and 2.1 cm, clinical staging T2 N1 M0, stage II  Right- breast cancer T1b N0 M0, stage I, ER/WI positive HER-2 negative  Started on neoadjuvant chemotherapy with Adriamycin plus cyclophosphamide on 10/3/18 unfortunately she had significant reaction with generalized tremors, shortness of breath and chest tightness after chemotherapy  Her chemotherapy was changed to weekly Taxol and she completed 12 cycles on 1/2/19  Had bilateral mastectomies on 2/15/19  Adjuvant radiation therapy started on 4/2/19 and will complete 5/9/19  Xeloda  Started 5/27/19  HISTORY OF PRESENT ILLNESS:    Oncologic History:  Eran Prater is a 63-year-old female with a family history of breast cancer was seen during initial consultation visit for newly diagnosed bilateral breast cancer. Patient had abnormal mammogram about a year ago and the biopsy showed ductal papilloma so she had excision and was being followed by Gen. surgery. Recently she noted a lump in her left breast and had a mammogram and ultrasound which confirmed 2 lesions in her left breast measuring 2.0 and 2.1 cm and one lesion in the right breast measuring 0.8 cm. Also 2 lymph nodes noted in the left axilla. Patient underwent biopsy of these agents including axillary lymph node which confirmed presence of invasive ductal carcinoma and on the biopsies. She is here for further recommendations. She does not have many medical conditions and her ECOG performance status is 0. She is physically active and not on any regular medications. She has history of breast cancer in her sister was diagnosed at the age of 58 and also reports history of breast cancer in her grandmother. She quit smoking in 1978 and drinks alcohol occasionally.   She has had hysterectomy and oophorectomy. Interval history:  Patient is returning for follow-up visit. She is here for further recommendations. She has completed RT and tolerated well. She is tolerating Xeloda well. She is following with plastic surgery of expansions. She denied any pain. No fever chills. She does have mild fatigue. She denied any nausea vomiting or fever chills   During this visit patient's allergy, social, medical, surgical history and medications were reviewed and updated. Allergies   Allergen Reactions    Boniva [Ibandronic Acid] Other (See Comments)     Jaw pain.  Fosamax [Alendronate Sodium] Other (See Comments)     Jaw pain. Current Outpatient Medications   Medication Sig Dispense Refill    capecitabine (XELODA) 500 MG chemo tablet Take 2,000 mg by mouth 2 times daily       No current facility-administered medications for this visit. Social History     Socioeconomic History    Marital status:      Spouse name: Not on file    Number of children: Not on file    Years of education: Not on file    Highest education level: Not on file   Occupational History    Occupation: retired     Employer: [employer] [id number] resource strain: Not on file    Food insecurity:     Worry: Not on file     Inability: Not on file    Transportation needs:     Medical: Not on file     Non-medical: Not on file   Tobacco Use    Smoking status: Former Smoker     Packs/day: 0.50     Years: 7.00     Pack years: 3.50     Types: Cigarettes     Last attempt to quit: 1978     Years since quittin.4    Smokeless tobacco: Never Used    Tobacco comment: Quit smoking in . Substance and Sexual Activity    Alcohol use: No     Comment: Occasional alcohol.     Drug use: No    Sexual activity: Not on file   Lifestyle    Physical activity:     Days per week: Not on file     Minutes per session: Not on file    Stress: Not on file   Relationships    Social connections: Talks on phone: Not on file     Gets together: Not on file     Attends Quaker service: Not on file     Active member of club or organization: Not on file     Attends meetings of clubs or organizations: Not on file     Relationship status: Not on file    Intimate partner violence:     Fear of current or ex partner: Not on file     Emotionally abused: Not on file     Physically abused: Not on file     Forced sexual activity: Not on file   Other Topics Concern    Not on file   Social History Narrative    Not on file       Family History   Problem Relation Age of Onset    Hypertension Mother         Renal failure.  Diabetes Mother         Type 2 at end of life.  Heart Failure Father     Coronary Art Dis Father         Status post CABG    Diabetes Father         Type 2, very late in life.  Breast Cancer Sister 58    Hypertension Brother     Hypertension Sister     Breast Cancer Paternal Aunt         62s    Breast Cancer Paternal Grandmother     Breast Cancer Paternal Cousin     Breast Cancer Paternal Aunt         62s    Breast Cancer Paternal Cousin         46s      REVIEW OF SYSTEM:   Constitutional: No fever or chills. No night sweats, no weight loss   Eyes: No eye discharge, double vision, or eye pain   HEENT: negative for sore mouth, sore throat, hoarseness and voice change   Respiratory: negative for cough , sputum, dyspnea, wheezing, hemoptysis, chest pain   Cardiovascular: negative for chest pain, dyspnea, palpitations, orthopnea, PND   Gastrointestinal: negative for nausea, vomiting, diarrhea, constipation, abdominal pain, Dysphagia, hematemesis and hematochezia   Genitourinary: negative for frequency, dysuria, nocturia, urinary incontinence, and hematuria   Integument: negative for rash, skin lesions, bruises.    Hematologic/Lymphatic: negative for easy bruising, bleeding, lymphadenopathy, petechiae and swelling/edema   Endocrine: negative for heat or cold intolerance, tremor, weight changes, change in bowel habits and hair loss   Musculoskeletal: negative for myalgias, arthralgias, pain, joint swelling,and bone pain   Neurological: negative for headaches, dizziness, seizures, weakness, numbness   OBJECTIVE:         Vitals:    06/17/19 1128   BP: 126/72   Pulse: 72   Temp: 99.5 °F (37.5 °C)   PHYSICAL EXAM:   General appearance - well appearing, no in pain or distress   Mental status - alert and cooperative   Eyes - pupils equal and reactive, extraocular eye movements intact   Ears - bilateral TM's and external ear canals normal   Mouth - mucous membranes moist, pharynx normal without lesions   Neck - supple, no significant adenopathy   Lymphatics - Mild palpable lymphadenopathy in the left axilla, no hepatosplenomegaly   Chest - clear to auscultation, no wheezes, rales or rhonchi, symmetric air entry   Breast: biat breast surgically absent, drains in place  2 cm lump palpable in left breast  Heart - normal rate, regular rhythm, normal S1, S2, no murmurs, rubs, clicks or gallops   Abdomen - soft, nontender, nondistended, no masses or organomegaly   Neurological - alert, oriented, normal speech, no focal findings or movement disorder noted   Musculoskeletal - no joint tenderness, deformity or swelling   Extremities - peripheral pulses normal, no pedal edema, no clubbing or cyanosis   Skin - normal coloration and turgor, no rashes, no suspicious skin lesions noted   LABORATORY DATA:     Lab Results   Component Value Date    WBC 3.6 05/28/2019    HGB 13.0 05/28/2019    HCT 39.4 05/28/2019    MCV 88.1 05/28/2019     05/28/2019    LYMPHOPCT 15 (L) 05/28/2019    RBC 4.47 05/28/2019    MCH 29.2 05/28/2019    MCHC 33.1 05/28/2019    RDW 14.5 05/28/2019    MONOPCT 16 (H) 05/28/2019    BASOPCT 0 05/28/2019    NEUTROABS 2.37 05/28/2019    LYMPHSABS 0.54 (L) 05/28/2019    MONOSABS 0.58 05/28/2019    EOSABS 0.11 05/28/2019    BASOSABS 0.00 05/28/2019       Chemistry        Component Value Date/Time  05/28/2019 1105    K 4.3 05/28/2019 1105     05/28/2019 1105    CO2 25 05/28/2019 1105    BUN 17 05/28/2019 1105    CREATININE 0.60 05/28/2019 1105        Component Value Date/Time    CALCIUM 9.5 05/28/2019 1105    ALKPHOS 74 05/28/2019 1105    AST 22 05/28/2019 1105    ALT 23 05/28/2019 1105    BILITOT 0.45 05/28/2019 1105        PATHOLOGY DATA:   Received: 9/11/2018   Reported: 9/12/2018 17:30     -- Diagnosis --   1.  RIGHT BREAST, CORE NEEDLE BIOPSIES:            -  WELL-DIFFERENTIATED INFILTRATING DUCTAL CARCINOMA, 0.6 CM   IN LARGEST INTACT CORE FRAGMENT.            -  ESTROGEN AND PROGESTERONE RECEPTOR IMMUNOSTAINS POSITIVE   IN INVASIVE TUMOR.       -  HER-2 FISH PENDING. 2.  LEFT BREAST, 2:00, N+4, CORE NEEDLE BIOPSIES:            -  MODERATELY DIFFERENTIATED INFILTRATING DUCTAL CARCINOMA,   0.6 CM IN LARGEST INTACT CORE FRAGMENT.       -  FOCAL DUCTAL CARCINOMA IN SITU, INTERMEDIATE GRADE, CRIBRIFORM   AND SOLID TYPE.            -  ESTROGEN AND PROGESTERONE RECEPTOR IMMUNOSTAINS POSITIVE   IN INVASIVE TUMOR.       -  HER-2 FISH PENDING. 3.  LEFT BREAST, 1:00, N+6, CORE NEEDLE BIOPSIES:            -  POORLY DIFFERENTIATED INFILTRATING DUCTAL CARCINOMA, 1.3   CM GREATEST DIMENSION IN LARGEST INTACT CORE FRAGMENT.       -  FOCAL DUCTAL CARCINOMA IN SITU, INTERMEDIATE GRADE, SOLID   TYPE.            -  ESTROGEN AND PROGESTERONE RECEPTOR IMMUNOSTAINS POSITIVE   IN INVASIVE TUMOR.       -  HER-2 FISH PENDING. 4.  LYMPH NODE, LEFT AXILLA, CORE NEEDLE BIOPSIES:       -  INFILTRATING DUCTAL CARCINOMA.          -  INVASIVE TUMOR POSITIVE FOR ESTROGEN RECEPTOR AND   NEGATIVE FOR PROGESTERONE RECEPTOR IMMUNOSTAINS.     -  LYMPH NODE AND BENIGN BREAST ARCHITECTURE NOT IDENTIFIED.      -- Diagnosis Comment --   SPECIMEN #4: CORRELATION WITH THE RADIOLOGIC PLACEMENT OF THE BIOPSY   WOULD BE NECESSARY TO DETERMINE IF THIS REPRESENTS A LYMPH NODE WITH   METASTATIC CARCINOMA, VERSUS CARCINOMA chemotherapy followed by surgery and patient is considering bilateral mastectomies. Unfortunately she had significant reaction to first cycle of AC chemotherapy. I gave preoperative  weekly paclitaxel for 12 cycles. She has a good response based on the recent mammogram.  Previously biopsied mass within the right breast is not seen on recent mammogram.  I discussed the surgical pathology report. LNs showed no residual cancer, NO residual cancer noted in right breast, But left breast showed smaller residual invasive cancer 1.5 cm and she had negative margins. I discussed the data from [employer] trial in her 2 negative patients about adjuvant Xeloda in patients with residual carcinoma after neoadjuvant chemotherapy. This trail showed improved PFS and OS in patient who received adjuvant capecitabine in patient with residual disease. Given that she did not receive antracycle and cyclophosphamide chemo, I would favor using adjuvant xeloda. I discussed risk benefits. She  Has completed RT. Now tolerating oral Xeloda well. During today's visit, the patient and the family had a number of reasonable questions which were answered to their satisfaction. They verbalized understanding of the information provided and they agreed to proceed as outlined above. PLAN:   She is tolerating xeloda well. No major side effects notedy  Continue chemo and plan for 6 cycles   Chemo teaching  Labs today  Will check labs with each cycle  RTC 6 weeks      Benson Smiley MD  Hematologist/Medical Oncologist  I spent more than 40 minutes examining, evaluating, reviewing data and counseling the patient. Greater than 50% of that time was spent face-to-face with the patient in counseling and coordinating her care.           This note is created with the assistance of a speech recognition program.  While intending to generate a document that actually reflects the content of the visit, the document can still have some errors including those of syntax and sound a like substitutions which may escape proof reading. It such instances, actual meaning can be extrapolated by contextual diversion.

## 2019-06-17 NOTE — PROGRESS NOTES
VAD accessed per protocol with 3/4 \" gramajo needle. Flushed easily with saline. Blood return noted. Labs drawn. Flushed with 10 ml of NSS and 5 ml of Heparin flush. VAD D/C'd and Band-Aid to site. Patient stable and discharged to home.

## 2019-06-18 DIAGNOSIS — C50.412 MALIGNANT NEOPLASM OF UPPER-OUTER QUADRANT OF BOTH BREASTS IN FEMALE, ESTROGEN RECEPTOR POSITIVE (HCC): ICD-10-CM

## 2019-06-18 DIAGNOSIS — C50.412 CARCINOMA OF UPPER-OUTER QUADRANT OF FEMALE BREAST, LEFT (HCC): Primary | ICD-10-CM

## 2019-06-18 DIAGNOSIS — Z17.0 MALIGNANT NEOPLASM OF UPPER-OUTER QUADRANT OF BOTH BREASTS IN FEMALE, ESTROGEN RECEPTOR POSITIVE (HCC): ICD-10-CM

## 2019-06-18 DIAGNOSIS — Z17.0 CARCINOMA OF UPPER-INNER QUADRANT OF RIGHT BREAST IN FEMALE, ESTROGEN RECEPTOR POSITIVE (HCC): ICD-10-CM

## 2019-06-18 DIAGNOSIS — C50.211 CARCINOMA OF UPPER-INNER QUADRANT OF RIGHT BREAST IN FEMALE, ESTROGEN RECEPTOR POSITIVE (HCC): ICD-10-CM

## 2019-06-18 DIAGNOSIS — C50.411 MALIGNANT NEOPLASM OF UPPER-OUTER QUADRANT OF BOTH BREASTS IN FEMALE, ESTROGEN RECEPTOR POSITIVE (HCC): ICD-10-CM

## 2019-06-18 RX ORDER — CAPECITABINE 500 MG/1
2000 TABLET, FILM COATED ORAL 2 TIMES DAILY
Qty: 112 TABLET | Refills: 5 | Status: SHIPPED | OUTPATIENT
Start: 2019-06-18 | End: 2019-07-05 | Stop reason: SDUPTHER

## 2019-06-28 ENCOUNTER — OFFICE VISIT (OUTPATIENT)
Dept: SURGERY | Age: 69
End: 2019-06-28

## 2019-06-28 VITALS
SYSTOLIC BLOOD PRESSURE: 126 MMHG | BODY MASS INDEX: 30.76 KG/M2 | HEART RATE: 66 BPM | HEIGHT: 67 IN | DIASTOLIC BLOOD PRESSURE: 76 MMHG | RESPIRATION RATE: 18 BRPM | WEIGHT: 196 LBS

## 2019-06-28 DIAGNOSIS — Z98.890 S/P BREAST RECONSTRUCTION, BILATERAL: Primary | ICD-10-CM

## 2019-06-28 PROCEDURE — 99024 POSTOP FOLLOW-UP VISIT: CPT | Performed by: PLASTIC SURGERY

## 2019-06-28 ASSESSMENT — ENCOUNTER SYMPTOMS
ABDOMINAL PAIN: 0
SHORTNESS OF BREATH: 0
TROUBLE SWALLOWING: 0
COUGH: 0

## 2019-06-28 NOTE — PROGRESS NOTES
Subjective:      Patient ID: Beena Zaldivar is a 81 y.o. female. HPI  Patient continues to do well. She is ready for further expansion. Review of Systems   Constitutional: Negative. HENT: Negative for congestion and trouble swallowing. Respiratory: Negative for cough and shortness of breath. Cardiovascular: Negative for chest pain. Gastrointestinal: Negative for abdominal pain. Neurological: Negative for light-headedness and headaches. Psychiatric/Behavioral: Negative for dysphoric mood. Objective:   Physical Exam   Constitutional: She is oriented to person, place, and time. She appears well-developed and well-nourished. HENT:   Head: Normocephalic and atraumatic. Eyes: Pupils are equal, round, and reactive to light. Conjunctivae and EOM are normal.   Pulmonary/Chest: Effort normal.   Radiation pigmentation has faded. Tissues soft. Expanding nicely. 60 cc sterile IV saline were instilled into each expander with closed system, sterlie technique giving total volume of 410 cc per expander. Neurological: She is alert and oriented to person, place, and time. Skin: Skin is warm and dry. Vitals reviewed. Assessment: In progress breast reconstruction. Plan:      Return in 2 weeks.         Andres Lawson MD

## 2019-07-02 ENCOUNTER — TELEPHONE (OUTPATIENT)
Dept: ONCOLOGY | Age: 69
End: 2019-07-02

## 2019-07-03 DIAGNOSIS — C50.412 CARCINOMA OF UPPER-OUTER QUADRANT OF FEMALE BREAST, LEFT (HCC): ICD-10-CM

## 2019-07-03 DIAGNOSIS — C50.211 CARCINOMA OF UPPER-INNER QUADRANT OF RIGHT BREAST IN FEMALE, ESTROGEN RECEPTOR POSITIVE (HCC): ICD-10-CM

## 2019-07-03 DIAGNOSIS — C50.412 MALIGNANT NEOPLASM OF UPPER-OUTER QUADRANT OF BOTH BREASTS IN FEMALE, ESTROGEN RECEPTOR POSITIVE (HCC): Primary | ICD-10-CM

## 2019-07-03 DIAGNOSIS — Z17.0 MALIGNANT NEOPLASM OF UPPER-OUTER QUADRANT OF BOTH BREASTS IN FEMALE, ESTROGEN RECEPTOR POSITIVE (HCC): Primary | ICD-10-CM

## 2019-07-03 DIAGNOSIS — Z17.0 CARCINOMA OF UPPER-INNER QUADRANT OF RIGHT BREAST IN FEMALE, ESTROGEN RECEPTOR POSITIVE (HCC): ICD-10-CM

## 2019-07-03 DIAGNOSIS — C50.411 MALIGNANT NEOPLASM OF UPPER-OUTER QUADRANT OF BOTH BREASTS IN FEMALE, ESTROGEN RECEPTOR POSITIVE (HCC): Primary | ICD-10-CM

## 2019-07-05 RX ORDER — CAPECITABINE 500 MG/1
2000 TABLET, FILM COATED ORAL 2 TIMES DAILY
Qty: 112 TABLET | Refills: 5 | Status: SHIPPED | OUTPATIENT
Start: 2019-07-05 | End: 2019-08-12 | Stop reason: SDUPTHER

## 2019-07-08 ENCOUNTER — HOSPITAL ENCOUNTER (OUTPATIENT)
Dept: INFUSION THERAPY | Age: 69
Discharge: HOME | End: 2019-07-08
Payer: MEDICARE

## 2019-07-08 DIAGNOSIS — C50.412 MALIGNANT NEOPLASM OF UPPER-OUTER QUADRANT OF LEFT BREAST IN FEMALE, ESTROGEN RECEPTOR POSITIVE (HCC): Primary | ICD-10-CM

## 2019-07-08 DIAGNOSIS — Z17.0 MALIGNANT NEOPLASM OF UPPER-OUTER QUADRANT OF LEFT BREAST IN FEMALE, ESTROGEN RECEPTOR POSITIVE (HCC): Primary | ICD-10-CM

## 2019-07-08 LAB
ABSOLUTE EOS #: 0.1 K/UL (ref 0–0.4)
ABSOLUTE IMMATURE GRANULOCYTE: ABNORMAL K/UL (ref 0–0.3)
ABSOLUTE LYMPH #: 0.7 K/UL (ref 1–4.8)
ABSOLUTE MONO #: 0.7 K/UL (ref 0.1–1.2)
ALBUMIN SERPL-MCNC: 4.6 G/DL (ref 3.5–5.2)
ALBUMIN/GLOBULIN RATIO: 2.3 (ref 1–2.5)
ALP BLD-CCNC: 72 U/L (ref 35–104)
ALT SERPL-CCNC: 49 U/L (ref 5–33)
ANION GAP SERPL CALCULATED.3IONS-SCNC: 11 MMOL/L (ref 9–17)
AST SERPL-CCNC: 42 U/L
BASOPHILS # BLD: 1 % (ref 0–1)
BASOPHILS ABSOLUTE: 0 K/UL (ref 0–0.2)
BILIRUB SERPL-MCNC: 0.61 MG/DL (ref 0.3–1.2)
BUN BLDV-MCNC: 14 MG/DL (ref 8–23)
BUN/CREAT BLD: 26 (ref 9–20)
CALCIUM SERPL-MCNC: 9.6 MG/DL (ref 8.6–10.4)
CHLORIDE BLD-SCNC: 105 MMOL/L (ref 98–107)
CO2: 25 MMOL/L (ref 20–31)
CREAT SERPL-MCNC: 0.54 MG/DL (ref 0.5–0.9)
DIFFERENTIAL TYPE: ABNORMAL
EOSINOPHILS RELATIVE PERCENT: 3 % (ref 1–7)
GFR AFRICAN AMERICAN: >60 ML/MIN
GFR NON-AFRICAN AMERICAN: >60 ML/MIN
GFR SERPL CREATININE-BSD FRML MDRD: ABNORMAL ML/MIN/{1.73_M2}
GFR SERPL CREATININE-BSD FRML MDRD: ABNORMAL ML/MIN/{1.73_M2}
GLUCOSE BLD-MCNC: 91 MG/DL (ref 70–99)
HCT VFR BLD CALC: 37.1 % (ref 36–46)
HEMOGLOBIN: 12.3 G/DL (ref 12–16)
IMMATURE GRANULOCYTES: ABNORMAL %
LYMPHOCYTES # BLD: 23 % (ref 16–46)
MCH RBC QN AUTO: 31.2 PG (ref 26–34)
MCHC RBC AUTO-ENTMCNC: 33.3 G/DL (ref 31–37)
MCV RBC AUTO: 93.6 FL (ref 80–100)
MONOCYTES # BLD: 21 % (ref 4–11)
NRBC AUTOMATED: ABNORMAL PER 100 WBC
PDW BLD-RTO: 23.5 % (ref 11–14.5)
PLATELET # BLD: 220 K/UL (ref 140–450)
PLATELET ESTIMATE: ABNORMAL
PMV BLD AUTO: 8.7 FL (ref 6–12)
POTASSIUM SERPL-SCNC: 4.2 MMOL/L (ref 3.7–5.3)
RBC # BLD: 3.96 M/UL (ref 4–5.2)
RBC # BLD: ABNORMAL 10*6/UL
SEG NEUTROPHILS: 52 % (ref 43–77)
SEGMENTED NEUTROPHILS ABSOLUTE COUNT: 1.7 K/UL (ref 1.8–7.7)
SODIUM BLD-SCNC: 141 MMOL/L (ref 135–144)
TOTAL PROTEIN: 6.6 G/DL (ref 6.4–8.3)
WBC # BLD: 3.1 K/UL (ref 3.5–11)
WBC # BLD: ABNORMAL 10*3/UL

## 2019-07-08 PROCEDURE — 6360000002 HC RX W HCPCS: Performed by: INTERNAL MEDICINE

## 2019-07-08 PROCEDURE — 85025 COMPLETE CBC W/AUTO DIFF WBC: CPT

## 2019-07-08 PROCEDURE — 36591 DRAW BLOOD OFF VENOUS DEVICE: CPT

## 2019-07-08 PROCEDURE — 2580000003 HC RX 258: Performed by: INTERNAL MEDICINE

## 2019-07-08 PROCEDURE — 80053 COMPREHEN METABOLIC PANEL: CPT

## 2019-07-08 RX ORDER — SODIUM CHLORIDE 0.9 % (FLUSH) 0.9 %
10 SYRINGE (ML) INJECTION PRN
Status: CANCELLED | OUTPATIENT
Start: 2019-07-08

## 2019-07-08 RX ORDER — HEPARIN SODIUM (PORCINE) LOCK FLUSH IV SOLN 100 UNIT/ML 100 UNIT/ML
500 SOLUTION INTRAVENOUS PRN
Status: DISCONTINUED | OUTPATIENT
Start: 2019-07-08 | End: 2019-07-09 | Stop reason: HOSPADM

## 2019-07-08 RX ORDER — HEPARIN SODIUM (PORCINE) LOCK FLUSH IV SOLN 100 UNIT/ML 100 UNIT/ML
500 SOLUTION INTRAVENOUS PRN
Status: CANCELLED | OUTPATIENT
Start: 2019-07-08

## 2019-07-08 RX ORDER — SODIUM CHLORIDE 0.9 % (FLUSH) 0.9 %
10 SYRINGE (ML) INJECTION PRN
Status: DISCONTINUED | OUTPATIENT
Start: 2019-07-08 | End: 2019-07-09 | Stop reason: HOSPADM

## 2019-07-08 RX ADMIN — HEPARIN SODIUM (PORCINE) LOCK FLUSH IV SOLN 100 UNIT/ML 500 UNITS: 100 SOLUTION at 11:30

## 2019-07-08 RX ADMIN — Medication 10 ML: at 11:30

## 2019-07-08 NOTE — PROGRESS NOTES
VAD accessed per protocol using 20 gauge 3/4\" gramajo needle. Flushes easily. Labs drawn. Flushed with 10 ml normal saline and 5 ml Heplock solution. Gramajo needle dc'd. Bandaid to site. Patient tolerated procedure with out difficulty. Patient discharged to home.

## 2019-07-12 ENCOUNTER — OFFICE VISIT (OUTPATIENT)
Dept: SURGERY | Age: 69
End: 2019-07-12

## 2019-07-12 VITALS
DIASTOLIC BLOOD PRESSURE: 76 MMHG | SYSTOLIC BLOOD PRESSURE: 130 MMHG | HEART RATE: 80 BPM | HEIGHT: 67 IN | BODY MASS INDEX: 30.92 KG/M2 | WEIGHT: 197 LBS

## 2019-07-12 DIAGNOSIS — Z98.890 S/P BREAST RECONSTRUCTION, BILATERAL: Primary | ICD-10-CM

## 2019-07-12 DIAGNOSIS — Z90.13 S/P BILATERAL MASTECTOMY: ICD-10-CM

## 2019-07-12 PROCEDURE — 99024 POSTOP FOLLOW-UP VISIT: CPT | Performed by: PLASTIC SURGERY

## 2019-07-12 ASSESSMENT — ENCOUNTER SYMPTOMS
SHORTNESS OF BREATH: 0
ABDOMINAL PAIN: 0
TROUBLE SWALLOWING: 0
COUGH: 0

## 2019-07-12 NOTE — PROGRESS NOTES
Subjective:      Patient ID: Petr Milligan is a 81 y.o. female. HPI  Patient returns for further expansion. Breasts have softened. She is doing well. Review of Systems   Constitutional: Negative. HENT: Negative for congestion and trouble swallowing. Respiratory: Negative for cough and shortness of breath. Cardiovascular: Negative for chest pain. Gastrointestinal: Negative for abdominal pain. Neurological: Negative for light-headedness and headaches. Psychiatric/Behavioral: Negative for dysphoric mood. Objective:   Physical Exam   Constitutional: She is oriented to person, place, and time. She appears well-developed and well-nourished. HENT:   Head: Normocephalic and atraumatic. Eyes: Pupils are equal, round, and reactive to light. Conjunctivae and EOM are normal.   Pulmonary/Chest: Effort normal.   Wounds healed well. Tissues soft. 60cc sterile IV saline instilled into each expander, closed system, sterile technique. Tolerated well. Current total, 470cc. Neurological: She is alert and oriented to person, place, and time. Skin: Skin is warm and dry. Vitals reviewed. Assessment: In progress bilateral breast reconstruction. Plan:      Recheck in 4 weeks for further fill.         Marcia Denny MD

## 2019-07-29 ENCOUNTER — HOSPITAL ENCOUNTER (OUTPATIENT)
Dept: INFUSION THERAPY | Age: 69
Discharge: HOME | End: 2019-07-29
Payer: MEDICARE

## 2019-07-29 DIAGNOSIS — Z17.0 MALIGNANT NEOPLASM OF UPPER-OUTER QUADRANT OF LEFT BREAST IN FEMALE, ESTROGEN RECEPTOR POSITIVE (HCC): Primary | ICD-10-CM

## 2019-07-29 DIAGNOSIS — C50.412 MALIGNANT NEOPLASM OF UPPER-OUTER QUADRANT OF LEFT BREAST IN FEMALE, ESTROGEN RECEPTOR POSITIVE (HCC): Primary | ICD-10-CM

## 2019-07-29 LAB
ABSOLUTE EOS #: 0.06 K/UL (ref 0–0.4)
ABSOLUTE IMMATURE GRANULOCYTE: ABNORMAL K/UL (ref 0–0.3)
ABSOLUTE LYMPH #: 0.99 K/UL (ref 1–4.8)
ABSOLUTE MONO #: 0.22 K/UL (ref 0.1–1.2)
ALBUMIN SERPL-MCNC: 4.3 G/DL (ref 3.5–5.2)
ALBUMIN/GLOBULIN RATIO: 2.3 (ref 1–2.5)
ALP BLD-CCNC: 65 U/L (ref 35–104)
ALT SERPL-CCNC: 50 U/L (ref 5–33)
ANION GAP SERPL CALCULATED.3IONS-SCNC: 11 MMOL/L (ref 9–17)
AST SERPL-CCNC: 38 U/L
ATYPICAL LYMPHOCYTE ABSOLUTE COUNT: 0.03 K/UL
ATYPICAL LYMPHOCYTES: 1 %
BASOPHILS # BLD: 0 % (ref 0–1)
BASOPHILS ABSOLUTE: 0 K/UL (ref 0–0.2)
BILIRUB SERPL-MCNC: 0.56 MG/DL (ref 0.3–1.2)
BUN BLDV-MCNC: 14 MG/DL (ref 8–23)
BUN/CREAT BLD: 26 (ref 9–20)
CALCIUM SERPL-MCNC: 9.3 MG/DL (ref 8.6–10.4)
CHLORIDE BLD-SCNC: 107 MMOL/L (ref 98–107)
CO2: 24 MMOL/L (ref 20–31)
CREAT SERPL-MCNC: 0.54 MG/DL (ref 0.5–0.9)
DIFFERENTIAL TYPE: ABNORMAL
EOSINOPHILS RELATIVE PERCENT: 2 % (ref 1–7)
GFR AFRICAN AMERICAN: >60 ML/MIN
GFR NON-AFRICAN AMERICAN: >60 ML/MIN
GFR SERPL CREATININE-BSD FRML MDRD: ABNORMAL ML/MIN/{1.73_M2}
GFR SERPL CREATININE-BSD FRML MDRD: ABNORMAL ML/MIN/{1.73_M2}
GLUCOSE BLD-MCNC: 112 MG/DL (ref 70–99)
HCT VFR BLD CALC: 35.5 % (ref 36–46)
HEMOGLOBIN: 11.9 G/DL (ref 12–16)
IMMATURE GRANULOCYTES: ABNORMAL %
LYMPHOCYTES # BLD: 31 % (ref 16–46)
MCH RBC QN AUTO: 32.7 PG (ref 26–34)
MCHC RBC AUTO-ENTMCNC: 33.5 G/DL (ref 31–37)
MCV RBC AUTO: 97.7 FL (ref 80–100)
MONOCYTES # BLD: 7 % (ref 4–11)
MORPHOLOGY: ABNORMAL
NRBC AUTOMATED: ABNORMAL PER 100 WBC
PDW BLD-RTO: 25.5 % (ref 11–14.5)
PLATELET # BLD: 220 K/UL (ref 140–450)
PLATELET ESTIMATE: ABNORMAL
PMV BLD AUTO: 8.5 FL (ref 6–12)
POTASSIUM SERPL-SCNC: 3.7 MMOL/L (ref 3.7–5.3)
RBC # BLD: 3.63 M/UL (ref 4–5.2)
RBC # BLD: ABNORMAL 10*6/UL
SEG NEUTROPHILS: 59 % (ref 43–77)
SEGMENTED NEUTROPHILS ABSOLUTE COUNT: 1.9 K/UL (ref 1.8–7.7)
SODIUM BLD-SCNC: 142 MMOL/L (ref 135–144)
TOTAL PROTEIN: 6.2 G/DL (ref 6.4–8.3)
WBC # BLD: 3.2 K/UL (ref 3.5–11)
WBC # BLD: ABNORMAL 10*3/UL

## 2019-07-29 PROCEDURE — 36591 DRAW BLOOD OFF VENOUS DEVICE: CPT

## 2019-07-29 PROCEDURE — 80053 COMPREHEN METABOLIC PANEL: CPT

## 2019-07-29 PROCEDURE — 85025 COMPLETE CBC W/AUTO DIFF WBC: CPT

## 2019-07-29 PROCEDURE — 6360000002 HC RX W HCPCS: Performed by: INTERNAL MEDICINE

## 2019-07-29 PROCEDURE — 2580000003 HC RX 258: Performed by: INTERNAL MEDICINE

## 2019-07-29 RX ORDER — SODIUM CHLORIDE 0.9 % (FLUSH) 0.9 %
10 SYRINGE (ML) INJECTION PRN
Status: CANCELLED | OUTPATIENT
Start: 2019-07-29

## 2019-07-29 RX ORDER — HEPARIN SODIUM (PORCINE) LOCK FLUSH IV SOLN 100 UNIT/ML 100 UNIT/ML
500 SOLUTION INTRAVENOUS PRN
Status: CANCELLED | OUTPATIENT
Start: 2019-07-29

## 2019-07-29 RX ORDER — SODIUM CHLORIDE 0.9 % (FLUSH) 0.9 %
10 SYRINGE (ML) INJECTION PRN
Status: DISCONTINUED | OUTPATIENT
Start: 2019-07-29 | End: 2019-07-30 | Stop reason: HOSPADM

## 2019-07-29 RX ORDER — HEPARIN SODIUM (PORCINE) LOCK FLUSH IV SOLN 100 UNIT/ML 100 UNIT/ML
500 SOLUTION INTRAVENOUS PRN
Status: DISCONTINUED | OUTPATIENT
Start: 2019-07-29 | End: 2019-07-30 | Stop reason: HOSPADM

## 2019-07-29 RX ADMIN — Medication 10 ML: at 10:06

## 2019-07-29 RX ADMIN — HEPARIN SODIUM (PORCINE) LOCK FLUSH IV SOLN 100 UNIT/ML 500 UNITS: 100 SOLUTION at 10:08

## 2019-07-29 RX ADMIN — Medication 10 ML: at 10:08

## 2019-07-29 NOTE — PROGRESS NOTES
VAD accessed per protocol using 20 gauge 3/4\" gramajo needle. Flushes easily. Labs drawn. Flushed with 10 ml normal saline and 5 ml Heplock solution. Gramajo needle dc'd. Bandaid to site. Patient tolerated procedure with out difficulty. Patient discharged to home with spouse.

## 2019-08-05 ENCOUNTER — OFFICE VISIT (OUTPATIENT)
Dept: ONCOLOGY | Age: 69
End: 2019-08-05
Payer: MEDICARE

## 2019-08-05 VITALS
WEIGHT: 198.8 LBS | SYSTOLIC BLOOD PRESSURE: 118 MMHG | OXYGEN SATURATION: 98 % | HEART RATE: 82 BPM | RESPIRATION RATE: 14 BRPM | BODY MASS INDEX: 30.13 KG/M2 | HEIGHT: 68 IN | DIASTOLIC BLOOD PRESSURE: 70 MMHG | TEMPERATURE: 97.8 F

## 2019-08-05 DIAGNOSIS — C50.412 MALIGNANT NEOPLASM OF UPPER-OUTER QUADRANT OF BOTH BREASTS IN FEMALE, ESTROGEN RECEPTOR POSITIVE (HCC): Primary | ICD-10-CM

## 2019-08-05 DIAGNOSIS — C50.411 MALIGNANT NEOPLASM OF UPPER-OUTER QUADRANT OF BOTH BREASTS IN FEMALE, ESTROGEN RECEPTOR POSITIVE (HCC): Primary | ICD-10-CM

## 2019-08-05 DIAGNOSIS — Z17.0 MALIGNANT NEOPLASM OF UPPER-OUTER QUADRANT OF BOTH BREASTS IN FEMALE, ESTROGEN RECEPTOR POSITIVE (HCC): Primary | ICD-10-CM

## 2019-08-05 PROCEDURE — 99215 OFFICE O/P EST HI 40 MIN: CPT | Performed by: INTERNAL MEDICINE

## 2019-08-05 RX ORDER — LORAZEPAM 0.5 MG/1
0.5 TABLET ORAL NIGHTLY PRN
Qty: 30 TABLET | Refills: 0 | Status: SHIPPED | OUTPATIENT
Start: 2019-08-05 | End: 2019-09-04

## 2019-08-05 NOTE — PROGRESS NOTES
(L) 07/29/2019    MONOSABS 0.22 07/29/2019    EOSABS 0.06 07/29/2019    BASOSABS 0.00 07/29/2019       Chemistry        Component Value Date/Time     07/29/2019 1015    K 3.7 07/29/2019 1015     07/29/2019 1015    CO2 24 07/29/2019 1015    BUN 14 07/29/2019 1015    CREATININE 0.54 07/29/2019 1015        Component Value Date/Time    CALCIUM 9.3 07/29/2019 1015    ALKPHOS 65 07/29/2019 1015    AST 38 (H) 07/29/2019 1015    ALT 50 (H) 07/29/2019 1015    BILITOT 0.56 07/29/2019 1015        PATHOLOGY DATA:   Received: 9/11/2018   Reported: 9/12/2018 17:30     -- Diagnosis --   1.  RIGHT BREAST, CORE NEEDLE BIOPSIES:            -  WELL-DIFFERENTIATED INFILTRATING DUCTAL CARCINOMA, 0.6 CM   IN LARGEST INTACT CORE FRAGMENT.            -  ESTROGEN AND PROGESTERONE RECEPTOR IMMUNOSTAINS POSITIVE   IN INVASIVE TUMOR.       -  HER-2 FISH PENDING. 2.  LEFT BREAST, 2:00, N+4, CORE NEEDLE BIOPSIES:            -  MODERATELY DIFFERENTIATED INFILTRATING DUCTAL CARCINOMA,   0.6 CM IN LARGEST INTACT CORE FRAGMENT.       -  FOCAL DUCTAL CARCINOMA IN SITU, INTERMEDIATE GRADE, CRIBRIFORM   AND SOLID TYPE.            -  ESTROGEN AND PROGESTERONE RECEPTOR IMMUNOSTAINS POSITIVE   IN INVASIVE TUMOR.       -  HER-2 FISH PENDING. 3.  LEFT BREAST, 1:00, N+6, CORE NEEDLE BIOPSIES:            -  POORLY DIFFERENTIATED INFILTRATING DUCTAL CARCINOMA, 1.3   CM GREATEST DIMENSION IN LARGEST INTACT CORE FRAGMENT.       -  FOCAL DUCTAL CARCINOMA IN SITU, INTERMEDIATE GRADE, SOLID   TYPE.            -  ESTROGEN AND PROGESTERONE RECEPTOR IMMUNOSTAINS POSITIVE   IN INVASIVE TUMOR.       -  HER-2 FISH PENDING. 4.  LYMPH NODE, LEFT AXILLA, CORE NEEDLE BIOPSIES:       -  INFILTRATING DUCTAL CARCINOMA.          -  INVASIVE TUMOR POSITIVE FOR ESTROGEN RECEPTOR AND   NEGATIVE FOR PROGESTERONE RECEPTOR IMMUNOSTAINS.     -  LYMPH NODE AND BENIGN BREAST ARCHITECTURE NOT IDENTIFIED.      -- Diagnosis Comment --   SPECIMEN #4: CORRELATION FOUND.  REPRESENTATIVE   SAMPLING OF 8 ADDITIONAL BLOCKS FROM THIS AXILLARY REGION IS SUBMITTED   FOR POSSIBLE IDENTIFICATION OF MINUTE NODES THAT MAY NOT BE GROSSLY   EVIDENT.  HOWEVER, NO ADDITIONAL NODES ARE DETECTED ON MICROSCOPIC   EXAM OF THESE ADDITIONAL BLOCKS.   IMAGING DATA:    Ultrasound breast 9/10/18  FINDINGS:   DIAGNOSTIC MAMMOGRAM: The breasts are composed of scattered fibroglandular   tissue.  Underlying the area palpable concern in the upper outer left breast,   2 masses are identified measuring up to 1.6 cm anteriorly and with a more   suspicious spiculated 2.4 cm mass with adjacent distortion.  No abnormal   calcifications are identified.  In the right breast, a spiculated 1 cm mass   is identified in the lower inner quadrant, 6 cm from the nipple.  No   associated microcalcifications.       TARGETED LEFT ULTRASOUND: Ultrasound was performed in the area of palpable   concern, identifying 2 adjacent irregular hypoechoic masses measuring 1.7 x   2.0 x 1.4 cm in the 2 o'clock position, 4 cm from the nipple and 1.8 x 2.1 x   2.0 cm in the 1 o'clock position, 6 cm from the nipple.       Ultrasound in the left axilla was also performed identifying a probable   abnormal lymph node in the axillary tail measuring 2.1 x 1.6 x 2.2 cm.  This   has an irregular appearance with a hyperechoic rim and no identifiable fatty   hilum. Ferraro Class is an adjacent abnormal lymph node measuring 1.7 x 1.0 x 2.0 cm   with a small fatty hilum and focal cortical thickening.       TARGETED RIGHT ULTRASOUND: Ultrasound was performed in the area of   mammographic concern in the lower inner quadrant.  A suspicious irregular   hypoechoic mass measuring 0.7 x 0.8 x 0.8 cm is present in the 3 o'clock   position, 7 cm from the nipple.  This corresponds to the mammographic finding.       Ultrasound imaging of the right axilla was also performed without   identifiable lymph nodes.           Impression   1. 2 noncalcified suspicious masses appeared within very short period of time as last year she had excisional biopsy of the lesion and negative mammograms. I discussed option of neoadjuvant chemotherapy followed by surgery and patient is considering bilateral mastectomies. Unfortunately she had significant reaction to first cycle of AC chemotherapy. I gave preoperative  weekly paclitaxel for 12 cycles. She has a good response based on the recent mammogram.  Previously biopsied mass within the right breast is not seen on recent mammogram.  I discussed the surgical pathology report. LNs showed no residual cancer, NO residual cancer noted in right breast, But left breast showed smaller residual invasive cancer 1.5 cm and she had negative margins. I discussed the data from [employer] trial in her 2 negative patients about adjuvant Xeloda in patients with residual carcinoma after neoadjuvant chemotherapy. This trail showed improved PFS and OS in patient who received adjuvant capecitabine in patient with residual disease. Given that she did not receive antracycle and cyclophosphamide chemo, I would favor using adjuvant xeloda. I discussed risk benefits. She  Has completed RT. Now tolerating oral Xeloda well. During today's visit, the patient and the family had a number of reasonable questions which were answered to their satisfaction. They verbalized understanding of the information provided and they agreed to proceed as outlined above. PLAN:   She is tolerating xeloda well. No major side effects notedy  Continue chemo  With reduced dose 3 Pills BID and plan for 6 cycles   Will check labs with each cycle  RTC 6 weeks      Abhay R. Claudetta Askew, MD  Hematologist/Medical Oncologist  I spent more than 40 minutes examining, evaluating, reviewing data and counseling the patient. Greater than 50% of that time was spent face-to-face with the patient in counseling and coordinating her care.           This note is created with the assistance of a speech recognition program.  While intending to generate a document that actually reflects the content of the visit, the document can still have some errors including those of syntax and sound a like substitutions which may escape proof reading. It such instances, actual meaning can be extrapolated by contextual diversion.

## 2019-08-09 ENCOUNTER — OFFICE VISIT (OUTPATIENT)
Dept: SURGERY | Age: 69
End: 2019-08-09

## 2019-08-09 VITALS
HEART RATE: 82 BPM | DIASTOLIC BLOOD PRESSURE: 74 MMHG | BODY MASS INDEX: 30.19 KG/M2 | HEIGHT: 68 IN | OXYGEN SATURATION: 99 % | WEIGHT: 199.2 LBS | SYSTOLIC BLOOD PRESSURE: 120 MMHG

## 2019-08-09 DIAGNOSIS — Z98.890 S/P BREAST RECONSTRUCTION, BILATERAL: Primary | ICD-10-CM

## 2019-08-09 PROCEDURE — 99024 POSTOP FOLLOW-UP VISIT: CPT | Performed by: PLASTIC SURGERY

## 2019-08-09 ASSESSMENT — ENCOUNTER SYMPTOMS
SHORTNESS OF BREATH: 0
TROUBLE SWALLOWING: 0
ABDOMINAL PAIN: 0
COUGH: 0

## 2019-08-12 DIAGNOSIS — Z17.0 CARCINOMA OF UPPER-INNER QUADRANT OF RIGHT BREAST IN FEMALE, ESTROGEN RECEPTOR POSITIVE (HCC): ICD-10-CM

## 2019-08-12 DIAGNOSIS — C50.412 CARCINOMA OF UPPER-OUTER QUADRANT OF FEMALE BREAST, LEFT (HCC): ICD-10-CM

## 2019-08-12 DIAGNOSIS — C50.412 MALIGNANT NEOPLASM OF UPPER-OUTER QUADRANT OF BOTH BREASTS IN FEMALE, ESTROGEN RECEPTOR POSITIVE (HCC): ICD-10-CM

## 2019-08-12 DIAGNOSIS — C50.211 CARCINOMA OF UPPER-INNER QUADRANT OF RIGHT BREAST IN FEMALE, ESTROGEN RECEPTOR POSITIVE (HCC): ICD-10-CM

## 2019-08-12 DIAGNOSIS — C50.411 MALIGNANT NEOPLASM OF UPPER-OUTER QUADRANT OF BOTH BREASTS IN FEMALE, ESTROGEN RECEPTOR POSITIVE (HCC): ICD-10-CM

## 2019-08-12 DIAGNOSIS — Z17.0 MALIGNANT NEOPLASM OF UPPER-OUTER QUADRANT OF BOTH BREASTS IN FEMALE, ESTROGEN RECEPTOR POSITIVE (HCC): ICD-10-CM

## 2019-08-13 RX ORDER — CAPECITABINE 500 MG/1
1500 TABLET, FILM COATED ORAL 2 TIMES DAILY
Qty: 84 TABLET | Refills: 2 | Status: SHIPPED | OUTPATIENT
Start: 2019-08-13 | End: 2019-12-13 | Stop reason: ALTCHOICE

## 2019-08-19 ENCOUNTER — HOSPITAL ENCOUNTER (OUTPATIENT)
Dept: INFUSION THERAPY | Age: 69
Discharge: HOME | End: 2019-08-19
Payer: MEDICARE

## 2019-08-19 DIAGNOSIS — Z17.0 MALIGNANT NEOPLASM OF UPPER-OUTER QUADRANT OF LEFT BREAST IN FEMALE, ESTROGEN RECEPTOR POSITIVE (HCC): Primary | ICD-10-CM

## 2019-08-19 DIAGNOSIS — C50.412 MALIGNANT NEOPLASM OF UPPER-OUTER QUADRANT OF LEFT BREAST IN FEMALE, ESTROGEN RECEPTOR POSITIVE (HCC): Primary | ICD-10-CM

## 2019-08-19 LAB
ABSOLUTE EOS #: 0.13 K/UL (ref 0–0.4)
ABSOLUTE IMMATURE GRANULOCYTE: ABNORMAL K/UL (ref 0–0.3)
ABSOLUTE LYMPH #: 1.02 K/UL (ref 1–4.8)
ABSOLUTE MONO #: 0.36 K/UL (ref 0.1–1.2)
ALBUMIN SERPL-MCNC: 4.3 G/DL (ref 3.5–5.2)
ALBUMIN/GLOBULIN RATIO: 2 (ref 1–2.5)
ALP BLD-CCNC: 71 U/L (ref 35–104)
ALT SERPL-CCNC: 57 U/L (ref 5–33)
ANION GAP SERPL CALCULATED.3IONS-SCNC: 12 MMOL/L (ref 9–17)
AST SERPL-CCNC: 45 U/L
BASOPHILS # BLD: 3 % (ref 0–1)
BASOPHILS ABSOLUTE: 0.1 K/UL (ref 0–0.2)
BILIRUB SERPL-MCNC: 0.64 MG/DL (ref 0.3–1.2)
BUN BLDV-MCNC: 14 MG/DL (ref 8–23)
BUN/CREAT BLD: 24 (ref 9–20)
CALCIUM SERPL-MCNC: 9.5 MG/DL (ref 8.6–10.4)
CHLORIDE BLD-SCNC: 106 MMOL/L (ref 98–107)
CO2: 24 MMOL/L (ref 20–31)
CREAT SERPL-MCNC: 0.59 MG/DL (ref 0.5–0.9)
DIFFERENTIAL TYPE: ABNORMAL
EOSINOPHILS RELATIVE PERCENT: 4 % (ref 1–7)
GFR AFRICAN AMERICAN: >60 ML/MIN
GFR NON-AFRICAN AMERICAN: >60 ML/MIN
GFR SERPL CREATININE-BSD FRML MDRD: ABNORMAL ML/MIN/{1.73_M2}
GFR SERPL CREATININE-BSD FRML MDRD: ABNORMAL ML/MIN/{1.73_M2}
GLUCOSE BLD-MCNC: 120 MG/DL (ref 70–99)
HCT VFR BLD CALC: 35.1 % (ref 36–46)
HEMOGLOBIN: 11.9 G/DL (ref 12–16)
IMMATURE GRANULOCYTES: ABNORMAL %
LYMPHOCYTES # BLD: 31 % (ref 16–46)
MCH RBC QN AUTO: 35.1 PG (ref 26–34)
MCHC RBC AUTO-ENTMCNC: 34 G/DL (ref 31–37)
MCV RBC AUTO: 103.1 FL (ref 80–100)
MONOCYTES # BLD: 11 % (ref 4–11)
MORPHOLOGY: ABNORMAL
NRBC AUTOMATED: ABNORMAL PER 100 WBC
PDW BLD-RTO: 25.9 % (ref 11–14.5)
PLATELET # BLD: 221 K/UL (ref 140–450)
PLATELET ESTIMATE: ABNORMAL
PMV BLD AUTO: 8.6 FL (ref 6–12)
POTASSIUM SERPL-SCNC: 3.8 MMOL/L (ref 3.7–5.3)
RBC # BLD: 3.41 M/UL (ref 4–5.2)
RBC # BLD: ABNORMAL 10*6/UL
SEG NEUTROPHILS: 51 % (ref 43–77)
SEGMENTED NEUTROPHILS ABSOLUTE COUNT: 1.69 K/UL (ref 1.8–7.7)
SODIUM BLD-SCNC: 142 MMOL/L (ref 135–144)
TOTAL PROTEIN: 6.4 G/DL (ref 6.4–8.3)
WBC # BLD: 3.3 K/UL (ref 3.5–11)
WBC # BLD: ABNORMAL 10*3/UL

## 2019-08-19 PROCEDURE — 6360000002 HC RX W HCPCS: Performed by: INTERNAL MEDICINE

## 2019-08-19 PROCEDURE — 2580000003 HC RX 258: Performed by: INTERNAL MEDICINE

## 2019-08-19 PROCEDURE — 85025 COMPLETE CBC W/AUTO DIFF WBC: CPT

## 2019-08-19 PROCEDURE — 36591 DRAW BLOOD OFF VENOUS DEVICE: CPT

## 2019-08-19 PROCEDURE — 80053 COMPREHEN METABOLIC PANEL: CPT

## 2019-08-19 RX ORDER — SODIUM CHLORIDE 0.9 % (FLUSH) 0.9 %
10 SYRINGE (ML) INJECTION PRN
Status: CANCELLED | OUTPATIENT
Start: 2019-08-19

## 2019-08-19 RX ORDER — SODIUM CHLORIDE 0.9 % (FLUSH) 0.9 %
10 SYRINGE (ML) INJECTION PRN
Status: DISCONTINUED | OUTPATIENT
Start: 2019-08-19 | End: 2019-08-20 | Stop reason: HOSPADM

## 2019-08-19 RX ORDER — HEPARIN SODIUM (PORCINE) LOCK FLUSH IV SOLN 100 UNIT/ML 100 UNIT/ML
500 SOLUTION INTRAVENOUS PRN
Status: CANCELLED | OUTPATIENT
Start: 2019-08-19

## 2019-08-19 RX ORDER — HEPARIN SODIUM (PORCINE) LOCK FLUSH IV SOLN 100 UNIT/ML 100 UNIT/ML
500 SOLUTION INTRAVENOUS PRN
Status: DISCONTINUED | OUTPATIENT
Start: 2019-08-19 | End: 2019-08-20 | Stop reason: HOSPADM

## 2019-08-19 RX ADMIN — Medication 10 ML: at 10:09

## 2019-08-19 RX ADMIN — Medication 10 ML: at 10:10

## 2019-08-19 RX ADMIN — HEPARIN SODIUM (PORCINE) LOCK FLUSH IV SOLN 100 UNIT/ML 500 UNITS: 100 SOLUTION at 10:10

## 2019-08-23 ENCOUNTER — OFFICE VISIT (OUTPATIENT)
Dept: SURGERY | Age: 69
End: 2019-08-23

## 2019-08-23 VITALS
HEIGHT: 67 IN | BODY MASS INDEX: 30.92 KG/M2 | HEART RATE: 84 BPM | SYSTOLIC BLOOD PRESSURE: 114 MMHG | WEIGHT: 197 LBS | DIASTOLIC BLOOD PRESSURE: 64 MMHG

## 2019-08-23 DIAGNOSIS — Z98.890 S/P BREAST RECONSTRUCTION, BILATERAL: Primary | ICD-10-CM

## 2019-08-23 PROCEDURE — 99024 POSTOP FOLLOW-UP VISIT: CPT | Performed by: PLASTIC SURGERY

## 2019-08-23 ASSESSMENT — ENCOUNTER SYMPTOMS
ABDOMINAL PAIN: 0
TROUBLE SWALLOWING: 0
COUGH: 0
SHORTNESS OF BREATH: 0

## 2019-09-09 ENCOUNTER — HOSPITAL ENCOUNTER (OUTPATIENT)
Dept: INFUSION THERAPY | Age: 69
Discharge: HOME | End: 2019-09-09
Payer: MEDICARE

## 2019-09-09 DIAGNOSIS — C50.412 MALIGNANT NEOPLASM OF UPPER-OUTER QUADRANT OF LEFT BREAST IN FEMALE, ESTROGEN RECEPTOR POSITIVE (HCC): Primary | ICD-10-CM

## 2019-09-09 DIAGNOSIS — Z17.0 MALIGNANT NEOPLASM OF UPPER-OUTER QUADRANT OF LEFT BREAST IN FEMALE, ESTROGEN RECEPTOR POSITIVE (HCC): Primary | ICD-10-CM

## 2019-09-09 LAB
ABSOLUTE EOS #: 0.1 K/UL (ref 0–0.4)
ABSOLUTE IMMATURE GRANULOCYTE: ABNORMAL K/UL (ref 0–0.3)
ABSOLUTE LYMPH #: 0.56 K/UL (ref 1–4.8)
ABSOLUTE MONO #: 0.46 K/UL (ref 0.1–1.2)
ALBUMIN SERPL-MCNC: 4.3 G/DL (ref 3.5–5.2)
ALBUMIN/GLOBULIN RATIO: 2.2 (ref 1–2.5)
ALP BLD-CCNC: 67 U/L (ref 35–104)
ALT SERPL-CCNC: 47 U/L (ref 5–33)
ANION GAP SERPL CALCULATED.3IONS-SCNC: 11 MMOL/L (ref 9–17)
AST SERPL-CCNC: 41 U/L
ATYPICAL LYMPHOCYTE ABSOLUTE COUNT: 0.03 K/UL
ATYPICAL LYMPHOCYTES: 1 %
BASOPHILS # BLD: 1 % (ref 0–1)
BASOPHILS ABSOLUTE: 0.03 K/UL (ref 0–0.2)
BILIRUB SERPL-MCNC: 0.68 MG/DL (ref 0.3–1.2)
BUN BLDV-MCNC: 11 MG/DL (ref 8–23)
BUN/CREAT BLD: 19 (ref 9–20)
CALCIUM SERPL-MCNC: 9.3 MG/DL (ref 8.6–10.4)
CHLORIDE BLD-SCNC: 107 MMOL/L (ref 98–107)
CO2: 25 MMOL/L (ref 20–31)
CREAT SERPL-MCNC: 0.57 MG/DL (ref 0.5–0.9)
DIFFERENTIAL TYPE: ABNORMAL
EOSINOPHILS RELATIVE PERCENT: 3 % (ref 1–7)
GFR AFRICAN AMERICAN: >60 ML/MIN
GFR NON-AFRICAN AMERICAN: >60 ML/MIN
GFR SERPL CREATININE-BSD FRML MDRD: ABNORMAL ML/MIN/{1.73_M2}
GFR SERPL CREATININE-BSD FRML MDRD: ABNORMAL ML/MIN/{1.73_M2}
GLUCOSE BLD-MCNC: 110 MG/DL (ref 70–99)
HCT VFR BLD CALC: 34.9 % (ref 36–46)
HEMOGLOBIN: 11.8 G/DL (ref 12–16)
IMMATURE GRANULOCYTES: ABNORMAL %
LYMPHOCYTES # BLD: 17 % (ref 16–46)
MCH RBC QN AUTO: 36.5 PG (ref 26–34)
MCHC RBC AUTO-ENTMCNC: 33.7 G/DL (ref 31–37)
MCV RBC AUTO: 108.2 FL (ref 80–100)
METAMYELOCYTES ABSOLUTE COUNT: 0.03 K/UL
METAMYELOCYTES: 1 %
MONOCYTES # BLD: 14 % (ref 4–11)
MORPHOLOGY: ABNORMAL
NRBC AUTOMATED: ABNORMAL PER 100 WBC
PDW BLD-RTO: 23.9 % (ref 11–14.5)
PLATELET # BLD: 204 K/UL (ref 140–450)
PLATELET ESTIMATE: ABNORMAL
PMV BLD AUTO: 8.8 FL (ref 6–12)
POTASSIUM SERPL-SCNC: 4 MMOL/L (ref 3.7–5.3)
RBC # BLD: 3.22 M/UL (ref 4–5.2)
RBC # BLD: ABNORMAL 10*6/UL
SEG NEUTROPHILS: 63 % (ref 43–77)
SEGMENTED NEUTROPHILS ABSOLUTE COUNT: 2.09 K/UL (ref 1.8–7.7)
SODIUM BLD-SCNC: 143 MMOL/L (ref 135–144)
TOTAL PROTEIN: 6.3 G/DL (ref 6.4–8.3)
WBC # BLD: 3.3 K/UL (ref 3.5–11)
WBC # BLD: ABNORMAL 10*3/UL

## 2019-09-09 PROCEDURE — 6360000002 HC RX W HCPCS: Performed by: INTERNAL MEDICINE

## 2019-09-09 PROCEDURE — 36591 DRAW BLOOD OFF VENOUS DEVICE: CPT

## 2019-09-09 PROCEDURE — 80053 COMPREHEN METABOLIC PANEL: CPT

## 2019-09-09 PROCEDURE — 2580000003 HC RX 258: Performed by: INTERNAL MEDICINE

## 2019-09-09 PROCEDURE — 85025 COMPLETE CBC W/AUTO DIFF WBC: CPT

## 2019-09-09 RX ORDER — HEPARIN SODIUM (PORCINE) LOCK FLUSH IV SOLN 100 UNIT/ML 100 UNIT/ML
500 SOLUTION INTRAVENOUS PRN
Status: CANCELLED | OUTPATIENT
Start: 2019-09-09

## 2019-09-09 RX ORDER — HEPARIN SODIUM (PORCINE) LOCK FLUSH IV SOLN 100 UNIT/ML 100 UNIT/ML
500 SOLUTION INTRAVENOUS PRN
Status: DISCONTINUED | OUTPATIENT
Start: 2019-09-09 | End: 2019-09-10 | Stop reason: HOSPADM

## 2019-09-09 RX ORDER — SODIUM CHLORIDE 0.9 % (FLUSH) 0.9 %
10 SYRINGE (ML) INJECTION PRN
Status: CANCELLED | OUTPATIENT
Start: 2019-09-09

## 2019-09-09 RX ORDER — SODIUM CHLORIDE 0.9 % (FLUSH) 0.9 %
10 SYRINGE (ML) INJECTION PRN
Status: DISCONTINUED | OUTPATIENT
Start: 2019-09-09 | End: 2019-09-10 | Stop reason: HOSPADM

## 2019-09-09 RX ADMIN — HEPARIN SODIUM (PORCINE) LOCK FLUSH IV SOLN 100 UNIT/ML 500 UNITS: 100 SOLUTION at 09:59

## 2019-09-09 RX ADMIN — Medication 10 ML: at 09:59

## 2019-09-16 ENCOUNTER — OFFICE VISIT (OUTPATIENT)
Dept: ONCOLOGY | Age: 69
End: 2019-09-16
Payer: MEDICARE

## 2019-09-16 VITALS
HEART RATE: 76 BPM | WEIGHT: 199.6 LBS | BODY MASS INDEX: 30.25 KG/M2 | DIASTOLIC BLOOD PRESSURE: 64 MMHG | OXYGEN SATURATION: 98 % | TEMPERATURE: 98.4 F | HEIGHT: 68 IN | SYSTOLIC BLOOD PRESSURE: 120 MMHG | RESPIRATION RATE: 16 BRPM

## 2019-09-16 DIAGNOSIS — C50.412 MALIGNANT NEOPLASM OF UPPER-OUTER QUADRANT OF BOTH BREASTS IN FEMALE, ESTROGEN RECEPTOR POSITIVE (HCC): Primary | ICD-10-CM

## 2019-09-16 DIAGNOSIS — C50.411 MALIGNANT NEOPLASM OF UPPER-OUTER QUADRANT OF BOTH BREASTS IN FEMALE, ESTROGEN RECEPTOR POSITIVE (HCC): Primary | ICD-10-CM

## 2019-09-16 DIAGNOSIS — Z17.0 MALIGNANT NEOPLASM OF UPPER-OUTER QUADRANT OF BOTH BREASTS IN FEMALE, ESTROGEN RECEPTOR POSITIVE (HCC): Primary | ICD-10-CM

## 2019-09-16 PROCEDURE — 99215 OFFICE O/P EST HI 40 MIN: CPT | Performed by: INTERNAL MEDICINE

## 2019-09-16 NOTE — PROGRESS NOTES
Patient ID: Norlene Siemens, 1950, [id number], 81 y.o. Surgeon : Dr. Marilee Benavides  Diagnosis:   Diagnosis of bilateral breast cancer  Left invasive ductal carcinoma, ER/NJ positive and HER-2/kelsy Negative, 2 foci 2.0 cm and 2.1 cm, clinical staging T2 N1 M0, stage II  Right- breast cancer T1b N0 M0, stage I, ER/NJ positive HER-2 negative  Started on neoadjuvant chemotherapy with Adriamycin plus cyclophosphamide on 10/3/18 unfortunately she had significant reaction with generalized tremors, shortness of breath and chest tightness after chemotherapy  Her chemotherapy was changed to weekly Taxol and she completed 12 cycles on 1/2/19  Had bilateral mastectomies on 2/15/19  Adjuvant radiation therapy started on 4/2/19 and will complete 5/9/19  Xeloda  Started 5/27/19  Will be completed in a week  HISTORY OF PRESENT ILLNESS:    Oncologic History:  Isabelle Bloch is a 81-year-old female with a family history of breast cancer was seen during initial consultation visit for newly diagnosed bilateral breast cancer. Patient had abnormal mammogram about a year ago and the biopsy showed ductal papilloma so she had excision and was being followed by Gen. surgery. Recently she noted a lump in her left breast and had a mammogram and ultrasound which confirmed 2 lesions in her left breast measuring 2.0 and 2.1 cm and one lesion in the right breast measuring 0.8 cm. Also 2 lymph nodes noted in the left axilla. Patient underwent biopsy of these agents including axillary lymph node which confirmed presence of invasive ductal carcinoma and on the biopsies. She is here for further recommendations. She does not have many medical conditions and her ECOG performance status is 0. She is physically active and not on any regular medications. She has history of breast cancer in her sister was diagnosed at the age of 58 and also reports history of breast cancer in her grandmother. She quit smoking in 1978 and drinks alcohol occasionally.   She has Minutes per session: Not on file    Stress: Not on file   Relationships    Social connections:     Talks on phone: Not on file     Gets together: Not on file     Attends Roman Catholic service: Not on file     Active member of club or organization: Not on file     Attends meetings of clubs or organizations: Not on file     Relationship status: Not on file    Intimate partner violence:     Fear of current or ex partner: Not on file     Emotionally abused: Not on file     Physically abused: Not on file     Forced sexual activity: Not on file   Other Topics Concern    Not on file   Social History Narrative    Not on file       Family History   Problem Relation Age of Onset    Hypertension Mother         Renal failure.  Diabetes Mother         Type 2 at end of life.  Heart Failure Father     Coronary Art Dis Father         Status post CABG    Diabetes Father         Type 2, very late in life.  Breast Cancer Sister 58    Hypertension Brother     Hypertension Sister     Breast Cancer Paternal Aunt         62s    Breast Cancer Paternal Grandmother     Breast Cancer Paternal Cousin     Breast Cancer Paternal Aunt         62s    Breast Cancer Paternal Cousin         46s      REVIEW OF SYSTEM:   Constitutional: No fever or chills. No night sweats, no weight loss   Eyes: No eye discharge, double vision, or eye pain   HEENT: negative for sore mouth, sore throat, hoarseness and voice change   Respiratory: negative for cough , sputum, dyspnea, wheezing, hemoptysis, chest pain   Cardiovascular: negative for chest pain, dyspnea, palpitations, orthopnea, PND   Gastrointestinal: negative for nausea, vomiting, diarrhea, constipation, abdominal pain, Dysphagia, hematemesis and hematochezia   Genitourinary: negative for frequency, dysuria, nocturia, urinary incontinence, and hematuria   Integument: negative for rash, skin lesions, bruises.    Hematologic/Lymphatic: negative for easy bruising, bleeding, EOSABS 0.10 09/09/2019    BASOSABS 0.03 09/09/2019       Chemistry        Component Value Date/Time     09/09/2019 1000    K 4.0 09/09/2019 1000     09/09/2019 1000    CO2 25 09/09/2019 1000    BUN 11 09/09/2019 1000    CREATININE 0.57 09/09/2019 1000        Component Value Date/Time    CALCIUM 9.3 09/09/2019 1000    ALKPHOS 67 09/09/2019 1000    AST 41 (H) 09/09/2019 1000    ALT 47 (H) 09/09/2019 1000    BILITOT 0.68 09/09/2019 1000        PATHOLOGY DATA:   Received: 9/11/2018   Reported: 9/12/2018 17:30     -- Diagnosis --   1.  RIGHT BREAST, CORE NEEDLE BIOPSIES:            -  WELL-DIFFERENTIATED INFILTRATING DUCTAL CARCINOMA, 0.6 CM   IN LARGEST INTACT CORE FRAGMENT.            -  ESTROGEN AND PROGESTERONE RECEPTOR IMMUNOSTAINS POSITIVE   IN INVASIVE TUMOR.       -  HER-2 FISH PENDING. 2.  LEFT BREAST, 2:00, N+4, CORE NEEDLE BIOPSIES:            -  MODERATELY DIFFERENTIATED INFILTRATING DUCTAL CARCINOMA,   0.6 CM IN LARGEST INTACT CORE FRAGMENT.       -  FOCAL DUCTAL CARCINOMA IN SITU, INTERMEDIATE GRADE, CRIBRIFORM   AND SOLID TYPE.            -  ESTROGEN AND PROGESTERONE RECEPTOR IMMUNOSTAINS POSITIVE   IN INVASIVE TUMOR.       -  HER-2 FISH PENDING. 3.  LEFT BREAST, 1:00, N+6, CORE NEEDLE BIOPSIES:            -  POORLY DIFFERENTIATED INFILTRATING DUCTAL CARCINOMA, 1.3   CM GREATEST DIMENSION IN LARGEST INTACT CORE FRAGMENT.       -  FOCAL DUCTAL CARCINOMA IN SITU, INTERMEDIATE GRADE, SOLID   TYPE.            -  ESTROGEN AND PROGESTERONE RECEPTOR IMMUNOSTAINS POSITIVE   IN INVASIVE TUMOR.       -  HER-2 FISH PENDING. 4.  LYMPH NODE, LEFT AXILLA, CORE NEEDLE BIOPSIES:       -  INFILTRATING DUCTAL CARCINOMA.          -  INVASIVE TUMOR POSITIVE FOR ESTROGEN RECEPTOR AND   NEGATIVE FOR PROGESTERONE RECEPTOR IMMUNOSTAINS.     -  LYMPH NODE AND BENIGN BREAST ARCHITECTURE NOT IDENTIFIED.      -- Diagnosis Comment --   SPECIMEN #4: CORRELATION WITH THE RADIOLOGIC PLACEMENT OF THE BIOPSY WOULD BE NECESSARY TO DETERMINE IF THIS REPRESENTS A LYMPH NODE WITH   METASTATIC CARCINOMA, VERSUS CARCINOMA INVOLVING THE AXILLARY TAIL OF   THE BREAST.  NO INTRINSIC LYMPH NODE OR BENIGN BREAST ARCHITECTURE IS   PRESENT FOR CONFIRMATION. 2/15/19: Final Diagnosis   1. LYMPH NODE, EXCISION (RIGHT SENTINEL):        - NO NEOPLASM DETECTED. 2. BREAST, MASTECTOMY (RIGHT):        - SCANT RESIDUAL FOCUS OF INVASIVE DUCTAL CARCINOMA.      - DUCTAL CARCINOMA IN SITU.        - EXCISION MARGINS NEGATIVE FOR NEOPLASM. 3. BREAST, MASTECTOMY, INCLUDING AXILLARY LYMPH NODES (LEFT):        - INVASIVE DUCTAL CARCINOMA, 1.5 CM        - DUCTAL CARCINOMA IN SITU.        - EXCISION MARGINS NEGATIVE FOR NEOPLASM. Diagnosis Comment   BIOMARKER STUDIES WERE PERFORMED ON PRIOR BIOPSIES FROM TUMORS FROM   BOTH BREASTS ([id number]) -     RIGHT:        - POSITIVE ESTROGEN RECEPTOR.        - POSITIVE PROGESTERONE RECEPTOR.        - NEGATIVE FOR HER2 GENE AMPLIFICATION. LEFT:        - POSITIVE ESTROGEN RECEPTOR.        - POSITIVE PROGESTERONE RECEPTOR.        - NEGATIVE FOR HER2 GENE AMPLIFICATION. Procedures/Addenda   ADDENDUM     Date Ordered:     2/21/2019     Status: Signed Out        Date Complete:     2/21/2019     By: Hugo Davis M.D.        Date Reported:     2/21/2019       ADDENDUM DIAGNOSIS   3.  BREAST, MASTECTOMY, WITH AXILLARY LYMPH NODES (LEFT):        - NO CHANGE IN DIAGNOSIS. ADDENDUM COMMENT   THE CASE IS DISCUSSED WITH DR. CID, FEBRUARY 20, 2019.  SLIDES ARE   REVIEWED, WITH SPECIFIC ATTENTION TO THE 6 SLIDES FROM AXILLARY   REGION.  THESE REPRESENT 2 LYMPH NODES, INCLUDING A LARGER 4.5 CM   NODE, AND NEITHER CONTAINS NEOPLASM ON REVIEW.  THE GROSS MASTECTOMY   SPECIMEN IS THEN REEXAMINED WITH SPECIFIC ATTENTION TO THE AXILLARY   TISSUE, AS WELL AS THE BREAST ADJACENT TO THE AXILLARY REGION.  NO   ADDITIONAL GROSSLY IDENTIFIABLE LYMPH NODES ARE FOUND.  REPRESENTATIVE   SAMPLING OF 8 ADDITIONAL BLOCKS FROM THIS AXILLARY REGION IS SUBMITTED   FOR POSSIBLE IDENTIFICATION OF MINUTE NODES THAT MAY NOT BE GROSSLY   EVIDENT.  HOWEVER, NO ADDITIONAL NODES ARE DETECTED ON MICROSCOPIC   EXAM OF THESE ADDITIONAL BLOCKS.   IMAGING DATA:    Ultrasound breast 9/10/18  FINDINGS:   DIAGNOSTIC MAMMOGRAM: The breasts are composed of scattered fibroglandular   tissue.  Underlying the area palpable concern in the upper outer left breast,   2 masses are identified measuring up to 1.6 cm anteriorly and with a more   suspicious spiculated 2.4 cm mass with adjacent distortion.  No abnormal   calcifications are identified.  In the right breast, a spiculated 1 cm mass   is identified in the lower inner quadrant, 6 cm from the nipple.  No   associated microcalcifications.       TARGETED LEFT ULTRASOUND: Ultrasound was performed in the area of palpable   concern, identifying 2 adjacent irregular hypoechoic masses measuring 1.7 x   2.0 x 1.4 cm in the 2 o'clock position, 4 cm from the nipple and 1.8 x 2.1 x   2.0 cm in the 1 o'clock position, 6 cm from the nipple.       Ultrasound in the left axilla was also performed identifying a probable   abnormal lymph node in the axillary tail measuring 2.1 x 1.6 x 2.2 cm.  This   has an irregular appearance with a hyperechoic rim and no identifiable fatty   hilum. Dyana Lie is an adjacent abnormal lymph node measuring 1.7 x 1.0 x 2.0 cm   with a small fatty hilum and focal cortical thickening.       TARGETED RIGHT ULTRASOUND: Ultrasound was performed in the area of   mammographic concern in the lower inner quadrant.  A suspicious irregular   hypoechoic mass measuring 0.7 x 0.8 x 0.8 cm is present in the 3 o'clock   position, 7 cm from the nipple.  This corresponds to the mammographic finding.       Ultrasound imaging of the right axilla was also performed without   identifiable lymph nodes.           Impression   1. 2 noncalcified suspicious masses in the area palpable concern in the

## 2019-09-23 ENCOUNTER — TELEPHONE (OUTPATIENT)
Dept: ONCOLOGY | Age: 69
End: 2019-09-23

## 2019-09-23 NOTE — TELEPHONE ENCOUNTER
Per Jose Parekh RN Nurse Navigator, will sign off from navigation. Pt completed active treatment. Will forward to oral compliance nurse.

## 2019-09-30 ENCOUNTER — HOSPITAL ENCOUNTER (OUTPATIENT)
Dept: CT IMAGING | Age: 69
Discharge: HOME | End: 2019-10-02
Payer: MEDICARE

## 2019-09-30 DIAGNOSIS — C50.411 MALIGNANT NEOPLASM OF UPPER-OUTER QUADRANT OF BOTH BREASTS IN FEMALE, ESTROGEN RECEPTOR POSITIVE (HCC): ICD-10-CM

## 2019-09-30 DIAGNOSIS — Z17.0 MALIGNANT NEOPLASM OF UPPER-OUTER QUADRANT OF BOTH BREASTS IN FEMALE, ESTROGEN RECEPTOR POSITIVE (HCC): ICD-10-CM

## 2019-09-30 DIAGNOSIS — C50.412 MALIGNANT NEOPLASM OF UPPER-OUTER QUADRANT OF BOTH BREASTS IN FEMALE, ESTROGEN RECEPTOR POSITIVE (HCC): ICD-10-CM

## 2019-09-30 PROCEDURE — 2709999900 CT CHEST ABDOMEN PELVIS W CONTRAST

## 2019-09-30 PROCEDURE — 6360000004 HC RX CONTRAST MEDICATION: Performed by: INTERNAL MEDICINE

## 2019-09-30 RX ADMIN — IOHEXOL 50 ML: 240 INJECTION, SOLUTION INTRATHECAL; INTRAVASCULAR; INTRAVENOUS; ORAL at 10:48

## 2019-09-30 RX ADMIN — IOPAMIDOL 100 ML: 755 INJECTION, SOLUTION INTRAVENOUS at 10:48

## 2019-10-07 ENCOUNTER — OFFICE VISIT (OUTPATIENT)
Dept: ONCOLOGY | Age: 69
End: 2019-10-07
Payer: MEDICARE

## 2019-10-07 VITALS
BODY MASS INDEX: 30.16 KG/M2 | DIASTOLIC BLOOD PRESSURE: 68 MMHG | RESPIRATION RATE: 16 BRPM | HEIGHT: 68 IN | OXYGEN SATURATION: 98 % | HEART RATE: 75 BPM | SYSTOLIC BLOOD PRESSURE: 124 MMHG | WEIGHT: 199 LBS

## 2019-10-07 DIAGNOSIS — C50.412 MALIGNANT NEOPLASM OF UPPER-OUTER QUADRANT OF BOTH BREASTS IN FEMALE, ESTROGEN RECEPTOR POSITIVE (HCC): Primary | ICD-10-CM

## 2019-10-07 DIAGNOSIS — C50.411 MALIGNANT NEOPLASM OF UPPER-OUTER QUADRANT OF BOTH BREASTS IN FEMALE, ESTROGEN RECEPTOR POSITIVE (HCC): Primary | ICD-10-CM

## 2019-10-07 DIAGNOSIS — Z17.0 MALIGNANT NEOPLASM OF UPPER-OUTER QUADRANT OF BOTH BREASTS IN FEMALE, ESTROGEN RECEPTOR POSITIVE (HCC): Primary | ICD-10-CM

## 2019-10-07 PROCEDURE — 99214 OFFICE O/P EST MOD 30 MIN: CPT | Performed by: INTERNAL MEDICINE

## 2019-10-22 ENCOUNTER — HOSPITAL ENCOUNTER (OUTPATIENT)
Dept: INFUSION THERAPY | Age: 69
Discharge: HOME | End: 2019-10-22
Payer: MEDICARE

## 2019-10-22 DIAGNOSIS — C50.412 MALIGNANT NEOPLASM OF UPPER-OUTER QUADRANT OF LEFT BREAST IN FEMALE, ESTROGEN RECEPTOR POSITIVE (HCC): Primary | ICD-10-CM

## 2019-10-22 DIAGNOSIS — Z17.0 MALIGNANT NEOPLASM OF UPPER-OUTER QUADRANT OF LEFT BREAST IN FEMALE, ESTROGEN RECEPTOR POSITIVE (HCC): Primary | ICD-10-CM

## 2019-10-22 PROCEDURE — 6360000002 HC RX W HCPCS: Performed by: INTERNAL MEDICINE

## 2019-10-22 PROCEDURE — 96523 IRRIG DRUG DELIVERY DEVICE: CPT

## 2019-10-22 PROCEDURE — 2580000003 HC RX 258: Performed by: INTERNAL MEDICINE

## 2019-10-22 RX ORDER — SODIUM CHLORIDE 0.9 % (FLUSH) 0.9 %
10 SYRINGE (ML) INJECTION PRN
Status: DISCONTINUED | OUTPATIENT
Start: 2019-10-22 | End: 2019-10-23 | Stop reason: HOSPADM

## 2019-10-22 RX ORDER — SODIUM CHLORIDE 0.9 % (FLUSH) 0.9 %
10 SYRINGE (ML) INJECTION PRN
Status: CANCELLED | OUTPATIENT
Start: 2019-10-22

## 2019-10-22 RX ORDER — HEPARIN SODIUM (PORCINE) LOCK FLUSH IV SOLN 100 UNIT/ML 100 UNIT/ML
500 SOLUTION INTRAVENOUS PRN
Status: CANCELLED | OUTPATIENT
Start: 2019-10-22

## 2019-10-22 RX ORDER — HEPARIN SODIUM (PORCINE) LOCK FLUSH IV SOLN 100 UNIT/ML 100 UNIT/ML
500 SOLUTION INTRAVENOUS PRN
Status: DISCONTINUED | OUTPATIENT
Start: 2019-10-22 | End: 2019-10-23 | Stop reason: HOSPADM

## 2019-10-22 RX ADMIN — HEPARIN SODIUM (PORCINE) LOCK FLUSH IV SOLN 100 UNIT/ML 500 UNITS: 100 SOLUTION at 10:55

## 2019-10-22 RX ADMIN — Medication 10 ML: at 10:55

## 2019-11-08 ENCOUNTER — HOSPITAL ENCOUNTER (OUTPATIENT)
Dept: LAB | Age: 69
Discharge: HOME | End: 2019-11-08
Payer: MEDICARE

## 2019-11-08 DIAGNOSIS — Z17.0 MALIGNANT NEOPLASM OF UPPER-OUTER QUADRANT OF BOTH BREASTS IN FEMALE, ESTROGEN RECEPTOR POSITIVE (HCC): ICD-10-CM

## 2019-11-08 DIAGNOSIS — C50.412 MALIGNANT NEOPLASM OF UPPER-OUTER QUADRANT OF BOTH BREASTS IN FEMALE, ESTROGEN RECEPTOR POSITIVE (HCC): ICD-10-CM

## 2019-11-08 DIAGNOSIS — C50.411 MALIGNANT NEOPLASM OF UPPER-OUTER QUADRANT OF BOTH BREASTS IN FEMALE, ESTROGEN RECEPTOR POSITIVE (HCC): ICD-10-CM

## 2019-11-08 LAB
CREAT SERPL-MCNC: 0.61 MG/DL (ref 0.5–0.9)
GFR AFRICAN AMERICAN: >60 ML/MIN
GFR NON-AFRICAN AMERICAN: >60 ML/MIN
GFR SERPL CREATININE-BSD FRML MDRD: NORMAL ML/MIN/{1.73_M2}
GFR SERPL CREATININE-BSD FRML MDRD: NORMAL ML/MIN/{1.73_M2}

## 2019-11-08 PROCEDURE — 82565 ASSAY OF CREATININE: CPT

## 2019-11-08 PROCEDURE — 36415 COLL VENOUS BLD VENIPUNCTURE: CPT

## 2019-11-11 ENCOUNTER — HOSPITAL ENCOUNTER (OUTPATIENT)
Dept: CT IMAGING | Age: 69
Discharge: HOME | End: 2019-11-13
Payer: MEDICARE

## 2019-11-11 DIAGNOSIS — C50.412 MALIGNANT NEOPLASM OF UPPER-OUTER QUADRANT OF BOTH BREASTS IN FEMALE, ESTROGEN RECEPTOR POSITIVE (HCC): ICD-10-CM

## 2019-11-11 DIAGNOSIS — C50.411 MALIGNANT NEOPLASM OF UPPER-OUTER QUADRANT OF BOTH BREASTS IN FEMALE, ESTROGEN RECEPTOR POSITIVE (HCC): ICD-10-CM

## 2019-11-11 DIAGNOSIS — Z17.0 MALIGNANT NEOPLASM OF UPPER-OUTER QUADRANT OF BOTH BREASTS IN FEMALE, ESTROGEN RECEPTOR POSITIVE (HCC): ICD-10-CM

## 2019-11-11 PROCEDURE — 2709999900 CT CHEST W CONTRAST

## 2019-11-11 PROCEDURE — 6360000004 HC RX CONTRAST MEDICATION: Performed by: INTERNAL MEDICINE

## 2019-11-11 RX ADMIN — IOPAMIDOL 100 ML: 755 INJECTION, SOLUTION INTRAVENOUS at 08:57

## 2019-11-18 ENCOUNTER — OFFICE VISIT (OUTPATIENT)
Dept: ONCOLOGY | Age: 69
End: 2019-11-18
Payer: MEDICARE

## 2019-11-18 VITALS
HEIGHT: 68 IN | HEART RATE: 74 BPM | SYSTOLIC BLOOD PRESSURE: 122 MMHG | BODY MASS INDEX: 30.22 KG/M2 | WEIGHT: 199.4 LBS | OXYGEN SATURATION: 98 % | RESPIRATION RATE: 16 BRPM | DIASTOLIC BLOOD PRESSURE: 66 MMHG

## 2019-11-18 DIAGNOSIS — Z17.0 MALIGNANT NEOPLASM OF UPPER-OUTER QUADRANT OF BOTH BREASTS IN FEMALE, ESTROGEN RECEPTOR POSITIVE (HCC): Primary | ICD-10-CM

## 2019-11-18 DIAGNOSIS — C50.412 MALIGNANT NEOPLASM OF UPPER-OUTER QUADRANT OF BOTH BREASTS IN FEMALE, ESTROGEN RECEPTOR POSITIVE (HCC): Primary | ICD-10-CM

## 2019-11-18 DIAGNOSIS — C50.411 MALIGNANT NEOPLASM OF UPPER-OUTER QUADRANT OF BOTH BREASTS IN FEMALE, ESTROGEN RECEPTOR POSITIVE (HCC): Primary | ICD-10-CM

## 2019-11-18 PROCEDURE — 99214 OFFICE O/P EST MOD 30 MIN: CPT | Performed by: INTERNAL MEDICINE

## 2019-11-18 RX ORDER — AZITHROMYCIN 250 MG/1
TABLET, FILM COATED ORAL
Qty: 1 PACKET | Refills: 0 | Status: SHIPPED | OUTPATIENT
Start: 2019-11-18 | End: 2019-12-13 | Stop reason: ALTCHOICE

## 2019-12-03 ENCOUNTER — HOSPITAL ENCOUNTER (OUTPATIENT)
Dept: INFUSION THERAPY | Age: 69
Discharge: HOME | End: 2019-12-03
Payer: MEDICARE

## 2019-12-03 DIAGNOSIS — C50.412 MALIGNANT NEOPLASM OF UPPER-OUTER QUADRANT OF LEFT BREAST IN FEMALE, ESTROGEN RECEPTOR POSITIVE (HCC): Primary | ICD-10-CM

## 2019-12-03 DIAGNOSIS — Z17.0 MALIGNANT NEOPLASM OF UPPER-OUTER QUADRANT OF LEFT BREAST IN FEMALE, ESTROGEN RECEPTOR POSITIVE (HCC): Primary | ICD-10-CM

## 2019-12-03 PROCEDURE — 96523 IRRIG DRUG DELIVERY DEVICE: CPT

## 2019-12-03 PROCEDURE — 2580000003 HC RX 258: Performed by: INTERNAL MEDICINE

## 2019-12-03 PROCEDURE — 96416 CHEMO PROLONG INFUSE W/PUMP: CPT

## 2019-12-03 PROCEDURE — 6360000002 HC RX W HCPCS: Performed by: INTERNAL MEDICINE

## 2019-12-03 RX ORDER — HEPARIN SODIUM (PORCINE) LOCK FLUSH IV SOLN 100 UNIT/ML 100 UNIT/ML
500 SOLUTION INTRAVENOUS PRN
Status: DISCONTINUED | OUTPATIENT
Start: 2019-12-03 | End: 2019-12-04 | Stop reason: HOSPADM

## 2019-12-03 RX ORDER — HEPARIN SODIUM (PORCINE) LOCK FLUSH IV SOLN 100 UNIT/ML 100 UNIT/ML
500 SOLUTION INTRAVENOUS PRN
Status: CANCELLED | OUTPATIENT
Start: 2019-12-03

## 2019-12-03 RX ORDER — SODIUM CHLORIDE 0.9 % (FLUSH) 0.9 %
10 SYRINGE (ML) INJECTION PRN
Status: CANCELLED | OUTPATIENT
Start: 2019-12-03

## 2019-12-03 RX ORDER — SODIUM CHLORIDE 0.9 % (FLUSH) 0.9 %
10 SYRINGE (ML) INJECTION PRN
Status: DISCONTINUED | OUTPATIENT
Start: 2019-12-03 | End: 2019-12-04 | Stop reason: HOSPADM

## 2019-12-03 RX ADMIN — Medication 10 ML: at 11:00

## 2019-12-03 RX ADMIN — HEPARIN SODIUM (PORCINE) LOCK FLUSH IV SOLN 100 UNIT/ML 500 UNITS: 100 SOLUTION at 11:00

## 2019-12-13 ENCOUNTER — OFFICE VISIT (OUTPATIENT)
Dept: SURGERY | Age: 69
End: 2019-12-13
Payer: MEDICARE

## 2019-12-13 VITALS
BODY MASS INDEX: 30.65 KG/M2 | DIASTOLIC BLOOD PRESSURE: 82 MMHG | HEIGHT: 68 IN | HEART RATE: 74 BPM | RESPIRATION RATE: 16 BRPM | WEIGHT: 202.2 LBS | SYSTOLIC BLOOD PRESSURE: 140 MMHG

## 2019-12-13 DIAGNOSIS — Z98.890 S/P BREAST RECONSTRUCTION, BILATERAL: ICD-10-CM

## 2019-12-13 DIAGNOSIS — Z90.13 S/P BILATERAL MASTECTOMY: Primary | ICD-10-CM

## 2019-12-13 PROCEDURE — 99213 OFFICE O/P EST LOW 20 MIN: CPT | Performed by: PLASTIC SURGERY

## 2019-12-13 ASSESSMENT — ENCOUNTER SYMPTOMS
COUGH: 0
SHORTNESS OF BREATH: 0
TROUBLE SWALLOWING: 0
ABDOMINAL PAIN: 0

## 2020-01-06 ENCOUNTER — CLINICAL DOCUMENTATION (OUTPATIENT)
Dept: SPIRITUAL SERVICES | Age: 70
End: 2020-01-06

## 2020-01-06 ENCOUNTER — HOSPITAL ENCOUNTER (OUTPATIENT)
Dept: INFUSION THERAPY | Age: 70
Discharge: HOME | End: 2020-01-06
Payer: MEDICARE

## 2020-01-06 DIAGNOSIS — C50.412 MALIGNANT NEOPLASM OF UPPER-OUTER QUADRANT OF BOTH BREASTS IN FEMALE, ESTROGEN RECEPTOR POSITIVE (HCC): Primary | ICD-10-CM

## 2020-01-06 DIAGNOSIS — C50.411 MALIGNANT NEOPLASM OF UPPER-OUTER QUADRANT OF BOTH BREASTS IN FEMALE, ESTROGEN RECEPTOR POSITIVE (HCC): Primary | ICD-10-CM

## 2020-01-06 DIAGNOSIS — Z17.0 MALIGNANT NEOPLASM OF UPPER-OUTER QUADRANT OF BOTH BREASTS IN FEMALE, ESTROGEN RECEPTOR POSITIVE (HCC): Primary | ICD-10-CM

## 2020-01-06 DIAGNOSIS — Z17.0 MALIGNANT NEOPLASM OF UPPER-OUTER QUADRANT OF LEFT BREAST IN FEMALE, ESTROGEN RECEPTOR POSITIVE (HCC): ICD-10-CM

## 2020-01-06 DIAGNOSIS — C50.412 MALIGNANT NEOPLASM OF UPPER-OUTER QUADRANT OF LEFT BREAST IN FEMALE, ESTROGEN RECEPTOR POSITIVE (HCC): ICD-10-CM

## 2020-01-06 LAB
ABSOLUTE EOS #: 0.04 K/UL (ref 0–0.44)
ABSOLUTE IMMATURE GRANULOCYTE: <0.03 K/UL (ref 0–0.3)
ABSOLUTE LYMPH #: 1.3 K/UL (ref 1.1–3.7)
ABSOLUTE MONO #: 0.65 K/UL (ref 0.1–1.2)
ALBUMIN SERPL-MCNC: 4.4 G/DL (ref 3.5–5.2)
ALBUMIN/GLOBULIN RATIO: 1.6 (ref 1–2.5)
ALP BLD-CCNC: 74 U/L (ref 35–104)
ALT SERPL-CCNC: 31 U/L (ref 5–33)
ANION GAP SERPL CALCULATED.3IONS-SCNC: 12 MMOL/L (ref 9–17)
AST SERPL-CCNC: 28 U/L
BASOPHILS # BLD: 1 % (ref 0–2)
BASOPHILS ABSOLUTE: 0.03 K/UL (ref 0–0.2)
BILIRUB SERPL-MCNC: 0.4 MG/DL (ref 0.3–1.2)
BUN BLDV-MCNC: 17 MG/DL (ref 8–23)
BUN/CREAT BLD: 30 (ref 9–20)
CALCIUM SERPL-MCNC: 9.9 MG/DL (ref 8.6–10.4)
CHLORIDE BLD-SCNC: 105 MMOL/L (ref 98–107)
CO2: 24 MMOL/L (ref 20–31)
CREAT SERPL-MCNC: 0.56 MG/DL (ref 0.5–0.9)
DIFFERENTIAL TYPE: ABNORMAL
EOSINOPHILS RELATIVE PERCENT: 1 % (ref 1–4)
GFR AFRICAN AMERICAN: >60 ML/MIN
GFR NON-AFRICAN AMERICAN: >60 ML/MIN
GFR SERPL CREATININE-BSD FRML MDRD: ABNORMAL ML/MIN/{1.73_M2}
GFR SERPL CREATININE-BSD FRML MDRD: ABNORMAL ML/MIN/{1.73_M2}
GLUCOSE BLD-MCNC: 93 MG/DL (ref 70–99)
HCT VFR BLD CALC: 42.1 % (ref 36.3–47.1)
HEMOGLOBIN: 13.6 G/DL (ref 11.9–15.1)
IMMATURE GRANULOCYTES: 0 %
LYMPHOCYTES # BLD: 29 % (ref 24–43)
MCH RBC QN AUTO: 30.9 PG (ref 25.2–33.5)
MCHC RBC AUTO-ENTMCNC: 32.3 G/DL (ref 25.2–33.5)
MCV RBC AUTO: 95.7 FL (ref 82.6–102.9)
MONOCYTES # BLD: 14 % (ref 3–12)
NRBC AUTOMATED: 0 PER 100 WBC
PDW BLD-RTO: 11.9 % (ref 11.8–14.4)
PLATELET # BLD: 200 K/UL (ref 138–453)
PLATELET ESTIMATE: ABNORMAL
PMV BLD AUTO: 11.6 FL (ref 8.1–13.5)
POTASSIUM SERPL-SCNC: 4.1 MMOL/L (ref 3.7–5.3)
RBC # BLD: 4.4 M/UL (ref 3.95–5.11)
RBC # BLD: ABNORMAL 10*6/UL
SEG NEUTROPHILS: 55 % (ref 36–65)
SEGMENTED NEUTROPHILS ABSOLUTE COUNT: 2.48 K/UL (ref 1.5–8.1)
SODIUM BLD-SCNC: 141 MMOL/L (ref 135–144)
TOTAL PROTEIN: 7.1 G/DL (ref 6.4–8.3)
WBC # BLD: 4.5 K/UL (ref 3.5–11.3)
WBC # BLD: ABNORMAL 10*3/UL

## 2020-01-06 PROCEDURE — 80053 COMPREHEN METABOLIC PANEL: CPT

## 2020-01-06 PROCEDURE — 36591 DRAW BLOOD OFF VENOUS DEVICE: CPT

## 2020-01-06 PROCEDURE — 85025 COMPLETE CBC W/AUTO DIFF WBC: CPT

## 2020-01-06 PROCEDURE — 6360000002 HC RX W HCPCS: Performed by: INTERNAL MEDICINE

## 2020-01-06 PROCEDURE — 2580000003 HC RX 258: Performed by: INTERNAL MEDICINE

## 2020-01-06 RX ORDER — SODIUM CHLORIDE 0.9 % (FLUSH) 0.9 %
10 SYRINGE (ML) INJECTION PRN
Status: CANCELLED | OUTPATIENT
Start: 2020-01-06

## 2020-01-06 RX ORDER — HEPARIN SODIUM (PORCINE) LOCK FLUSH IV SOLN 100 UNIT/ML 100 UNIT/ML
500 SOLUTION INTRAVENOUS PRN
Status: DISCONTINUED | OUTPATIENT
Start: 2020-01-06 | End: 2020-01-07 | Stop reason: HOSPADM

## 2020-01-06 RX ORDER — HEPARIN SODIUM (PORCINE) LOCK FLUSH IV SOLN 100 UNIT/ML 100 UNIT/ML
500 SOLUTION INTRAVENOUS PRN
Status: CANCELLED | OUTPATIENT
Start: 2020-01-06

## 2020-01-06 RX ORDER — SODIUM CHLORIDE 0.9 % (FLUSH) 0.9 %
10 SYRINGE (ML) INJECTION PRN
Status: DISCONTINUED | OUTPATIENT
Start: 2020-01-06 | End: 2020-01-07 | Stop reason: HOSPADM

## 2020-01-06 RX ADMIN — HEPARIN SODIUM (PORCINE) LOCK FLUSH IV SOLN 100 UNIT/ML 500 UNITS: 100 SOLUTION at 11:10

## 2020-01-06 RX ADMIN — Medication 10 ML: at 11:10

## 2020-01-07 ENCOUNTER — HOSPITAL ENCOUNTER (OUTPATIENT)
Dept: CT IMAGING | Age: 70
Discharge: HOME | End: 2020-01-09
Payer: MEDICARE

## 2020-01-07 PROCEDURE — 6360000004 HC RX CONTRAST MEDICATION: Performed by: INTERNAL MEDICINE

## 2020-01-07 PROCEDURE — 2709999900 CT CHEST W CONTRAST

## 2020-01-07 RX ADMIN — IOPAMIDOL 75 ML: 755 INJECTION, SOLUTION INTRAVENOUS at 08:17

## 2020-01-13 ENCOUNTER — OFFICE VISIT (OUTPATIENT)
Dept: ONCOLOGY | Age: 70
End: 2020-01-13
Payer: MEDICARE

## 2020-01-13 VITALS
WEIGHT: 204.8 LBS | RESPIRATION RATE: 16 BRPM | BODY MASS INDEX: 31.04 KG/M2 | OXYGEN SATURATION: 98 % | DIASTOLIC BLOOD PRESSURE: 70 MMHG | HEIGHT: 68 IN | SYSTOLIC BLOOD PRESSURE: 122 MMHG | HEART RATE: 80 BPM

## 2020-01-13 PROCEDURE — 99214 OFFICE O/P EST MOD 30 MIN: CPT | Performed by: INTERNAL MEDICINE

## 2020-01-13 RX ORDER — ANASTROZOLE 1 MG/1
1 TABLET ORAL DAILY
Qty: 30 TABLET | Refills: 3 | Status: SHIPPED | OUTPATIENT
Start: 2020-01-13 | End: 2020-04-14 | Stop reason: SDUPTHER

## 2020-01-13 NOTE — PROGRESS NOTES
hysterectomy and oophorectomy. Interval history:  Patient is returning for follow-up visit. She is here to discuss CT scan results and further recommendations. She is feeling much better. Her fatigue has been improving significantly. Denies any fever chills, fatigue, diarrhea or nausea vomiting. During this visit patient's allergy, social, medical, surgical history and medications were reviewed and updated. Allergies   Allergen Reactions    Boniva [Ibandronic Acid] Other (See Comments)     Jaw pain.  Fosamax [Alendronate Sodium] Other (See Comments)     Jaw pain. No current outpatient medications on file. No current facility-administered medications for this visit. Social History     Socioeconomic History    Marital status:      Spouse name: Not on file    Number of children: Not on file    Years of education: Not on file    Highest education level: Not on file   Occupational History    Occupation: retired     Employer: [employer] resource strain: Not on file    Food insecurity:     Worry: Not on file     Inability: Not on file    Transportation needs:     Medical: Not on file     Non-medical: Not on file   Tobacco Use    Smoking status: Former Smoker     Packs/day: 0.50     Years: 7.00     Pack years: 3.50     Types: Cigarettes     Last attempt to quit: 1978     Years since quittin.0    Smokeless tobacco: Never Used    Tobacco comment: Quit smoking in . Substance and Sexual Activity    Alcohol use: No     Comment: Occasional alcohol.     Drug use: No    Sexual activity: Not on file   Lifestyle    Physical activity:     Days per week: Not on file     Minutes per session: Not on file    Stress: Not on file   Relationships    Social connections:     Talks on phone: Not on file     Gets together: Not on file     Attends Sabianist service: Not on file     Active member of club or organization: Not on file Attends meetings of clubs or organizations: Not on file     Relationship status: Not on file    Intimate partner violence:     Fear of current or ex partner: Not on file     Emotionally abused: Not on file     Physically abused: Not on file     Forced sexual activity: Not on file   Other Topics Concern    Not on file   Social History Narrative    Not on file       Family History   Problem Relation Age of Onset    Hypertension Mother         Renal failure.  Diabetes Mother         Type 2 at end of life.  Heart Failure Father     Coronary Art Dis Father         Status post CABG    Diabetes Father         Type 2, very late in life.  Breast Cancer Sister 58    Hypertension Brother     Hypertension Sister     Breast Cancer Paternal Aunt         62s    Breast Cancer Paternal Grandmother     Breast Cancer Paternal Cousin     Breast Cancer Paternal Aunt         62s    Breast Cancer Paternal Cousin         46s      REVIEW OF SYSTEM:   Constitutional: No fever or chills. No night sweats, no weight loss   Eyes: No eye discharge, double vision, or eye pain   HEENT: negative for sore mouth, sore throat, hoarseness and voice change   Respiratory: negative for cough , sputum, dyspnea, wheezing, hemoptysis, chest pain   Cardiovascular: negative for chest pain, dyspnea, palpitations, orthopnea, PND   Gastrointestinal: negative for nausea, vomiting, diarrhea, constipation, abdominal pain, Dysphagia, hematemesis and hematochezia   Genitourinary: negative for frequency, dysuria, nocturia, urinary incontinence, and hematuria   Integument: negative for rash, skin lesions, bruises.    Hematologic/Lymphatic: negative for easy bruising, bleeding, lymphadenopathy, petechiae and swelling/edema   Endocrine: negative for heat or cold intolerance, tremor, weight changes, change in bowel habits and hair loss   Musculoskeletal: negative for myalgias, arthralgias, pain, joint swelling,and bone pain   Neurological: negative for Component Value Date/Time    CALCIUM 9.9 01/06/2020 1120    ALKPHOS 74 01/06/2020 1120    AST 28 01/06/2020 1120    ALT 31 01/06/2020 1120    BILITOT 0.40 01/06/2020 1120        PATHOLOGY DATA:   Received: 9/11/2018   Reported: 9/12/2018 17:30     -- Diagnosis --   1.  RIGHT BREAST, CORE NEEDLE BIOPSIES:            -  WELL-DIFFERENTIATED INFILTRATING DUCTAL CARCINOMA, 0.6 CM   IN LARGEST INTACT CORE FRAGMENT.            -  ESTROGEN AND PROGESTERONE RECEPTOR IMMUNOSTAINS POSITIVE   IN INVASIVE TUMOR.       -  HER-2 FISH PENDING. 2.  LEFT BREAST, 2:00, N+4, CORE NEEDLE BIOPSIES:            -  MODERATELY DIFFERENTIATED INFILTRATING DUCTAL CARCINOMA,   0.6 CM IN LARGEST INTACT CORE FRAGMENT.       -  FOCAL DUCTAL CARCINOMA IN SITU, INTERMEDIATE GRADE, CRIBRIFORM   AND SOLID TYPE.            -  ESTROGEN AND PROGESTERONE RECEPTOR IMMUNOSTAINS POSITIVE   IN INVASIVE TUMOR.       -  HER-2 FISH PENDING. 3.  LEFT BREAST, 1:00, N+6, CORE NEEDLE BIOPSIES:            -  POORLY DIFFERENTIATED INFILTRATING DUCTAL CARCINOMA, 1.3   CM GREATEST DIMENSION IN LARGEST INTACT CORE FRAGMENT.       -  FOCAL DUCTAL CARCINOMA IN SITU, INTERMEDIATE GRADE, SOLID   TYPE.            -  ESTROGEN AND PROGESTERONE RECEPTOR IMMUNOSTAINS POSITIVE   IN INVASIVE TUMOR.       -  HER-2 FISH PENDING. 4.  LYMPH NODE, LEFT AXILLA, CORE NEEDLE BIOPSIES:       -  INFILTRATING DUCTAL CARCINOMA.          -  INVASIVE TUMOR POSITIVE FOR ESTROGEN RECEPTOR AND   NEGATIVE FOR PROGESTERONE RECEPTOR IMMUNOSTAINS.     -  LYMPH NODE AND BENIGN BREAST ARCHITECTURE NOT IDENTIFIED. -- Diagnosis Comment --   SPECIMEN #4: CORRELATION WITH THE RADIOLOGIC PLACEMENT OF THE BIOPSY   WOULD BE NECESSARY TO DETERMINE IF THIS REPRESENTS A LYMPH NODE WITH   METASTATIC CARCINOMA, VERSUS CARCINOMA INVOLVING THE AXILLARY TAIL OF   THE BREAST.  NO INTRINSIC LYMPH NODE OR BENIGN BREAST ARCHITECTURE IS   PRESENT FOR CONFIRMATION. 2/15/19: Final Diagnosis   1. LYMPH NODE, EXCISION (RIGHT SENTINEL):        - NO NEOPLASM DETECTED. 2. BREAST, MASTECTOMY (RIGHT):        - SCANT RESIDUAL FOCUS OF INVASIVE DUCTAL CARCINOMA.      - DUCTAL CARCINOMA IN SITU.        - EXCISION MARGINS NEGATIVE FOR NEOPLASM. 3. BREAST, MASTECTOMY, INCLUDING AXILLARY LYMPH NODES (LEFT):        - INVASIVE DUCTAL CARCINOMA, 1.5 CM        - DUCTAL CARCINOMA IN SITU.        - EXCISION MARGINS NEGATIVE FOR NEOPLASM. Diagnosis Comment   BIOMARKER STUDIES WERE PERFORMED ON PRIOR BIOPSIES FROM TUMORS FROM   BOTH BREASTS ([id number]) -     RIGHT:        - POSITIVE ESTROGEN RECEPTOR.        - POSITIVE PROGESTERONE RECEPTOR.        - NEGATIVE FOR HER2 GENE AMPLIFICATION. LEFT:        - POSITIVE ESTROGEN RECEPTOR.        - POSITIVE PROGESTERONE RECEPTOR.        - NEGATIVE FOR HER2 GENE AMPLIFICATION. Procedures/Addenda   ADDENDUM     Date Ordered:     2/21/2019     Status: Signed Out        Date Complete:     2/21/2019     By: Tiarra Dale M.D.        Date Reported:     2/21/2019       ADDENDUM DIAGNOSIS   3.  BREAST, MASTECTOMY, WITH AXILLARY LYMPH NODES (LEFT):        - NO CHANGE IN DIAGNOSIS. ADDENDUM COMMENT   THE CASE IS DISCUSSED WITH DR. CID, FEBRUARY 20, 2019.  SLIDES ARE   REVIEWED, WITH SPECIFIC ATTENTION TO THE 6 SLIDES FROM AXILLARY   REGION.  THESE REPRESENT 2 LYMPH NODES, INCLUDING A LARGER 4.5 CM   NODE, AND NEITHER CONTAINS NEOPLASM ON REVIEW.  THE GROSS MASTECTOMY   SPECIMEN IS THEN REEXAMINED WITH SPECIFIC ATTENTION TO THE AXILLARY   TISSUE, AS WELL AS THE BREAST ADJACENT TO THE AXILLARY REGION.  NO   ADDITIONAL GROSSLY IDENTIFIABLE LYMPH NODES ARE FOUND.  REPRESENTATIVE   SAMPLING OF 8 ADDITIONAL BLOCKS FROM THIS AXILLARY REGION IS SUBMITTED   FOR POSSIBLE IDENTIFICATION OF MINUTE NODES THAT MAY NOT BE GROSSLY   EVIDENT.  HOWEVER, NO ADDITIONAL NODES ARE DETECTED ON MICROSCOPIC   EXAM OF THESE ADDITIONAL BLOCKS.   IMAGING DATA:    Ultrasound breast 9/10/18  FINDINGS: chest 1/7/2020  Impression   1. No suspicious nodules or masses. 2. Stable or slightly improved left lung apex reticular interlobular septal   thickening on background of patchy ground-glass attenuation and minor   anterior subpleural reticulations in the left upper lobe attributed to post   treatment scarring.  Attention on follow-up. 3. Bilateral breast implants in right IJ infusion port. ASSESSMENT:     Lulú Campos has clinical stage II, T2 N1 M0 disease in her left breast and she has clinical stage I T1 N0 M0 disease in her right breast.    Unfortunately she had significant reaction to first cycle of AC chemotherapy. I gave preoperative  weekly paclitaxel for 12 cycles. She underwent surgical resection: I discussed the surgical pathology report. LNs showed no residual cancer, NO residual cancer noted in right breast, But left breast showed smaller residual invasive cancer 1.5 cm and she had negative margins. She  Has completed RT. Now completed Xeloda well. Dense of recurrence noted on recent scan. During today's visit, the patient and the family had a number of reasonable questions which were answered to their satisfaction. They verbalized understanding of the information provided and they agreed to proceed as outlined above. PLAN:   I reviewed the recent CT scan of the chest.  It showed stable/slightly improved left lung apex septal thickening suggesting possible benign etiology or treatment related to scarring. I will plan for port removal   We will get baseline DEXA scan  Return to clinic in 2 months      Benson Arenas MD  Hematologist/Medical Oncologist  I spent more than 25 minutes examining, evaluating, reviewing data and counseling the patient. Greater than 50% of that time was spent face-to-face with the patient in counseling and coordinating her care.           This note is created with the assistance of a speech recognition program.  While intending to generate a document that actually

## 2020-01-23 ENCOUNTER — TELEPHONE (OUTPATIENT)
Dept: SURGERY | Age: 70
End: 2020-01-23

## 2020-01-23 ENCOUNTER — HOSPITAL ENCOUNTER (OUTPATIENT)
Dept: NON INVASIVE DIAGNOSTICS | Age: 70
Discharge: HOME | End: 2020-01-23
Payer: MEDICARE

## 2020-01-23 ENCOUNTER — OFFICE VISIT (OUTPATIENT)
Dept: SURGERY | Age: 70
End: 2020-01-23
Payer: MEDICARE

## 2020-01-23 VITALS
SYSTOLIC BLOOD PRESSURE: 136 MMHG | HEART RATE: 70 BPM | DIASTOLIC BLOOD PRESSURE: 76 MMHG | HEIGHT: 68 IN | BODY MASS INDEX: 31.19 KG/M2 | WEIGHT: 205.8 LBS

## 2020-01-23 LAB
EKG ATRIAL RATE: 58 BPM
EKG P AXIS: 79 DEGREES
EKG P-R INTERVAL: 174 MS
EKG Q-T INTERVAL: 436 MS
EKG QRS DURATION: 84 MS
EKG QTC CALCULATION (BAZETT): 428 MS
EKG R AXIS: 28 DEGREES
EKG T AXIS: 68 DEGREES
EKG VENTRICULAR RATE: 58 BPM

## 2020-01-23 PROCEDURE — 99213 OFFICE O/P EST LOW 20 MIN: CPT | Performed by: SURGERY

## 2020-01-23 PROCEDURE — 93005 ELECTROCARDIOGRAM TRACING: CPT

## 2020-01-23 PROCEDURE — 99214 OFFICE O/P EST MOD 30 MIN: CPT

## 2020-01-23 NOTE — PROGRESS NOTES
Keith Acosta is a 81 y.o. female          CC:    . Breast Cancer (B/L breast cancer with B/l mastectomies feb 2019, getting reconstruction)      HISTORY OF PRESENT ILLNESS:    Pt is 80 yo with bilateral breast cancer and hx of B/L mastectomies in feb 2019. She is getting bilateral reconstruction. She has finishes chemo and is not ready to have her port removed. Review of Systems:    General:  Fever: Negative  Weight Change:Negative  Night Sweats: Negative    Eye:  Blurry Vision:Negative  Double Vision: Negative    Ent:  Headaches: Negative  Sore throat: Negative    Allergy/Immunology:  Hives: Negative  Latex allergy: Negative    Hematology/Lymphatic:  Bleeding Problems: Negative  Blood Clots: Negative  Swollen Lymph Nodes: Negative    Lungs:  Cough: Negative  SOB: Negative  Wheezing:Negative    Cardiovascular:  Chest Pain: Negative  Palpitations:Negative    GI:   Decreased Appetite: Negative  Heartburn: Negative  Dysphagia: Negative  Nausea/Vomiting: Negative  Abdominal Pain: Negative  Change in Bowels:Negative  Constipation: Negative  Diarrhea: Negative  Rectal Bleeding: Negative    :   Dysuria: Negative  Increase Urinary Frequency/Urgency: Negative    Neuro:  Seizures: Negative  Confusion: Negative        PAST MEDICAL HISTORY:        Family History   Problem Relation Age of Onset    Hypertension Mother         Renal failure.  Diabetes Mother         Type 2 at end of life.  Heart Failure Father     Coronary Art Dis Father         Status post CABG    Diabetes Father         Type 2, very late in life.     Breast Cancer Sister 58    Hypertension Brother     Hypertension Sister     Breast Cancer Paternal Aunt         62s    Breast Cancer Paternal Grandmother     Breast Cancer Paternal Cousin     Breast Cancer Paternal Aunt         62s    Breast Cancer Paternal Cousin         46s      Social History     Socioeconomic History    Marital status:      Spouse name: Not on file   

## 2020-01-27 NOTE — TELEPHONE ENCOUNTER
OK for anesthesia
reconstruction w/ expander placement performed by Evita Frances MD at [address] [place] [address]  3/21/2005    Screening. Negative. Due for repeat 2015.  DILATION AND CURETTAGE OF UTERUS  1976    Status post miscarriage.  DILATION AND CURETTAGE OF UTERUS  1995    bleeding    HYSTERECTOMY, TOTAL ABDOMINAL      LYMPH NODE BIOPSY Left 09/10/2018    MASTECTOMY Bilateral 2/15/2019    Left Modified Radical Mastectomy w/ axillary disection & Right Mastectomy sentinel lymph node bx w/ Nuc med & blue dye Inj's performed by Patrizia Pichardo MD at . [facility] [address] [facility] VAD W/SUBQ PORT AGE 7 YR/> N/A 2018    PORT INSERTION performed by Patrizia Pichardo MD at [facility]  1995    fibroids    TONSILLECTOMY      Subsequent hemorrhage and cautery. Social History     Tobacco Use    Smoking status: Former Smoker     Packs/day: 0.50     Years: 7.00     Pack years: 3.50     Types: Cigarettes     Last attempt to quit: 1978     Years since quittin.0    Smokeless tobacco: Never Used    Tobacco comment: Quit smoking in . Substance Use Topics    Alcohol use: No     Comment: Occasional alcohol.  Drug use: No     Medications:  Current Outpatient Medications   Medication Sig Dispense Refill    anastrozole (ARIMIDEX) 1 MG tablet Take 1 tablet by mouth daily 30 tablet 3     No current facility-administered medications for this visit. Scheduled Meds:  Continuous Infusions:  PRN Meds:. Prior to Admission medications    Medication Sig Start Date End Date Taking? Authorizing Provider   anastrozole (ARIMIDEX) 1 MG tablet Take 1 tablet by mouth daily 20   Marilee Washington MD     Vital Signs (Current) [unfilled]    Weight:   Wt Readings from Last 1 Encounters:   20 205 lb 12.8 oz (93.4 kg)     Height:   Ht Readings from Last 1 Encounters:   20 5' 7.99\" (1.727 m)      BMI:  There is no height or weight on file to calculate BMI.   Estimated body mass index

## 2020-01-29 ENCOUNTER — ANESTHESIA EVENT (OUTPATIENT)
Dept: OPERATING ROOM | Age: 70
End: 2020-01-29
Payer: MEDICARE

## 2020-01-29 ENCOUNTER — ANESTHESIA (OUTPATIENT)
Dept: OPERATING ROOM | Age: 70
End: 2020-01-29
Payer: MEDICARE

## 2020-01-29 ENCOUNTER — HOSPITAL ENCOUNTER (OUTPATIENT)
Age: 70
Setting detail: OUTPATIENT SURGERY
Discharge: HOME | End: 2020-01-29
Attending: SURGERY | Admitting: SURGERY
Payer: MEDICARE

## 2020-01-29 VITALS
WEIGHT: 208.4 LBS | HEIGHT: 68 IN | BODY MASS INDEX: 31.58 KG/M2 | TEMPERATURE: 96.8 F | SYSTOLIC BLOOD PRESSURE: 136 MMHG | HEART RATE: 60 BPM | RESPIRATION RATE: 16 BRPM | OXYGEN SATURATION: 100 % | DIASTOLIC BLOOD PRESSURE: 70 MMHG

## 2020-01-29 VITALS
DIASTOLIC BLOOD PRESSURE: 55 MMHG | SYSTOLIC BLOOD PRESSURE: 111 MMHG | OXYGEN SATURATION: 99 % | RESPIRATION RATE: 8 BRPM

## 2020-01-29 PROCEDURE — 7100000011 HC PHASE II RECOVERY - ADDTL 15 MIN: Performed by: SURGERY

## 2020-01-29 PROCEDURE — 36590 REMOVAL TUNNELED CV CATH: CPT | Performed by: SURGERY

## 2020-01-29 PROCEDURE — 2709999900 HC NON-CHARGEABLE SUPPLY: Performed by: SURGERY

## 2020-01-29 PROCEDURE — 2500000003 HC RX 250 WO HCPCS: Performed by: NURSE ANESTHETIST, CERTIFIED REGISTERED

## 2020-01-29 PROCEDURE — 6360000002 HC RX W HCPCS: Performed by: NURSE ANESTHETIST, CERTIFIED REGISTERED

## 2020-01-29 PROCEDURE — 3700000001 HC ADD 15 MINUTES (ANESTHESIA): Performed by: SURGERY

## 2020-01-29 PROCEDURE — 3600000002 HC SURGERY LEVEL 2 BASE: Performed by: SURGERY

## 2020-01-29 PROCEDURE — 2580000003 HC RX 258: Performed by: NURSE ANESTHETIST, CERTIFIED REGISTERED

## 2020-01-29 PROCEDURE — 7100000010 HC PHASE II RECOVERY - FIRST 15 MIN: Performed by: SURGERY

## 2020-01-29 PROCEDURE — 3600000012 HC SURGERY LEVEL 2 ADDTL 15MIN: Performed by: SURGERY

## 2020-01-29 PROCEDURE — 3700000000 HC ANESTHESIA ATTENDED CARE: Performed by: SURGERY

## 2020-01-29 PROCEDURE — 2580000003 HC RX 258: Performed by: SURGERY

## 2020-01-29 PROCEDURE — 2500000003 HC RX 250 WO HCPCS: Performed by: SURGERY

## 2020-01-29 RX ORDER — PROPOFOL 10 MG/ML
INJECTION, EMULSION INTRAVENOUS PRN
Status: DISCONTINUED | OUTPATIENT
Start: 2020-01-29 | End: 2020-01-29 | Stop reason: SDUPTHER

## 2020-01-29 RX ORDER — FENTANYL CITRATE 50 UG/ML
25 INJECTION, SOLUTION INTRAMUSCULAR; INTRAVENOUS EVERY 5 MIN PRN
Status: DISCONTINUED | OUTPATIENT
Start: 2020-01-29 | End: 2020-01-29 | Stop reason: HOSPADM

## 2020-01-29 RX ORDER — MORPHINE SULFATE 2 MG/ML
1 INJECTION, SOLUTION INTRAMUSCULAR; INTRAVENOUS EVERY 5 MIN PRN
Status: DISCONTINUED | OUTPATIENT
Start: 2020-01-29 | End: 2020-01-29 | Stop reason: HOSPADM

## 2020-01-29 RX ORDER — ONDANSETRON 2 MG/ML
4 INJECTION INTRAMUSCULAR; INTRAVENOUS
Status: DISCONTINUED | OUTPATIENT
Start: 2020-01-29 | End: 2020-01-29 | Stop reason: HOSPADM

## 2020-01-29 RX ORDER — SODIUM CHLORIDE, SODIUM LACTATE, POTASSIUM CHLORIDE, CALCIUM CHLORIDE 600; 310; 30; 20 MG/100ML; MG/100ML; MG/100ML; MG/100ML
INJECTION, SOLUTION INTRAVENOUS CONTINUOUS PRN
Status: DISCONTINUED | OUTPATIENT
Start: 2020-01-29 | End: 2020-01-29 | Stop reason: SDUPTHER

## 2020-01-29 RX ORDER — FENTANYL CITRATE 50 UG/ML
INJECTION, SOLUTION INTRAMUSCULAR; INTRAVENOUS PRN
Status: DISCONTINUED | OUTPATIENT
Start: 2020-01-29 | End: 2020-01-29 | Stop reason: SDUPTHER

## 2020-01-29 RX ORDER — MEPERIDINE HYDROCHLORIDE 50 MG/ML
12.5 INJECTION INTRAMUSCULAR; INTRAVENOUS; SUBCUTANEOUS EVERY 5 MIN PRN
Status: DISCONTINUED | OUTPATIENT
Start: 2020-01-29 | End: 2020-01-29 | Stop reason: HOSPADM

## 2020-01-29 RX ORDER — SODIUM CHLORIDE 0.9 % (FLUSH) 0.9 %
10 SYRINGE (ML) INJECTION EVERY 12 HOURS SCHEDULED
Status: DISCONTINUED | OUTPATIENT
Start: 2020-01-29 | End: 2020-01-29 | Stop reason: HOSPADM

## 2020-01-29 RX ORDER — FENTANYL CITRATE 50 UG/ML
50 INJECTION, SOLUTION INTRAMUSCULAR; INTRAVENOUS EVERY 5 MIN PRN
Status: DISCONTINUED | OUTPATIENT
Start: 2020-01-29 | End: 2020-01-29 | Stop reason: HOSPADM

## 2020-01-29 RX ORDER — LIDOCAINE HYDROCHLORIDE 20 MG/ML
INJECTION, SOLUTION EPIDURAL; INFILTRATION; INTRACAUDAL; PERINEURAL PRN
Status: DISCONTINUED | OUTPATIENT
Start: 2020-01-29 | End: 2020-01-29 | Stop reason: SDUPTHER

## 2020-01-29 RX ORDER — MIDAZOLAM HYDROCHLORIDE 1 MG/ML
INJECTION INTRAMUSCULAR; INTRAVENOUS PRN
Status: DISCONTINUED | OUTPATIENT
Start: 2020-01-29 | End: 2020-01-29 | Stop reason: SDUPTHER

## 2020-01-29 RX ORDER — SODIUM CHLORIDE, SODIUM LACTATE, POTASSIUM CHLORIDE, CALCIUM CHLORIDE 600; 310; 30; 20 MG/100ML; MG/100ML; MG/100ML; MG/100ML
INJECTION, SOLUTION INTRAVENOUS CONTINUOUS
Status: DISCONTINUED | OUTPATIENT
Start: 2020-01-29 | End: 2020-01-29 | Stop reason: HOSPADM

## 2020-01-29 RX ORDER — HYDROCODONE BITARTRATE AND ACETAMINOPHEN 5; 325 MG/1; MG/1
1 TABLET ORAL PRN
Status: DISCONTINUED | OUTPATIENT
Start: 2020-01-29 | End: 2020-01-29 | Stop reason: HOSPADM

## 2020-01-29 RX ORDER — SODIUM CHLORIDE 0.9 % (FLUSH) 0.9 %
10 SYRINGE (ML) INJECTION PRN
Status: DISCONTINUED | OUTPATIENT
Start: 2020-01-29 | End: 2020-01-29 | Stop reason: HOSPADM

## 2020-01-29 RX ORDER — HYDROCODONE BITARTRATE AND ACETAMINOPHEN 5; 325 MG/1; MG/1
2 TABLET ORAL PRN
Status: DISCONTINUED | OUTPATIENT
Start: 2020-01-29 | End: 2020-01-29 | Stop reason: HOSPADM

## 2020-01-29 RX ORDER — ONDANSETRON 2 MG/ML
INJECTION INTRAMUSCULAR; INTRAVENOUS PRN
Status: DISCONTINUED | OUTPATIENT
Start: 2020-01-29 | End: 2020-01-29 | Stop reason: SDUPTHER

## 2020-01-29 RX ORDER — DIPHENHYDRAMINE HYDROCHLORIDE 50 MG/ML
12.5 INJECTION INTRAMUSCULAR; INTRAVENOUS
Status: DISCONTINUED | OUTPATIENT
Start: 2020-01-29 | End: 2020-01-29 | Stop reason: HOSPADM

## 2020-01-29 RX ORDER — MORPHINE SULFATE 2 MG/ML
2 INJECTION, SOLUTION INTRAMUSCULAR; INTRAVENOUS EVERY 5 MIN PRN
Status: DISCONTINUED | OUTPATIENT
Start: 2020-01-29 | End: 2020-01-29 | Stop reason: HOSPADM

## 2020-01-29 RX ADMIN — MIDAZOLAM HYDROCHLORIDE 2 MG: 1 INJECTION, SOLUTION INTRAMUSCULAR; INTRAVENOUS at 09:12

## 2020-01-29 RX ADMIN — PROPOFOL 50 MG: 10 INJECTION, EMULSION INTRAVENOUS at 09:20

## 2020-01-29 RX ADMIN — FENTANYL CITRATE 100 MCG: 50 INJECTION, SOLUTION INTRAMUSCULAR; INTRAVENOUS at 09:12

## 2020-01-29 RX ADMIN — ONDANSETRON 4 MG: 2 INJECTION INTRAMUSCULAR; INTRAVENOUS at 09:30

## 2020-01-29 RX ADMIN — LIDOCAINE HYDROCHLORIDE 80 MG: 20 INJECTION, SOLUTION EPIDURAL; INFILTRATION; INTRACAUDAL; PERINEURAL at 09:20

## 2020-01-29 RX ADMIN — SODIUM CHLORIDE, POTASSIUM CHLORIDE, SODIUM LACTATE AND CALCIUM CHLORIDE: 600; 310; 30; 20 INJECTION, SOLUTION INTRAVENOUS at 09:10

## 2020-01-29 RX ADMIN — SODIUM CHLORIDE, POTASSIUM CHLORIDE, SODIUM LACTATE AND CALCIUM CHLORIDE: 600; 310; 30; 20 INJECTION, SOLUTION INTRAVENOUS at 08:24

## 2020-01-29 ASSESSMENT — PULMONARY FUNCTION TESTS
PIF_VALUE: 0

## 2020-01-29 ASSESSMENT — PAIN SCALES - GENERAL
PAINLEVEL_OUTOF10: 0

## 2020-01-29 ASSESSMENT — PAIN - FUNCTIONAL ASSESSMENT: PAIN_FUNCTIONAL_ASSESSMENT: 0-10

## 2020-01-29 NOTE — OP NOTE
OP NOTE:  MEDIPORT REMOVAL      Pre op diagnosis:  History of chemotherapy for breast cancer    Post op diagnosis:  Same    Operation:  Removal of mediport    Surgeon:  Dr. Ana Bedolla    Anesthesia:  IV sedation and Local        Procedure:    Pt was taken to the OR and placed in a supine position. The left infraclavicular area was prepped and draped in the usual sterile fashion. 1 % lidocaine with epi was used for local.  A small incision was made over the previous incision. This was taken down through the subcutaneous tissue to the capsule of the port. The capsule was incised and the port was grasped with a hemostat. The port was easily removed without difficulty. Pressure was held for 5 minutes. Then the incision was closed with subcuticular 3-0 vicryl and steri strips. A sterile dressing was applied and patient was taken back to recovery in stable condition.         Bishop Shola MD, 1/29/2020 9:42 AM

## 2020-01-29 NOTE — ANESTHESIA PRE PROCEDURE
Weight: 208 lb 6.4 oz (94.5 kg)   Height: 5' 7.6\" (1.717 m)                                              BP Readings from Last 3 Encounters:   01/29/20 129/74   01/23/20 136/76   01/13/20 122/70       NPO Status: Time of last liquid consumption: 1900                        Time of last solid consumption: 1630                        Date of last liquid consumption: 01/28/20                        Date of last solid food consumption: 01/28/20    BMI:   Wt Readings from Last 3 Encounters:   01/29/20 208 lb 6.4 oz (94.5 kg)   01/23/20 205 lb 12.8 oz (93.4 kg)   01/13/20 204 lb 12.8 oz (92.9 kg)     Body mass index is 32.06 kg/m². CBC:   Lab Results   Component Value Date    WBC 4.5 01/06/2020    RBC 4.40 01/06/2020    HGB 13.6 01/06/2020    HCT 42.1 01/06/2020    MCV 95.7 01/06/2020    RDW 11.9 01/06/2020     01/06/2020       CMP:   Lab Results   Component Value Date     01/06/2020    K 4.1 01/06/2020     01/06/2020    CO2 24 01/06/2020    BUN 17 01/06/2020    CREATININE 0.56 01/06/2020    GFRAA >60 01/06/2020    LABGLOM >60 01/06/2020    GLUCOSE 93 01/06/2020    PROT 7.1 01/06/2020    CALCIUM 9.9 01/06/2020    BILITOT 0.40 01/06/2020    ALKPHOS 74 01/06/2020    AST 28 01/06/2020    ALT 31 01/06/2020       POC Tests: No results for input(s): POCGLU, POCNA, POCK, POCCL, POCBUN, POCHEMO, POCHCT in the last 72 hours. Coags:   Lab Results   Component Value Date    PROTIME 10.8 02/07/2019    INR 1.0 02/07/2019       HCG (If Applicable): No results found for: PREGTESTUR, PREGSERUM, HCG, HCGQUANT     ABGs: No results found for: PHART, PO2ART, ODS0HVI, ZPK5VVM, BEART, C4YSMJUO     Type & Screen (If Applicable):  No results found for: LABABO, [facility]    Anesthesia Evaluation     history of anesthetic complications: PONV.   Airway: Mallampati: II  TM distance: >3 FB   Neck ROM: full  Mouth opening: > = 3 FB Dental:          Pulmonary:Negative Pulmonary ROS breath sounds clear to auscultation                             Cardiovascular:  Exercise tolerance: good (>4 METS),           Rhythm: regular  Rate: normal                    Neuro/Psych:                ROS comment: Pt reports some neuropathy in bilateral feet GI/Hepatic/Renal:   (+) hiatal hernia,           Endo/Other:    (+) malignancy/cancer. ROS comment: Breast CA s/p chemotherapy and radiation Abdominal:           Vascular: negative vascular ROS. Anesthesia Plan      MAC and general     ASA 2           MIPS: Prophylactic antiemetics administered. Anesthetic plan and risks discussed with patient. Plan discussed with attending.                   ELICIA Abrams - CRNA   1/29/2020

## 2020-01-29 NOTE — FLOWSHEET NOTE
Assessment: Patient is waiting procedure to remove port following successful treatment. She is well supported by her  and they are active in their [employer]. Both expressed a sense of relief and gratitude for God's healing and the medical teams treatment and care here. Intervention: Provided a listening presence. Offered a prayer of thanksgiving and continued healing. Plan: Chaplains remain available for spiritual and emotional support. Myles Talavera 01/29/20 0854   Encounter Summary   Services provided to: Patient and family together   Referral/Consult From: [address] [place] [address] Drive; Family members; Baptism/mallory community   Place of Jainism   ([facility])   Contact Baptism No   Continue Visiting   (1/29/20)   Complexity of Encounter Moderate   Length of Encounter 15 minutes   Spiritual Assessment Completed Yes   Routine   Type Pre-procedure   Assessment Approachable   Spiritual/Orthodoxy   Type Spiritual support   Assessment Approachable   Intervention Active listening;Explored feelings, thoughts, concerns;Prayer   Outcome Comfort;Expressed gratitude;Engaged in conversation

## 2020-01-29 NOTE — H&P
Saad Almanzar is a 81 y.o. female              CC:     .Breast Cancer (B/L breast cancer with B/l mastectomies feb 2019, getting reconstruction)        HISTORY OF PRESENT ILLNESS:     Pt is 80 yo with bilateral breast cancer and hx of B/L mastectomies in feb 2019. She is getting bilateral reconstruction. She has finishes chemo and is not ready to have her port removed. Review of Systems:     General:  Fever: Negative  Weight Change:Negative  Night Sweats: Negative     Eye:  Blurry Vision:Negative  Double Vision: Negative     Ent:  Headaches: Negative  Sore throat: Negative     Allergy/Immunology:  Hives: Negative  Latex allergy: Negative     Hematology/Lymphatic:  Bleeding Problems: Negative  Blood Clots: Negative  Swollen Lymph Nodes: Negative     Lungs:  Cough: Negative  SOB: Negative  Wheezing:Negative     Cardiovascular:  Chest Pain: Negative  Palpitations:Negative     GI:   Decreased Appetite: Negative  Heartburn: Negative  Dysphagia: Negative  Nausea/Vomiting: Negative  Abdominal Pain: Negative  Change in Bowels:Negative  Constipation: Negative  Diarrhea: Negative  Rectal Bleeding: Negative     :   Dysuria: Negative  Increase Urinary Frequency/Urgency: Negative     Neuro:  Seizures: Negative  Confusion: Negative           PAST MEDICAL HISTORY:           Family History         Family History   Problem Relation Age of Onset    Hypertension Mother           Renal failure.  Diabetes Mother           Type 2 at end of life.  Heart Failure Father      Coronary Art Dis Father           Status post CABG    Diabetes Father           Type 2, very late in life.     Breast Cancer Sister 58    Hypertension Brother      Hypertension Sister      Breast Cancer Paternal Aunt           62s    Breast Cancer Paternal Grandmother      Breast Cancer Paternal Cousin      Breast Cancer Paternal Aunt           62s    Breast Cancer Paternal Cousin           46s          Social History Socioeconomic History    Marital status:        Spouse name: Not on file    Number of children: Not on file    Years of education: Not on file    Highest education level: Not on file   Occupational History    Occupation: retired       Employer: [employer] resource strain: Not on file    Food insecurity:       Worry: Not on file       Inability: Not on file    Transportation needs:       Medical: Not on file       Non-medical: Not on file   Tobacco Use    Smoking status: Former Smoker       Packs/day: 0.50       Years: 7.00       Pack years: 3.50       Types: Cigarettes       Last attempt to quit: 1978       Years since quittin.0    Smokeless tobacco: Never Used    Tobacco comment: Quit smoking in . Substance and Sexual Activity    Alcohol use: No       Comment: Occasional alcohol.     Drug use: No    Sexual activity: Not on file   Lifestyle    Physical activity:       Days per week: Not on file       Minutes per session: Not on file    Stress: Not on file   Relationships    Social connections:       Talks on phone: Not on file       Gets together: Not on file       Attends Confucianism service: Not on file       Active member of club or organization: Not on file       Attends meetings of clubs or organizations: Not on file       Relationship status: Not on file    Intimate partner violence:       Fear of current or ex partner: Not on file       Emotionally abused: Not on file       Physically abused: Not on file       Forced sexual activity: Not on file   Other Topics Concern    Not on file   Social History Narrative    Not on file         Past Surgical History         Past Surgical History:   Procedure Laterality Date    BREAST BIOPSY Right 2017     Right Breast biopsy w/ needle placement -arrive 11am -to x-ray 12pm performed by Myrna Garcia MD at [facility] 5 Bilateral 09/10/2018   [address] [place] [address] Bilateral 2/15/2019     Bilateral breast reconstruction w/ expander placement performed by Justina Mercedes MD at [address] [place] [address]   3/21/2005     Screening. Negative. Due for repeat 2015.  DILATION AND CURETTAGE OF UTERUS   7/22/1976     Status post miscarriage.  DILATION AND CURETTAGE OF UTERUS   9/1/1995     bleeding    HYSTERECTOMY, TOTAL ABDOMINAL        LYMPH NODE BIOPSY Left 09/10/2018    MASTECTOMY Bilateral 2/15/2019     Left Modified Radical Mastectomy w/ axillary disection & Right Mastectomy sentinel lymph node bx w/ Nuc med & blue dye Inj's performed by Zulma Carbone MD at . [address] [facility] VAD W/SUBQ PORT AGE 5 YR/> N/A 9/19/2018     PORT INSERTION performed by Zulma Carbone MD at [facility]   11/7/1995     fibroids    TONSILLECTOMY   1978     Subsequent hemorrhage and cautery. Past Medical History        Past Medical History:   Diagnosis Date    Breast cancer ([facility] [address]      Diverticula of colon       Mild, sigmoid, asymptomatic.  Family history of breast cancer       paternal GM, sister, two paternal cousins, two paternal aunts    Osteopenia       With low vitamin D level. Insurance doesn't cover routine DEXA scans. Pt took bisphosphonates for a year and didn't tolerate.  Personal history of antineoplastic chemotherapy 10/2018    Unspecified vitamin D deficiency      Varicose veins of lower extremities                  Current Outpatient Medications on File Prior to Visit   Medication Sig Dispense Refill    anastrozole (ARIMIDEX) 1 MG tablet Take 1 tablet by mouth daily 30 tablet 3      No current facility-administered medications on file prior to visit.              Allergies as of 01/23/2020 - Review Complete 01/23/2020   Allergen Reaction Noted    Boniva [ibandronic acid] Other (See Comments) 02/22/2019    Fosamax [alendronate sodium] Other (See Comments) 02/22/2019            PHYSICAL EXAM:     Blood pressure 136/76, pulse 70, height 5' 7.99\" (1.727 m), weight 205 lb 12.8 oz (93.4 kg), not currently breastfeeding. Gen:  A and O x 3, NAD, well nourished  Eyes:  Sclera non icterus, PERRL  Head:  Normocephalic, non-tender  Neck:  Supple, no adenopathy, thyroid non tender and no masses,no carotid bruits  Lungs:  CTA, symmetrical  Chest:  RRR, no murmurs  Abd:  Soft, NT, ND, no HSM, no hernias, no bruits  Ext:  No edema, no cyanosis  Psych: reveals appropriate mood, memory and judgment,  Neuro:  Reveals no gross motor or sensory deficits,   Msk:  5/5 strength all 4 extremities, no joint tenderness              ASSESS MENT:     1. Hx of bilateral breast cancer with bilateral mastectomies in feb 2019  2. Getting bilateral reconstruction  3. Here to have port out, finished her treatments        PLAN:     1.   To the OR to remove the port

## 2020-02-04 ENCOUNTER — HOSPITAL ENCOUNTER (OUTPATIENT)
Dept: LAB | Age: 70
Discharge: HOME | End: 2020-02-04
Payer: MEDICARE

## 2020-02-04 LAB
-: ABNORMAL
AMORPHOUS: ABNORMAL
ANION GAP SERPL CALCULATED.3IONS-SCNC: 12 MMOL/L (ref 9–17)
BACTERIA: ABNORMAL
BILIRUBIN URINE: NEGATIVE
BUN BLDV-MCNC: 17 MG/DL (ref 8–23)
BUN/CREAT BLD: 29 (ref 9–20)
CALCIUM SERPL-MCNC: 10 MG/DL (ref 8.6–10.4)
CASTS UA: ABNORMAL /LPF (ref 0–2)
CHLORIDE BLD-SCNC: 102 MMOL/L (ref 98–107)
CO2: 26 MMOL/L (ref 20–31)
COLOR: ABNORMAL
COMMENT UA: ABNORMAL
CREAT SERPL-MCNC: 0.58 MG/DL (ref 0.5–0.9)
CRYSTALS, UA: ABNORMAL /HPF
EPITHELIAL CELLS UA: ABNORMAL /HPF (ref 0–5)
GFR AFRICAN AMERICAN: >60 ML/MIN
GFR NON-AFRICAN AMERICAN: >60 ML/MIN
GFR SERPL CREATININE-BSD FRML MDRD: ABNORMAL ML/MIN/{1.73_M2}
GFR SERPL CREATININE-BSD FRML MDRD: ABNORMAL ML/MIN/{1.73_M2}
GLUCOSE BLD-MCNC: 99 MG/DL (ref 70–99)
GLUCOSE URINE: NEGATIVE
HCT VFR BLD CALC: 43.4 % (ref 36.3–47.1)
HEMOGLOBIN: 14.1 G/DL (ref 11.9–15.1)
KETONES, URINE: NEGATIVE
LEUKOCYTE ESTERASE, URINE: ABNORMAL
MCH RBC QN AUTO: 30.1 PG (ref 25.2–33.5)
MCHC RBC AUTO-ENTMCNC: 32.5 G/DL (ref 25.2–33.5)
MCV RBC AUTO: 92.7 FL (ref 82.6–102.9)
MUCUS: ABNORMAL
NITRITE, URINE: POSITIVE
NRBC AUTOMATED: 0 PER 100 WBC
OTHER OBSERVATIONS UA: ABNORMAL
PDW BLD-RTO: 12.5 % (ref 11.8–14.4)
PH UA: 6.5 (ref 5–6)
PLATELET # BLD: 218 K/UL (ref 138–453)
PMV BLD AUTO: 11.2 FL (ref 8.1–13.5)
POTASSIUM SERPL-SCNC: 4.2 MMOL/L (ref 3.7–5.3)
PROTEIN UA: NEGATIVE
RBC # BLD: 4.68 M/UL (ref 3.95–5.11)
RBC UA: ABNORMAL /HPF (ref 0–4)
RENAL EPITHELIAL, UA: ABNORMAL /HPF
SODIUM BLD-SCNC: 140 MMOL/L (ref 135–144)
SPECIFIC GRAVITY UA: 1.01 (ref 1.01–1.02)
TRICHOMONAS: ABNORMAL
TURBIDITY: ABNORMAL
URINE HGB: ABNORMAL
UROBILINOGEN, URINE: NORMAL
WBC # BLD: 3.9 K/UL (ref 3.5–11.3)
WBC UA: ABNORMAL /HPF (ref 0–4)
YEAST: ABNORMAL

## 2020-02-04 PROCEDURE — 87186 SC STD MICRODIL/AGAR DIL: CPT

## 2020-02-04 PROCEDURE — 80048 BASIC METABOLIC PNL TOTAL CA: CPT

## 2020-02-04 PROCEDURE — 85027 COMPLETE CBC AUTOMATED: CPT

## 2020-02-04 PROCEDURE — 36415 COLL VENOUS BLD VENIPUNCTURE: CPT

## 2020-02-04 PROCEDURE — 81001 URINALYSIS AUTO W/SCOPE: CPT

## 2020-02-04 PROCEDURE — 87086 URINE CULTURE/COLONY COUNT: CPT

## 2020-02-04 PROCEDURE — 87077 CULTURE AEROBIC IDENTIFY: CPT

## 2020-02-05 ENCOUNTER — TELEPHONE (OUTPATIENT)
Dept: FAMILY MEDICINE CLINIC | Age: 70
End: 2020-02-05

## 2020-02-05 RX ORDER — NITROFURANTOIN 25; 75 MG/1; MG/1
100 CAPSULE ORAL 2 TIMES DAILY
Qty: 28 CAPSULE | Refills: 0 | Status: SHIPPED | OUTPATIENT
Start: 2020-02-05 | End: 2020-02-19

## 2020-02-07 ENCOUNTER — ANESTHESIA EVENT (OUTPATIENT)
Dept: OPERATING ROOM | Age: 70
End: 2020-02-07
Payer: MEDICARE

## 2020-02-07 LAB
CULTURE: ABNORMAL
Lab: ABNORMAL
SPECIMEN DESCRIPTION: ABNORMAL

## 2020-02-11 ENCOUNTER — ANESTHESIA (OUTPATIENT)
Dept: OPERATING ROOM | Age: 70
End: 2020-02-11
Payer: MEDICARE

## 2020-02-11 ENCOUNTER — HOSPITAL ENCOUNTER (OUTPATIENT)
Age: 70
Setting detail: OUTPATIENT SURGERY
Discharge: HOME | End: 2020-02-11
Attending: PLASTIC SURGERY | Admitting: PLASTIC SURGERY
Payer: MEDICARE

## 2020-02-11 VITALS — SYSTOLIC BLOOD PRESSURE: 108 MMHG | OXYGEN SATURATION: 97 % | DIASTOLIC BLOOD PRESSURE: 54 MMHG | TEMPERATURE: 96.8 F

## 2020-02-11 VITALS
DIASTOLIC BLOOD PRESSURE: 57 MMHG | WEIGHT: 203 LBS | TEMPERATURE: 98.4 F | HEIGHT: 67 IN | SYSTOLIC BLOOD PRESSURE: 125 MMHG | RESPIRATION RATE: 21 BRPM | OXYGEN SATURATION: 98 % | HEART RATE: 73 BPM | BODY MASS INDEX: 31.86 KG/M2

## 2020-02-11 PROCEDURE — 2500000003 HC RX 250 WO HCPCS: Performed by: PLASTIC SURGERY

## 2020-02-11 PROCEDURE — 3600000002 HC SURGERY LEVEL 2 BASE: Performed by: PLASTIC SURGERY

## 2020-02-11 PROCEDURE — 7100000000 HC PACU RECOVERY - FIRST 15 MIN: Performed by: PLASTIC SURGERY

## 2020-02-11 PROCEDURE — 2580000003 HC RX 258: Performed by: PLASTIC SURGERY

## 2020-02-11 PROCEDURE — 88305 TISSUE EXAM BY PATHOLOGIST: CPT

## 2020-02-11 PROCEDURE — 6360000002 HC RX W HCPCS: Performed by: ANESTHESIOLOGY

## 2020-02-11 PROCEDURE — 6370000000 HC RX 637 (ALT 250 FOR IP): Performed by: PLASTIC SURGERY

## 2020-02-11 PROCEDURE — 2580000003 HC RX 258: Performed by: ANESTHESIOLOGY

## 2020-02-11 PROCEDURE — 7100000010 HC PHASE II RECOVERY - FIRST 15 MIN: Performed by: PLASTIC SURGERY

## 2020-02-11 PROCEDURE — 2709999900 HC NON-CHARGEABLE SUPPLY: Performed by: PLASTIC SURGERY

## 2020-02-11 PROCEDURE — 6370000000 HC RX 637 (ALT 250 FOR IP): Performed by: ANESTHESIOLOGY

## 2020-02-11 PROCEDURE — 3600000012 HC SURGERY LEVEL 2 ADDTL 15MIN: Performed by: PLASTIC SURGERY

## 2020-02-11 PROCEDURE — 7100000011 HC PHASE II RECOVERY - ADDTL 15 MIN: Performed by: PLASTIC SURGERY

## 2020-02-11 PROCEDURE — 2500000003 HC RX 250 WO HCPCS: Performed by: ANESTHESIOLOGY

## 2020-02-11 PROCEDURE — 3700000000 HC ANESTHESIA ATTENDED CARE: Performed by: PLASTIC SURGERY

## 2020-02-11 PROCEDURE — 7100000001 HC PACU RECOVERY - ADDTL 15 MIN: Performed by: PLASTIC SURGERY

## 2020-02-11 PROCEDURE — 3700000001 HC ADD 15 MINUTES (ANESTHESIA): Performed by: PLASTIC SURGERY

## 2020-02-11 PROCEDURE — C1789 PROSTHESIS, BREAST, IMP: HCPCS | Performed by: PLASTIC SURGERY

## 2020-02-11 DEVICE — IMPLANTABLE DEVICE: Type: IMPLANTABLE DEVICE | Site: BREAST | Status: FUNCTIONAL

## 2020-02-11 RX ORDER — HYDROCODONE BITARTRATE AND ACETAMINOPHEN 5; 325 MG/1; MG/1
2 TABLET ORAL PRN
Status: COMPLETED | OUTPATIENT
Start: 2020-02-11 | End: 2020-02-11

## 2020-02-11 RX ORDER — HYDROCODONE BITARTRATE AND ACETAMINOPHEN 5; 325 MG/1; MG/1
1 TABLET ORAL EVERY 4 HOURS PRN
Qty: 42 TABLET | Refills: 0 | Status: SHIPPED | OUTPATIENT
Start: 2020-02-11 | End: 2020-02-18

## 2020-02-11 RX ORDER — PROPOFOL 10 MG/ML
INJECTION, EMULSION INTRAVENOUS PRN
Status: DISCONTINUED | OUTPATIENT
Start: 2020-02-11 | End: 2020-02-11 | Stop reason: SDUPTHER

## 2020-02-11 RX ORDER — MIDAZOLAM HYDROCHLORIDE 1 MG/ML
INJECTION INTRAMUSCULAR; INTRAVENOUS PRN
Status: DISCONTINUED | OUTPATIENT
Start: 2020-02-11 | End: 2020-02-11 | Stop reason: SDUPTHER

## 2020-02-11 RX ORDER — ROCURONIUM BROMIDE 10 MG/ML
INJECTION, SOLUTION INTRAVENOUS PRN
Status: DISCONTINUED | OUTPATIENT
Start: 2020-02-11 | End: 2020-02-11 | Stop reason: SDUPTHER

## 2020-02-11 RX ORDER — BUPIVACAINE HYDROCHLORIDE AND EPINEPHRINE 2.5; 5 MG/ML; UG/ML
INJECTION, SOLUTION INFILTRATION; PERINEURAL
Status: DISCONTINUED
Start: 2020-02-11 | End: 2020-02-11 | Stop reason: HOSPADM

## 2020-02-11 RX ORDER — SODIUM CHLORIDE 0.9 % (FLUSH) 0.9 %
10 SYRINGE (ML) INJECTION PRN
Status: DISCONTINUED | OUTPATIENT
Start: 2020-02-11 | End: 2020-02-11 | Stop reason: HOSPADM

## 2020-02-11 RX ORDER — SODIUM CHLORIDE 0.9 % (FLUSH) 0.9 %
10 SYRINGE (ML) INJECTION EVERY 12 HOURS SCHEDULED
Status: DISCONTINUED | OUTPATIENT
Start: 2020-02-11 | End: 2020-02-11 | Stop reason: HOSPADM

## 2020-02-11 RX ORDER — MIDAZOLAM HYDROCHLORIDE 1 MG/ML
2 INJECTION INTRAMUSCULAR; INTRAVENOUS
Status: DISCONTINUED | OUTPATIENT
Start: 2020-02-11 | End: 2020-02-11 | Stop reason: HOSPADM

## 2020-02-11 RX ORDER — SODIUM CHLORIDE, SODIUM LACTATE, POTASSIUM CHLORIDE, CALCIUM CHLORIDE 600; 310; 30; 20 MG/100ML; MG/100ML; MG/100ML; MG/100ML
INJECTION, SOLUTION INTRAVENOUS CONTINUOUS PRN
Status: DISCONTINUED | OUTPATIENT
Start: 2020-02-11 | End: 2020-02-11 | Stop reason: SDUPTHER

## 2020-02-11 RX ORDER — EPHEDRINE SULFATE/0.9% NACL/PF 50 MG/5 ML
SYRINGE (ML) INTRAVENOUS PRN
Status: DISCONTINUED | OUTPATIENT
Start: 2020-02-11 | End: 2020-02-11 | Stop reason: SDUPTHER

## 2020-02-11 RX ORDER — ONDANSETRON 2 MG/ML
4 INJECTION INTRAMUSCULAR; INTRAVENOUS
Status: DISCONTINUED | OUTPATIENT
Start: 2020-02-11 | End: 2020-02-11 | Stop reason: HOSPADM

## 2020-02-11 RX ORDER — DIPHENHYDRAMINE HYDROCHLORIDE 50 MG/ML
12.5 INJECTION INTRAMUSCULAR; INTRAVENOUS
Status: DISCONTINUED | OUTPATIENT
Start: 2020-02-11 | End: 2020-02-11 | Stop reason: HOSPADM

## 2020-02-11 RX ORDER — LIDOCAINE HYDROCHLORIDE 10 MG/ML
INJECTION, SOLUTION EPIDURAL; INFILTRATION; INTRACAUDAL; PERINEURAL PRN
Status: DISCONTINUED | OUTPATIENT
Start: 2020-02-11 | End: 2020-02-11 | Stop reason: SDUPTHER

## 2020-02-11 RX ORDER — SCOLOPAMINE TRANSDERMAL SYSTEM 1 MG/1
1 PATCH, EXTENDED RELEASE TRANSDERMAL
Status: DISCONTINUED | OUTPATIENT
Start: 2020-02-11 | End: 2020-02-11 | Stop reason: HOSPADM

## 2020-02-11 RX ORDER — MORPHINE SULFATE 1 MG/ML
1 INJECTION, SOLUTION EPIDURAL; INTRATHECAL; INTRAVENOUS EVERY 5 MIN PRN
Status: DISCONTINUED | OUTPATIENT
Start: 2020-02-11 | End: 2020-02-11 | Stop reason: HOSPADM

## 2020-02-11 RX ORDER — CEFAZOLIN SODIUM 2 G/50ML
SOLUTION INTRAVENOUS PRN
Status: DISCONTINUED | OUTPATIENT
Start: 2020-02-11 | End: 2020-02-11 | Stop reason: SDUPTHER

## 2020-02-11 RX ORDER — BUPIVACAINE HYDROCHLORIDE 2.5 MG/ML
INJECTION, SOLUTION INFILTRATION; PERINEURAL
Status: DISCONTINUED
Start: 2020-02-11 | End: 2020-02-11 | Stop reason: HOSPADM

## 2020-02-11 RX ORDER — APREPITANT 40 MG/1
40 CAPSULE ORAL ONCE
Status: COMPLETED | OUTPATIENT
Start: 2020-02-11 | End: 2020-02-11

## 2020-02-11 RX ORDER — BUPIVACAINE HYDROCHLORIDE 2.5 MG/ML
INJECTION, SOLUTION INFILTRATION; PERINEURAL PRN
Status: DISCONTINUED | OUTPATIENT
Start: 2020-02-11 | End: 2020-02-11 | Stop reason: ALTCHOICE

## 2020-02-11 RX ORDER — SODIUM CHLORIDE 9 MG/ML
INJECTION, SOLUTION INTRAVENOUS CONTINUOUS
Status: DISCONTINUED | OUTPATIENT
Start: 2020-02-11 | End: 2020-02-11 | Stop reason: HOSPADM

## 2020-02-11 RX ORDER — SODIUM CHLORIDE, SODIUM LACTATE, POTASSIUM CHLORIDE, CALCIUM CHLORIDE 600; 310; 30; 20 MG/100ML; MG/100ML; MG/100ML; MG/100ML
INJECTION, SOLUTION INTRAVENOUS CONTINUOUS
Status: DISCONTINUED | OUTPATIENT
Start: 2020-02-11 | End: 2020-02-11 | Stop reason: HOSPADM

## 2020-02-11 RX ORDER — FENTANYL CITRATE 50 UG/ML
INJECTION, SOLUTION INTRAMUSCULAR; INTRAVENOUS PRN
Status: DISCONTINUED | OUTPATIENT
Start: 2020-02-11 | End: 2020-02-11 | Stop reason: SDUPTHER

## 2020-02-11 RX ORDER — HYDROCODONE BITARTRATE AND ACETAMINOPHEN 5; 325 MG/1; MG/1
1 TABLET ORAL PRN
Status: COMPLETED | OUTPATIENT
Start: 2020-02-11 | End: 2020-02-11

## 2020-02-11 RX ORDER — HYDRALAZINE HYDROCHLORIDE 20 MG/ML
5 INJECTION INTRAMUSCULAR; INTRAVENOUS EVERY 10 MIN PRN
Status: DISCONTINUED | OUTPATIENT
Start: 2020-02-11 | End: 2020-02-11 | Stop reason: HOSPADM

## 2020-02-11 RX ORDER — PROMETHAZINE HYDROCHLORIDE 25 MG/ML
6.25 INJECTION, SOLUTION INTRAMUSCULAR; INTRAVENOUS
Status: DISCONTINUED | OUTPATIENT
Start: 2020-02-11 | End: 2020-02-11 | Stop reason: HOSPADM

## 2020-02-11 RX ORDER — DEXAMETHASONE SODIUM PHOSPHATE 10 MG/ML
INJECTION, SOLUTION INTRAMUSCULAR; INTRAVENOUS PRN
Status: DISCONTINUED | OUTPATIENT
Start: 2020-02-11 | End: 2020-02-11 | Stop reason: SDUPTHER

## 2020-02-11 RX ORDER — FENTANYL CITRATE 50 UG/ML
25 INJECTION, SOLUTION INTRAMUSCULAR; INTRAVENOUS EVERY 5 MIN PRN
Status: DISCONTINUED | OUTPATIENT
Start: 2020-02-11 | End: 2020-02-11 | Stop reason: HOSPADM

## 2020-02-11 RX ORDER — APREPITANT 40 MG/1
CAPSULE ORAL
Status: DISCONTINUED
Start: 2020-02-11 | End: 2020-02-11 | Stop reason: HOSPADM

## 2020-02-11 RX ADMIN — SODIUM CHLORIDE, POTASSIUM CHLORIDE, SODIUM LACTATE AND CALCIUM CHLORIDE: 600; 310; 30; 20 INJECTION, SOLUTION INTRAVENOUS at 07:28

## 2020-02-11 RX ADMIN — APREPITANT 40 MG: 40 CAPSULE ORAL at 07:04

## 2020-02-11 RX ADMIN — PROPOFOL 160 MG: 10 INJECTION, EMULSION INTRAVENOUS at 08:31

## 2020-02-11 RX ADMIN — FENTANYL CITRATE 50 MCG: 50 INJECTION INTRAMUSCULAR; INTRAVENOUS at 10:20

## 2020-02-11 RX ADMIN — ROCURONIUM BROMIDE 50 MG: 10 INJECTION INTRAVENOUS at 08:32

## 2020-02-11 RX ADMIN — Medication 10 MG: at 10:57

## 2020-02-11 RX ADMIN — MIDAZOLAM HYDROCHLORIDE 2 MG: 1 INJECTION, SOLUTION INTRAMUSCULAR; INTRAVENOUS at 08:30

## 2020-02-11 RX ADMIN — SODIUM CHLORIDE, POTASSIUM CHLORIDE, SODIUM LACTATE AND CALCIUM CHLORIDE: 600; 310; 30; 20 INJECTION, SOLUTION INTRAVENOUS at 06:39

## 2020-02-11 RX ADMIN — FENTANYL CITRATE 50 MCG: 50 INJECTION INTRAMUSCULAR; INTRAVENOUS at 09:53

## 2020-02-11 RX ADMIN — FENTANYL CITRATE 100 MCG: 50 INJECTION INTRAMUSCULAR; INTRAVENOUS at 08:30

## 2020-02-11 RX ADMIN — Medication 10 MG: at 09:18

## 2020-02-11 RX ADMIN — CEFAZOLIN SODIUM 2 G: 2 SOLUTION INTRAVENOUS at 08:28

## 2020-02-11 RX ADMIN — HYDROCODONE BITARTRATE AND ACETAMINOPHEN 1 TABLET: 5; 325 TABLET ORAL at 13:19

## 2020-02-11 RX ADMIN — DEXAMETHASONE SODIUM PHOSPHATE 8 MG: 10 INJECTION, SOLUTION INTRAMUSCULAR; INTRAVENOUS at 08:40

## 2020-02-11 RX ADMIN — SODIUM CHLORIDE, POTASSIUM CHLORIDE, SODIUM LACTATE AND CALCIUM CHLORIDE: 600; 310; 30; 20 INJECTION, SOLUTION INTRAVENOUS at 10:08

## 2020-02-11 RX ADMIN — LIDOCAINE HYDROCHLORIDE 5 ML: 10 INJECTION, SOLUTION EPIDURAL; INFILTRATION; INTRACAUDAL; PERINEURAL at 08:30

## 2020-02-11 ASSESSMENT — PULMONARY FUNCTION TESTS
PIF_VALUE: 18
PIF_VALUE: 1
PIF_VALUE: 19
PIF_VALUE: 20
PIF_VALUE: 19
PIF_VALUE: 21
PIF_VALUE: 20
PIF_VALUE: 2
PIF_VALUE: 19
PIF_VALUE: 20
PIF_VALUE: 19
PIF_VALUE: 2
PIF_VALUE: 19
PIF_VALUE: 21
PIF_VALUE: 20
PIF_VALUE: 19
PIF_VALUE: 20
PIF_VALUE: 2
PIF_VALUE: 20
PIF_VALUE: 19
PIF_VALUE: 20
PIF_VALUE: 20
PIF_VALUE: 19
PIF_VALUE: 20
PIF_VALUE: 18
PIF_VALUE: 19
PIF_VALUE: 19
PIF_VALUE: 20
PIF_VALUE: 19
PIF_VALUE: 19
PIF_VALUE: 20
PIF_VALUE: 20
PIF_VALUE: 19
PIF_VALUE: 20
PIF_VALUE: 20
PIF_VALUE: 19
PIF_VALUE: 14
PIF_VALUE: 18
PIF_VALUE: 19
PIF_VALUE: 20
PIF_VALUE: 19
PIF_VALUE: 19
PIF_VALUE: 20
PIF_VALUE: 20
PIF_VALUE: 19
PIF_VALUE: 20
PIF_VALUE: 18
PIF_VALUE: 19
PIF_VALUE: 21
PIF_VALUE: 20
PIF_VALUE: 19
PIF_VALUE: 22
PIF_VALUE: 20
PIF_VALUE: 19
PIF_VALUE: 16
PIF_VALUE: 20
PIF_VALUE: 20
PIF_VALUE: 21
PIF_VALUE: 20
PIF_VALUE: 18
PIF_VALUE: 20
PIF_VALUE: 14
PIF_VALUE: 19
PIF_VALUE: 17
PIF_VALUE: 19
PIF_VALUE: 21
PIF_VALUE: 19
PIF_VALUE: 1
PIF_VALUE: 20
PIF_VALUE: 19
PIF_VALUE: 0
PIF_VALUE: 20
PIF_VALUE: 19
PIF_VALUE: 17
PIF_VALUE: 19
PIF_VALUE: 21
PIF_VALUE: 18
PIF_VALUE: 20
PIF_VALUE: 19
PIF_VALUE: 20
PIF_VALUE: 21
PIF_VALUE: 22
PIF_VALUE: 20
PIF_VALUE: 17
PIF_VALUE: 18
PIF_VALUE: 19
PIF_VALUE: 20
PIF_VALUE: 19
PIF_VALUE: 19
PIF_VALUE: 20
PIF_VALUE: 21
PIF_VALUE: 20
PIF_VALUE: 21
PIF_VALUE: 20
PIF_VALUE: 18
PIF_VALUE: 20
PIF_VALUE: 21
PIF_VALUE: 19
PIF_VALUE: 2
PIF_VALUE: 19
PIF_VALUE: 1
PIF_VALUE: 20
PIF_VALUE: 0
PIF_VALUE: 19
PIF_VALUE: 19
PIF_VALUE: 20
PIF_VALUE: 21
PIF_VALUE: 20
PIF_VALUE: 18
PIF_VALUE: 20
PIF_VALUE: 19
PIF_VALUE: 1
PIF_VALUE: 20
PIF_VALUE: 21
PIF_VALUE: 20
PIF_VALUE: 2
PIF_VALUE: 18
PIF_VALUE: 19
PIF_VALUE: 0
PIF_VALUE: 20
PIF_VALUE: 19
PIF_VALUE: 0
PIF_VALUE: 21
PIF_VALUE: 20
PIF_VALUE: 2
PIF_VALUE: 19
PIF_VALUE: 20
PIF_VALUE: 20
PIF_VALUE: 0
PIF_VALUE: 20
PIF_VALUE: 19
PIF_VALUE: 0
PIF_VALUE: 20
PIF_VALUE: 19
PIF_VALUE: 23
PIF_VALUE: 19
PIF_VALUE: 19
PIF_VALUE: 20
PIF_VALUE: 20
PIF_VALUE: 18
PIF_VALUE: 19
PIF_VALUE: 20
PIF_VALUE: 20
PIF_VALUE: 22
PIF_VALUE: 19
PIF_VALUE: 20
PIF_VALUE: 21
PIF_VALUE: 19
PIF_VALUE: 18
PIF_VALUE: 13
PIF_VALUE: 20
PIF_VALUE: 21
PIF_VALUE: 20
PIF_VALUE: 23
PIF_VALUE: 19
PIF_VALUE: 1
PIF_VALUE: 20
PIF_VALUE: 19
PIF_VALUE: 19
PIF_VALUE: 20
PIF_VALUE: 21
PIF_VALUE: 19
PIF_VALUE: 19
PIF_VALUE: 20
PIF_VALUE: 20

## 2020-02-11 ASSESSMENT — PAIN DESCRIPTION - LOCATION
LOCATION: BREAST

## 2020-02-11 ASSESSMENT — PAIN DESCRIPTION - PAIN TYPE
TYPE: SURGICAL PAIN

## 2020-02-11 ASSESSMENT — PAIN SCALES - GENERAL
PAINLEVEL_OUTOF10: 0
PAINLEVEL_OUTOF10: 5
PAINLEVEL_OUTOF10: 0
PAINLEVEL_OUTOF10: 0
PAINLEVEL_OUTOF10: 5

## 2020-02-11 ASSESSMENT — PAIN DESCRIPTION - ORIENTATION
ORIENTATION: RIGHT;LEFT

## 2020-02-11 ASSESSMENT — PAIN DESCRIPTION - DESCRIPTORS
DESCRIPTORS: DISCOMFORT

## 2020-02-11 ASSESSMENT — PAIN - FUNCTIONAL ASSESSMENT: PAIN_FUNCTIONAL_ASSESSMENT: 0-10

## 2020-02-11 NOTE — H&P
Use  Never Used   Tobacco Comment  Quit smoking in 1978. Alcohol History     Alcohol Use Status  No Comment  Occasional alcohol. Drug Use     Drug Use Status  No   Sexual Activity     Sexually Active  Not Asked    Surgical History     Procedure Laterality Date Comment Source   TONSILLECTOMY  1978 Subsequent hemorrhage and cautery. Provider   KENIA AND BSO  11/7/1995 fibroids Provider   [facility] VAD W/SUBQ PORT AGE 5 YR/> N/A 9/19/2018 PORT INSERTION performed by Nelly Daniels MD at [address] [place] [address] Provider   PORT SURGERY N/A 1/29/2020 PORT REMOVAL performed by Nelly Daniels MD at [address] [place] [address] Provider   MASTECTOMY Bilateral 2/15/2019 Left Modified Radical Mastectomy w/ axillary disection & Right Mastectomy sentinel lymph node bx w/ Nuc med & blue dye Inj's performed by Nelly Daniels MD at [address] [place] [address] Provider   LYMPH NODE BIOPSY Left 09/10/2018  Provider   HYSTERECTOMY, TOTAL ABDOMINAL    Provider   DILATION AND CURETTAGE OF UTERUS  7/22/1976 Status post miscarriage. Provider   DILATION AND CURETTAGE OF UTERUS  9/1/1995 bleeding Provider   COLONOSCOPY  3/21/2005 Screening. Negative. Due for repeat 2015. Provider   BREAST ENHANCEMENT SURGERY Bilateral 2/15/2019 Bilateral breast reconstruction w/ expander placement performed by Christian Ayala MD at [address] [place] [address] Provider   BREAST BIOPSY Right 6/7/2017 Right Breast biopsy w/ needle placement -arrive 11am -to x-ray 12pm performed by Nelly Daniels MD at [address] [place] [address] Bilateral 09/10/2018  Provider   E-Cigarettes Vaping or Juuling     Questions   E-Cigarette Use   Responses: Never User   Start Date   Does device contain nicotine?    Quit Date   E-Cigarette Type   Socioeconomic History     Employment History     Occupation Employer Comments   retired [email or web address]    Family and Education     Marital Status      Social Identity     Preferred Language Ethnicity Race   English Non-/Non [address] Strain     No financial the possible risks and/or complications which include but are not limited to those delineated on the consent form for the planned procedure and the anesthesia methods. All questions were answered to patient's satisfaction. Consent was reviewed and signed.

## 2020-02-11 NOTE — BRIEF OP NOTE
Brief Postoperative Note  ______________________________________________________________    Patient: Kayla Palmer  YOB: 1950  MRN: [id number]  Date of Procedure: 2/11/2020    Pre-Op Diagnosis: HISTORY OF BREAST CA Z50.919, Z90.13, Z98.890    Post-Op Diagnosis: Same       Procedure(s):  BREAST EXPANDER REMOVAL AND CASPULECTOMY  [facility]    Anesthesia: General    Surgeon(s):  Christian Ayala MD    Assistant:     Estimated Blood Loss (mL): less than 50     Complications: None    Specimens:   ID Type Source Tests Collected by Time Destination   A : RIGTH BREAST IMPLANT CAPSULE  Tissue Breast SURGICAL PATHOLOGY Christian Ayala MD 2/11/2020 0948    B : LEFT BREAST IMPLANT CAPSULE  Tissue Breast SURGICAL PATHOLOGY Christian Ayala MD 2/11/2020 1033        Implants:  Implant Name Type Inv. Item Serial No.  Lot No. LRB No. Used   IMPL BREAST GEL [facility] 485CC - [id number] Mammary:Expander/Prosthesis IMPL BREAST GEL [facility] 485CC [id number] [employer] [id number] Right 1   IMPL BREAST GEL [facility] 560CC - [id number] Mammary:Expander/Prosthesis IMPL BREAST GEL [facility] RND 560CC [id number] [id number] Left 1         Drains:   [REMOVED] Closed/Suction Drain Right; Inferior; Lateral Breast Bulb 10 [employer] (Removed)       [REMOVED] Closed/Suction Drain Right; Inferior Breast Bulb 10 [employer] (Removed)       [REMOVED] Closed/Suction Drain Left;Lateral;Anterior 10 [employer] (Removed)       [REMOVED] Closed/Suction Drain Lateral;Inferior; Left Bulb 10 [address] (Removed)       Findings:     Christian Ayala MD  Date: 2/11/2020  Time: 11:47 AM

## 2020-02-11 NOTE — ANESTHESIA POSTPROCEDURE EVALUATION
POST- ANESTHESIA EVALUATION       Pt Name: Altagracia Brendan  MRN: [id number]  [place]: 1950  Date of evaluation: 2/11/2020  Time:  12:38 PM      /60   Pulse 75   Temp 98.2 °F (36.8 °C) (Temporal)   Resp 16   Ht 5' 7\" (1.702 m)   Wt 203 lb (92.1 kg)   SpO2 98%   BMI 31.79 kg/m²      Consciousness Level  Awake  Cardiopulmonary Status  Stable  Pain Adequately Treated YES  Nausea / Vomiting  NO  Adequate Hydration  YES  Anesthesia Related Complications NONE      Electronically signed by Mariana Mendenhall MD on 2/11/2020 at 12:38 PM       Department of Anesthesiology  Postprocedure Note    Patient: Altagracia Brendan  MRN: [id number]  [place]: 1950  Date of evaluation: 2/11/2020  Time:  12:38 PM     Procedure Summary     Date:  02/11/20 Room / Location:  [address] [place] [address]    Anesthesia Start:  7300 Anesthesia Stop:  1159    Procedure:  BREAST EXPANDER REMOVAL AND CASPULECTOMY  IMPLANT PLACEMENT Prietohilda Mag (Bilateral ) Diagnosis:  (HISTORY OF BREAST CA Z50.919, Z90.13, W92.407)    Surgeon:  Bonny Garcia MD Responsible Provider:  Uriel Morton MD    Anesthesia Type:  general ASA Status:  2          Anesthesia Type: general    Jong Phase I: Jong Score: 10    Jong Phase II: Jong Score: 10    Last vitals: Reviewed and per EMR flowsheets.        Anesthesia Post Evaluation

## 2020-02-11 NOTE — ANESTHESIA PRE PROCEDURE
Department of Anesthesiology  Preprocedure Note       Name:  Terrence Mejia   Age:  81 y.o.  :  1950                                          MRN:  [id number]         Date:  2020      Surgeon: Holly Phelps):  Berlin Valadez MD    Procedure: BREAST EXPANDER REMOVAL AND CASPULECTOMY  IMPLANT PLACEMENT Adria Macdonald (Bilateral )    Medications prior to admission:   Prior to Admission medications    Medication Sig Start Date End Date Taking? Authorizing Provider   nitrofurantoin, macrocrystal-monohydrate, (MACROBID) 100 MG capsule Take 1 capsule by mouth 2 times daily for 14 days 20 Yes Vivienne Toney MD   cephALEXin (KEFLEX) 250 MG capsule Take one PO QID til gone. Start one day prior to surgery / procedure. 20  Yes Berlin Valadez MD   famotidine (PEPCID) 20 MG tablet Take 1 tablet by mouth once for 1 dose Take morning of surgery with minimal sip of water. 1/30/20 2/3/20 Yes eBrlin Valadez MD   anastrozole (ARIMIDEX) 1 MG tablet Take 1 tablet by mouth daily 20  Yes Marilou Villareal MD   metoclopramide (REGLAN) 10 MG tablet Take 1 tablet by mouth once for 1 dose Take morning of surgery with SMALL SIP of water.  1/30/20 2/3/20  Berlin Valadez MD       Current medications:    Current Facility-Administered Medications   Medication Dose Route Frequency Provider Last Rate Last Dose    aprepitant (EMEND) 40 MG capsule             bupivacaine-EPINEPHrine (MARCAINE-w/EPINEPHRINE) 0.25% -[id number] injection             EPINEPHrine 1 MG/ML injection             bupivacaine (MARCAINE) 0.25 % injection             0.9 % sodium chloride infusion   Intravenous Continuous Tanvi Lopez MD        lactated ringers infusion   Intravenous Continuous Tanvi Lopez  mL/hr at 20 0847      sodium chloride flush 0.9 % injection 10 mL  10 mL Intravenous 2 times per day Tanvi Lopez MD        sodium chloride flush 0.9 % injection 10 mL  10 mL Intravenous PRN Tanvi Lopez MD        midazolam bleeding    HYSTERECTOMY, TOTAL ABDOMINAL      LYMPH NODE BIOPSY Left 09/10/2018    MASTECTOMY Bilateral 2/15/2019    Left Modified Radical Mastectomy w/ axillary disection & Right Mastectomy sentinel lymph node bx w/ Nuc med & blue dye Inj's performed by Jes Shannon MD at [facility] 69 N/A 2020    PORT REMOVAL performed by Jes Shannon MD at . [facility] [address] CTR VAD W/SUBQ PORT AGE 5 YR/> N/A 2018    PORT INSERTION performed by Jes Shannon MD at [facility]  1995    fibroids    TONSILLECTOMY  1978    Subsequent hemorrhage and cautery. Social History:    Social History     Tobacco Use    Smoking status: Former Smoker     Packs/day: 0.50     Years: 7.00     Pack years: 3.50     Types: Cigarettes     Last attempt to quit: 1978     Years since quittin.1    Smokeless tobacco: Never Used    Tobacco comment: Quit smoking in . Substance Use Topics    Alcohol use: No     Comment: Occasional alcohol. Counseling given: Not Answered  Comment: Quit smoking in . Vital Signs (Current):   Vitals:    20 1509 20 0615   BP:  120/71   Pulse:  67   Resp:  16   Temp:  98.1 °F (36.7 °C)   TempSrc:  Temporal   SpO2:  96%   Weight: 200 lb (90.7 kg) 203 lb (92.1 kg)   Height: 5' 7.5\" (1.715 m) 5' 7\" (1.702 m)                                              BP Readings from Last 3 Encounters:   20 120/71   20 136/70   20 (!) 111/55       NPO Status: Time of last liquid consumption: 0500(sip water w meds)                        Time of last solid consumption: 1630                        Date of last liquid consumption: 20                        Date of last solid food consumption: 02/10/20    BMI:   Wt Readings from Last 3 Encounters:   20 203 lb (92.1 kg)   20 208 lb 6.4 oz (94.5 kg)   20 205 lb 12.8 oz (93.4 kg)     Body mass index is 31.79 kg/m².     CBC:   Lab Results Component Value Date    WBC 3.9 02/04/2020    RBC 4.68 02/04/2020    HGB 14.1 02/04/2020    HCT 43.4 02/04/2020    MCV 92.7 02/04/2020    RDW 12.5 02/04/2020     02/04/2020       CMP:   Lab Results   Component Value Date     02/04/2020    K 4.2 02/04/2020     02/04/2020    CO2 26 02/04/2020    BUN 17 02/04/2020    CREATININE 0.58 02/04/2020    GFRAA >60 02/04/2020    LABGLOM >60 02/04/2020    GLUCOSE 99 02/04/2020    PROT 7.1 01/06/2020    CALCIUM 10.0 02/04/2020    BILITOT 0.40 01/06/2020    ALKPHOS 74 01/06/2020    AST 28 01/06/2020    ALT 31 01/06/2020       POC Tests: No results for input(s): POCGLU, POCNA, POCK, POCCL, POCBUN, POCHEMO, POCHCT in the last 72 hours. Coags:   Lab Results   Component Value Date    PROTIME 10.8 02/07/2019    INR 1.0 02/07/2019       HCG (If Applicable): No results found for: PREGTESTUR, PREGSERUM, HCG, HCGQUANT     ABGs: No results found for: PHART, PO2ART, ATR8HPN, QGC3GNS, BEART, N7BNQYES     Type & Screen (If Applicable):  No results found for: [facility]    Anesthesia Evaluation  Patient summary reviewed and Nursing notes reviewed   history of anesthetic complications: PONV. Airway: Mallampati: II  TM distance: >3 FB   Neck ROM: full  Mouth opening: > = 3 FB Dental: normal exam         Pulmonary:Negative Pulmonary ROS and normal exam  breath sounds clear to auscultation                             Cardiovascular:Negative CV ROS            Rhythm: regular  Rate: normal                    Neuro/Psych:   Negative Neuro/Psych ROS              GI/Hepatic/Renal:   (+) hiatal hernia,          ROS comment: Diverticula of colon. Endo/Other:    (+) malignancy/cancer. ROS comment: Breast cancer  Abdominal:           Vascular: negative vascular ROS. Anesthesia Plan      general     ASA 2     (Scopolamine patch for PONV)  Induction: intravenous.     MIPS: Postoperative opioids intended and Prophylactic antiemetics administered. Anesthetic plan and risks discussed with patient.                       Yvette Rasheed MD   2/11/2020

## 2020-02-11 NOTE — PROGRESS NOTES
LEFT BREAST EXPANDER REMOVED PER DR. Yesenia Peres LOT# [id number] [id number] CM STYLE 9[address] [facility]

## 2020-02-11 NOTE — PROGRESS NOTES
States has been on aniiibiotic for asymptomatic UTI. [facility] Dr. Clara Arias aware and notified pt of the abnormal urinalysis results for treatment plan.

## 2020-02-13 LAB — SURGICAL PATHOLOGY REPORT: NORMAL

## 2020-02-13 NOTE — OP NOTE
[address] [facility] 30                                OPERATIVE REPORT    PATIENT NAME: Elena Elizabeth                       :        1950  MED REC NO:   [id number]                             ROOM:  ACCOUNT NO:   [de-identified]                           ADMIT DATE: 2020  PROVIDER:     Bruno Owusu    DATE OF PROCEDURE:  2020    ATTENDING PHYSICIAN AND SURGEON:  Bruno Owusu MD    PREOPERATIVE DIAGNOSIS:  In-progress bilateral breast reconstruction  with expanders. POSTOPERATIVE DIAGNOSIS:  In-progress bilateral breast reconstruction  with expanders. PROCEDURES:  Bilateral total capsulectomies with removal of expanders. Placement of bilateral permanent silicone gel breast implants. ANESTHESIA:  General.    INDICATIONS:  The patient is a 63-year-old female who is in progress of  bilateral breast reconstruction with expanders, presenting at this time  for placement of permanent implants. The procedure, the risks, the  benefits were discussed with her. All questions were answered to her  satisfaction. Consent was signed. NARRATIVE SUMMARY:  The patient was brought to the operating suite,  placed under general anesthetic in the supine position. He was prepped  and draped in the usual sterile fashion. Initial attention was taken to  the right breast.  The previous incision was reopened with a scalpel and  deepened with Bovie cutting cautery down to the pectoral muscle. Dissection was then taken here down to the inframammary fold and  slightly above the incision. Due to reconstruction still sitting a  little high, the inferior muscle was released from its  insertion, leaving the implant capsule intact. At this point, the  pectoralis was elevated up off the anterior capsule, all the way up to  the top of the expander and then the entire anterior capsule was  exposed.   Dissection was

## 2020-03-05 ENCOUNTER — HOSPITAL ENCOUNTER (OUTPATIENT)
Dept: BONE DENSITY | Age: 70
Discharge: HOME | End: 2020-03-07
Payer: MEDICARE

## 2020-03-05 PROCEDURE — 77085 DXA BONE DENSITY AXL VRT FX: CPT

## 2020-03-13 ENCOUNTER — OFFICE VISIT (OUTPATIENT)
Dept: SURGERY | Age: 70
End: 2020-03-13
Payer: MEDICARE

## 2020-03-13 VITALS
DIASTOLIC BLOOD PRESSURE: 72 MMHG | SYSTOLIC BLOOD PRESSURE: 136 MMHG | HEIGHT: 68 IN | BODY MASS INDEX: 31.1 KG/M2 | HEART RATE: 62 BPM | WEIGHT: 205.2 LBS

## 2020-03-13 PROCEDURE — 99212 OFFICE O/P EST SF 10 MIN: CPT

## 2020-03-13 PROCEDURE — 99024 POSTOP FOLLOW-UP VISIT: CPT | Performed by: PLASTIC SURGERY

## 2020-03-13 ASSESSMENT — ENCOUNTER SYMPTOMS
SHORTNESS OF BREATH: 0
COUGH: 0
TROUBLE SWALLOWING: 0
ABDOMINAL PAIN: 0

## 2020-03-30 ENCOUNTER — OFFICE VISIT (OUTPATIENT)
Dept: ONCOLOGY | Age: 70
End: 2020-03-30
Payer: MEDICARE

## 2020-03-30 VITALS
SYSTOLIC BLOOD PRESSURE: 138 MMHG | DIASTOLIC BLOOD PRESSURE: 84 MMHG | OXYGEN SATURATION: 97 % | BODY MASS INDEX: 31.46 KG/M2 | WEIGHT: 207.6 LBS | HEART RATE: 78 BPM | TEMPERATURE: 98.4 F | HEIGHT: 68 IN

## 2020-03-30 PROCEDURE — 99212 OFFICE O/P EST SF 10 MIN: CPT

## 2020-03-30 PROCEDURE — 99214 OFFICE O/P EST MOD 30 MIN: CPT | Performed by: INTERNAL MEDICINE

## 2020-03-30 NOTE — PATIENT INSTRUCTIONS
RTC 3 months      [employer]  Username: [email or web address]  Password: [email or web address]  [email or web address]    You may also download the Brendan below on your phone:  [address] with [facility]

## 2020-03-30 NOTE — PROGRESS NOTES
and drinks alcohol occasionally. She has had hysterectomy and oophorectomy. Interval history:  Patient is returning for follow-up visit. He had port removal.  She recently had expanders removed and had breast implants. She denies any new lumps or bumps. She is eating and drinking well. She is tolerating Arimidex very well. She denies any hot flashes. Denies any fever chills, fatigue, diarrhea or nausea vomiting. During this visit patient's allergy, social, medical, surgical history and medications were reviewed and updated. Allergies   Allergen Reactions    Boniva [Ibandronic Acid] Other (See Comments)     Jaw pain.  Fosamax [Alendronate Sodium] Other (See Comments)     Jaw pain. Current Outpatient Medications   Medication Sig Dispense Refill    anastrozole (ARIMIDEX) 1 MG tablet Take 1 tablet by mouth daily 30 tablet 3    cephALEXin (KEFLEX) 250 MG capsule Take one PO QID til gone. Start one day prior to surgery / procedure. (Patient not taking: Reported on 3/13/2020) 12 capsule 0     No current facility-administered medications for this visit. Social History     Socioeconomic History    Marital status:      Spouse name: Not on file    Number of children: Not on file    Years of education: Not on file    Highest education level: Not on file   Occupational History    Occupation: retired     Employer: [employer] 22 resource strain: Not on file    Food insecurity     Worry: Not on file     Inability: Not on file   [employer] needs     Medical: Not on file     Non-medical: Not on file   Tobacco Use    Smoking status: Former Smoker     Packs/day: 0.50     Years: 7.00     Pack years: 3.50     Types: Cigarettes     Last attempt to quit: 1978     Years since quittin.2    Smokeless tobacco: Never Used    Tobacco comment: Quit smoking in .    Substance and Sexual Activity    Alcohol use: No     Comment: Occasional MONOPCT 14 (H) 01/06/2020    BASOPCT 1 01/06/2020    NEUTROABS 2.48 01/06/2020    LYMPHSABS 1.30 01/06/2020    MONOSABS 0.65 01/06/2020    EOSABS 0.04 01/06/2020    BASOSABS 0.03 01/06/2020       Chemistry        Component Value Date/Time     02/04/2020 0804    K 4.2 02/04/2020 0804     02/04/2020 0804    CO2 26 02/04/2020 0804    BUN 17 02/04/2020 0804    CREATININE 0.58 02/04/2020 0804        Component Value Date/Time    CALCIUM 10.0 02/04/2020 0804    ALKPHOS 74 01/06/2020 1120    AST 28 01/06/2020 1120    ALT 31 01/06/2020 1120    BILITOT 0.40 01/06/2020 1120        PATHOLOGY DATA:   Received: 9/11/2018   Reported: 9/12/2018 17:30     -- Diagnosis --   1.  RIGHT BREAST, CORE NEEDLE BIOPSIES:            -  WELL-DIFFERENTIATED INFILTRATING DUCTAL CARCINOMA, 0.6 CM   IN LARGEST INTACT CORE FRAGMENT.            -  ESTROGEN AND PROGESTERONE RECEPTOR IMMUNOSTAINS POSITIVE   IN INVASIVE TUMOR.       -  HER-2 FISH PENDING. 2.  LEFT BREAST, 2:00, N+4, CORE NEEDLE BIOPSIES:            -  MODERATELY DIFFERENTIATED INFILTRATING DUCTAL CARCINOMA,   0.6 CM IN LARGEST INTACT CORE FRAGMENT.       -  FOCAL DUCTAL CARCINOMA IN SITU, INTERMEDIATE GRADE, CRIBRIFORM   AND SOLID TYPE.            -  ESTROGEN AND PROGESTERONE RECEPTOR IMMUNOSTAINS POSITIVE   IN INVASIVE TUMOR.       -  HER-2 FISH PENDING. 3.  LEFT BREAST, 1:00, N+6, CORE NEEDLE BIOPSIES:            -  POORLY DIFFERENTIATED INFILTRATING DUCTAL CARCINOMA, 1.3   CM GREATEST DIMENSION IN LARGEST INTACT CORE FRAGMENT.       -  FOCAL DUCTAL CARCINOMA IN SITU, INTERMEDIATE GRADE, SOLID   TYPE.            -  ESTROGEN AND PROGESTERONE RECEPTOR IMMUNOSTAINS POSITIVE   IN INVASIVE TUMOR.       -  HER-2 FISH PENDING. 4.  LYMPH NODE, LEFT AXILLA, CORE NEEDLE BIOPSIES:       -  INFILTRATING DUCTAL CARCINOMA.          -  INVASIVE TUMOR POSITIVE FOR ESTROGEN RECEPTOR AND   NEGATIVE FOR PROGESTERONE RECEPTOR IMMUNOSTAINS.        -  LYMPH NODE AND BENIGN BREAST ARCHITECTURE NOT IDENTIFIED. -- Diagnosis Comment --   SPECIMEN #4: CORRELATION WITH THE RADIOLOGIC PLACEMENT OF THE BIOPSY   WOULD BE NECESSARY TO DETERMINE IF THIS REPRESENTS A LYMPH NODE WITH   METASTATIC CARCINOMA, VERSUS CARCINOMA INVOLVING THE AXILLARY TAIL OF   THE BREAST.  NO INTRINSIC LYMPH NODE OR BENIGN BREAST ARCHITECTURE IS   PRESENT FOR CONFIRMATION. 2/15/19: Final Diagnosis   1. LYMPH NODE, EXCISION (RIGHT SENTINEL):        - NO NEOPLASM DETECTED. 2. BREAST, MASTECTOMY (RIGHT):        - SCANT RESIDUAL FOCUS OF INVASIVE DUCTAL CARCINOMA.      - DUCTAL CARCINOMA IN SITU.        - EXCISION MARGINS NEGATIVE FOR NEOPLASM. 3. BREAST, MASTECTOMY, INCLUDING AXILLARY LYMPH NODES (LEFT):        - INVASIVE DUCTAL CARCINOMA, 1.5 CM        - DUCTAL CARCINOMA IN SITU.        - EXCISION MARGINS NEGATIVE FOR NEOPLASM. Diagnosis Comment   BIOMARKER STUDIES WERE PERFORMED ON PRIOR BIOPSIES FROM TUMORS FROM   BOTH BREASTS ([id number]) -     RIGHT:        - POSITIVE ESTROGEN RECEPTOR.        - POSITIVE PROGESTERONE RECEPTOR.        - NEGATIVE FOR HER2 GENE AMPLIFICATION. LEFT:        - POSITIVE ESTROGEN RECEPTOR.        - POSITIVE PROGESTERONE RECEPTOR.        - NEGATIVE FOR HER2 GENE AMPLIFICATION. Procedures/Addenda   ADDENDUM     Date Ordered:     2/21/2019     Status: Signed Out        Date Complete:     2/21/2019     By: Uriah Freeman M.D.        Date Reported:     2/21/2019       ADDENDUM DIAGNOSIS   3.  BREAST, MASTECTOMY, WITH AXILLARY LYMPH NODES (LEFT):        - NO CHANGE IN DIAGNOSIS. ADDENDUM COMMENT   THE CASE IS DISCUSSED WITH DR. CID, FEBRUARY 20, 2019.  SLIDES ARE   REVIEWED, WITH SPECIFIC ATTENTION TO THE 6 SLIDES FROM AXILLARY   REGION.  THESE REPRESENT 2 LYMPH NODES, INCLUDING A LARGER 4.5 CM   NODE, AND NEITHER CONTAINS NEOPLASM ON REVIEW.  THE GROSS MASTECTOMY   SPECIMEN IS THEN REEXAMINED WITH SPECIFIC ATTENTION TO THE AXILLARY   TISSUE, AS WELL AS THE BREAST MD  Hematologist/Medical Oncologist  I spent more than 25 minutes examining, evaluating, reviewing data and counseling the patient. Greater than 50% of that time was spent face-to-face with the patient in counseling and coordinating her care. This note is created with the assistance of a speech recognition program.  While intending to generate a document that actually reflects the content of the visit, the document can still have some errors including those of syntax and sound a like substitutions which may escape proof reading. It such instances, actual meaning can be extrapolated by contextual diversion.

## 2020-04-15 RX ORDER — ANASTROZOLE 1 MG/1
1 TABLET ORAL DAILY
Qty: 90 TABLET | Refills: 1 | Status: SHIPPED | OUTPATIENT
Start: 2020-04-15 | End: 2020-10-26

## 2020-06-12 ENCOUNTER — OFFICE VISIT (OUTPATIENT)
Dept: SURGERY | Age: 70
End: 2020-06-12
Payer: MEDICARE

## 2020-06-12 VITALS
HEIGHT: 67 IN | DIASTOLIC BLOOD PRESSURE: 80 MMHG | WEIGHT: 205 LBS | HEART RATE: 80 BPM | SYSTOLIC BLOOD PRESSURE: 124 MMHG | BODY MASS INDEX: 32.18 KG/M2

## 2020-06-12 PROCEDURE — 99213 OFFICE O/P EST LOW 20 MIN: CPT | Performed by: PLASTIC SURGERY

## 2020-06-12 PROCEDURE — 99214 OFFICE O/P EST MOD 30 MIN: CPT

## 2020-06-12 RX ORDER — CYANOCOBALAMIN (VITAMIN B-12) 1000 MCG
1 TABLET, EXTENDED RELEASE ORAL
COMMUNITY
End: 2020-12-14

## 2020-06-12 ASSESSMENT — ENCOUNTER SYMPTOMS
COUGH: 0
ABDOMINAL PAIN: 0
TROUBLE SWALLOWING: 0
SHORTNESS OF BREATH: 0

## 2020-06-29 ENCOUNTER — OFFICE VISIT (OUTPATIENT)
Dept: ONCOLOGY | Age: 70
End: 2020-06-29
Payer: MEDICARE

## 2020-06-29 VITALS
SYSTOLIC BLOOD PRESSURE: 122 MMHG | WEIGHT: 211 LBS | BODY MASS INDEX: 33.12 KG/M2 | HEART RATE: 73 BPM | OXYGEN SATURATION: 97 % | DIASTOLIC BLOOD PRESSURE: 64 MMHG | TEMPERATURE: 97.3 F | HEIGHT: 67 IN

## 2020-06-29 PROCEDURE — 99214 OFFICE O/P EST MOD 30 MIN: CPT | Performed by: INTERNAL MEDICINE

## 2020-06-29 PROCEDURE — 99213 OFFICE O/P EST LOW 20 MIN: CPT

## 2020-06-29 NOTE — PROGRESS NOTES
Patient ID: Sussy Pak, 1950, [id number], 78 y.o. Surgeon : Dr. Moy Ayala  Diagnosis:   Diagnosis of bilateral breast cancer  Left invasive ductal carcinoma, ER/MI positive and HER-2/kelsy Negative, 2 foci 2.0 cm and 2.1 cm, clinical staging T2 N1 M0, stage II  Right- breast cancer, lower inner quadrant, T1b N0 M0, stage I, ER/MI positive HER-2 negative  Started on neoadjuvant chemotherapy with Adriamycin plus cyclophosphamide on 10/3/18 unfortunately she had significant reaction with generalized tremors, shortness of breath and chest tightness after chemotherapy  Her chemotherapy was changed to weekly Taxol and she completed 12 cycles on 1/2/19  Had bilateral mastectomies on 2/15/19  Adjuvant radiation therapy started on 4/2/19 and will complete 5/9/19  Xeloda  Started 5/27/19 And completed on 9/22  Currently on Arimidex  HISTORY OF PRESENT ILLNESS:    Oncologic History:  Shalom Cantu is a 62-year-old female with a family history of breast cancer was seen during initial consultation visit for newly diagnosed bilateral breast cancer. Patient had abnormal mammogram about a year ago and the biopsy showed ductal papilloma so she had excision and was being followed by Gen. surgery. Recently she noted a lump in her left breast and had a mammogram and ultrasound which confirmed 2 lesions in her left breast measuring 2.0 and 2.1 cm and one lesion in the right breast measuring 0.8 cm. Also 2 lymph nodes noted in the left axilla. Patient underwent biopsy of these agents including axillary lymph node which confirmed presence of invasive ductal carcinoma and on the biopsies. She is here for further recommendations. She does not have many medical conditions and her ECOG performance status is 0. She is physically active and not on any regular medications. She has history of breast cancer in her sister was diagnosed at the age of 58 and also reports history of breast cancer in her grandmother.   She quit smoking in 1978 --   SPECIMEN #4: CORRELATION WITH THE RADIOLOGIC PLACEMENT OF THE BIOPSY   WOULD BE NECESSARY TO DETERMINE IF THIS REPRESENTS A LYMPH NODE WITH   METASTATIC CARCINOMA, VERSUS CARCINOMA INVOLVING THE AXILLARY TAIL OF   THE BREAST.  NO INTRINSIC LYMPH NODE OR BENIGN BREAST ARCHITECTURE IS   PRESENT FOR CONFIRMATION. 2/15/19: Final Diagnosis   1. LYMPH NODE, EXCISION (RIGHT SENTINEL):        - NO NEOPLASM DETECTED. 2. BREAST, MASTECTOMY (RIGHT):        - SCANT RESIDUAL FOCUS OF INVASIVE DUCTAL CARCINOMA.      - DUCTAL CARCINOMA IN SITU.        - EXCISION MARGINS NEGATIVE FOR NEOPLASM. 3. BREAST, MASTECTOMY, INCLUDING AXILLARY LYMPH NODES (LEFT):        - INVASIVE DUCTAL CARCINOMA, 1.5 CM        - DUCTAL CARCINOMA IN SITU.        - EXCISION MARGINS NEGATIVE FOR NEOPLASM. Diagnosis Comment   BIOMARKER STUDIES WERE PERFORMED ON PRIOR BIOPSIES FROM TUMORS FROM   BOTH BREASTS ([id number]) -     RIGHT:        - POSITIVE ESTROGEN RECEPTOR.        - POSITIVE PROGESTERONE RECEPTOR.        - NEGATIVE FOR HER2 GENE AMPLIFICATION. LEFT:        - POSITIVE ESTROGEN RECEPTOR.        - POSITIVE PROGESTERONE RECEPTOR.        - NEGATIVE FOR HER2 GENE AMPLIFICATION. Procedures/Addenda   ADDENDUM     Date Ordered:     2/21/2019     Status: Signed Out        Date Complete:     2/21/2019     By: Shaun Velazco M.D.        Date Reported:     2/21/2019       ADDENDUM DIAGNOSIS   3.  BREAST, MASTECTOMY, WITH AXILLARY LYMPH NODES (LEFT):        - NO CHANGE IN DIAGNOSIS. ADDENDUM COMMENT   THE CASE IS DISCUSSED WITH DR. CID, FEBRUARY 20, 2019.  SLIDES ARE   REVIEWED, WITH SPECIFIC ATTENTION TO THE 6 SLIDES FROM AXILLARY   REGION.  THESE REPRESENT 2 LYMPH NODES, INCLUDING A LARGER 4.5 CM   NODE, AND NEITHER CONTAINS NEOPLASM ON REVIEW.  THE GROSS MASTECTOMY   SPECIMEN IS THEN REEXAMINED WITH SPECIFIC ATTENTION TO THE AXILLARY   TISSUE, AS WELL AS THE BREAST ADJACENT TO THE AXILLARY REGION.  NO   ADDITIONAL GROSSLY infectious process cannot be excluded.  If infectious process is suspected,   repeat CT after therapy is recommended.  No definitive CT evidence of   metastatic disease within the chest.   2. Small hiatal hernia. 3. Hepatic steatosis. 4. Nodules involving the thyroid gland, largest measuring 1.6 cm. Correlation with ultrasound is recommended.       CT chest 1/7/2020  Impression   1. No suspicious nodules or masses. 2. Stable or slightly improved left lung apex reticular interlobular septal   thickening on background of patchy ground-glass attenuation and minor   anterior subpleural reticulations in the left upper lobe attributed to post   treatment scarring.  Attention on follow-up. 3. Bilateral breast implants in right IJ infusion port. ASSESSMENT:     Kailee Escobedo has clinical stage II, T2 N1 M0 disease in her left breast and she has clinical stage I T1 N0 M0 disease in her right breast.    Unfortunately she had significant reaction to first cycle of AC chemotherapy. I gave preoperative  weekly paclitaxel for 12 cycles. She underwent surgical resection: I discussed the surgical pathology report. LNs showed no residual cancer, NO residual cancer noted in right breast, But left breast showed smaller residual invasive cancer 1.5 cm and she had negative margins. She  Has completed RT. Now completed Xeloda well. She is tolerating Arimidex very well  During today's visit, the patient and the family had a number of reasonable questions which were answered to their satisfaction. They verbalized understanding of the information provided and they agreed to proceed as outlined above. PLAN:   I will get labs this week  Continue endocrine therapy with Arimidex  Based on physical examination no evidence of recurrence  I advised her to take calcium and vitamin D  Return to clinic in 4 months      Benson May MD  Hematologist/Medical Oncologist  I spent more than 25 minutes examining, evaluating, reviewing

## 2020-06-30 ENCOUNTER — HOSPITAL ENCOUNTER (OUTPATIENT)
Dept: LAB | Age: 70
Discharge: HOME | End: 2020-06-30
Payer: MEDICARE

## 2020-06-30 LAB
ABSOLUTE EOS #: 0.04 K/UL (ref 0–0.44)
ABSOLUTE IMMATURE GRANULOCYTE: <0.03 K/UL (ref 0–0.3)
ABSOLUTE LYMPH #: 1.18 K/UL (ref 1.1–3.7)
ABSOLUTE MONO #: 0.46 K/UL (ref 0.1–1.2)
ALBUMIN SERPL-MCNC: 4.2 G/DL (ref 3.5–5.2)
ALBUMIN/GLOBULIN RATIO: 1.8 (ref 1–2.5)
ALP BLD-CCNC: 71 U/L (ref 35–104)
ALT SERPL-CCNC: 26 U/L (ref 5–33)
ANION GAP SERPL CALCULATED.3IONS-SCNC: 13 MMOL/L (ref 9–17)
AST SERPL-CCNC: 27 U/L
BASOPHILS # BLD: 1 % (ref 0–2)
BASOPHILS ABSOLUTE: <0.03 K/UL (ref 0–0.2)
BILIRUB SERPL-MCNC: 0.5 MG/DL (ref 0.3–1.2)
BUN BLDV-MCNC: 14 MG/DL (ref 8–23)
BUN/CREAT BLD: 25 (ref 9–20)
CALCIUM SERPL-MCNC: 9.4 MG/DL (ref 8.6–10.4)
CHLORIDE BLD-SCNC: 104 MMOL/L (ref 98–107)
CO2: 26 MMOL/L (ref 20–31)
CREAT SERPL-MCNC: 0.55 MG/DL (ref 0.5–0.9)
DIFFERENTIAL TYPE: NORMAL
EOSINOPHILS RELATIVE PERCENT: 1 % (ref 1–4)
GFR AFRICAN AMERICAN: >60 ML/MIN
GFR NON-AFRICAN AMERICAN: >60 ML/MIN
GFR SERPL CREATININE-BSD FRML MDRD: ABNORMAL ML/MIN/{1.73_M2}
GFR SERPL CREATININE-BSD FRML MDRD: ABNORMAL ML/MIN/{1.73_M2}
GLUCOSE BLD-MCNC: 104 MG/DL (ref 70–99)
HCT VFR BLD CALC: 40.4 % (ref 36.3–47.1)
HEMOGLOBIN: 13.1 G/DL (ref 11.9–15.1)
IMMATURE GRANULOCYTES: 0 %
LYMPHOCYTES # BLD: 28 % (ref 24–43)
MCH RBC QN AUTO: 30 PG (ref 25.2–33.5)
MCHC RBC AUTO-ENTMCNC: 32.4 G/DL (ref 25.2–33.5)
MCV RBC AUTO: 92.4 FL (ref 82.6–102.9)
MONOCYTES # BLD: 11 % (ref 3–12)
NRBC AUTOMATED: 0 PER 100 WBC
PDW BLD-RTO: 13.2 % (ref 11.8–14.4)
PLATELET # BLD: 197 K/UL (ref 138–453)
PLATELET ESTIMATE: NORMAL
PMV BLD AUTO: 11 FL (ref 8.1–13.5)
POTASSIUM SERPL-SCNC: 4.2 MMOL/L (ref 3.7–5.3)
RBC # BLD: 4.37 M/UL (ref 3.95–5.11)
RBC # BLD: NORMAL 10*6/UL
SEG NEUTROPHILS: 59 % (ref 36–65)
SEGMENTED NEUTROPHILS ABSOLUTE COUNT: 2.54 K/UL (ref 1.5–8.1)
SODIUM BLD-SCNC: 143 MMOL/L (ref 135–144)
TOTAL PROTEIN: 6.6 G/DL (ref 6.4–8.3)
WBC # BLD: 4.3 K/UL (ref 3.5–11.3)
WBC # BLD: NORMAL 10*3/UL

## 2020-06-30 PROCEDURE — 36415 COLL VENOUS BLD VENIPUNCTURE: CPT

## 2020-06-30 PROCEDURE — 85025 COMPLETE CBC W/AUTO DIFF WBC: CPT

## 2020-06-30 PROCEDURE — 80053 COMPREHEN METABOLIC PANEL: CPT

## 2020-10-02 ENCOUNTER — HOSPITAL ENCOUNTER (OUTPATIENT)
Age: 70
Setting detail: SPECIMEN
Discharge: HOME | End: 2020-10-02
Payer: MEDICARE

## 2020-10-02 ENCOUNTER — OFFICE VISIT (OUTPATIENT)
Dept: FAMILY MEDICINE CLINIC | Age: 70
End: 2020-10-02
Payer: MEDICARE

## 2020-10-02 VITALS
DIASTOLIC BLOOD PRESSURE: 78 MMHG | HEART RATE: 85 BPM | BODY MASS INDEX: 33.59 KG/M2 | WEIGHT: 214 LBS | HEIGHT: 67 IN | TEMPERATURE: 98.1 F | SYSTOLIC BLOOD PRESSURE: 118 MMHG | RESPIRATION RATE: 18 BRPM | OXYGEN SATURATION: 97 %

## 2020-10-02 PROBLEM — Z79.811 LONG TERM CURRENT USE OF AROMATASE INHIBITOR: Status: ACTIVE | Noted: 2020-10-02

## 2020-10-02 LAB
-: ABNORMAL
AMORPHOUS: ABNORMAL
BACTERIA: ABNORMAL
BILIRUBIN URINE: NEGATIVE
CASTS UA: ABNORMAL /LPF (ref 0–2)
COLOR: ABNORMAL
COMMENT UA: ABNORMAL
CRYSTALS, UA: ABNORMAL /HPF
EPITHELIAL CELLS UA: ABNORMAL /HPF (ref 0–5)
GLUCOSE URINE: NEGATIVE
KETONES, URINE: NEGATIVE
LEUKOCYTE ESTERASE, URINE: ABNORMAL
MUCUS: ABNORMAL
NITRITE, URINE: NEGATIVE
OTHER OBSERVATIONS UA: ABNORMAL
PH UA: 6 (ref 5–6)
PROTEIN UA: NEGATIVE
RBC UA: ABNORMAL /HPF (ref 0–4)
RENAL EPITHELIAL, UA: ABNORMAL /HPF
SPECIFIC GRAVITY UA: 1 (ref 1.01–1.02)
TRICHOMONAS: ABNORMAL
TURBIDITY: ABNORMAL
URINE HGB: ABNORMAL
UROBILINOGEN, URINE: NORMAL
WBC UA: ABNORMAL /HPF (ref 0–4)
YEAST: ABNORMAL

## 2020-10-02 PROCEDURE — 87077 CULTURE AEROBIC IDENTIFY: CPT

## 2020-10-02 PROCEDURE — 87186 SC STD MICRODIL/AGAR DIL: CPT

## 2020-10-02 PROCEDURE — 99211 OFF/OP EST MAY X REQ PHY/QHP: CPT

## 2020-10-02 PROCEDURE — 87086 URINE CULTURE/COLONY COUNT: CPT

## 2020-10-02 PROCEDURE — 99214 OFFICE O/P EST MOD 30 MIN: CPT | Performed by: FAMILY MEDICINE

## 2020-10-02 PROCEDURE — 81001 URINALYSIS AUTO W/SCOPE: CPT

## 2020-10-02 RX ORDER — NITROFURANTOIN 25; 75 MG/1; MG/1
100 CAPSULE ORAL 2 TIMES DAILY
Qty: 14 CAPSULE | Refills: 0 | Status: SHIPPED | OUTPATIENT
Start: 2020-10-02 | End: 2020-10-04

## 2020-10-02 ASSESSMENT — ENCOUNTER SYMPTOMS
BLOOD IN STOOL: 1
WHEEZING: 0
SHORTNESS OF BREATH: 0
RECTAL PAIN: 0
NAUSEA: 0
COUGH: 0
CHEST TIGHTNESS: 0
HEMATEMESIS: 0
ABDOMINAL PAIN: 0
HEMATOCHEZIA: 1
DIARRHEA: 0
VOMITING: 0

## 2020-10-02 NOTE — PROGRESS NOTES
[facility] [address] [place] [address]  Dept: [phone]  Dept Fax: [phone]  Loc: [phone]    Fredy Amaro is a 78 y.o. female who presents today for her medical conditions/complaints as noted below. Fredy Amaro is c/o of   Chief Complaint   Patient presents with    Hypertension     stated have been running 130's over 70's    Urinary Tract Infection     burning, frequency, pressure     Rectal Bleeding     stated recently had blood in stool        HPI:     Here today for a follow up of her HTN and she has rectal bleeding and dysuria. Urinary Tract Infection    This is a new problem. The current episode started today. The problem occurs intermittently. The problem has been unchanged. The quality of the pain is described as burning. The pain is at a severity of 2/10. The pain is mild. There has been no fever. She is sexually active. Associated symptoms include frequency and urgency. Pertinent negatives include no chills, discharge, hematuria, nausea, possible pregnancy or vomiting. She has tried nothing for the symptoms. The treatment provided no relief. Rectal Bleeding    The current episode started more than 1 week ago (10 days). The onset was sudden. The problem occurs frequently. The problem has been resolved. The patient is experiencing no pain. The stool is described as hard. There was no prior successful therapy. There was no prior unsuccessful therapy. Pertinent negatives include no fever, no abdominal pain, no diarrhea, no hematemesis, no hemorrhoids (none recently), no nausea, no rectal pain, no vomiting, no hematuria, no chest pain, no headaches and no coughing. She has been eating and drinking normally. Urine output has been normal. There were no sick contacts. HTN: no issues with chest pain or shortness of breath. She is not having any issues with headaches.  No vision changes recently. She gets a little swelling in her left ankle, but its not too bad. Her blood pressures at home have been in the 130s/70s. Past Medical History:   Diagnosis Date    Breast cancer ([address]     Diverticula of colon     Mild, sigmoid, asymptomatic.  Family history of breast cancer     paternal GM, sister, two paternal cousins, two paternal aunts    Osteopenia     With low vitamin D level. Insurance doesn't cover routine DEXA scans. Pt took bisphosphonates for a year and didn't tolerate.  Personal history of antineoplastic chemotherapy 10/2018    PONV (postoperative nausea and vomiting)     Unspecified vitamin D deficiency     Varicose veins of lower extremities           Social History     Tobacco Use    Smoking status: Former Smoker     Packs/day: 0.50     Years: 7.00     Pack years: 3.50     Types: Cigarettes     Last attempt to quit: 1978     Years since quittin.7    Smokeless tobacco: Never Used    Tobacco comment: Quit smoking in . Substance Use Topics    Alcohol use: No     Comment: Occasional alcohol. Current Outpatient Medications   Medication Sig Dispense Refill    nitrofurantoin, macrocrystal-monohydrate, (MACROBID) 100 MG capsule Take 1 capsule by mouth 2 times daily for 7 days 14 capsule 0    anastrozole (ARIMIDEX) 1 MG tablet Take 1 tablet by mouth daily 90 tablet 1    calcium citrate-vitamin D (CITRICAL + D) 315-250 MG-UNIT TABS per tablet Take 1 tablet by mouth daily (with breakfast)       No current facility-administered medications for this visit. Allergies   Allergen Reactions    Boniva [Ibandronic Acid] Other (See Comments)     Jaw pain.  Fosamax [Alendronate Sodium] Other (See Comments)     Jaw pain. Subjective:     Review of Systems   Constitutional: Negative for activity change, appetite change, chills, fatigue and fever. Respiratory: Negative for cough, chest tightness, shortness of breath and wheezing.     Cardiovascular: Negative for chest pain, palpitations and leg swelling. Gastrointestinal: Positive for blood in stool and hematochezia. Negative for abdominal pain, diarrhea, hematemesis, hemorrhoids (none recently), nausea, rectal pain and vomiting. Genitourinary: Positive for frequency and urgency. Negative for difficulty urinating and hematuria. Neurological: Negative for dizziness, syncope, weakness, light-headedness and headaches. Psychiatric/Behavioral: Negative for decreased concentration, dysphoric mood and sleep disturbance. The patient is not nervous/anxious. Objective:      Physical Exam  Vitals signs and nursing note reviewed. Constitutional:       General: She is not in acute distress. Appearance: She is well-developed. Eyes:      Conjunctiva/sclera: Conjunctivae normal.   Neck:      Musculoskeletal: Normal range of motion and neck supple. Thyroid: No thyromegaly. Cardiovascular:      Rate and Rhythm: Normal rate and regular rhythm. Heart sounds: Normal heart sounds. No murmur. Pulmonary:      Effort: Pulmonary effort is normal. No respiratory distress. Breath sounds: Normal breath sounds. No wheezing. Abdominal:      General: Abdomen is flat. Bowel sounds are normal.      Palpations: Abdomen is soft. Tenderness: There is no abdominal tenderness. Genitourinary:     Rectum: No mass, tenderness, anal fissure, external hemorrhoid or internal hemorrhoid. Normal anal tone. Lymphadenopathy:      Cervical: No cervical adenopathy. Skin:     General: Skin is warm and dry. Findings: No erythema or rash. Neurological:      Mental Status: She is alert and oriented to person, place, and time. Psychiatric:         Mood and Affect: Mood normal.         Behavior: Behavior normal.         Thought Content:  Thought content normal.         Judgment: Judgment normal.       /78 (Site: Right Upper Arm, Position: Sitting, Cuff Size: Large Adult)   Pulse 85   Temp 98.1 °F (36.7 °C)   Resp 18   Ht 5' 7\" (1.702 m)   Wt 214 lb (97.1 kg)   SpO2 97%   BMI 33.52 kg/m²     Assessment:       Diagnosis Orders   1. Acute cystitis with hematuria     2. Blood in stool  Dread Carr MD, General Surgery, [place]   3. Encounter for screening for malignant neoplasm of colon  Zunilda Copopla MD, General Surgery, [place]   4. Dysuria  Urinalysis Reflex to Culture      Hospital Outpatient Visit on 10/02/2020   Component Date Value Ref Range Status    Color, UA 10/02/2020 NOT REPORTED  YELLOW Final    Turbidity UA 10/02/2020 NOT REPORTED  CLEAR Final    Glucose, Ur 10/02/2020 NEGATIVE  NEGATIVE Final    Bilirubin Urine 10/02/2020 NEGATIVE  NEGATIVE Final    Ketones, Urine 10/02/2020 NEGATIVE  NEGATIVE Final    Specific Gravity, UA 10/02/2020 1.005* 1.010 - 1.025 Final    Urine Hgb 10/02/2020 TRACE* NEGATIVE Final    pH, UA 10/02/2020 6.0  5.0 - 6.0 Final    Protein, UA 10/02/2020 NEGATIVE  NEGATIVE Final    Urobilinogen, Urine 10/02/2020 Normal  Normal Final    Nitrite, Urine 10/02/2020 NEGATIVE  NEGATIVE Final    Leukocyte Esterase, Urine 10/02/2020 1+* NEGATIVE Final    Urinalysis Comments 10/02/2020 NOT REPORTED   Final    - 10/02/2020        Final    WBC, UA 10/02/2020 5 TO 10  0 - 4 /HPF Final    RBC, UA 10/02/2020 0 TO 4  0 - 4 /HPF Final    Casts UA 10/02/2020 NOT REPORTED  0 - 2 /LPF Final    Crystals, UA 10/02/2020 NOT REPORTED  None /HPF Final    Epithelial Cells UA 10/02/2020 0 TO 4  0 - 5 /HPF Final    Renal Epithelial, UA 10/02/2020 NOT REPORTED  0 /HPF Final    Bacteria, UA 10/02/2020 1+* None Final    Mucus, UA 10/02/2020 NOT REPORTED  None Final    Trichomonas, UA 10/02/2020 NOT REPORTED  None Final    Amorphous, UA 10/02/2020 NOT REPORTED  None Final    Other Observations UA 10/02/2020 NOT REPORTED  NOT REQ. Final    Yeast, UA 10/02/2020 NOT REPORTED  None Final            Plan:        UTI: new; her UA shows she has a UTI.  I will treat with macrobid for 7 days and follow up on the culture. If the culture shows resistance to macrobid then she will be notified and her antibiotic will be changed. I encouraged her to use tylenol or ibuprofen for pain and she can continue the pyridium if it is helping. she was told to return to the clinic or ER if symptoms worsen, if she develops fevers or severe back pain. she understood and agreed with the plan. Blood in the stool: new; she is due for a screening colonoscopy anyway, so I put in a referral to general surgery for her. I did not see any hemorrhoids that would explain her symptoms. Return in about 3 months (around 1/2/2021) for [address] Orders Placed This Encounter   Procedures    Urinalysis Reflex to Culture     Standing Status:   Future     Number of Occurrences:   1     Standing Expiration Date:   10/2/2021     Order Specific Question:   SPECIFY(EX-CATH,MIDSTREAM,CYSTO,ETC)? Answer:   Demetrice Maloney MD, General Surgery, [place]     Referral Priority:   Routine     Referral Type:   Eval and Treat     Referral Reason:   Specialty Services Required     Referred to Provider:   Le Anaya MD     Requested Specialty:   General Surgery     Number of Visits Requested:   1     Orders Placed This Encounter   Medications    nitrofurantoin, macrocrystal-monohydrate, (MACROBID) 100 MG capsule     Sig: Take 1 capsule by mouth 2 times daily for 7 days     Dispense:  14 capsule     Refill:  0       Patientgiven educational materials - see patient instructions. Discussed use, benefit,and side effects of prescribed medications. All patient questions answered. Ptvoiced understanding. Reviewed health maintenance. Instructed to continue currentmedications, diet and exercise. Patient agreed with treatment plan. Follow up asdirected.      Electronically signed by Anna Marie Carty MD on 10/2/2020 at 1:45 PM

## 2020-10-04 LAB
CULTURE: ABNORMAL
Lab: ABNORMAL
SPECIMEN DESCRIPTION: ABNORMAL

## 2020-10-04 RX ORDER — CEPHALEXIN 500 MG/1
500 CAPSULE ORAL 3 TIMES DAILY
Qty: 21 CAPSULE | Refills: 0 | Status: SHIPPED | OUTPATIENT
Start: 2020-10-04 | End: 2020-10-11

## 2020-10-06 ENCOUNTER — TELEPHONE (OUTPATIENT)
Dept: SURGERY | Age: 70
End: 2020-10-06

## 2020-10-06 NOTE — TELEPHONE ENCOUNTER
Patient is having rectal bleeding. She made an appointment first available on 11/12/20. She is asking if you can see her sooner.

## 2020-10-09 ENCOUNTER — NURSE ONLY (OUTPATIENT)
Dept: SURGERY | Age: 70
End: 2020-10-09
Payer: MEDICARE

## 2020-10-09 VITALS
HEIGHT: 67 IN | DIASTOLIC BLOOD PRESSURE: 70 MMHG | WEIGHT: 212.6 LBS | HEART RATE: 74 BPM | BODY MASS INDEX: 33.37 KG/M2 | SYSTOLIC BLOOD PRESSURE: 138 MMHG

## 2020-10-09 PROCEDURE — 99212 OFFICE O/P EST SF 10 MIN: CPT

## 2020-10-09 NOTE — PROGRESS NOTES
Alcohol use: No     Comment: Occasional alcohol.  Drug use: No    Sexual activity: Not on file   Lifestyle    Physical activity     Days per week: Not on file     Minutes per session: Not on file    Stress: Not on file   Relationships    Social connections     Talks on phone: Not on file     Gets together: Not on file     Attends Muslim service: Not on file     Active member of club or organization: Not on file     Attends meetings of clubs or organizations: Not on file     Relationship status: Not on file    Intimate partner violence     Fear of current or ex partner: Not on file     Emotionally abused: Not on file     Physically abused: Not on file     Forced sexual activity: Not on file   Other Topics Concern    Not on file   Social History Narrative    Not on file     Past Surgical History:   Procedure Laterality Date    BREAST BIOPSY Right 6/7/2017    Right Breast biopsy w/ needle placement -arrive 11am -to x-ray 12pm performed by Felicia Mary MD at [address] [place] [address] Bilateral 09/10/2018   [facility] Bilateral 2/15/2019    Bilateral breast reconstruction w/ expander placement performed by Dolly Moran MD at [address] [place] [address]  3/21/2005    Screening. Negative. Due for repeat 2015.  DILATION AND CURETTAGE OF UTERUS  7/22/1976    Status post miscarriage.     DILATION AND CURETTAGE OF UTERUS  9/1/1995    bleeding    HYSTERECTOMY, TOTAL ABDOMINAL      LYMPH NODE BIOPSY Left 09/10/2018    MASTECTOMY Bilateral 2/15/2019    Left Modified Radical Mastectomy w/ axillary disection & Right Mastectomy sentinel lymph node bx w/ Nuc med & blue dye Inj's performed by Felicia Mary MD at [facility] 69 N/A 1/29/2020    PORT REMOVAL performed by Felicia Mary MD at [facility] [address] [facility] CTR VAD W/SUBQ PORT AGE 5 YR/> N/A 9/19/2018    PORT INSERTION performed by Felicia Mary MD at [facility] [address] / DELAYED W/ TISSUE EXPANDER Bilateral 2/11/2020    BREAST EXPANDER REMOVAL AND CASPULECTOMY  IMPLANT PLACEMENT Mildred Mowers performed by Elda Branch MD at [address]  11/7/1995    fibroids    TONSILLECTOMY  1978    Subsequent hemorrhage and cautery. Past Medical History:   Diagnosis Date    Breast cancer ([facility] [address]     Diverticula of colon     Mild, sigmoid, asymptomatic.  Family history of breast cancer     paternal GM, sister, two paternal cousins, two paternal aunts    Osteopenia     With low vitamin D level. Insurance doesn't cover routine DEXA scans. Pt took bisphosphonates for a year and didn't tolerate.  Personal history of antineoplastic chemotherapy 10/2018    PONV (postoperative nausea and vomiting)     Unspecified vitamin D deficiency     Varicose veins of lower extremities      Current Outpatient Medications on File Prior to Visit   Medication Sig Dispense Refill    cephALEXin (KEFLEX) 500 MG capsule Take 1 capsule by mouth 3 times daily for 7 days 21 capsule 0    anastrozole (ARIMIDEX) 1 MG tablet Take 1 tablet by mouth daily 90 tablet 1    calcium citrate-vitamin D (CITRICAL + D) 315-250 MG-UNIT TABS per tablet Take 1 tablet by mouth daily (with breakfast)       No current facility-administered medications on file prior to visit.       Allergies as of 10/09/2020 - Review Complete 10/09/2020   Allergen Reaction Noted    Boniva [ibandronic acid] Other (See Comments) 02/22/2019    Fosamax [alendronate sodium] Other (See Comments) 02/22/2019           PEx:                ASSESSMENT:        PLAN:Colonoscopy

## 2020-10-09 NOTE — PROGRESS NOTES
Patient presents to schedule colonoscopy verbal order of Dr. Raheem Veliz. Instructions given verbally and in writing. Miralax bowel prep given. Patient verbalizes understanding. Procedure will be scheduled on [id number].

## 2020-10-12 ENCOUNTER — PRE-PROCEDURE TELEPHONE (OUTPATIENT)
Dept: PREADMISSION TESTING | Age: 70
End: 2020-10-12

## 2020-10-16 ENCOUNTER — OFFICE VISIT (OUTPATIENT)
Dept: PRIMARY CARE CLINIC | Age: 70
End: 2020-10-16
Payer: MEDICARE

## 2020-10-16 ENCOUNTER — HOSPITAL ENCOUNTER (OUTPATIENT)
Age: 70
Setting detail: SPECIMEN
Discharge: HOME | End: 2020-10-16
Payer: MEDICARE

## 2020-10-16 VITALS
WEIGHT: 216 LBS | DIASTOLIC BLOOD PRESSURE: 78 MMHG | SYSTOLIC BLOOD PRESSURE: 138 MMHG | BODY MASS INDEX: 33.9 KG/M2 | OXYGEN SATURATION: 98 % | HEIGHT: 67 IN | HEART RATE: 66 BPM | RESPIRATION RATE: 16 BRPM | TEMPERATURE: 97.9 F

## 2020-10-16 LAB
-: ABNORMAL
AMORPHOUS: ABNORMAL
BACTERIA: ABNORMAL
BILIRUBIN URINE: NEGATIVE
CASTS UA: ABNORMAL /LPF (ref 0–2)
COLOR: ABNORMAL
COMMENT UA: ABNORMAL
CRYSTALS, UA: ABNORMAL /HPF
EPITHELIAL CELLS UA: ABNORMAL /HPF (ref 0–5)
GLUCOSE URINE: NEGATIVE
KETONES, URINE: NEGATIVE
LEUKOCYTE ESTERASE, URINE: ABNORMAL
MUCUS: ABNORMAL
NITRITE, URINE: NEGATIVE
OTHER OBSERVATIONS UA: ABNORMAL
PH UA: 6 (ref 5–6)
PROTEIN UA: NEGATIVE
RBC UA: ABNORMAL /HPF (ref 0–4)
RENAL EPITHELIAL, UA: ABNORMAL /HPF
SPECIFIC GRAVITY UA: 1 (ref 1.01–1.02)
TRICHOMONAS: ABNORMAL
TURBIDITY: ABNORMAL
URINE HGB: ABNORMAL
UROBILINOGEN, URINE: NORMAL
WBC UA: >50 /HPF (ref 0–4)
YEAST: ABNORMAL

## 2020-10-16 PROCEDURE — 99212 OFFICE O/P EST SF 10 MIN: CPT

## 2020-10-16 PROCEDURE — 99213 OFFICE O/P EST LOW 20 MIN: CPT | Performed by: NURSE PRACTITIONER

## 2020-10-16 PROCEDURE — 81001 URINALYSIS AUTO W/SCOPE: CPT

## 2020-10-16 PROCEDURE — 87086 URINE CULTURE/COLONY COUNT: CPT

## 2020-10-16 RX ORDER — CIPROFLOXACIN 500 MG/1
500 TABLET, FILM COATED ORAL 2 TIMES DAILY
Qty: 14 TABLET | Refills: 0 | Status: SHIPPED | OUTPATIENT
Start: 2020-10-16 | End: 2020-10-23 | Stop reason: ALTCHOICE

## 2020-10-16 ASSESSMENT — ENCOUNTER SYMPTOMS
DIARRHEA: 0
SHORTNESS OF BREATH: 0
CONSTIPATION: 0
COUGH: 0
NAUSEA: 0
WHEEZING: 0
VOMITING: 0

## 2020-10-16 ASSESSMENT — PATIENT HEALTH QUESTIONNAIRE - PHQ9: DEPRESSION UNABLE TO ASSESS: PT REFUSES

## 2020-10-16 NOTE — PROGRESS NOTES
Subjective:      Patient ID: Fredy Amaro is a 78 y.o. female. Urinary Frequency    This is a recurrent problem. The current episode started in the past 7 days. The problem occurs every urination. The problem has been unchanged. The patient is experiencing no pain. There has been no fever. Associated symptoms include frequency and urgency. Pertinent negatives include no hesitancy, nausea, possible pregnancy or vomiting. She has tried increased fluids and antibiotics (was started on macrobid then was changed to 7 days of keflex due to the culture results. ) for the symptoms. The treatment provided mild relief. Her past medical history is significant for recurrent UTIs. Past Medical History:   Diagnosis Date    Breast cancer ([facility] [address]     Diverticula of colon     Mild, sigmoid, asymptomatic.  Family history of breast cancer     paternal GM, sister, two paternal cousins, two paternal aunts    Osteopenia     With low vitamin D level. Insurance doesn't cover routine DEXA scans. Pt took bisphosphonates for a year and didn't tolerate.  Personal history of antineoplastic chemotherapy 10/2018    PONV (postoperative nausea and vomiting)     Unspecified vitamin D deficiency     Varicose veins of lower extremities        Past Surgical History:   Procedure Laterality Date    BREAST BIOPSY Right 6/7/2017    Right Breast biopsy w/ needle placement -arrive 11am -to x-ray 12pm performed by Silvino Metcalf MD at [facility] 5 Bilateral 09/10/2018    BREAST ENHANCEMENT SURGERY Bilateral 2/15/2019    Bilateral breast reconstruction w/ expander placement performed by Mira Freitas MD at [address] [facility]  3/21/2005    Screening. Negative. Due for repeat 2015.  DILATION AND CURETTAGE OF UTERUS  7/22/1976    Status post miscarriage.     DILATION AND CURETTAGE OF UTERUS  9/1/1995    bleeding    HYSTERECTOMY, TOTAL ABDOMINAL      LYMPH NODE BIOPSY Left 09/10/2018    MASTECTOMY Bilateral 2/15/2019 Left Modified Radical Mastectomy w/ axillary disection & Right Mastectomy sentinel lymph node bx w/ Nuc med & blue dye Inj's performed by Kitty German MD at [facility] [place] 69 N/A 2020    PORT REMOVAL performed by Kitty German MD at [address] CTR VAD W/SUBQ PORT AGE 5 YR/> N/A 2018    PORT INSERTION performed by Kitty German MD at [facility] [address] / DELAYED W/ TISSUE EXPANDER Bilateral 2020    BREAST EXPANDER REMOVAL AND CASPULECTOMY  IMPLANT PLACEMENT Deretha Billet performed by Aneudy Ayala MD at [address]  1995    fibroids    TONSILLECTOMY      Subsequent hemorrhage and cautery. Social History     Socioeconomic History    Marital status:      Spouse name: None    Number of children: None    Years of education: None    Highest education level: None   Occupational History    Occupation: retired     Employer: [employer]   Social Needs    Financial resource strain: None    Food insecurity     Worry: None     Inability: None    Transportation needs     Medical: None     Non-medical: None   Tobacco Use    Smoking status: Former Smoker     Packs/day: 0.50     Years: 7.00     Pack years: 3.50     Types: Cigarettes     Last attempt to quit: 1978     Years since quittin.8    Smokeless tobacco: Never Used    Tobacco comment: Quit smoking in . Substance and Sexual Activity    Alcohol use: No     Comment: Occasional alcohol.     Drug use: No    Sexual activity: None   Lifestyle    Physical activity     Days per week: None     Minutes per session: None    Stress: None   Relationships    Social connections     Talks on phone: None     Gets together: None     Attends Pentecostal service: None     Active member of club or organization: None     Attends meetings of clubs or organizations: None     Relationship status: None    Intimate partner violence     Fear Cardiovascular:      Rate and Rhythm: Normal rate and regular rhythm. Heart sounds: Normal heart sounds. Pulmonary:      Effort: Pulmonary effort is normal.      Breath sounds: Normal breath sounds. Abdominal:      General: Bowel sounds are normal.      Palpations: Abdomen is soft. Tenderness: There is no abdominal tenderness. There is no right CVA tenderness, left CVA tenderness, guarding or rebound. Skin:     Capillary Refill: Capillary refill takes less than 2 seconds. Neurological:      General: No focal deficit present. Mental Status: She is alert and oriented to person, place, and time.        Hospital Outpatient Visit on 10/16/2020   Component Date Value Ref Range Status    Color, UA 10/16/2020 NOT REPORTED  YELLOW Final    Turbidity UA 10/16/2020 NOT REPORTED  CLEAR Final    Glucose, Ur 10/16/2020 NEGATIVE  NEGATIVE Final    Bilirubin Urine 10/16/2020 NEGATIVE  NEGATIVE Final    Ketones, Urine 10/16/2020 NEGATIVE  NEGATIVE Final    Specific [place], UA 10/16/2020 1.005* 1.010 - 1.025 Final    Urine Hgb 10/16/2020 1+* NEGATIVE Final    pH, UA 10/16/2020 6.0  5.0 - 6.0 Final    Protein, UA 10/16/2020 NEGATIVE  NEGATIVE Final    Urobilinogen, Urine 10/16/2020 Normal  Normal Final    Nitrite, Urine 10/16/2020 NEGATIVE  NEGATIVE Final    Leukocyte Esterase, Urine 10/16/2020 2+* NEGATIVE Final    Urinalysis Comments 10/16/2020 NOT REPORTED   Final    - 10/16/2020        Final    WBC, UA 10/16/2020 >50  0 - 4 /HPF Final    RBC, UA 10/16/2020 0 TO 4  0 - 4 /HPF Final    Casts UA 10/16/2020 NOT REPORTED  0 - 2 /LPF Final    Crystals, UA 10/16/2020 NOT REPORTED  None /HPF Final    Epithelial Cells UA 10/16/2020 0 TO 4  0 - 5 /HPF Final    Renal Epithelial, UA 10/16/2020 NOT REPORTED  0 /HPF Final    Bacteria, UA 10/16/2020 TRACE* None Final    Mucus, UA 10/16/2020 NOT REPORTED  None Final    Trichomonas, UA 10/16/2020 NOT REPORTED  None Final    Amorphous, UA 10/16/2020 NOT REPORTED  None Final    Other Observations UA 10/16/2020 Specimen Cultured* NOT REQ. Final    Yeast, UA 10/16/2020 NOT REPORTED  None Final   Hospital Outpatient Visit on 10/02/2020   Component Date Value Ref Range Status    Color, UA 10/02/2020 NOT REPORTED  YELLOW Final    Turbidity UA 10/02/2020 NOT REPORTED  CLEAR Final    Glucose, Ur 10/02/2020 NEGATIVE  NEGATIVE Final    Bilirubin Urine 10/02/2020 NEGATIVE  NEGATIVE Final    Ketones, Urine 10/02/2020 NEGATIVE  NEGATIVE Final    Specific Gravity, UA 10/02/2020 1.005* 1.010 - 1.025 Final    Urine Hgb 10/02/2020 TRACE* NEGATIVE Final    pH, UA 10/02/2020 6.0  5.0 - 6.0 Final    Protein, UA 10/02/2020 NEGATIVE  NEGATIVE Final    Urobilinogen, Urine 10/02/2020 Normal  Normal Final    Nitrite, Urine 10/02/2020 NEGATIVE  NEGATIVE Final    Leukocyte Esterase, Urine 10/02/2020 1+* NEGATIVE Final    Urinalysis Comments 10/02/2020 NOT REPORTED   Final    - 10/02/2020        Final    WBC, UA 10/02/2020 5 TO 10  0 - 4 /HPF Final    RBC, UA 10/02/2020 0 TO 4  0 - 4 /HPF Final    Casts UA 10/02/2020 NOT REPORTED  0 - 2 /LPF Final    Crystals, UA 10/02/2020 NOT REPORTED  None /HPF Final    Epithelial Cells UA 10/02/2020 0 TO 4  0 - 5 /HPF Final    Renal Epithelial, UA 10/02/2020 NOT REPORTED  0 /HPF Final    Bacteria, UA 10/02/2020 1+* None Final    Mucus, UA 10/02/2020 NOT REPORTED  None Final    Trichomonas, UA 10/02/2020 NOT REPORTED  None Final    Amorphous, UA 10/02/2020 NOT REPORTED  None Final    Other Observations UA 10/02/2020 NOT REPORTED  NOT REQ. Final    Yeast, UA 10/02/2020 NOT REPORTED  None Final    Specimen Description 10/02/2020 . CLEAN CATCH URINE   Final    Special Requests 10/02/2020 NOT REPORTED   Final    Culture 10/02/2020 ESCHERICHIA COLI 10 to 50,000 CFU/ML*  Final         Assessment:       Diagnosis Orders   1. Acute cystitis with hematuria     2.  Urinary frequency  Urinalysis Reflex to Culture           Plan: Reviewed UA with patient   Will start cipro 500mg 1 tablet BID For 7 days  Push fluids  Will call with culture results  Pt to return if symptoms do not improve or worsen   Return ELICIA Haywood - CNP

## 2020-10-17 LAB
CULTURE: NORMAL
Lab: NORMAL
SPECIMEN DESCRIPTION: NORMAL

## 2020-10-19 ENCOUNTER — OFFICE VISIT (OUTPATIENT)
Dept: ONCOLOGY | Age: 70
End: 2020-10-19
Payer: MEDICARE

## 2020-10-19 ENCOUNTER — TELEPHONE (OUTPATIENT)
Dept: PRIMARY CARE CLINIC | Age: 70
End: 2020-10-19

## 2020-10-19 VITALS
BODY MASS INDEX: 33.74 KG/M2 | SYSTOLIC BLOOD PRESSURE: 138 MMHG | RESPIRATION RATE: 16 BRPM | HEART RATE: 98 BPM | TEMPERATURE: 96.9 F | OXYGEN SATURATION: 98 % | WEIGHT: 215 LBS | HEIGHT: 67 IN | DIASTOLIC BLOOD PRESSURE: 64 MMHG

## 2020-10-19 PROCEDURE — 99214 OFFICE O/P EST MOD 30 MIN: CPT | Performed by: INTERNAL MEDICINE

## 2020-10-19 NOTE — TELEPHONE ENCOUNTER
----- Message from ELICIA Rdz CNP sent at 10/17/2020  7:41 PM EDT -----  No significant growth. Continue antibiotic until gone.  Push fluids

## 2020-10-19 NOTE — PROGRESS NOTES
Patient ID: Junior Tamayo, 1950, [id number], 78 y.o. Surgeon : Dr. Frederica Ahumada  Diagnosis:   Diagnosis of bilateral breast cancer  Left invasive ductal carcinoma, ER/DE positive and HER-2/kelsy Negative, 2 foci 2.0 cm and 2.1 cm, clinical staging T2 N1 M0, stage II  Right- breast cancer, lower inner quadrant, T1b N0 M0, stage I, ER/DE positive HER-2 negative  Started on neoadjuvant chemotherapy with Adriamycin plus cyclophosphamide on 10/3/18 unfortunately she had significant reaction with generalized tremors, shortness of breath and chest tightness after chemotherapy  Her chemotherapy was changed to weekly Taxol and she completed 12 cycles on 1/2/19  Had bilateral mastectomies on 2/15/19  Adjuvant radiation therapy started on 4/2/19 and will complete 5/9/19  Xeloda  Started 5/27/19 And completed on 9/22/19  Currently on Arimidex  HISTORY OF PRESENT ILLNESS:    Oncologic History:  Leila Dakins is a 61-year-old female with a family history of breast cancer was seen during initial consultation visit for newly diagnosed bilateral breast cancer. Patient had abnormal mammogram about a year ago and the biopsy showed ductal papilloma so she had excision and was being followed by Gen. surgery. Recently she noted a lump in her left breast and had a mammogram and ultrasound which confirmed 2 lesions in her left breast measuring 2.0 and 2.1 cm and one lesion in the right breast measuring 0.8 cm. Also 2 lymph nodes noted in the left axilla. Patient underwent biopsy of these agents including axillary lymph node which confirmed presence of invasive ductal carcinoma and on the biopsies. She is here for further recommendations. She does not have many medical conditions and her ECOG performance status is 0. She is physically active and not on any regular medications. She has history of breast cancer in her sister was diagnosed at the age of 58 and also reports history of breast cancer in her grandmother.   She quit smoking in 1978 and drinks alcohol occasionally. She has had hysterectomy and oophorectomy. Interval history:  Patient is returning for follow-up visit. She is here to discuss further recommendations. She is tolerating Arimidex well. She is not taking calcium and vitamin D regularly. She recently had bilateral flank pain and was diagnosed with urinary tract infection and currently on ciprofloxacin. She  is planning to have colonoscopy next week. She denies any hot flashes. She recently had expanders removed and had breast implants. She denies any new lumps or bumps. During this visit patient's allergy, social, medical, surgical history and medications were reviewed and updated. Allergies   Allergen Reactions    Boniva [Ibandronic Acid] Other (See Comments)     Jaw pain.  Fosamax [Alendronate Sodium] Other (See Comments)     Jaw pain. Current Outpatient Medications   Medication Sig Dispense Refill    ciprofloxacin (CIPRO) 500 MG tablet Take 1 tablet by mouth 2 times daily for 7 days Take with food. 14 tablet 0    anastrozole (ARIMIDEX) 1 MG tablet Take 1 tablet by mouth daily 90 tablet 1    calcium citrate-vitamin D (CITRICAL + D) 315-250 MG-UNIT TABS per tablet Take 1 tablet by mouth daily (with breakfast)       No current facility-administered medications for this visit.         Social History     Socioeconomic History    Marital status:      Spouse name: Not on file    Number of children: Not on file    Years of education: Not on file    Highest education level: Not on file   Occupational History    Occupation: retired     Employer: [employer] resource strain: Not on file    Food insecurity     Worry: Not on file     Inability: Not on file   Sunpre[employer] needs     Medical: Not on file     Non-medical: Not on file   Tobacco Use    Smoking status: Former Smoker     Packs/day: 0.50     Years: 7.00     Pack years: 3.50     Types: Cigarettes Last attempt to quit: 1978     Years since quittin.8    Smokeless tobacco: Never Used    Tobacco comment: Quit smoking in . Substance and Sexual Activity    Alcohol use: No     Comment: Occasional alcohol.  Drug use: No    Sexual activity: Not on file   Lifestyle    Physical activity     Days per week: Not on file     Minutes per session: Not on file    Stress: Not on file   Relationships    Social connections     Talks on phone: Not on file     Gets together: Not on file     Attends Anabaptism service: Not on file     Active member of club or organization: Not on file     Attends meetings of clubs or organizations: Not on file     Relationship status: Not on file    Intimate partner violence     Fear of current or ex partner: Not on file     Emotionally abused: Not on file     Physically abused: Not on file     Forced sexual activity: Not on file   Other Topics Concern    Not on file   Social History Narrative    Not on file       Family History   Problem Relation Age of Onset    Hypertension Mother         Renal failure.  Diabetes Mother         Type 2 at end of life.  Heart Failure Father     Coronary Art Dis Father         Status post CABG    Diabetes Father         Type 2, very late in life.  Breast Cancer Sister 58    Hypertension Brother     Hypertension Sister     Breast Cancer Paternal Aunt         62s    Breast Cancer Paternal Grandmother     Breast Cancer Paternal Cousin     Breast Cancer Paternal Aunt         62s    Breast Cancer Paternal Cousin         46s      REVIEW OF SYSTEM:   Constitutional: No fever or chills.  No night sweats, no weight loss   Eyes: No eye discharge, double vision, or eye pain   HEENT: negative for sore mouth, sore throat, hoarseness and voice change   Respiratory: negative for cough , sputum, dyspnea, wheezing, hemoptysis, chest pain   Cardiovascular: negative for chest pain, dyspnea, palpitations, orthopnea, PND WBC 4.3 06/30/2020    HGB 13.1 06/30/2020    HCT 40.4 06/30/2020    MCV 92.4 06/30/2020     06/30/2020    LYMPHOPCT 28 06/30/2020    RBC 4.37 06/30/2020    MCH 30.0 06/30/2020    MCHC 32.4 06/30/2020    RDW 13.2 06/30/2020    MONOPCT 11 06/30/2020    BASOPCT 1 06/30/2020    NEUTROABS 2.54 06/30/2020    LYMPHSABS 1.18 06/30/2020    MONOSABS 0.46 06/30/2020    EOSABS 0.04 06/30/2020    BASOSABS <0.03 06/30/2020       Chemistry        Component Value Date/Time     06/30/2020 0801    K 4.2 06/30/2020 0801     06/30/2020 0801    CO2 26 06/30/2020 0801    BUN 14 06/30/2020 0801    CREATININE 0.55 06/30/2020 0801        Component Value Date/Time    CALCIUM 9.4 06/30/2020 0801    ALKPHOS 71 06/30/2020 0801    AST 27 06/30/2020 0801    ALT 26 06/30/2020 0801    BILITOT 0.50 06/30/2020 0801        PATHOLOGY DATA:   Received: 9/11/2018   Reported: 9/12/2018 17:30     -- Diagnosis --   1.  RIGHT BREAST, CORE NEEDLE BIOPSIES:            -  WELL-DIFFERENTIATED INFILTRATING DUCTAL CARCINOMA, 0.6 CM   IN LARGEST INTACT CORE FRAGMENT.            -  ESTROGEN AND PROGESTERONE RECEPTOR IMMUNOSTAINS POSITIVE   IN INVASIVE TUMOR.       -  HER-2 FISH PENDING. 2.  LEFT BREAST, 2:00, N+4, CORE NEEDLE BIOPSIES:            -  MODERATELY DIFFERENTIATED INFILTRATING DUCTAL CARCINOMA,   0.6 CM IN LARGEST INTACT CORE FRAGMENT.       -  FOCAL DUCTAL CARCINOMA IN SITU, INTERMEDIATE GRADE, CRIBRIFORM   AND SOLID TYPE.            -  ESTROGEN AND PROGESTERONE RECEPTOR IMMUNOSTAINS POSITIVE   IN INVASIVE TUMOR.       -  HER-2 FISH PENDING. 3.  LEFT BREAST, 1:00, N+6, CORE NEEDLE BIOPSIES:            -  POORLY DIFFERENTIATED INFILTRATING DUCTAL CARCINOMA, 1.3   CM GREATEST DIMENSION IN LARGEST INTACT CORE FRAGMENT.       -  FOCAL DUCTAL CARCINOMA IN SITU, INTERMEDIATE GRADE, SOLID   TYPE.            -  ESTROGEN AND PROGESTERONE RECEPTOR IMMUNOSTAINS POSITIVE   IN INVASIVE TUMOR.       -  HER-2 FISH PENDING.      4.  LYMPH NODE, LEFT AXILLA, CORE NEEDLE BIOPSIES:       -  INFILTRATING DUCTAL CARCINOMA.          -  INVASIVE TUMOR POSITIVE FOR ESTROGEN RECEPTOR AND   NEGATIVE FOR PROGESTERONE RECEPTOR IMMUNOSTAINS.     -  LYMPH NODE AND BENIGN BREAST ARCHITECTURE NOT IDENTIFIED. -- Diagnosis Comment --   SPECIMEN #4: CORRELATION WITH THE RADIOLOGIC PLACEMENT OF THE BIOPSY   WOULD BE NECESSARY TO DETERMINE IF THIS REPRESENTS A LYMPH NODE WITH   METASTATIC CARCINOMA, VERSUS CARCINOMA INVOLVING THE AXILLARY TAIL OF   THE BREAST.  NO INTRINSIC LYMPH NODE OR BENIGN BREAST ARCHITECTURE IS   PRESENT FOR CONFIRMATION. 2/15/19: Final Diagnosis   1. LYMPH NODE, EXCISION (RIGHT SENTINEL):        - NO NEOPLASM DETECTED. 2. BREAST, MASTECTOMY (RIGHT):        - SCANT RESIDUAL FOCUS OF INVASIVE DUCTAL CARCINOMA.      - DUCTAL CARCINOMA IN SITU.        - EXCISION MARGINS NEGATIVE FOR NEOPLASM. 3. BREAST, MASTECTOMY, INCLUDING AXILLARY LYMPH NODES (LEFT):        - INVASIVE DUCTAL CARCINOMA, 1.5 CM        - DUCTAL CARCINOMA IN SITU.        - EXCISION MARGINS NEGATIVE FOR NEOPLASM. Diagnosis Comment   BIOMARKER STUDIES WERE PERFORMED ON PRIOR BIOPSIES FROM TUMORS FROM   BOTH BREASTS ([id number]) -     RIGHT:        - POSITIVE ESTROGEN RECEPTOR.        - POSITIVE PROGESTERONE RECEPTOR.        - NEGATIVE FOR HER2 GENE AMPLIFICATION. LEFT:        - POSITIVE ESTROGEN RECEPTOR.        - POSITIVE PROGESTERONE RECEPTOR.        - NEGATIVE FOR HER2 GENE AMPLIFICATION. Procedures/Addenda   ADDENDUM     Date Ordered:     2/21/2019     Status: Signed Out        Date Complete:     2/21/2019     By: Delaney Corral M.D.        Date Reported:     2/21/2019       ADDENDUM DIAGNOSIS   3.  BREAST, MASTECTOMY, WITH AXILLARY LYMPH NODES (LEFT):        - NO CHANGE IN DIAGNOSIS. ADDENDUM COMMENT   THE CASE IS DISCUSSED WITH DR. CID, FEBRUARY 20, 2019.  SLIDES ARE   REVIEWED, WITH SPECIFIC ATTENTION TO THE 6 SLIDES FROM AXILLARY   REGION. Sunday Aden hypoechoic mass measuring 0.7 x 0.8 x 0.8 cm is present in the 3 o'clock   position, 7 cm from the nipple.  This corresponds to the mammographic finding.       Ultrasound imaging of the right axilla was also performed without   identifiable lymph nodes.           Impression   1. 2 noncalcified suspicious masses in the area palpable concern in the 1-2   o'clock position of the left breast with sonographic correlate measuring up   to 2.0 cm and 2.1 cm.       2. Left axillary ultrasound identifies 2 abnormal lymph nodes.       3.  New suspicious noncalcified mass in the lower inner quadrant of the right   breast with corresponding sonographic 0.8 cm mass in the 3 o'clock position,   7 cm from the nipple.       4.  No identifiable lymph nodes in the right axilla.       Ultrasound-guided biopsy of the left breast masses, a left axillary lymph   node, and right breast mass is recommended.       BIRADS:   BIRADS - CATEGORY 5       Findings are highly suggestive of malignancy.  Biopsy should be considered at   this time. Bone scan 9/21/18  Impression   No evidence to suggest osseous metastatic disease. CT chest abdomen pelvis 9/21/18  Impression   1.  Known bilateral breast masses and left axillary lymphadenopathy are   demonstrated.  No evidence for distant metastatic disease in the chest,   abdomen, or pelvis.       2.  Tiny right hydropneumothorax, presumed related to recent right chest port   placement.       3.  Tiny hiatal hernia.       4.  There is a 1.5 cm right thyroid nodule.  Thyroid ultrasound is   recommended.       The result was communicated to the ordering physician, Dr. Niyah Serna, at 1:42   p.m. on 09/21/2018.  This was communicated by the radiology communication   center [facility]), as I was not available when Dr. Niyah Serna returned the   phone call. Mammogram 1/4/19  Impression   Mammographic evidence of response of therapy within both breasts as described   above.  No new lesions are identified. physical examination no evidence of recurrence  I advised her to take calcium and vitamin D  Return to clinic in 4 months with labs prior      Benson Bush MD  Hematologist/Medical Oncologist  I spent more than 25 minutes examining, evaluating, reviewing data and counseling the patient. Greater than 50% of that time was spent face-to-face with the patient in counseling and coordinating her care. This note is created with the assistance of a speech recognition program.  While intending to generate a document that actually reflects the content of the visit, the document can still have some errors including those of syntax and sound a like substitutions which may escape proof reading. It such instances, actual meaning can be extrapolated by contextual diversion.

## 2020-10-20 PROCEDURE — U0003 INFECTIOUS AGENT DETECTION BY NUCLEIC ACID (DNA OR RNA); SEVERE ACUTE RESPIRATORY SYNDROME CORONAVIRUS 2 (SARS-COV-2) (CORONAVIRUS DISEASE [COVID-19]), AMPLIFIED PROBE TECHNIQUE, MAKING USE OF HIGH THROUGHPUT TECHNOLOGIES AS DESCRIBED BY CMS-2020-01-R: HCPCS

## 2020-10-21 ENCOUNTER — HOSPITAL ENCOUNTER (OUTPATIENT)
Dept: PREADMISSION TESTING | Age: 70
Setting detail: SPECIMEN
Discharge: HOME | End: 2020-10-25
Payer: MEDICARE

## 2020-10-21 LAB
SARS-COV-2, RAPID: NORMAL
SARS-COV-2: NORMAL
SARS-COV-2: NOT DETECTED
SOURCE: NORMAL

## 2020-10-21 PROCEDURE — U0003 INFECTIOUS AGENT DETECTION BY NUCLEIC ACID (DNA OR RNA); SEVERE ACUTE RESPIRATORY SYNDROME CORONAVIRUS 2 (SARS-COV-2) (CORONAVIRUS DISEASE [COVID-19]), AMPLIFIED PROBE TECHNIQUE, MAKING USE OF HIGH THROUGHPUT TECHNOLOGIES AS DESCRIBED BY CMS-2020-01-R: HCPCS

## 2020-10-22 ENCOUNTER — TELEPHONE (OUTPATIENT)
Dept: PRIMARY CARE CLINIC | Age: 70
End: 2020-10-22

## 2020-10-26 ENCOUNTER — HOSPITAL ENCOUNTER (OUTPATIENT)
Age: 70
Setting detail: OUTPATIENT SURGERY
Discharge: HOME | End: 2020-10-26
Attending: SURGERY | Admitting: SURGERY
Payer: MEDICARE

## 2020-10-26 ENCOUNTER — ANESTHESIA EVENT (OUTPATIENT)
Dept: OPERATING ROOM | Age: 70
End: 2020-10-26
Payer: MEDICARE

## 2020-10-26 ENCOUNTER — ANESTHESIA (OUTPATIENT)
Dept: OPERATING ROOM | Age: 70
End: 2020-10-26
Payer: MEDICARE

## 2020-10-26 VITALS
TEMPERATURE: 98 F | SYSTOLIC BLOOD PRESSURE: 111 MMHG | OXYGEN SATURATION: 100 % | HEART RATE: 63 BPM | DIASTOLIC BLOOD PRESSURE: 57 MMHG | WEIGHT: 212 LBS | HEIGHT: 67 IN | RESPIRATION RATE: 18 BRPM | BODY MASS INDEX: 33.27 KG/M2

## 2020-10-26 VITALS
OXYGEN SATURATION: 95 % | RESPIRATION RATE: 22 BRPM | DIASTOLIC BLOOD PRESSURE: 53 MMHG | SYSTOLIC BLOOD PRESSURE: 103 MMHG

## 2020-10-26 PROCEDURE — 3609010300 HC COLONOSCOPY W/BIOPSY SINGLE/MULTIPLE: Performed by: SURGERY

## 2020-10-26 PROCEDURE — 7100000010 HC PHASE II RECOVERY - FIRST 15 MIN: Performed by: SURGERY

## 2020-10-26 PROCEDURE — 3700000000 HC ANESTHESIA ATTENDED CARE: Performed by: SURGERY

## 2020-10-26 PROCEDURE — 3700000001 HC ADD 15 MINUTES (ANESTHESIA): Performed by: SURGERY

## 2020-10-26 PROCEDURE — 7100000011 HC PHASE II RECOVERY - ADDTL 15 MIN: Performed by: SURGERY

## 2020-10-26 PROCEDURE — 2709999900 HC NON-CHARGEABLE SUPPLY: Performed by: SURGERY

## 2020-10-26 PROCEDURE — 88305 TISSUE EXAM BY PATHOLOGIST: CPT

## 2020-10-26 PROCEDURE — 45380 COLONOSCOPY AND BIOPSY: CPT | Performed by: SURGERY

## 2020-10-26 PROCEDURE — 2580000003 HC RX 258: Performed by: SURGERY

## 2020-10-26 PROCEDURE — 6360000002 HC RX W HCPCS: Performed by: NURSE ANESTHETIST, CERTIFIED REGISTERED

## 2020-10-26 RX ORDER — SODIUM CHLORIDE, SODIUM LACTATE, POTASSIUM CHLORIDE, CALCIUM CHLORIDE 600; 310; 30; 20 MG/100ML; MG/100ML; MG/100ML; MG/100ML
INJECTION, SOLUTION INTRAVENOUS CONTINUOUS
Status: DISCONTINUED | OUTPATIENT
Start: 2020-10-26 | End: 2020-10-26 | Stop reason: HOSPADM

## 2020-10-26 RX ORDER — PROPOFOL 10 MG/ML
INJECTION, EMULSION INTRAVENOUS PRN
Status: DISCONTINUED | OUTPATIENT
Start: 2020-10-26 | End: 2020-10-26 | Stop reason: SDUPTHER

## 2020-10-26 RX ORDER — ANASTROZOLE 1 MG/1
TABLET ORAL
Qty: 90 TABLET | Refills: 3 | Status: SHIPPED | OUTPATIENT
Start: 2020-10-26 | End: 2021-09-21

## 2020-10-26 RX ADMIN — SODIUM CHLORIDE, POTASSIUM CHLORIDE, SODIUM LACTATE AND CALCIUM CHLORIDE: 600; 310; 30; 20 INJECTION, SOLUTION INTRAVENOUS at 10:59

## 2020-10-26 RX ADMIN — PROPOFOL 80 MG: 10 INJECTION, EMULSION INTRAVENOUS at 12:38

## 2020-10-26 RX ADMIN — PROPOFOL 50 MG: 10 INJECTION, EMULSION INTRAVENOUS at 12:34

## 2020-10-26 RX ADMIN — PROPOFOL 100 MG: 10 INJECTION, EMULSION INTRAVENOUS at 12:30

## 2020-10-26 ASSESSMENT — PAIN SCALES - GENERAL
PAINLEVEL_OUTOF10: 0

## 2020-10-26 ASSESSMENT — PAIN - FUNCTIONAL ASSESSMENT: PAIN_FUNCTIONAL_ASSESSMENT: 0-10

## 2020-10-26 NOTE — ANESTHESIA PRE PROCEDURE
Department of Anesthesiology  Preprocedure Note       Name:  Juancho Dawn   Age:  78 y.o.  :  1950                                          MRN:  [id number]         Date:  10/26/2020      Surgeon: Nayeli Salvador):  Joshua Simms MD    Procedure: Procedure(s):  COLONOSCOPY    Medications prior to admission:   Prior to Admission medications    Medication Sig Start Date End Date Taking? Authorizing Provider   calcium citrate-vitamin D (CITRICAL + D) 315-250 MG-UNIT TABS per tablet Take 1 tablet by mouth daily (with breakfast)   Yes Historical Provider, MD   anastrozole (ARIMIDEX) 1 MG tablet Take 1 tablet by mouth daily 4/15/20  Yes Victoria Lea MD       Current medications:    Current Facility-Administered Medications   Medication Dose Route Frequency Provider Last Rate Last Dose    lactated ringers infusion   Intravenous Continuous Joshua Simms MD 75 mL/hr at 10/26/20 1059         Allergies: Allergies   Allergen Reactions    Boniva [Ibandronic Acid] Other (See Comments)     Jaw pain.  Fosamax [Alendronate Sodium] Other (See Comments)     Jaw pain. Problem List:    Patient Active Problem List   Diagnosis Code    Osteopenia M85.80    Diverticula of colon K57.30    Varicose veins of both lower extremities I83.93    Vitamin D deficiency E55.9    Postoperative pain G89.18    Malignant neoplasm of upper-outer quadrant of left breast in female, estrogen receptor positive ([facility] [address] C50.412, Z17.0    Nausea & vomiting R11.2    Hiatal hernia K44.9    Breast cancer in female [facility]) C50.919    S/P bilateral mastectomy Z90.13    S/P breast reconstruction, bilateral Z98.890    Long term current use of aromatase inhibitor Z79.811       Past Medical History:        Diagnosis Date    Breast cancer ([facility] [address]     Diverticula of colon     Mild, sigmoid, asymptomatic.  Family history of breast cancer     paternal GM, sister, two paternal cousins, two paternal aunts    Osteopenia     With low vitamin D level. Insurance doesn't cover routine DEXA scans. Pt took bisphosphonates for a year and didn't tolerate.  Personal history of antineoplastic chemotherapy 10/2018    PONV (postoperative nausea and vomiting)     Unspecified vitamin D deficiency     Varicose veins of lower extremities        Past Surgical History:        Procedure Laterality Date    BREAST BIOPSY Right 2017    Right Breast biopsy w/ needle placement -arrive 11am -to x-ray 12pm performed by Andria Quiñones MD at [address] [facility] 5 Bilateral 09/10/2018    BREAST ENHANCEMENT SURGERY Bilateral 2/15/2019    Bilateral breast reconstruction w/ expander placement performed by Les Haro MD at [address] [place] [address]  3/21/2005    Screening. Negative. Due for repeat 2015.  DILATION AND CURETTAGE OF UTERUS  1976    Status post miscarriage.  DILATION AND CURETTAGE OF UTERUS  1995    bleeding    HYSTERECTOMY, TOTAL ABDOMINAL      LYMPH NODE BIOPSY Left 09/10/2018    MASTECTOMY Bilateral 2/15/2019    Left Modified Radical Mastectomy w/ axillary disection & Right Mastectomy sentinel lymph node bx w/ Nuc med & blue dye Inj's performed by Andria Quiñones MD at [facility] 69 N/A 2020    PORT REMOVAL performed by Andria Quiñones MD at [address] CTR VAD W/SUBQ PORT AGE 5 YR/> N/A 2018    PORT INSERTION performed by Andria Quiñones MD at [facility] [address] / DELAYED W/ TISSUE EXPANDER Bilateral 2020    BREAST EXPANDER REMOVAL AND CASPULECTOMY  IMPLANT PLACEMENT Darrius Toth performed by Les Haro MD at [address]  1995    fibroids    TONSILLECTOMY  1978    Subsequent hemorrhage and cautery.        Social History:    Social History     Tobacco Use    Smoking status: Former Smoker     Packs/day: 0.50     Years: 7.00     Pack years: 3.50     Types: Cigarettes     Last attempt to quit: 1978     Years since quittin.8    Smokeless tobacco: Never Used    Tobacco comment: Quit smoking in 1978. Substance Use Topics    Alcohol use: No     Comment: Occasional alcohol. Counseling given: Not Answered  Comment: Quit smoking in 1978. Vital Signs (Current):   Vitals:    10/26/20 1049   BP: 130/66   Pulse: 85   Resp: 18   Temp: 37.1 °C (98.7 °F)   TempSrc: Oral   SpO2: 96%   Weight: 212 lb (96.2 kg)   Height: 5' 7\" (1.702 m)                                              BP Readings from Last 3 Encounters:   10/26/20 130/66   10/19/20 138/64   10/16/20 138/78       NPO Status: Time of last liquid consumption: 2300                        Time of last solid consumption: 1830                        Date of last liquid consumption: 10/25/20                        Date of last solid food consumption: 10/24/20    BMI:   Wt Readings from Last 3 Encounters:   10/26/20 212 lb (96.2 kg)   10/19/20 215 lb (97.5 kg)   10/16/20 216 lb (98 kg)     Body mass index is 33.2 kg/m². CBC:   Lab Results   Component Value Date    WBC 4.3 06/30/2020    RBC 4.37 06/30/2020    HGB 13.1 06/30/2020    HCT 40.4 06/30/2020    MCV 92.4 06/30/2020    RDW 13.2 06/30/2020     06/30/2020       CMP:   Lab Results   Component Value Date     06/30/2020    K 4.2 06/30/2020     06/30/2020    CO2 26 06/30/2020    BUN 14 06/30/2020    CREATININE 0.55 06/30/2020    GFRAA >60 06/30/2020    LABGLOM >60 06/30/2020    GLUCOSE 104 06/30/2020    PROT 6.6 06/30/2020    CALCIUM 9.4 06/30/2020    BILITOT 0.50 06/30/2020    ALKPHOS 71 06/30/2020    AST 27 06/30/2020    ALT 26 06/30/2020       POC Tests: No results for input(s): POCGLU, POCNA, POCK, POCCL, POCBUN, POCHEMO, POCHCT in the last 72 hours.     Coags:   Lab Results   Component Value Date    PROTIME 10.8 02/07/2019    INR 1.0 02/07/2019       HCG (If Applicable): No results found for: PREGTESTUR, PREGSERUM, HCG, HCGQUANT     ABGs: No results found for: PHART, PO2ART, LID5LGX, YJY2DNR, BEART, I1ZEFBOG     Type & Screen (If Applicable):  No results found for: LABABO, LABRH    Drug/Infectious Status (If Applicable):  Lab Results   Component Value Date    HEPCAB NONREACTIVE 11/02/2017       COVID-19 Screening (If Applicable):   Lab Results   Component Value Date    COVID19 Not Detected 10/21/2020         Anesthesia Evaluation  Patient summary reviewed   history of anesthetic complications: PONV. Airway: Mallampati: II  TM distance: >3 FB     Mouth opening: > = 3 FB Dental: normal exam         Pulmonary:Negative Pulmonary ROS and normal exam                               Cardiovascular:Negative CV ROS  Exercise tolerance: good (>4 METS),         ECG reviewed                        Neuro/Psych:   Negative Neuro/Psych ROS              GI/Hepatic/Renal:   (+) hiatal hernia, bowel prep,           Endo/Other:    (+) malignancy/cancer. ROS comment: Breast Cancer Abdominal:           Vascular: negative vascular ROS. Anesthesia Plan      TIVA and general     ASA 2       Induction: intravenous. Anesthetic plan and risks discussed with patient.       Plan discussed with surgical team.                  ELICIA Ahn - CRNA   10/26/2020

## 2020-10-26 NOTE — FLOWSHEET NOTE
encountered patient during rounding. Patient expressed the desire for prayer for healing and that the procedure go well.  prayed with patient patient expressed gratitude for the prayer and the visit. 10/26/20 1119   Encounter Summary   Services provided to: Patient and family together   Referral/Consult From: [facility]   (10/26/20)   Complexity of Encounter Low   Length of Encounter 15 minutes   Routine   Type Pre-procedure   Assessment Calm; Approachable   Intervention Active listening;Explored feelings, thoughts, concerns;Prayer   Outcome Expressed gratitude   Electronically signed by Ronan Marino on 10/26/2020 at 11:20 AM

## 2020-10-26 NOTE — OP NOTE
COLONOSCOPY PROCEDURE NOTE:      Pre op diagnosis:    1. Rectal bleeding  2. Last CS 2005, normal     Operative Procedure:    1. Colonoscopy with cold bx    Surgeon:    Dr. Tamanna Sands     Anesthesia:    IV sedation per CRNA    EBL:  minimal    Procedure:    Patient was taken to the endoscopy suite and placed in a left lateral decubitus position. They were given IV sedation as mentioned above. A digital rectal exam was performed. There were no masses and anal tone was normal.  The colonoscope was inserted into the rectum and carefully manipulated up through the sigmoid colon, transverse colon, right colon and into the cecum. The cecum was identified by the ileal cecal valve and transabdominal illumination. Then the scope was slowly withdrawn. The scope was retroflexed in the rectum and the dentate line was examined. The scope was removed. The patient tolerated the procedure well. The following findings were noted. Final Diagnosis:    1. Mild irritation left colon and proximal rectum, ? Significance. bx pnd at right, transverse, left colon and sigmoid colon and rectum. 2. Mild grade 2 internal hemorrhoids    Plan:    1. Await bx    ADDENDUM:  Endo Review :  12/6/2020    Pathology:    -- Diagnosis --   1.  RIGHT COLON BIOPSY:  NORMAL COLONIC MUCOSA. 2.  TRANSVERSE COLON BIOPSY:  NORMAL COLONIC MUCOSA. 3.  LEFT COLON BIOPSY:  NORMAL COLONIC MUCOSA. 4.  SIGMOID COLON BIOPSY:  NORMAL COLONIC MUCOSA. 5.  RECTUM BIOPSY:  NORMAL COLONIC MUCOSA. Plan:    1. Rectal bleeding probably secondary to hemorrhoids. If not bothering her too much would just tx conservatively. If they are more symptomatic then we could discuss treatment options. 2.  The inflammation we thought we saw on colonoscopy, turned out to be normal on the pathology. So would not do anything else at this time for this .     3.  Repeat colonoscopy in 10 years

## 2020-10-26 NOTE — H&P
H &P Colonoscopy  Kayla Thakur                 :1950  (yes) patient has seen Dr. Thea Seymour prior to procedure  PCP: Dr. [place] Portillo bleeding           HPI:           Colonoscopy  Abd pain: no  Anemia: no  Bloating:no  Diarrhea: no  Constipation: no  Melena: no  Hematochezia:yes  Rectal Bleeding:yes  Rectal/Anal Pain:no  Pruritus: no  Family history colon Cancer: no  Previous colon cancer: no  Previous Colon Polyp: no  Change in bowels: no  Decrease caliber of stool: no  Change in color of stool: no     Previous work up date: -normal colonoscopy         Family History         Family History   Problem Relation Age of Onset    Hypertension Mother           Renal failure.  Diabetes Mother           Type 2 at end of life.  Heart Failure Father      Coronary Art Dis Father           Status post CABG    Diabetes Father           Type 2, very late in life.     Breast Cancer Sister 58    Hypertension Brother      Hypertension Sister      Breast Cancer Paternal Aunt           62s    Breast Cancer Paternal Grandmother      Breast Cancer Paternal Cousin      Breast Cancer Paternal Aunt           62s    Breast Cancer Paternal Cousin           46s         Social History               Socioeconomic History    Marital status:        Spouse name: Not on file    Number of children: Not on file    Years of education: Not on file    Highest education level: Not on file   Occupational History    Occupation: retired       Employer: [employer]   Social Needs    Financial resource strain: Not on file    Food insecurity       Worry: Not on file       Inability: Not on file   [employer] needs       Medical: Not on file       Non-medical: Not on file   Tobacco Use    Smoking status: Former Smoker       Packs/day: 0.50       Years: 7.00       Pack years: 3.50       Types: Cigarettes       Last attempt to quit: 1978       Years since quittin.8    Smokeless tobacco: Never Used    Tobacco comment: Quit smoking in 1978. Substance and Sexual Activity    Alcohol use: No       Comment: Occasional alcohol.  Drug use: No    Sexual activity: Not on file   Lifestyle    Physical activity       Days per week: Not on file       Minutes per session: Not on file    Stress: Not on file   Relationships    Social connections       Talks on phone: Not on file       Gets together: Not on file       Attends Denominational service: Not on file       Active member of club or organization: Not on file       Attends meetings of clubs or organizations: Not on file       Relationship status: Not on file    Intimate partner violence       Fear of current or ex partner: Not on file       Emotionally abused: Not on file       Physically abused: Not on file       Forced sexual activity: Not on file   Other Topics Concern    Not on file   Social History Narrative    Not on file        Past Surgical History         Past Surgical History:   Procedure Laterality Date    BREAST BIOPSY Right 6/7/2017     Right Breast biopsy w/ needle placement -arrive 11am -to x-ray 12pm performed by Krysta Martinez MD at [address] [place] [address] Bilateral 09/10/2018   [facility] Bilateral 2/15/2019     Bilateral breast reconstruction w/ expander placement performed by Eusebio Ramos MD at [address] [place] [address]   3/21/2005     Screening. Negative. Due for repeat 2015.  DILATION AND CURETTAGE OF UTERUS   7/22/1976     Status post miscarriage.     DILATION AND CURETTAGE OF UTERUS   9/1/1995     bleeding    HYSTERECTOMY, TOTAL ABDOMINAL        LYMPH NODE BIOPSY Left 09/10/2018    MASTECTOMY Bilateral 2/15/2019     Left Modified Radical Mastectomy w/ axillary disection & Right Mastectomy sentinel lymph node bx w/ Nuc med & blue dye Inj's performed by Krysta Martinez MD at [facility] N/A 1/29/2020     PORT REMOVAL performed by Krysta Martinez MD at . [address] [facility] CTR VAD W/SUBQ PORT AGE 5 YR/> N/A 9/19/2018     PORT INSERTION performed by Jono Patterson MD at [facility] [address] / DELAYED W/ TISSUE EXPANDER Bilateral 2/11/2020     BREAST EXPANDER REMOVAL AND CASPULECTOMY  IMPLANT PLACEMENT Thakur Seats performed by Penny Cheung MD at [address]   11/7/1995     fibroids    TONSILLECTOMY   1978     Subsequent hemorrhage and cautery. Past Medical History        Past Medical History:   Diagnosis Date    Breast cancer ([address]      Diverticula of colon       Mild, sigmoid, asymptomatic.  Family history of breast cancer       paternal GM, sister, two paternal cousins, two paternal aunts    Osteopenia       With low vitamin D level. Insurance doesn't cover routine DEXA scans. Pt took bisphosphonates for a year and didn't tolerate.  Personal history of antineoplastic chemotherapy 10/2018    PONV (postoperative nausea and vomiting)      Unspecified vitamin D deficiency      Varicose veins of lower extremities                 Current Outpatient Medications on File Prior to Visit   Medication Sig Dispense Refill    cephALEXin (KEFLEX) 500 MG capsule Take 1 capsule by mouth 3 times daily for 7 days 21 capsule 0    anastrozole (ARIMIDEX) 1 MG tablet Take 1 tablet by mouth daily 90 tablet 1    calcium citrate-vitamin D (CITRICAL + D) 315-250 MG-UNIT TABS per tablet Take 1 tablet by mouth daily (with breakfast)          No current facility-administered medications on file prior to visit. Allergies as of 10/09/2020 - Review Complete 10/09/2020   Allergen Reaction Noted    Boniva [ibandronic acid] Other (See Comments) 02/22/2019    Fosamax [alendronate sodium] Other (See Comments) 02/22/2019          PHYSICAL EXAM:    Blood pressure 130/66, pulse 85, temperature 98.7 °F (37.1 °C), temperature source Oral, resp.  rate 18, height 5' 7\" (1.702 m), weight 212 lb (96.2 kg), SpO2 96 %, not currently breastfeeding. Gen:  A and O x 3, NAD, well nourished  Eyes:  Sclera non icterus, PERRL  Lungs:  CTA, symmetrical  Chest:  RRR, no murmurs  Abd:  Soft, NT, ND, no HSM, no bruits             ASSESSMENT:      1. Rectal bleeding  2.   Last CS 2005, normal     PLAN:Colonoscopy

## 2020-10-26 NOTE — ANESTHESIA POSTPROCEDURE EVALUATION
Department of Anesthesiology  Postprocedure Note    Patient: Danny Dyer  MRN: [id number]  [place]: 1950  Date of evaluation: 10/26/2020  Time:  12:41 PM     Procedure Summary     Date:  10/26/20 Room / Location:  [address]    Anesthesia Start:  6931 Anesthesia Stop:  9635    Procedure:  COLONOSCOPY WITH BIOPSY (N/A ) Diagnosis:  (rectal bledding)    Surgeon:  Vikash Lawson MD Responsible Provider:  ELICIA Galvan CRNA    Anesthesia Type:  TIVA, general ASA Status:  2          Anesthesia Type: TIVA, general    Jong Phase I: Jong Score: 10    Jong Phase II:      Last vitals: Reviewed and per EMR flowsheets.        Anesthesia Post Evaluation    Patient location during evaluation: bedside  Patient participation: complete - patient participated  Level of consciousness: sleepy but conscious  Pain score: 0  Airway patency: patent  Nausea & Vomiting: no nausea and no vomiting  Complications: no  Cardiovascular status: blood pressure returned to baseline and hemodynamically stable  Respiratory status: acceptable  Hydration status: euvolemic

## 2020-10-28 LAB — SURGICAL PATHOLOGY REPORT: NORMAL

## 2020-12-07 ENCOUNTER — TELEPHONE (OUTPATIENT)
Dept: SURGERY | Age: 70
End: 2020-12-07

## 2020-12-07 NOTE — LETTER
[facility] [address]  [address] Radha Bang  Phone: [phone]  Fax: [phone]    Lesvia Mancia MD      12/7/2020    Dear Katherin Trent,      Dr. Yue Guidry reviewed the results of your recent colonoscopy . The biopsies of the irritation on the left side of the colon were normal. No further treatment. You have internal hemorrhoids that are probably the cause of rectal bleeding. If hemorrhoids are bothering you, call our office to make a follow up appointment. His recommendations are to be scoped again in 10 years, due to colon cancer screening. If you have any questions or concerns regarding your test results or our recommendations, please call the office at [phone].       Sincerely,           Pancho Grier

## 2020-12-07 NOTE — TELEPHONE ENCOUNTER
Letter mailed to patient with colonoscopy results and Dr. Henok Staton recommendations. Results entered in chart history, health maintenance, recall and forwarded to PCP.

## 2020-12-14 ENCOUNTER — HOSPITAL ENCOUNTER (OUTPATIENT)
Age: 70
Setting detail: SPECIMEN
Discharge: HOME | End: 2020-12-14
Payer: MEDICARE

## 2020-12-14 ENCOUNTER — OFFICE VISIT (OUTPATIENT)
Dept: PRIMARY CARE CLINIC | Age: 70
End: 2020-12-14
Payer: MEDICARE

## 2020-12-14 VITALS
DIASTOLIC BLOOD PRESSURE: 60 MMHG | TEMPERATURE: 97.1 F | WEIGHT: 218 LBS | BODY MASS INDEX: 34.21 KG/M2 | OXYGEN SATURATION: 98 % | HEIGHT: 67 IN | HEART RATE: 84 BPM | SYSTOLIC BLOOD PRESSURE: 122 MMHG

## 2020-12-14 LAB
-: ABNORMAL
AMORPHOUS: ABNORMAL
BACTERIA: ABNORMAL
BILIRUBIN URINE: NEGATIVE
CASTS UA: ABNORMAL /LPF (ref 0–2)
COLOR: ABNORMAL
COMMENT UA: ABNORMAL
CRYSTALS, UA: ABNORMAL /HPF
EPITHELIAL CELLS UA: ABNORMAL /HPF (ref 0–5)
GLUCOSE URINE: NEGATIVE
KETONES, URINE: NEGATIVE
LEUKOCYTE ESTERASE, URINE: ABNORMAL
MUCUS: ABNORMAL
NITRITE, URINE: NEGATIVE
OTHER OBSERVATIONS UA: ABNORMAL
PH UA: 5.5 (ref 5–6)
PROTEIN UA: NEGATIVE
RBC UA: ABNORMAL /HPF (ref 0–4)
RENAL EPITHELIAL, UA: ABNORMAL /HPF
SPECIFIC GRAVITY UA: 1 (ref 1.01–1.02)
TRICHOMONAS: ABNORMAL
TURBIDITY: ABNORMAL
URINE HGB: ABNORMAL
UROBILINOGEN, URINE: NORMAL
WBC UA: ABNORMAL /HPF (ref 0–4)
YEAST: ABNORMAL

## 2020-12-14 PROCEDURE — 87186 SC STD MICRODIL/AGAR DIL: CPT

## 2020-12-14 PROCEDURE — 81001 URINALYSIS AUTO W/SCOPE: CPT

## 2020-12-14 PROCEDURE — 99214 OFFICE O/P EST MOD 30 MIN: CPT | Performed by: FAMILY MEDICINE

## 2020-12-14 PROCEDURE — 87077 CULTURE AEROBIC IDENTIFY: CPT

## 2020-12-14 PROCEDURE — 87086 URINE CULTURE/COLONY COUNT: CPT

## 2020-12-14 PROCEDURE — 99211 OFF/OP EST MAY X REQ PHY/QHP: CPT

## 2020-12-14 RX ORDER — CEPHALEXIN 500 MG/1
500 CAPSULE ORAL 3 TIMES DAILY
Qty: 21 CAPSULE | Refills: 0 | Status: SHIPPED | OUTPATIENT
Start: 2020-12-14 | End: 2020-12-15

## 2020-12-14 ASSESSMENT — PATIENT HEALTH QUESTIONNAIRE - PHQ9
SUM OF ALL RESPONSES TO PHQ QUESTIONS 1-9: 0
SUM OF ALL RESPONSES TO PHQ QUESTIONS 1-9: 0
1. LITTLE INTEREST OR PLEASURE IN DOING THINGS: 0
SUM OF ALL RESPONSES TO PHQ9 QUESTIONS 1 & 2: 0
2. FEELING DOWN, DEPRESSED OR HOPELESS: 0
SUM OF ALL RESPONSES TO PHQ QUESTIONS 1-9: 0

## 2020-12-14 ASSESSMENT — ENCOUNTER SYMPTOMS
COUGH: 0
CHEST TIGHTNESS: 0
NAUSEA: 0
WHEEZING: 0
SHORTNESS OF BREATH: 0

## 2020-12-14 NOTE — PROGRESS NOTES
[address] [place] [address]  Dept: [phone]  Dept Fax: [phone]  Loc: [phone]    Anand Dominguez is a 78 y.o. female who presents today for her medical conditions/complaints as noted below. Anand Dominguez is c/o of   Chief Complaint   Patient presents with    Urinary Frequency       HPI:     Here today for urinary frequency. Urinary Frequency   This is a new problem. The current episode started in the past 7 days (2 days). The problem occurs every urination. The problem has been unchanged. The quality of the pain is described as burning. The pain is mild. There has been no fever. She is sexually active. There is no history of pyelonephritis. Associated symptoms include frequency and urgency. Pertinent negatives include no chills, flank pain, hematuria, hesitancy, nausea or possible pregnancy. Associated symptoms comments: Mild incontinence . She has tried nothing for the symptoms. The treatment provided no relief. Past Medical History:   Diagnosis Date    Breast cancer ([address]     Diverticula of colon     Mild, sigmoid, asymptomatic.  Family history of breast cancer     paternal GM, sister, two paternal cousins, two paternal aunts    Osteopenia     With low vitamin D level. Insurance doesn't cover routine DEXA scans. Pt took bisphosphonates for a year and didn't tolerate.  Personal history of antineoplastic chemotherapy 10/2018    PONV (postoperative nausea and vomiting)     Unspecified vitamin D deficiency     Varicose veins of lower extremities           Social History     Tobacco Use    Smoking status: Former Smoker     Packs/day: 0.50     Years: 7.00     Pack years: 3.50     Types: Cigarettes     Quit date: 1978     Years since quittin.9    Smokeless tobacco: Never Used    Tobacco comment: Quit smoking in . Substance Use Topics    Alcohol use:  No Comment: Occasional alcohol. Current Outpatient Medications   Medication Sig Dispense Refill    cephALEXin (KEFLEX) 500 MG capsule Take 1 capsule by mouth 3 times daily for 7 days 21 capsule 0    anastrozole (ARIMIDEX) 1 MG tablet TAKE 1 TABLET DAILY 90 tablet 3     No current facility-administered medications for this visit. Allergies   Allergen Reactions    Boniva [Ibandronic Acid] Other (See Comments)     Jaw pain.  Fosamax [Alendronate Sodium] Other (See Comments)     Jaw pain. Subjective:     Review of Systems   Constitutional: Negative for activity change, appetite change, chills, fatigue and fever. Eyes: Negative for visual disturbance. Respiratory: Negative for cough, chest tightness, shortness of breath and wheezing. Cardiovascular: Negative for chest pain, palpitations and leg swelling. Gastrointestinal: Negative for nausea. Genitourinary: Positive for frequency and urgency. Negative for difficulty urinating, flank pain, hematuria and hesitancy. Neurological: Negative for dizziness, syncope, weakness and light-headedness. Objective:      Physical Exam  Vitals signs and nursing note reviewed. Constitutional:       General: She is not in acute distress. Appearance: She is well-developed. Eyes:      Conjunctiva/sclera: Conjunctivae normal.      Pupils: Pupils are equal, round, and reactive to light. Neck:      Musculoskeletal: Normal range of motion and neck supple. Thyroid: No thyromegaly. Cardiovascular:      Rate and Rhythm: Normal rate and regular rhythm. Heart sounds: Normal heart sounds. No murmur. Pulmonary:      Effort: Pulmonary effort is normal. No respiratory distress. Breath sounds: Normal breath sounds. No wheezing. Lymphadenopathy:      Cervical: No cervical adenopathy. Skin:     General: Skin is warm and dry. Findings: No erythema or rash.    Neurological:      Mental Status: She is alert and oriented to person, place, and time. /60   Pulse 84   Temp 97.1 °F (36.2 °C)   Ht 5' 7\" (1.702 m)   Wt 218 lb (98.9 kg)   SpO2 98%   BMI 34.14 kg/m²     Assessment:       Diagnosis Orders   1. Dysuria  Urinalysis Reflex to Culture      Hospital Outpatient Visit on 12/14/2020   Component Date Value Ref Range Status    Color, UA 12/14/2020 NOT REPORTED  YELLOW Final    Turbidity UA 12/14/2020 NOT REPORTED  CLEAR Final    Glucose, Ur 12/14/2020 NEGATIVE  NEGATIVE Final    Bilirubin Urine 12/14/2020 NEGATIVE  NEGATIVE Final    Ketones, Urine 12/14/2020 NEGATIVE  NEGATIVE Final    Specific Gravity, UA 12/14/2020 1.005* 1.010 - 1.025 Final    Urine Hgb 12/14/2020 TRACE* NEGATIVE Final    pH, UA 12/14/2020 5.5  5.0 - 6.0 Final    Protein, UA 12/14/2020 NEGATIVE  NEGATIVE Final    Urobilinogen, Urine 12/14/2020 Normal  Normal Final    Nitrite, Urine 12/14/2020 NEGATIVE  NEGATIVE Final    Leukocyte Esterase, Urine 12/14/2020 1+* NEGATIVE Final    Urinalysis Comments 12/14/2020 NOT REPORTED   Final    - 12/14/2020        Final    WBC, UA 12/14/2020 10 TO 25  0 - 4 /HPF Final    RBC, UA 12/14/2020 0 TO 4  0 - 4 /HPF Final    Casts UA 12/14/2020 NOT REPORTED  0 - 2 /LPF Final    Crystals, UA 12/14/2020 NOT REPORTED  None /HPF Final    Epithelial Cells UA 12/14/2020 0 TO 4  0 - 5 /HPF Final    Renal Epithelial, UA 12/14/2020 NOT REPORTED  0 /HPF Final    Bacteria, UA 12/14/2020 TRACE* None Final    Mucus, UA 12/14/2020 NOT REPORTED  None Final    Trichomonas, UA 12/14/2020 NOT REPORTED  None Final    Amorphous, UA 12/14/2020 NOT REPORTED  None Final    Other Observations UA 12/14/2020 Specimen Cultured* NOT REQ. Final    Yeast, UA 12/14/2020 NOT REPORTED  None Final              Plan:        her UA shows she has a UTI. I will treat with keflex for 7 days and follow up on the culture. If the culture shows resistance to keflex then she will be notified and her antibiotic will be changed.  I encouraged her to use tylenol or ibuprofen for pain. she was told to return to the clinic or ER if symptoms worsen, if she develops fevers or severe back pain. she understood and agreed with the plan. Return if symptoms worsen or fail to improve. Orders Placed This Encounter   Procedures    Urinalysis Reflex to Culture     Standing Status:   Future     Number of Occurrences:   1     Standing Expiration Date:   12/14/2021     Order Specific Question:   SPECIFY(EX-CATH,MIDSTREAM,CYSTO,ETC)? Answer:   clean catch     Orders Placed This Encounter   Medications    cephALEXin (KEFLEX) 500 MG capsule     Sig: Take 1 capsule by mouth 3 times daily for 7 days     Dispense:  21 capsule     Refill:  0       Patientgiven educational materials - see patient instructions. Discussed use, benefit,and side effects of prescribed medications. All patient questions answered. Ptvoiced understanding. Reviewed health maintenance. Instructed to continue currentmedications, diet and exercise. Patient agreed with treatment plan. Follow up asdirected.      Electronically signed by Edgar Neil MD on 12/14/2020 at 9:27 AM

## 2020-12-15 LAB
CULTURE: ABNORMAL
Lab: ABNORMAL
SPECIMEN DESCRIPTION: ABNORMAL

## 2020-12-15 RX ORDER — NITROFURANTOIN 25; 75 MG/1; MG/1
100 CAPSULE ORAL 2 TIMES DAILY
Qty: 14 CAPSULE | Refills: 0 | Status: SHIPPED | OUTPATIENT
Start: 2020-12-15 | End: 2020-12-22

## 2020-12-28 ENCOUNTER — HOSPITAL ENCOUNTER (OUTPATIENT)
Age: 70
Setting detail: SPECIMEN
Discharge: HOME | End: 2020-12-28
Payer: MEDICARE

## 2020-12-28 ENCOUNTER — OFFICE VISIT (OUTPATIENT)
Dept: PRIMARY CARE CLINIC | Age: 70
End: 2020-12-28
Payer: MEDICARE

## 2020-12-28 VITALS
HEART RATE: 72 BPM | DIASTOLIC BLOOD PRESSURE: 72 MMHG | TEMPERATURE: 98.8 F | HEIGHT: 67 IN | OXYGEN SATURATION: 98 % | SYSTOLIC BLOOD PRESSURE: 130 MMHG | WEIGHT: 219 LBS | BODY MASS INDEX: 34.37 KG/M2

## 2020-12-28 DIAGNOSIS — R35.0 URINARY FREQUENCY: Primary | ICD-10-CM

## 2020-12-28 LAB
-: NORMAL
AMORPHOUS: NORMAL
BACTERIA: NORMAL
BILIRUBIN URINE: NEGATIVE
CASTS UA: NORMAL /LPF (ref 0–2)
COLOR: ABNORMAL
COMMENT UA: ABNORMAL
CRYSTALS, UA: NORMAL /HPF
EPITHELIAL CELLS UA: NORMAL /HPF (ref 0–5)
GLUCOSE URINE: NEGATIVE
KETONES, URINE: NEGATIVE
LEUKOCYTE ESTERASE, URINE: NEGATIVE
MUCUS: NORMAL
NITRITE, URINE: NEGATIVE
OTHER OBSERVATIONS UA: NORMAL
PH UA: 5 (ref 5–6)
PROTEIN UA: NEGATIVE
RBC UA: NORMAL /HPF (ref 0–4)
RENAL EPITHELIAL, UA: NORMAL /HPF
SPECIFIC GRAVITY UA: 1 (ref 1.01–1.02)
TRICHOMONAS: NORMAL
TURBIDITY: ABNORMAL
URINE HGB: ABNORMAL
UROBILINOGEN, URINE: NORMAL
WBC UA: NORMAL /HPF (ref 0–4)
YEAST: NORMAL

## 2020-12-28 PROCEDURE — 99212 OFFICE O/P EST SF 10 MIN: CPT | Performed by: FAMILY MEDICINE

## 2020-12-28 PROCEDURE — 81001 URINALYSIS AUTO W/SCOPE: CPT

## 2020-12-28 PROCEDURE — 87086 URINE CULTURE/COLONY COUNT: CPT

## 2020-12-28 PROCEDURE — 87186 SC STD MICRODIL/AGAR DIL: CPT

## 2020-12-28 PROCEDURE — 87077 CULTURE AEROBIC IDENTIFY: CPT

## 2020-12-28 PROCEDURE — 99213 OFFICE O/P EST LOW 20 MIN: CPT | Performed by: FAMILY MEDICINE

## 2020-12-28 ASSESSMENT — ENCOUNTER SYMPTOMS
NAUSEA: 0
VOMITING: 0

## 2020-12-28 NOTE — PROGRESS NOTES
Bilateral 09/10/2018    BREAST ENHANCEMENT SURGERY Bilateral 2/15/2019    Bilateral breast reconstruction w/ expander placement performed by Jimmy Dawson MD at [address] [place] [address]  3/21/2005    Screening. Negative. Due for repeat 2015.  COLONOSCOPY N/A 10/26/2020    mild irritation left colon, biopsies normal. grade 2 internal hemorrhoids. Dr. Kary Grant at [facility] [address]  1976    Status post miscarriage.  DILATION AND CURETTAGE OF UTERUS  1995    bleeding    HYSTERECTOMY, TOTAL ABDOMINAL      LYMPH NODE BIOPSY Left 09/10/2018    MASTECTOMY Bilateral 2/15/2019    Left Modified Radical Mastectomy w/ axillary disection & Right Mastectomy sentinel lymph node bx w/ Nuc med & blue dye Inj's performed by Yong Min MD at [facility] N/A 2020    PORT REMOVAL performed by Yong Min MD at . [facility] [address] [facility] CTR VAD W/SUBQ PORT AGE 5 YR/> N/A 2018    PORT INSERTION performed by Yong Min MD at [facility] [address] / DELAYED W/ TISSUE EXPANDER Bilateral 2020    BREAST EXPANDER REMOVAL AND CASPULECTOMY  IMPLANT PLACEMENT Ernesto Jauregui performed by Jimmy Dawson MD at [address]  1995    fibroids    TONSILLECTOMY      Subsequent hemorrhage and cautery. Social History     Tobacco Use    Smoking status: Former Smoker     Packs/day: 0.50     Years: 7.00     Pack years: 3.50     Types: Cigarettes     Quit date: 1978     Years since quittin.0    Smokeless tobacco: Never Used    Tobacco comment: Quit smoking in . Substance Use Topics    Alcohol use: No     Comment: Occasional alcohol. Current Outpatient Medications   Medication Sig Dispense Refill    anastrozole (ARIMIDEX) 1 MG tablet TAKE 1 TABLET DAILY 90 tablet 3     No current facility-administered medications for this visit.       Allergies   Allergen Reactions    Boniva [Ibandronic Specific Question:   Specify (ex-cath, midstream, cysto, etc)? Answer:   clean catch    Urinalysis Reflex to Culture     Standing Status:   Future     Number of Occurrences:   1     Standing Expiration Date:   12/28/2021     Order Specific Question:   SPECIFY(EX-CATH,MIDSTREAM,CYSTO,ETC)? Answer:   midstream     No orders of the defined types were placed in this encounter. Patient given educational materials - see patient instructions. Discussed use, benefit, and side effects of prescribed medications. All patient questions answered. Pt voiced understanding.        Electronically signed by Nghia Conte DO on 1/4/2021 at 12:11 AM

## 2020-12-29 LAB
CULTURE: ABNORMAL
Lab: ABNORMAL
SPECIMEN DESCRIPTION: ABNORMAL

## 2020-12-31 RX ORDER — CEPHALEXIN 500 MG/1
500 CAPSULE ORAL 3 TIMES DAILY
Qty: 6 CAPSULE | Refills: 0 | Status: SHIPPED | OUTPATIENT
Start: 2020-12-31 | End: 2021-01-02

## 2021-01-25 ENCOUNTER — OFFICE VISIT (OUTPATIENT)
Dept: FAMILY MEDICINE CLINIC | Age: 71
End: 2021-01-25
Payer: MEDICARE

## 2021-01-25 VITALS
HEART RATE: 76 BPM | TEMPERATURE: 98.5 F | BODY MASS INDEX: 33.9 KG/M2 | SYSTOLIC BLOOD PRESSURE: 132 MMHG | DIASTOLIC BLOOD PRESSURE: 70 MMHG | HEIGHT: 67 IN | OXYGEN SATURATION: 98 % | WEIGHT: 216 LBS

## 2021-01-25 DIAGNOSIS — Z00.00 ROUTINE GENERAL MEDICAL EXAMINATION AT A HEALTH CARE FACILITY: Primary | ICD-10-CM

## 2021-01-25 DIAGNOSIS — R03.0 ELEVATED BP WITHOUT DIAGNOSIS OF HYPERTENSION: ICD-10-CM

## 2021-01-25 PROCEDURE — 99213 OFFICE O/P EST LOW 20 MIN: CPT | Performed by: FAMILY MEDICINE

## 2021-01-25 PROCEDURE — 99212 OFFICE O/P EST SF 10 MIN: CPT

## 2021-01-25 PROCEDURE — G0438 PPPS, INITIAL VISIT: HCPCS | Performed by: FAMILY MEDICINE

## 2021-01-25 RX ORDER — M-VIT,TX,IRON,MINS/CALC/FOLIC 27MG-0.4MG
1 TABLET ORAL DAILY
COMMUNITY
End: 2021-07-26

## 2021-01-25 ASSESSMENT — ENCOUNTER SYMPTOMS
COUGH: 0
WHEEZING: 0
CHEST TIGHTNESS: 0
SHORTNESS OF BREATH: 0

## 2021-01-25 ASSESSMENT — PATIENT HEALTH QUESTIONNAIRE - PHQ9
SUM OF ALL RESPONSES TO PHQ QUESTIONS 1-9: 0
SUM OF ALL RESPONSES TO PHQ QUESTIONS 1-9: 0

## 2021-01-25 ASSESSMENT — LIFESTYLE VARIABLES: HOW OFTEN DO YOU HAVE A DRINK CONTAINING ALCOHOL: 0

## 2021-01-25 NOTE — PROGRESS NOTES
[address]  [address] [address]  Dept: [phone]  Dept Fax: [phone]  Loc: [phone]    Delio Gardiner is a 78 y.o. female who presents today for her medical conditions/complaints as noted below. Delio Gardiner is c/o of   Chief Complaint   Patient presents with    Medicare AWV    3 Month Follow-Up     HTN       HPI:     HPI Here today for a MWV and a follow up of her HTN    HTN: stable; She has been doing well. She has not had any issues with chest pain or shortness of breath. No issues with headaches or vision changes. No leg swelling. She is not getting lightheaded or dizzy when standing. She just got a Pyrenees/collie puppy. Past Medical History:   Diagnosis Date    Breast cancer ([facility] [address] [facility]     Diverticula of colon     Mild, sigmoid, asymptomatic.  Family history of breast cancer     paternal GM, sister, two paternal cousins, two paternal aunts    Osteopenia     With low vitamin D level. Insurance doesn't cover routine DEXA scans. Pt took bisphosphonates for a year and didn't tolerate.  Personal history of antineoplastic chemotherapy 10/2018    PONV (postoperative nausea and vomiting)     Unspecified vitamin D deficiency     Varicose veins of lower extremities           Social History     Tobacco Use    Smoking status: Former Smoker     Packs/day: 0.50     Years: 7.00     Pack years: 3.50     Types: Cigarettes     Quit date: 1978     Years since quittin.0    Smokeless tobacco: Never Used    Tobacco comment: Quit smoking in . Substance Use Topics    Alcohol use: No     Comment: Occasional alcohol.      Current Outpatient Medications   Medication Sig Dispense Refill    Multiple Vitamins-Minerals (THERAPEUTIC MULTIVITAMIN-MINERALS) tablet Take 1 tablet by mouth daily      anastrozole (ARIMIDEX) 1 MG tablet TAKE 1 TABLET DAILY 90 tablet 3     No current facility-administered medications for this visit. Allergies   Allergen Reactions    Boniva [Ibandronic Acid] Other (See Comments)     Jaw pain.  Fosamax [Alendronate Sodium] Other (See Comments)     Jaw pain. Subjective:     Review of Systems   Constitutional: Negative for activity change, appetite change, chills, fatigue and fever. Eyes: Negative for visual disturbance. Respiratory: Negative for cough, chest tightness, shortness of breath and wheezing. Cardiovascular: Negative for chest pain, palpitations and leg swelling. Genitourinary: Negative for difficulty urinating. Neurological: Negative for dizziness, syncope, weakness, light-headedness and headaches. Psychiatric/Behavioral: Negative for decreased concentration, dysphoric mood and sleep disturbance. The patient is not nervous/anxious. Objective:      Physical Exam  Vitals signs and nursing note reviewed. Constitutional:       General: She is not in acute distress. Appearance: She is well-developed. Eyes:      Conjunctiva/sclera: Conjunctivae normal.   Neck:      Musculoskeletal: Normal range of motion and neck supple. Thyroid: No thyromegaly. Cardiovascular:      Rate and Rhythm: Normal rate and regular rhythm. Heart sounds: Normal heart sounds. No murmur. Pulmonary:      Effort: Pulmonary effort is normal. No respiratory distress. Breath sounds: Normal breath sounds. No wheezing. Lymphadenopathy:      Cervical: No cervical adenopathy. Skin:     General: Skin is warm and dry. Findings: No erythema or rash. Neurological:      Mental Status: She is alert and oriented to person, place, and time. Psychiatric:         Mood and Affect: Mood normal.         Behavior: Behavior normal.       /70   Pulse 76   Temp 98.5 °F (36.9 °C)   Ht 5' 7\" (1.702 m)   Wt 216 lb (98 kg)   SpO2 98%   BMI 33.83 kg/m²     Assessment:       Diagnosis Orders   1.  Routine general medical examination at a health care facility     2. Elevated BP without diagnosis of hypertension               Plan:        MWV: see other note    Elevated bp: improving; her blood pressure is new today even without medication so I will continue to monitor it. Return in 6 months (on 7/25/2021) for HTN follow up. Patientgiven educational materials - see patient instructions. Discussed use, benefit,and side effects of prescribed medications. All patient questions answered. Ptvoiced understanding. Reviewed health maintenance. Instructed to continue currentmedications, diet and exercise. Patient agreed with treatment plan. Follow up asdirected.      Electronically signed by Carlos Carpio MD on 1/25/2021 at 10:47 AM

## 2021-01-25 NOTE — PROGRESS NOTES
Medicare Annual Wellness Visit  Name: Chito Doyle Date: 2021   MRN: [id number] Sex: Female   Age: 78 y.o. Ethnicity: Non-/Non    : 1950 Race: Azra Fitzgerald is here for Medicare AWV and 3 Month Follow-Up (HTN)    Screenings for behavioral, psychosocial and functional/safety risks, and cognitive dysfunction are all negative except as indicated below. These results, as well as other patient data from the [address] form, are documented in Flowsheets linked to this Encounter. Allergies   Allergen Reactions    Boniva [Ibandronic Acid] Other (See Comments)     Jaw pain.  Fosamax [Alendronate Sodium] Other (See Comments)     Jaw pain. Prior to Visit Medications    Medication Sig Taking? Authorizing Provider   Multiple Vitamins-Minerals (THERAPEUTIC MULTIVITAMIN-MINERALS) tablet Take 1 tablet by mouth daily Yes Historical Provider, MD   anastrozole (ARIMIDEX) 1 MG tablet TAKE 1 TABLET DAILY Yes Tiffanie Villalta MD         Past Medical History:   Diagnosis Date    Breast cancer ([facility] [address]     Diverticula of colon     Mild, sigmoid, asymptomatic.  Family history of breast cancer     paternal GM, sister, two paternal cousins, two paternal aunts    Osteopenia     With low vitamin D level. Insurance doesn't cover routine DEXA scans. Pt took bisphosphonates for a year and didn't tolerate.     Personal history of antineoplastic chemotherapy 10/2018    PONV (postoperative nausea and vomiting)     Unspecified vitamin D deficiency     Varicose veins of lower extremities        Past Surgical History:   Procedure Laterality Date    BREAST BIOPSY Right 2017    Right Breast biopsy w/ needle placement -arrive 11am -to x-ray 12pm performed by Fortino Porter MD at [address] [place] [address] Bilateral 09/10/2018    BREAST ENHANCEMENT SURGERY Bilateral 2/15/2019    Bilateral breast reconstruction w/ expander placement performed by Maida Rosario MD at [address] [place] [address] Provider  Leroy Alcantar MD as PCP - Hematology/Oncology (Hematology and Oncology)  Shmuel Flores RN as Registered Nurse (Oncology)    Wt Readings from Last 3 Encounters:   01/25/21 216 lb (98 kg)   01/25/21 216 lb (98 kg)   12/28/20 219 lb (99.3 kg)     Vitals:    01/25/21 0825   BP: 132/70   Pulse: 76   Temp: 98.5 °F (36.9 °C)   SpO2: 98%   Weight: 216 lb (98 kg)   Height: 5' 7\" (1.702 m)     Body mass index is 33.83 kg/m². Based upon direct observation of the patient, evaluation of cognition reveals recent and remote memory intact. Patient's complete Health Risk Assessment and screening values have been reviewed and are found in Flowsheets. The following problems were reviewed today and where indicated follow up appointments were made and/or referrals ordered. Positive Risk Factor Screenings with Interventions:            General Health and ACP:  General  In general, how would you say your health is?: Good  In the past 7 days, have you experienced any of the following?  New or Increased Pain, New or Increased Fatigue, Loneliness, Social Isolation, Stress or Anger?: None of These  Do you get the social and emotional support that you need?: Yes  Do you have a Living Will?: Yes  Advance Directives     Power of [address] [place] [address] Will ACP-Advance Directive ACP-Power of     Not on File Not on File Not on File Not on File      General Health Risk Interventions:  · No Living Will: ACP documents already completed- patient asked to provide copy to the office    Health Habits/Nutrition:  Health Habits/Nutrition  Do you exercise for at least 20 minutes 2-3 times per week?: Yes  Have you lost any weight without trying in the past 3 months?: No  Do you eat fewer than 2 meals per day?: (!) Yes  Have you seen a dentist within the past year?: Yes  Body mass index: (!) 33.83  Health Habits/Nutrition Interventions:  · Nutritional issues:  educational materials for healthy, well-balanced diet provided Safety:  Safety  Do you have working smoke detectors?: Yes  Have all throw rugs been removed or fastened?: (!) No  Do you have non-slip mats or surfaces in all bathtubs/showers?: (!) No  Do all of your stairways have a railing or banister?: Yes  Are your doorways, halls and stairs free of clutter?: Yes  Do you always fasten your seatbelt when you are in a car?: Yes  Safety Interventions:  · Home safety tips provided     Personalized Preventive Plan   Current Health Maintenance Status  Immunization History   Administered Date(s) Administered    Td (Adult), 5 Lf Tetanus Toxoid, Pf (Tenivac, Decavac) 07/21/2006    Td, unspecified formulation 07/21/2006        Health Maintenance   Topic Date Due    Annual Wellness Visit (AWV)  06/18/2019    DTaP/Tdap/Td vaccine (1 - Tdap) 10/02/2021 (Originally 4/4/1969)    Flu vaccine (1) 10/02/2021 (Originally 9/1/2020)    Shingles Vaccine (1 of 2) 10/02/2021 (Originally 4/4/2002)    Pneumococcal 65+ years Vaccine (1 of 1 - PPSV23) 11/18/2021 (Originally 4/4/2017)    Diabetes screen  02/07/2022    Lipid screen  11/02/2022    Colon cancer screen colonoscopy  10/26/2030    DEXA (modify frequency per FRAX score)  Completed    Hepatitis C screen  Completed    Hepatitis A vaccine  Aged Out    Hepatitis B vaccine  Aged Out    Hib vaccine  Aged Out    Meningococcal (ACWY) vaccine  Aged Out     Recommendations for [place] Due: see orders and patient instructions/AVS.  . Recommended screening schedule for the next 5-10 years is provided to the patient in written form: see Patient Instructions/AVS.    Yanet James was seen today for medicare awv and 3 month follow-up. Diagnoses and all orders for this visit:    Routine general medical examination at a health care facility    Elevated BP without diagnosis of hypertension            Return in 6 months (on 7/25/2021) for HTN follow up.       Kavon Aponte MD  1/25/2021  10:48 AM

## 2021-02-18 ENCOUNTER — HOSPITAL ENCOUNTER (OUTPATIENT)
Dept: LAB | Age: 71
Discharge: HOME | End: 2021-02-18
Payer: MEDICARE

## 2021-02-18 DIAGNOSIS — Z17.0 MALIGNANT NEOPLASM OF UPPER-OUTER QUADRANT OF BOTH BREASTS IN FEMALE, ESTROGEN RECEPTOR POSITIVE (HCC): ICD-10-CM

## 2021-02-18 DIAGNOSIS — C50.411 MALIGNANT NEOPLASM OF UPPER-OUTER QUADRANT OF BOTH BREASTS IN FEMALE, ESTROGEN RECEPTOR POSITIVE (HCC): ICD-10-CM

## 2021-02-18 DIAGNOSIS — C50.412 MALIGNANT NEOPLASM OF UPPER-OUTER QUADRANT OF BOTH BREASTS IN FEMALE, ESTROGEN RECEPTOR POSITIVE (HCC): ICD-10-CM

## 2021-02-18 LAB
ABSOLUTE EOS #: 0.06 K/UL (ref 0–0.44)
ABSOLUTE IMMATURE GRANULOCYTE: <0.03 K/UL (ref 0–0.3)
ABSOLUTE LYMPH #: 1.49 K/UL (ref 1.1–3.7)
ABSOLUTE MONO #: 0.48 K/UL (ref 0.1–1.2)
ALBUMIN SERPL-MCNC: 4.3 G/DL (ref 3.5–5.2)
ALBUMIN/GLOBULIN RATIO: 1.6 (ref 1–2.5)
ALP BLD-CCNC: 73 U/L (ref 35–104)
ALT SERPL-CCNC: 29 U/L (ref 5–33)
ANION GAP SERPL CALCULATED.3IONS-SCNC: 10 MMOL/L (ref 9–17)
AST SERPL-CCNC: 31 U/L
BASOPHILS # BLD: 1 % (ref 0–2)
BASOPHILS ABSOLUTE: 0.03 K/UL (ref 0–0.2)
BILIRUB SERPL-MCNC: 0.48 MG/DL (ref 0.3–1.2)
BUN BLDV-MCNC: 14 MG/DL (ref 8–23)
BUN/CREAT BLD: 20 (ref 9–20)
CALCIUM SERPL-MCNC: 10.3 MG/DL (ref 8.6–10.4)
CHLORIDE BLD-SCNC: 106 MMOL/L (ref 98–107)
CO2: 28 MMOL/L (ref 20–31)
CREAT SERPL-MCNC: 0.69 MG/DL (ref 0.5–0.9)
DIFFERENTIAL TYPE: NORMAL
EOSINOPHILS RELATIVE PERCENT: 1 % (ref 1–4)
GFR AFRICAN AMERICAN: >60 ML/MIN
GFR NON-AFRICAN AMERICAN: >60 ML/MIN
GFR SERPL CREATININE-BSD FRML MDRD: ABNORMAL ML/MIN/{1.73_M2}
GFR SERPL CREATININE-BSD FRML MDRD: ABNORMAL ML/MIN/{1.73_M2}
GLUCOSE FASTING: 106 MG/DL (ref 70–99)
HCT VFR BLD CALC: 45 % (ref 36.3–47.1)
HEMOGLOBIN: 14.2 G/DL (ref 11.9–15.1)
IMMATURE GRANULOCYTES: 0 %
LYMPHOCYTES # BLD: 31 % (ref 24–43)
MCH RBC QN AUTO: 30 PG (ref 25.2–33.5)
MCHC RBC AUTO-ENTMCNC: 31.6 G/DL (ref 25.2–33.5)
MCV RBC AUTO: 94.9 FL (ref 82.6–102.9)
MONOCYTES # BLD: 10 % (ref 3–12)
NRBC AUTOMATED: 0 PER 100 WBC
PDW BLD-RTO: 13.1 % (ref 11.8–14.4)
PLATELET # BLD: 225 K/UL (ref 138–453)
PLATELET ESTIMATE: NORMAL
PMV BLD AUTO: 11.3 FL (ref 8.1–13.5)
POTASSIUM SERPL-SCNC: 4.3 MMOL/L (ref 3.7–5.3)
RBC # BLD: 4.74 M/UL (ref 3.95–5.11)
RBC # BLD: NORMAL 10*6/UL
SEG NEUTROPHILS: 57 % (ref 36–65)
SEGMENTED NEUTROPHILS ABSOLUTE COUNT: 2.68 K/UL (ref 1.5–8.1)
SODIUM BLD-SCNC: 144 MMOL/L (ref 135–144)
TOTAL PROTEIN: 7 G/DL (ref 6.4–8.3)
WBC # BLD: 4.8 K/UL (ref 3.5–11.3)
WBC # BLD: NORMAL 10*3/UL

## 2021-02-18 PROCEDURE — 85025 COMPLETE CBC W/AUTO DIFF WBC: CPT

## 2021-02-18 PROCEDURE — 36415 COLL VENOUS BLD VENIPUNCTURE: CPT

## 2021-02-18 PROCEDURE — 80053 COMPREHEN METABOLIC PANEL: CPT

## 2021-02-22 ENCOUNTER — OFFICE VISIT (OUTPATIENT)
Dept: ONCOLOGY | Age: 71
End: 2021-02-22
Payer: MEDICARE

## 2021-02-22 VITALS
SYSTOLIC BLOOD PRESSURE: 136 MMHG | BODY MASS INDEX: 34.15 KG/M2 | RESPIRATION RATE: 16 BRPM | TEMPERATURE: 97.5 F | WEIGHT: 217.6 LBS | HEART RATE: 88 BPM | HEIGHT: 67 IN | DIASTOLIC BLOOD PRESSURE: 74 MMHG | OXYGEN SATURATION: 97 %

## 2021-02-22 DIAGNOSIS — Z17.0 MALIGNANT NEOPLASM OF UPPER-OUTER QUADRANT OF BOTH BREASTS IN FEMALE, ESTROGEN RECEPTOR POSITIVE (HCC): Primary | ICD-10-CM

## 2021-02-22 DIAGNOSIS — C50.412 MALIGNANT NEOPLASM OF UPPER-OUTER QUADRANT OF BOTH BREASTS IN FEMALE, ESTROGEN RECEPTOR POSITIVE (HCC): Primary | ICD-10-CM

## 2021-02-22 DIAGNOSIS — C50.411 MALIGNANT NEOPLASM OF UPPER-OUTER QUADRANT OF BOTH BREASTS IN FEMALE, ESTROGEN RECEPTOR POSITIVE (HCC): Primary | ICD-10-CM

## 2021-02-22 PROCEDURE — 99214 OFFICE O/P EST MOD 30 MIN: CPT | Performed by: INTERNAL MEDICINE

## 2021-02-22 NOTE — PROGRESS NOTES
Patient ID: Nandini Raya, 1950, [id number], 78 y.o. Surgeon : Dr. Marisel Hearn  Diagnosis:   Diagnosis of bilateral breast cancer  Left invasive ductal carcinoma, ER/IA positive and HER-2/kelsy Negative, 2 foci 2.0 cm and 2.1 cm, clinical staging T2 N1 M0, stage II  Right breast cancer, lower inner quadrant, T1b N0 M0, stage I, ER/IA positive HER-2 negative  Started on neoadjuvant chemotherapy with Adriamycin plus cyclophosphamide on 10/3/18 unfortunately she had significant reaction with generalized tremors, shortness of breath and chest tightness after chemotherapy  Her chemotherapy was changed to weekly Taxol and she completed 12 cycles on 1/2/19  Had bilateral mastectomies on 2/15/19  Adjuvant radiation therapy started on 4/2/19 and will complete 5/9/19  Xeloda  Started 5/27/19 And completed on 9/22/19  Currently on Arimidex  HISTORY OF PRESENT ILLNESS:    Oncologic History:  Jeremy Granado is a 61-year-old female with a family history of breast cancer was seen during initial consultation visit for newly diagnosed bilateral breast cancer. Patient had abnormal mammogram about a year ago and the biopsy showed ductal papilloma so she had excision and was being followed by Gen. surgery. Recently she noted a lump in her left breast and had a mammogram and ultrasound which confirmed 2 lesions in her left breast measuring 2.0 and 2.1 cm and one lesion in the right breast measuring 0.8 cm. Also 2 lymph nodes noted in the left axilla. Patient underwent biopsy of these agents including axillary lymph node which confirmed presence of invasive ductal carcinoma and on the biopsies. She is here for further recommendations. She does not have many medical conditions and her ECOG performance status is 0. She is physically active and not on any regular medications. She has history of breast cancer in her sister was diagnosed at the age of 58 and also reports history of breast cancer in her grandmother.   She quit smoking in 1978 and drinks alcohol occasionally. She has had hysterectomy and oophorectomy. Interval history:  Patient is returning for follow-up visit. She is here to discuss lab results and further recommendations. She is tolerating Arimidex very well. She denies any hot flashes. She does complain of some ache in her low back pain especially at night but is not persistent. She is taking calcium and vitamin D regularly. She denies any blood in urine, urine frequency or fever chills. She had some rectal bleeding and had a colonoscopy recently and did not show any acute pathology. Her rectal bleeding was thought to be secondary to hemorrhoids. Her bleeding is resolved now. During this visit patient's allergy, social, medical, surgical history and medications were reviewed and updated. Allergies   Allergen Reactions    Boniva [Ibandronic Acid] Other (See Comments)     Jaw pain.  Fosamax [Alendronate Sodium] Other (See Comments)     Jaw pain. Current Outpatient Medications   Medication Sig Dispense Refill    Multiple Vitamins-Minerals (THERAPEUTIC MULTIVITAMIN-MINERALS) tablet Take 1 tablet by mouth daily      anastrozole (ARIMIDEX) 1 MG tablet TAKE 1 TABLET DAILY 90 tablet 3     No current facility-administered medications for this visit.         Social History     Socioeconomic History    Marital status:      Spouse name: Not on file    Number of children: Not on file    Years of education: Not on file    Highest education level: Not on file   Occupational History    Occupation: retired     Employer: [employer] resource strain: Not on file    Food insecurity     Worry: Not on file     Inability: Not on file    Transportation needs     Medical: Not on file     Non-medical: Not on file   Tobacco Use    Smoking status: Former Smoker     Packs/day: 0.50     Years: 7.00     Pack years: 3.50     Types: Cigarettes     Quit date: 1/1/1978 Years since quittin.1    Smokeless tobacco: Never Used    Tobacco comment: Quit smoking in . Substance and Sexual Activity    Alcohol use: No     Comment: Occasional alcohol.  Drug use: No    Sexual activity: Not on file   Lifestyle    Physical activity     Days per week: Not on file     Minutes per session: Not on file    Stress: Not on file   Relationships    Social connections     Talks on phone: Not on file     Gets together: Not on file     Attends Sikh service: Not on file     Active member of club or organization: Not on file     Attends meetings of clubs or organizations: Not on file     Relationship status: Not on file    Intimate partner violence     Fear of current or ex partner: Not on file     Emotionally abused: Not on file     Physically abused: Not on file     Forced sexual activity: Not on file   Other Topics Concern    Not on file   Social History Narrative    Not on file       Family History   Problem Relation Age of Onset    Hypertension Mother         Renal failure.  Diabetes Mother         Type 2 at end of life.  Heart Failure Father     Coronary Art Dis Father         Status post CABG    Diabetes Father         Type 2, very late in life.  Breast Cancer Sister 58    Hypertension Brother     Hypertension Sister     Breast Cancer Paternal Aunt         62s    Breast Cancer Paternal Grandmother     Breast Cancer Paternal Cousin     Breast Cancer Paternal Aunt         62s    Breast Cancer Paternal Cousin         46s      REVIEW OF SYSTEM:   Constitutional: No fever or chills.  No night sweats, no weight loss   Eyes: No eye discharge, double vision, or eye pain   HEENT: negative for sore mouth, sore throat, hoarseness and voice change   Respiratory: negative for cough , sputum, dyspnea, wheezing, hemoptysis, chest pain   Cardiovascular: negative for chest pain, dyspnea, palpitations, orthopnea, PND   Gastrointestinal: negative for nausea, vomiting, diarrhea, constipation, abdominal pain, Dysphagia, hematemesis and hematochezia   Genitourinary: negative for frequency, dysuria, nocturia, urinary incontinence, and hematuria   Integument: negative for rash, skin lesions, bruises.    Hematologic/Lymphatic: negative for easy bruising, bleeding, lymphadenopathy, petechiae and swelling/edema   Endocrine: negative for heat or cold intolerance, tremor, weight changes, change in bowel habits and hair loss   Musculoskeletal: negative for myalgias, arthralgias, pain, joint swelling,and bone pain   Neurological: negative for headaches, dizziness, seizures, weakness, numbness   OBJECTIVE:         Vitals:    02/22/21 0905   BP: 136/74   Pulse: 88   Resp: 16   Temp: 97.5 °F (36.4 °C)   SpO2: 97%   PHYSICAL EXAM:   General appearance - well appearing, no in pain or distress   Mental status - alert and cooperative   Eyes - pupils equal and reactive, extraocular eye movements intact   Ears - bilateral TM's and external ear canals normal   Mouth - mucous membranes moist, pharynx normal without lesions   Neck - supple, no significant adenopathy   Lymphatics - Mild palpable lymphadenopathy in the left axilla, no hepatosplenomegaly   Chest - clear to auscultation, no wheezes, rales or rhonchi, symmetric air entry   Breast: biat breast surgically absent, drains in place  2 cm lump palpable in left breast  Heart - normal rate, regular rhythm, normal S1, S2, no murmurs, rubs, clicks or gallops   Abdomen - soft, nontender, nondistended, no masses or organomegaly   Neurological - alert, oriented, normal speech, no focal findings or movement disorder noted   Musculoskeletal - no joint tenderness, deformity or swelling   Extremities - peripheral pulses normal, no pedal edema, no clubbing or cyanosis   Skin - normal coloration and turgor, no rashes, no suspicious skin lesions noted   LABORATORY DATA:     Lab Results   Component Value Date    WBC 4.8 02/18/2021    HGB 14.2 02/18/2021    HCT 45.0 02/18/2021    MCV 94.9 02/18/2021     02/18/2021    LYMPHOPCT 31 02/18/2021    RBC 4.74 02/18/2021    MCH 30.0 02/18/2021    MCHC 31.6 02/18/2021    RDW 13.1 02/18/2021    MONOPCT 10 02/18/2021    BASOPCT 1 02/18/2021    NEUTROABS 2.68 02/18/2021    LYMPHSABS 1.49 02/18/2021    MONOSABS 0.48 02/18/2021    EOSABS 0.06 02/18/2021    BASOSABS 0.03 02/18/2021       Chemistry        Component Value Date/Time     02/18/2021 0728    K 4.3 02/18/2021 0728     02/18/2021 0728    CO2 28 02/18/2021 0728    BUN 14 02/18/2021 0728    CREATININE 0.69 02/18/2021 0728        Component Value Date/Time    CALCIUM 10.3 02/18/2021 0728    ALKPHOS 73 02/18/2021 0728    AST 31 02/18/2021 0728    ALT 29 02/18/2021 0728    BILITOT 0.48 02/18/2021 0728        PATHOLOGY DATA:   Received: 9/11/2018   Reported: 9/12/2018 17:30     -- Diagnosis --   1.  RIGHT BREAST, CORE NEEDLE BIOPSIES:            -  WELL-DIFFERENTIATED INFILTRATING DUCTAL CARCINOMA, 0.6 CM   IN LARGEST INTACT CORE FRAGMENT.            -  ESTROGEN AND PROGESTERONE RECEPTOR IMMUNOSTAINS POSITIVE   IN INVASIVE TUMOR.       -  HER-2 FISH PENDING. 2.  LEFT BREAST, 2:00, N+4, CORE NEEDLE BIOPSIES:            -  MODERATELY DIFFERENTIATED INFILTRATING DUCTAL CARCINOMA,   0.6 CM IN LARGEST INTACT CORE FRAGMENT.       -  FOCAL DUCTAL CARCINOMA IN SITU, INTERMEDIATE GRADE, CRIBRIFORM   AND SOLID TYPE.            -  ESTROGEN AND PROGESTERONE RECEPTOR IMMUNOSTAINS POSITIVE   IN INVASIVE TUMOR.       -  HER-2 FISH PENDING. 3.  LEFT BREAST, 1:00, N+6, CORE NEEDLE BIOPSIES:            -  POORLY DIFFERENTIATED INFILTRATING DUCTAL CARCINOMA, 1.3   CM GREATEST DIMENSION IN LARGEST INTACT CORE FRAGMENT.       -  FOCAL DUCTAL CARCINOMA IN SITU, INTERMEDIATE GRADE, SOLID   TYPE.            -  ESTROGEN AND PROGESTERONE RECEPTOR IMMUNOSTAINS POSITIVE   IN INVASIVE TUMOR.       -  HER-2 FISH PENDING.      4.  LYMPH NODE, LEFT AXILLA, CORE NEEDLE BIOPSIES:       -  INFILTRATING DUCTAL CARCINOMA.          -  INVASIVE TUMOR POSITIVE FOR ESTROGEN RECEPTOR AND   NEGATIVE FOR PROGESTERONE RECEPTOR IMMUNOSTAINS.     -  LYMPH NODE AND BENIGN BREAST ARCHITECTURE NOT IDENTIFIED. -- Diagnosis Comment --   SPECIMEN #4: CORRELATION WITH THE RADIOLOGIC PLACEMENT OF THE BIOPSY   WOULD BE NECESSARY TO DETERMINE IF THIS REPRESENTS A LYMPH NODE WITH   METASTATIC CARCINOMA, VERSUS CARCINOMA INVOLVING THE AXILLARY TAIL OF   THE BREAST.  NO INTRINSIC LYMPH NODE OR BENIGN BREAST ARCHITECTURE IS   PRESENT FOR CONFIRMATION. 2/15/19: Final Diagnosis   1. LYMPH NODE, EXCISION (RIGHT SENTINEL):        - NO NEOPLASM DETECTED. 2. BREAST, MASTECTOMY (RIGHT):        - SCANT RESIDUAL FOCUS OF INVASIVE DUCTAL CARCINOMA.      - DUCTAL CARCINOMA IN SITU.        - EXCISION MARGINS NEGATIVE FOR NEOPLASM. 3. BREAST, MASTECTOMY, INCLUDING AXILLARY LYMPH NODES (LEFT):        - INVASIVE DUCTAL CARCINOMA, 1.5 CM        - DUCTAL CARCINOMA IN SITU.        - EXCISION MARGINS NEGATIVE FOR NEOPLASM. Diagnosis Comment   BIOMARKER STUDIES WERE PERFORMED ON PRIOR BIOPSIES FROM TUMORS FROM   BOTH BREASTS ([id number]) -     RIGHT:        - POSITIVE ESTROGEN RECEPTOR.        - POSITIVE PROGESTERONE RECEPTOR.        - NEGATIVE FOR HER2 GENE AMPLIFICATION. LEFT:        - POSITIVE ESTROGEN RECEPTOR.        - POSITIVE PROGESTERONE RECEPTOR.        - NEGATIVE FOR HER2 GENE AMPLIFICATION. Procedures/Addenda   ADDENDUM     Date Ordered:     2/21/2019     Status: Signed Out        Date Complete:     2/21/2019     By: Carina Monet M.D.        Date Reported:     2/21/2019       ADDENDUM DIAGNOSIS   3.  BREAST, MASTECTOMY, WITH AXILLARY LYMPH NODES (LEFT):        - NO CHANGE IN DIAGNOSIS. ADDENDUM COMMENT   THE CASE IS DISCUSSED WITH DR. CID, FEBRUARY 20, 2019.  SLIDES ARE   REVIEWED, WITH SPECIFIC ATTENTION TO THE 6 SLIDES FROM AXILLARY   REGION.  THESE REPRESENT 2 LYMPH NODES, INCLUDING A LARGER 4.5 CM   NODE, AND NEITHER CONTAINS NEOPLASM ON REVIEW.  THE GROSS MASTECTOMY   SPECIMEN IS THEN REEXAMINED WITH SPECIFIC ATTENTION TO THE AXILLARY   TISSUE, AS WELL AS THE BREAST ADJACENT TO THE AXILLARY REGION.  NO   ADDITIONAL GROSSLY IDENTIFIABLE LYMPH NODES ARE FOUND.  REPRESENTATIVE   SAMPLING OF 8 ADDITIONAL BLOCKS FROM THIS AXILLARY REGION IS SUBMITTED   FOR POSSIBLE IDENTIFICATION OF MINUTE NODES THAT MAY NOT BE GROSSLY   EVIDENT.  HOWEVER, NO ADDITIONAL NODES ARE DETECTED ON MICROSCOPIC   EXAM OF THESE ADDITIONAL BLOCKS.   IMAGING DATA:    Ultrasound breast 9/10/18  FINDINGS:   DIAGNOSTIC MAMMOGRAM: The breasts are composed of scattered fibroglandular   tissue.  Underlying the area palpable concern in the upper outer left breast,   2 masses are identified measuring up to 1.6 cm anteriorly and with a more   suspicious spiculated 2.4 cm mass with adjacent distortion.  No abnormal   calcifications are identified.  In the right breast, a spiculated 1 cm mass   is identified in the lower inner quadrant, 6 cm from the nipple.  No   associated microcalcifications.       TARGETED LEFT ULTRASOUND: Ultrasound was performed in the area of palpable   concern, identifying 2 adjacent irregular hypoechoic masses measuring 1.7 x   2.0 x 1.4 cm in the 2 o'clock position, 4 cm from the nipple and 1.8 x 2.1 x   2.0 cm in the 1 o'clock position, 6 cm from the nipple.       Ultrasound in the left axilla was also performed identifying a probable   abnormal lymph node in the axillary tail measuring 2.1 x 1.6 x 2.2 cm.  This   has an irregular appearance with a hyperechoic rim and no identifiable fatty   hilum. [place] [address] is an adjacent abnormal lymph node measuring 1.7 x 1.0 x 2.0 cm   with a small fatty hilum and focal cortical thickening.       TARGETED RIGHT ULTRASOUND: Ultrasound was performed in the area of   mammographic concern in the lower inner quadrant.  A suspicious irregular   hypoechoic mass measuring 0.7 x 0.8 x 0.8 cm is present in the 3 o'clock   position, 7 cm from the nipple.  This corresponds to the mammographic finding.       Ultrasound imaging of the right axilla was also performed without   identifiable lymph nodes.           Impression   1. 2 noncalcified suspicious masses in the area palpable concern in the 1-2   o'clock position of the left breast with sonographic correlate measuring up   to 2.0 cm and 2.1 cm.       2. Left axillary ultrasound identifies 2 abnormal lymph nodes.       3.  New suspicious noncalcified mass in the lower inner quadrant of the right   breast with corresponding sonographic 0.8 cm mass in the 3 o'clock position,   7 cm from the nipple.       4.  No identifiable lymph nodes in the right axilla.       Ultrasound-guided biopsy of the left breast masses, a left axillary lymph   node, and right breast mass is recommended.       BIRADS:   BIRADS - CATEGORY 5       Findings are highly suggestive of malignancy.  Biopsy should be considered at   this time. Bone scan 9/21/18  Impression   No evidence to suggest osseous metastatic disease. CT chest abdomen pelvis 9/21/18  Impression   1.  Known bilateral breast masses and left axillary lymphadenopathy are   demonstrated.  No evidence for distant metastatic disease in the chest,   abdomen, or pelvis.       2.  Tiny right hydropneumothorax, presumed related to recent right chest port   placement.       3.  Tiny hiatal hernia.       4.  There is a 1.5 cm right thyroid nodule.  Thyroid ultrasound is   recommended.       The result was communicated to the ordering physician, Dr. Jose Monet, at 1:42   p.m. on 09/21/2018.  This was communicated by the radiology communication   center [facility]), as I was not available when Dr. Jose Monet returned the   phone call. Mammogram 1/4/19  Impression   Mammographic evidence of response of therapy within both breasts as described   above.  No new lesions are identified.       CT chest 11/11/2019:  Impression   1.  In the region of previously described ill-defined nodules involving the   left lung apex, there appears to be interval development of an ill-defined   consolidation.  Finding is likely post treatment in nature however underlying   infectious process cannot be excluded.  If infectious process is suspected,   repeat CT after therapy is recommended.  No definitive CT evidence of   metastatic disease within the chest.   2. Small hiatal hernia. 3. Hepatic steatosis. 4. Nodules involving the thyroid gland, largest measuring 1.6 cm. Correlation with ultrasound is recommended.       CT chest 1/7/2020  Impression   1. No suspicious nodules or masses. 2. Stable or slightly improved left lung apex reticular interlobular septal   thickening on background of patchy ground-glass attenuation and minor   anterior subpleural reticulations in the left upper lobe attributed to post   treatment scarring.  Attention on follow-up. 3. Bilateral breast implants in right IJ infusion port. ASSESSMENT:     Marcelino Frazier has clinical stage II, T2 N1 M0 disease in her left breast and she has clinical stage I T1 N0 M0 disease in her right breast.    Unfortunately she had significant reaction to first cycle of AC chemotherapy. I gave preoperative  weekly paclitaxel for 12 cycles. She underwent surgical resection: I discussed the surgical pathology report. LNs showed no residual cancer, NO residual cancer noted in right breast, But left breast showed smaller residual invasive cancer 1.5 cm and she had negative margins. She  Has completed RT. Now completed Xeloda well. She is tolerating Arimidex very well  During today's visit, the patient and the family had a number of reasonable questions which were answered to their satisfaction. They verbalized understanding of the information provided and they agreed to proceed as outlined above.   PLAN:   I reviewed recent lab work and discussed the treatment plan with patient   continue endocrine therapy with Arimidex  Based on physical examination no evidence of recurrence  I advised her to take calcium and vitamin D  Return to clinic in 6 months with labs prior      Benson Martins MD  Hematologist/Medical Oncologist  I spent more than 25 minutes examining, evaluating, reviewing data and counseling the patient. Greater than 50% of that time was spent face-to-face with the patient in counseling and coordinating her care. This note is created with the assistance of a speech recognition program.  While intending to generate a document that actually reflects the content of the visit, the document can still have some errors including those of syntax and sound a like substitutions which may escape proof reading. It such instances, actual meaning can be extrapolated by contextual diversion.

## 2021-06-11 ENCOUNTER — OFFICE VISIT (OUTPATIENT)
Dept: SURGERY | Age: 71
End: 2021-06-11
Payer: MEDICARE

## 2021-06-11 VITALS
SYSTOLIC BLOOD PRESSURE: 126 MMHG | WEIGHT: 211.13 LBS | DIASTOLIC BLOOD PRESSURE: 68 MMHG | BODY MASS INDEX: 33.14 KG/M2 | HEART RATE: 76 BPM | HEIGHT: 67 IN

## 2021-06-11 DIAGNOSIS — Z98.890 S/P BREAST RECONSTRUCTION, BILATERAL: Primary | ICD-10-CM

## 2021-06-11 PROCEDURE — 99211 OFF/OP EST MAY X REQ PHY/QHP: CPT | Performed by: PLASTIC SURGERY

## 2021-06-11 PROCEDURE — 99999 PR OFFICE/OUTPT VISIT,PROCEDURE ONLY: CPT | Performed by: PLASTIC SURGERY

## 2021-06-11 ASSESSMENT — ENCOUNTER SYMPTOMS
TROUBLE SWALLOWING: 0
ABDOMINAL PAIN: 0
COUGH: 0
SHORTNESS OF BREATH: 0

## 2021-06-11 NOTE — PROGRESS NOTES
73year old female presents in the office today for a one year follow up from her breast reconstruction on 2/11/2020. Patient denies any concerns or issues at this time.

## 2021-06-11 NOTE — PROGRESS NOTES
Subjective:      Patient ID: Jeffery Rod is a 73 y.o. female. HPI  Patient presents in the office today for a one year follow up from her breast reconstruction on 2/11/2020. Patient denies any concerns or issues at this time. Review of Systems   Constitutional: Negative. HENT: Negative for congestion and trouble swallowing. Respiratory: Negative for cough and shortness of breath. Cardiovascular: Negative for chest pain. Gastrointestinal: Negative for abdominal pain. Neurological: Negative for light-headedness and headaches. Psychiatric/Behavioral: Negative for dysphoric mood. Objective:   Physical Exam  Vitals and nursing note reviewed. Exam conducted with a chaperone present. Constitutional:       Appearance: Normal appearance. HENT:      Head: Normocephalic and atraumatic. Mouth/Throat:      Mouth: Mucous membranes are moist.   Eyes:      Extraocular Movements: Extraocular movements intact. Conjunctiva/sclera: Conjunctivae normal.      Pupils: Pupils are equal, round, and reactive to light. Pulmonary:      Effort: Pulmonary effort is normal.   Chest:      Comments:   No masses either breast.  Implants soft, Baker 1 bilaterally. Skin:     General: Skin is warm and dry. Neurological:      General: No focal deficit present. Mental Status: She is alert and oriented to person, place, and time. Assessment:      Bilateral breast reconstruction. Plan:      Recheck in 1 year.         Lynsey Noriega MD

## 2021-07-01 ENCOUNTER — TELEPHONE (OUTPATIENT)
Dept: ONCOLOGY | Age: 71
End: 2021-07-01

## 2021-07-01 NOTE — TELEPHONE ENCOUNTER
Phone call to Carlos Borges to review her use of Arimidex. 1. Are taking your medication? yes    2. How have you been taking your medication? Every morning with breakfast     3. Have you had any problems with side effects? Having pain in both sides that comes and goes. No aggravating or alleviating factors. Started after she started Arimidex. Has an appt. With family MD soon and will discuss with him. I explained that it could be due to Arimidex. 4. In the last 2 (4) weeks, how many times did you forget to take your medicine? none    5. Have you had any problems getting refills on time? no    6. Do you have any questions for the pharmacist or doctor? None other than if the pain could be related to the Arimidex. I reviewed side effects of Arimidex with Carlos Borges. She felt reassured that the pain could be from it. I introduced the idea of having a Survivorship Visit with Kiran Lopez CNP to help review her disease/treatment history and discuss health maintenance issues. She said she would think about it. She is agreeable to a call back in 4-6 weeks and further discussion of a Survivorship Visit.

## 2021-07-26 ENCOUNTER — OFFICE VISIT (OUTPATIENT)
Dept: FAMILY MEDICINE CLINIC | Age: 71
End: 2021-07-26
Payer: MEDICARE

## 2021-07-26 ENCOUNTER — HOSPITAL ENCOUNTER (OUTPATIENT)
Dept: GENERAL RADIOLOGY | Age: 71
Discharge: HOME | End: 2021-07-28
Payer: MEDICARE

## 2021-07-26 VITALS
TEMPERATURE: 97.6 F | SYSTOLIC BLOOD PRESSURE: 132 MMHG | BODY MASS INDEX: 32.33 KG/M2 | WEIGHT: 206 LBS | HEIGHT: 67 IN | HEART RATE: 72 BPM | OXYGEN SATURATION: 98 % | DIASTOLIC BLOOD PRESSURE: 68 MMHG

## 2021-07-26 DIAGNOSIS — R03.0 ELEVATED BP WITHOUT DIAGNOSIS OF HYPERTENSION: ICD-10-CM

## 2021-07-26 DIAGNOSIS — M25.572 CHRONIC PAIN OF LEFT ANKLE: ICD-10-CM

## 2021-07-26 DIAGNOSIS — M25.572 CHRONIC PAIN OF LEFT ANKLE: Primary | ICD-10-CM

## 2021-07-26 DIAGNOSIS — I83.813 VARICOSE VEINS OF BOTH LOWER EXTREMITIES WITH PAIN: Primary | ICD-10-CM

## 2021-07-26 DIAGNOSIS — G89.29 CHRONIC PAIN OF LEFT ANKLE: ICD-10-CM

## 2021-07-26 DIAGNOSIS — Z00.00 ROUTINE GENERAL MEDICAL EXAMINATION AT A HEALTH CARE FACILITY: ICD-10-CM

## 2021-07-26 DIAGNOSIS — G89.29 CHRONIC PAIN OF LEFT ANKLE: Primary | ICD-10-CM

## 2021-07-26 PROCEDURE — 73610 X-RAY EXAM OF ANKLE: CPT

## 2021-07-26 PROCEDURE — 99214 OFFICE O/P EST MOD 30 MIN: CPT | Performed by: FAMILY MEDICINE

## 2021-07-26 PROCEDURE — 99212 OFFICE O/P EST SF 10 MIN: CPT | Performed by: FAMILY MEDICINE

## 2021-07-26 SDOH — ECONOMIC STABILITY: FOOD INSECURITY: WITHIN THE PAST 12 MONTHS, THE FOOD YOU BOUGHT JUST DIDN'T LAST AND YOU DIDN'T HAVE MONEY TO GET MORE.: PATIENT DECLINED

## 2021-07-26 SDOH — ECONOMIC STABILITY: FOOD INSECURITY: WITHIN THE PAST 12 MONTHS, YOU WORRIED THAT YOUR FOOD WOULD RUN OUT BEFORE YOU GOT MONEY TO BUY MORE.: PATIENT DECLINED

## 2021-07-26 ASSESSMENT — SOCIAL DETERMINANTS OF HEALTH (SDOH): HOW HARD IS IT FOR YOU TO PAY FOR THE VERY BASICS LIKE FOOD, HOUSING, MEDICAL CARE, AND HEATING?: PATIENT DECLINED

## 2021-07-26 ASSESSMENT — ENCOUNTER SYMPTOMS
COUGH: 0
WHEEZING: 0
CONSTIPATION: 0
SHORTNESS OF BREATH: 0
NAUSEA: 0
CHEST TIGHTNESS: 0
DIARRHEA: 0
ABDOMINAL PAIN: 0

## 2021-07-26 NOTE — PROGRESS NOTES
BIANCA [address]  [address] [address]  Dept: [phone]  Dept Fax: [phone]  Loc: [phone]    Catie De La Paz is a 73 y.o. female who presents today for her medical conditions/complaints as noted below. Catie De La Paz is c/o of   Chief Complaint   Patient presents with    6 Month Follow-Up       HPI:     HPI Here today for a follow up of her bp. She also has some issues with back pain. Back pain: worsening; she has been having some issues with achiness in her back. This seems to be worse at night. She wonders if it is due to the arimidex. She started taking a probiotic about a month ago and it has really helped with her heartburn and she generally feels better. No issues with abdominal pain, constipation or diarrhea. No blood in the stool. She feels a little irritation in her urine, but nothing too bothersome. She is not sleeping much at night. She has issues with leg cramps at night. I suggested she try pickle juice to see if that helps. She has been having trouble with swelling in her left ankle. She gets pain on the medial side and she has some swelling that can be quite painful. She has noticed the swelling is worse if she eats salt or chips. She just feels like her whole left side is different since that is the side she had radiation on. She does not wear compression socks. Past Medical History:   Diagnosis Date    Breast cancer ([facility] [address]     Diverticula of colon     Mild, sigmoid, asymptomatic.  Family history of breast cancer     paternal GM, sister, two paternal cousins, two paternal aunts    Osteopenia     With low vitamin D level. Insurance doesn't cover routine DEXA scans. Pt took bisphosphonates for a year and didn't tolerate.     Personal history of antineoplastic chemotherapy 10/2018    PONV (postoperative nausea and vomiting)     Unspecified vitamin D deficiency     Varicose veins of lower extremities           Social History     Tobacco Use    Smoking status: Former Smoker     Packs/day: 0.50     Years: 7.00     Pack years: 3.50     Types: Cigarettes     Quit date: 1978     Years since quittin.5    Smokeless tobacco: Never Used    Tobacco comment: Quit smoking in . Substance Use Topics    Alcohol use: No     Comment: Occasional alcohol. Current Outpatient Medications   Medication Sig Dispense Refill    Lactobacillus-Inulin (CULTURELLE DIGESTIVE DAILY PO) Take by mouth      anastrozole (ARIMIDEX) 1 MG tablet TAKE 1 TABLET DAILY 90 tablet 3     No current facility-administered medications for this visit. Allergies   Allergen Reactions    Boniva [Ibandronic Acid] Other (See Comments)     Jaw pain.  Fosamax [Alendronate Sodium] Other (See Comments)     Jaw pain. Subjective:     Review of Systems   Constitutional: Negative for activity change, appetite change, chills, fatigue and fever. Eyes: Negative for visual disturbance. Respiratory: Negative for cough, chest tightness, shortness of breath and wheezing. Cardiovascular: Positive for leg swelling (on the left). Negative for chest pain and palpitations. Gastrointestinal: Negative for abdominal pain, constipation, diarrhea and nausea. Genitourinary: Negative for difficulty urinating. Musculoskeletal: Positive for arthralgias (left ankle). Negative for gait problem, joint swelling and myalgias. Neurological: Negative for dizziness, syncope, weakness, light-headedness and headaches. Psychiatric/Behavioral: Positive for sleep disturbance. Negative for decreased concentration and dysphoric mood. The patient is not nervous/anxious. Objective:      Physical Exam  Vitals and nursing note reviewed. Constitutional:       General: She is not in acute distress. Appearance: She is well-developed.    Eyes:      Conjunctiva/sclera: Conjunctivae normal.   Neck:      Thyroid: No thyromegaly. Cardiovascular:      Rate and Rhythm: Normal rate and regular rhythm. Heart sounds: Normal heart sounds. No murmur heard. Pulmonary:      Effort: Pulmonary effort is normal. No respiratory distress. Breath sounds: Normal breath sounds. No wheezing. Musculoskeletal:      Cervical back: Normal range of motion and neck supple. Left ankle: No swelling or ecchymosis. No tenderness. Normal range of motion. Left Achilles Tendon: Normal.      Comments: Left ankle is covered in varicose veins. Pain when it occurs is along the medial side along the peroneal tendon. Lymphadenopathy:      Cervical: No cervical adenopathy. Skin:     General: Skin is warm and dry. Findings: No erythema or rash. Neurological:      Mental Status: She is alert and oriented to person, place, and time. /68   Pulse 72   Temp 97.6 °F (36.4 °C)   Ht 5' 7\" (1.702 m)   Wt 206 lb (93.4 kg)   SpO2 98%   BMI 32.26 kg/m²     Assessment:       Diagnosis Orders   1. Chronic pain of left ankle  XR ANKLE LEFT (MIN 3 VIEWS)   2. Elevated BP without diagnosis of hypertension     3. Routine general medical examination at a health care facility  Lipid Panel             Plan:        Ankle pain: new; it is unclear if it is more due to arthritis and tendonitis or due to swelling. I orderd and xray to look for arthritis and tendonitis. If that is normal I will refer to vascular due to the extensive varicose veins. It is has arthritis I will refer to ortho. Return in about 6 months (around 1/26/2022) for [address]     Orders Placed This Encounter   Procedures    XR ANKLE LEFT (MIN 3 VIEWS)     Standing Status:   Future     Number of Occurrences:   1     Standing Expiration Date:   7/26/2022     Order Specific Question:   Reason for exam:     Answer:   left ankle pain    Lipid Panel     Standing Status:   Future     Standing Expiration Date:   7/26/2022     Order Specific Question:   Is Patient Fasting?/# of Hours     Answer:   0 per dr Peg Curtis educational materials - see patient instructions. Discussed use, benefit,and side effects of prescribed medications. All patient questions answered. Ptvoiced understanding. Reviewed health maintenance. Instructed to continue currentmedications, diet and exercise. Patient agreed with treatment plan. Follow up asdirected.      Electronically signed by Jayson Valladares MD on 7/26/2021 at 9:40 AM

## 2021-08-16 ENCOUNTER — HOSPITAL ENCOUNTER (OUTPATIENT)
Dept: LAB | Age: 71
Discharge: HOME | End: 2021-08-16
Payer: MEDICARE

## 2021-08-16 DIAGNOSIS — Z17.0 MALIGNANT NEOPLASM OF UPPER-OUTER QUADRANT OF BOTH BREASTS IN FEMALE, ESTROGEN RECEPTOR POSITIVE (HCC): ICD-10-CM

## 2021-08-16 DIAGNOSIS — C50.411 MALIGNANT NEOPLASM OF UPPER-OUTER QUADRANT OF BOTH BREASTS IN FEMALE, ESTROGEN RECEPTOR POSITIVE (HCC): ICD-10-CM

## 2021-08-16 DIAGNOSIS — C50.412 MALIGNANT NEOPLASM OF UPPER-OUTER QUADRANT OF BOTH BREASTS IN FEMALE, ESTROGEN RECEPTOR POSITIVE (HCC): ICD-10-CM

## 2021-08-16 LAB
ABSOLUTE EOS #: 0.06 K/UL (ref 0–0.44)
ABSOLUTE IMMATURE GRANULOCYTE: 0.03 K/UL (ref 0–0.3)
ABSOLUTE LYMPH #: 1.31 K/UL (ref 1.1–3.7)
ABSOLUTE MONO #: 0.52 K/UL (ref 0.1–1.2)
ALBUMIN SERPL-MCNC: 4.3 G/DL (ref 3.5–5.2)
ALBUMIN/GLOBULIN RATIO: 1.6 (ref 1–2.5)
ALP BLD-CCNC: 69 U/L (ref 35–104)
ALT SERPL-CCNC: 23 U/L (ref 5–33)
ANION GAP SERPL CALCULATED.3IONS-SCNC: 10 MMOL/L (ref 9–17)
AST SERPL-CCNC: 26 U/L
BASOPHILS # BLD: 1 % (ref 0–2)
BASOPHILS ABSOLUTE: 0.03 K/UL (ref 0–0.2)
BILIRUB SERPL-MCNC: 0.5 MG/DL (ref 0.3–1.2)
BUN BLDV-MCNC: 15 MG/DL (ref 8–23)
BUN/CREAT BLD: 20 (ref 9–20)
CALCIUM SERPL-MCNC: 9.9 MG/DL (ref 8.6–10.4)
CHLORIDE BLD-SCNC: 108 MMOL/L (ref 98–107)
CO2: 24 MMOL/L (ref 20–31)
CREAT SERPL-MCNC: 0.74 MG/DL (ref 0.5–0.9)
DIFFERENTIAL TYPE: ABNORMAL
EOSINOPHILS RELATIVE PERCENT: 1 % (ref 1–4)
GFR AFRICAN AMERICAN: >60 ML/MIN
GFR NON-AFRICAN AMERICAN: >60 ML/MIN
GFR SERPL CREATININE-BSD FRML MDRD: ABNORMAL ML/MIN/{1.73_M2}
GFR SERPL CREATININE-BSD FRML MDRD: ABNORMAL ML/MIN/{1.73_M2}
GLUCOSE FASTING: 97 MG/DL (ref 70–99)
HCT VFR BLD CALC: 42.4 % (ref 36.3–47.1)
HEMOGLOBIN: 13.6 G/DL (ref 11.9–15.1)
IMMATURE GRANULOCYTES: 1 %
LYMPHOCYTES # BLD: 31 % (ref 24–43)
MCH RBC QN AUTO: 30.3 PG (ref 25.2–33.5)
MCHC RBC AUTO-ENTMCNC: 32.1 G/DL (ref 25.2–33.5)
MCV RBC AUTO: 94.4 FL (ref 82.6–102.9)
MONOCYTES # BLD: 12 % (ref 3–12)
NRBC AUTOMATED: 0 PER 100 WBC
PDW BLD-RTO: 13.1 % (ref 11.8–14.4)
PLATELET # BLD: 203 K/UL (ref 138–453)
PLATELET ESTIMATE: ABNORMAL
PMV BLD AUTO: 11.5 FL (ref 8.1–13.5)
POTASSIUM SERPL-SCNC: 4 MMOL/L (ref 3.7–5.3)
RBC # BLD: 4.49 M/UL (ref 3.95–5.11)
RBC # BLD: ABNORMAL 10*6/UL
SEG NEUTROPHILS: 54 % (ref 36–65)
SEGMENTED NEUTROPHILS ABSOLUTE COUNT: 2.27 K/UL (ref 1.5–8.1)
SODIUM BLD-SCNC: 142 MMOL/L (ref 135–144)
TOTAL PROTEIN: 7 G/DL (ref 6.4–8.3)
WBC # BLD: 4.2 K/UL (ref 3.5–11.3)
WBC # BLD: ABNORMAL 10*3/UL

## 2021-08-16 PROCEDURE — 85025 COMPLETE CBC W/AUTO DIFF WBC: CPT

## 2021-08-16 PROCEDURE — 80053 COMPREHEN METABOLIC PANEL: CPT

## 2021-08-16 PROCEDURE — 36415 COLL VENOUS BLD VENIPUNCTURE: CPT

## 2021-08-23 ENCOUNTER — OFFICE VISIT (OUTPATIENT)
Dept: ONCOLOGY | Age: 71
End: 2021-08-23
Payer: MEDICARE

## 2021-08-23 VITALS
HEART RATE: 68 BPM | HEIGHT: 67 IN | WEIGHT: 207 LBS | TEMPERATURE: 97.3 F | OXYGEN SATURATION: 97 % | SYSTOLIC BLOOD PRESSURE: 126 MMHG | BODY MASS INDEX: 32.49 KG/M2 | DIASTOLIC BLOOD PRESSURE: 64 MMHG

## 2021-08-23 DIAGNOSIS — Z79.811 LONG TERM (CURRENT) USE OF AROMATASE INHIBITORS: Primary | ICD-10-CM

## 2021-08-23 PROCEDURE — 99214 OFFICE O/P EST MOD 30 MIN: CPT | Performed by: INTERNAL MEDICINE

## 2021-08-23 NOTE — PROGRESS NOTES
and drinks alcohol occasionally. She has had hysterectomy and oophorectomy. Interval history:  Patient is return for follow-up visit and to discuss lab results and further recommendations. She complains of pain in her left foot and had x-ray last month which was negative for any acute fracture. She is following with her primary care physician for this. She is on Arimidex and tolerating well. She denies any hot flashes. She denies any new bone pain or back pain. During this visit patient's allergy, social, medical, surgical history and medications were reviewed and updated. Allergies   Allergen Reactions    Boniva [Ibandronic Acid] Other (See Comments)     Jaw pain.  Fosamax [Alendronate Sodium] Other (See Comments)     Jaw pain. Current Outpatient Medications   Medication Sig Dispense Refill    Lactobacillus-Inulin (CULTURELLE DIGESTIVE DAILY PO) Take by mouth      anastrozole (ARIMIDEX) 1 MG tablet TAKE 1 TABLET DAILY 90 tablet 3     No current facility-administered medications for this visit. Social History     Socioeconomic History    Marital status:      Spouse name: Not on file    Number of children: Not on file    Years of education: Not on file    Highest education level: Not on file   Occupational History    Occupation: retired     Employer: [employer]   Tobacco Use    Smoking status: Former Smoker     Packs/day: 0.50     Years: 7.00     Pack years: 3.50     Types: Cigarettes     Quit date: 1978     Years since quittin.6    Smokeless tobacco: Never Used    Tobacco comment: Quit smoking in . Vaping Use    Vaping Use: Never used   Substance and Sexual Activity    Alcohol use: No     Comment: Occasional alcohol.     Drug use: No    Sexual activity: Not on file   Other Topics Concern    Not on file   Social History Narrative    Not on file     Social Determinants of Health     Financial Resource Strain: Unknown    Difficulty of Genitourinary: negative for frequency, dysuria, nocturia, urinary incontinence, and hematuria   Integument: negative for rash, skin lesions, bruises.    Hematologic/Lymphatic: negative for easy bruising, bleeding, lymphadenopathy, petechiae and swelling/edema   Endocrine: negative for heat or cold intolerance, tremor, weight changes, change in bowel habits and hair loss   Musculoskeletal: negative for myalgias, arthralgias, pain, joint swelling,and bone pain   Neurological: negative for headaches, dizziness, seizures, weakness, numbness   OBJECTIVE:         Vitals:    08/23/21 0930   BP: 126/64   Pulse: 68   Temp: 97.3 °F (36.3 °C)   SpO2: 97%   PHYSICAL EXAM:   General appearance - well appearing, no in pain or distress   Mental status - alert and cooperative   Eyes - pupils equal and reactive, extraocular eye movements intact   Ears - bilateral TM's and external ear canals normal   Mouth - mucous membranes moist, pharynx normal without lesions   Neck - supple, no significant adenopathy   Lymphatics - Mild palpable lymphadenopathy in the left axilla, no hepatosplenomegaly   Chest - clear to auscultation, no wheezes, rales or rhonchi, symmetric air entry   Breast: biat breast surgically absent, drains in place  2 cm lump palpable in left breast  Heart - normal rate, regular rhythm, normal S1, S2, no murmurs, rubs, clicks or gallops   Abdomen - soft, nontender, nondistended, no masses or organomegaly   Neurological - alert, oriented, normal speech, no focal findings or movement disorder noted   Musculoskeletal - no joint tenderness, deformity or swelling   Extremities - peripheral pulses normal, no pedal edema, no clubbing or cyanosis   Skin - normal coloration and turgor, no rashes, no suspicious skin lesions noted   LABORATORY DATA:     Lab Results   Component Value Date    WBC 4.2 08/16/2021    HGB 13.6 08/16/2021    HCT 42.4 08/16/2021    MCV 94.4 08/16/2021     08/16/2021    LYMPHOPCT 31 08/16/2021 RBC 4.49 08/16/2021    MCH 30.3 08/16/2021    MCHC 32.1 08/16/2021    RDW 13.1 08/16/2021    MONOPCT 12 08/16/2021    BASOPCT 1 08/16/2021    NEUTROABS 2.27 08/16/2021    LYMPHSABS 1.31 08/16/2021    MONOSABS 0.52 08/16/2021    EOSABS 0.06 08/16/2021    BASOSABS 0.03 08/16/2021       Chemistry        Component Value Date/Time     08/16/2021 0731    K 4.0 08/16/2021 0731     (H) 08/16/2021 0731    CO2 24 08/16/2021 0731    BUN 15 08/16/2021 0731    CREATININE 0.74 08/16/2021 0731        Component Value Date/Time    CALCIUM 9.9 08/16/2021 0731    ALKPHOS 69 08/16/2021 0731    AST 26 08/16/2021 0731    ALT 23 08/16/2021 0731    BILITOT 0.50 08/16/2021 0731        PATHOLOGY DATA:   Received: 9/11/2018   Reported: 9/12/2018 17:30     -- Diagnosis --   1.  RIGHT BREAST, CORE NEEDLE BIOPSIES:            -  WELL-DIFFERENTIATED INFILTRATING DUCTAL CARCINOMA, 0.6 CM   IN LARGEST INTACT CORE FRAGMENT.            -  ESTROGEN AND PROGESTERONE RECEPTOR IMMUNOSTAINS POSITIVE   IN INVASIVE TUMOR.       -  HER-2 FISH PENDING. 2.  LEFT BREAST, 2:00, N+4, CORE NEEDLE BIOPSIES:            -  MODERATELY DIFFERENTIATED INFILTRATING DUCTAL CARCINOMA,   0.6 CM IN LARGEST INTACT CORE FRAGMENT.       -  FOCAL DUCTAL CARCINOMA IN SITU, INTERMEDIATE GRADE, CRIBRIFORM   AND SOLID TYPE.            -  ESTROGEN AND PROGESTERONE RECEPTOR IMMUNOSTAINS POSITIVE   IN INVASIVE TUMOR.       -  HER-2 FISH PENDING. 3.  LEFT BREAST, 1:00, N+6, CORE NEEDLE BIOPSIES:            -  POORLY DIFFERENTIATED INFILTRATING DUCTAL CARCINOMA, 1.3   CM GREATEST DIMENSION IN LARGEST INTACT CORE FRAGMENT.       -  FOCAL DUCTAL CARCINOMA IN SITU, INTERMEDIATE GRADE, SOLID   TYPE.            -  ESTROGEN AND PROGESTERONE RECEPTOR IMMUNOSTAINS POSITIVE   IN INVASIVE TUMOR.       -  HER-2 FISH PENDING. 4.  LYMPH NODE, LEFT AXILLA, CORE NEEDLE BIOPSIES:       -  INFILTRATING DUCTAL CARCINOMA.             -  INVASIVE TUMOR POSITIVE FOR ESTROGEN RECEPTOR AND   NEGATIVE FOR PROGESTERONE RECEPTOR IMMUNOSTAINS.     -  LYMPH NODE AND BENIGN BREAST ARCHITECTURE NOT IDENTIFIED. -- Diagnosis Comment --   SPECIMEN #4: CORRELATION WITH THE RADIOLOGIC PLACEMENT OF THE BIOPSY   WOULD BE NECESSARY TO DETERMINE IF THIS REPRESENTS A LYMPH NODE WITH   METASTATIC CARCINOMA, VERSUS CARCINOMA INVOLVING THE AXILLARY TAIL OF   THE BREAST.  NO INTRINSIC LYMPH NODE OR BENIGN BREAST ARCHITECTURE IS   PRESENT FOR CONFIRMATION. 2/15/19: Final Diagnosis   1. LYMPH NODE, EXCISION (RIGHT SENTINEL):        - NO NEOPLASM DETECTED. 2. BREAST, MASTECTOMY (RIGHT):        - SCANT RESIDUAL FOCUS OF INVASIVE DUCTAL CARCINOMA.      - DUCTAL CARCINOMA IN SITU.        - EXCISION MARGINS NEGATIVE FOR NEOPLASM. 3. BREAST, MASTECTOMY, INCLUDING AXILLARY LYMPH NODES (LEFT):        - INVASIVE DUCTAL CARCINOMA, 1.5 CM        - DUCTAL CARCINOMA IN SITU.        - EXCISION MARGINS NEGATIVE FOR NEOPLASM. Diagnosis Comment   BIOMARKER STUDIES WERE PERFORMED ON PRIOR BIOPSIES FROM TUMORS FROM   BOTH BREASTS ([id number]) -     RIGHT:        - POSITIVE ESTROGEN RECEPTOR.        - POSITIVE PROGESTERONE RECEPTOR.        - NEGATIVE FOR HER2 GENE AMPLIFICATION. LEFT:        - POSITIVE ESTROGEN RECEPTOR.        - POSITIVE PROGESTERONE RECEPTOR.        - NEGATIVE FOR HER2 GENE AMPLIFICATION. Procedures/Addenda   ADDENDUM     Date Ordered:     2/21/2019     Status: Signed Out        Date Complete:     2/21/2019     By: Rossi Whatley M.D.        Date Reported:     2/21/2019       ADDENDUM DIAGNOSIS   3.  BREAST, MASTECTOMY, WITH AXILLARY LYMPH NODES (LEFT):        - NO CHANGE IN DIAGNOSIS. ADDENDUM COMMENT   THE CASE IS DISCUSSED WITH DR. CID, FEBRUARY 20, 2019.  SLIDES ARE   REVIEWED, WITH SPECIFIC ATTENTION TO THE 6 SLIDES FROM AXILLARY   REGION.  THESE REPRESENT 2 LYMPH NODES, INCLUDING A LARGER 4.5 CM   NODE, AND NEITHER CONTAINS NEOPLASM ON REVIEW.  THE GROSS MASTECTOMY   SPECIMEN IS THEN REEXAMINED WITH SPECIFIC ATTENTION TO THE AXILLARY   TISSUE, AS WELL AS THE BREAST ADJACENT TO THE AXILLARY REGION.  NO   ADDITIONAL GROSSLY IDENTIFIABLE LYMPH NODES ARE FOUND.  REPRESENTATIVE   SAMPLING OF 8 ADDITIONAL BLOCKS FROM THIS AXILLARY REGION IS SUBMITTED   FOR POSSIBLE IDENTIFICATION OF MINUTE NODES THAT MAY NOT BE GROSSLY   EVIDENT.  HOWEVER, NO ADDITIONAL NODES ARE DETECTED ON MICROSCOPIC   EXAM OF THESE ADDITIONAL BLOCKS.   IMAGING DATA:    Ultrasound breast 9/10/18  FINDINGS:   DIAGNOSTIC MAMMOGRAM: The breasts are composed of scattered fibroglandular   tissue.  Underlying the area palpable concern in the upper outer left breast,   2 masses are identified measuring up to 1.6 cm anteriorly and with a more   suspicious spiculated 2.4 cm mass with adjacent distortion.  No abnormal   calcifications are identified.  In the right breast, a spiculated 1 cm mass   is identified in the lower inner quadrant, 6 cm from the nipple.  No   associated microcalcifications.       TARGETED LEFT ULTRASOUND: Ultrasound was performed in the area of palpable   concern, identifying 2 adjacent irregular hypoechoic masses measuring 1.7 x   2.0 x 1.4 cm in the 2 o'clock position, 4 cm from the nipple and 1.8 x 2.1 x   2.0 cm in the 1 o'clock position, 6 cm from the nipple.       Ultrasound in the left axilla was also performed identifying a probable   abnormal lymph node in the axillary tail measuring 2.1 x 1.6 x 2.2 cm.  This   has an irregular appearance with a hyperechoic rim and no identifiable fatty   hilum. Lili Barone is an adjacent abnormal lymph node measuring 1.7 x 1.0 x 2.0 cm   with a small fatty hilum and focal cortical thickening.       TARGETED RIGHT ULTRASOUND: Ultrasound was performed in the area of   mammographic concern in the lower inner quadrant.  A suspicious irregular   hypoechoic mass measuring 0.7 x 0.8 x 0.8 cm is present in the 3 o'clock   position, 7 cm from the nipple.  This corresponds to the mammographic finding.       Ultrasound imaging of the right axilla was also performed without   identifiable lymph nodes.           Impression   1. 2 noncalcified suspicious masses in the area palpable concern in the 1-2   o'clock position of the left breast with sonographic correlate measuring up   to 2.0 cm and 2.1 cm.       2. Left axillary ultrasound identifies 2 abnormal lymph nodes.       3.  New suspicious noncalcified mass in the lower inner quadrant of the right   breast with corresponding sonographic 0.8 cm mass in the 3 o'clock position,   7 cm from the nipple.       4.  No identifiable lymph nodes in the right axilla.       Ultrasound-guided biopsy of the left breast masses, a left axillary lymph   node, and right breast mass is recommended.       BIRADS:   BIRADS - CATEGORY 5       Findings are highly suggestive of malignancy.  Biopsy should be considered at   this time. Bone scan 9/21/18  Impression   No evidence to suggest osseous metastatic disease. CT chest abdomen pelvis 9/21/18  Impression   1.  Known bilateral breast masses and left axillary lymphadenopathy are   demonstrated.  No evidence for distant metastatic disease in the chest,   abdomen, or pelvis.       2.  Tiny right hydropneumothorax, presumed related to recent right chest port   placement.       3.  Tiny hiatal hernia.       4.  There is a 1.5 cm right thyroid nodule.  Thyroid ultrasound is   recommended.       The result was communicated to the ordering physician, Dr. Brooklyn Odom, at 1:42   p.m. on 09/21/2018.  This was communicated by the radiology communication   center [facility], as I was not available when Dr. Brooklyn Odom returned the   phone call. Mammogram 1/4/19  Impression   Mammographic evidence of response of therapy within both breasts as described   above.  No new lesions are identified.       CT chest 11/11/2019:  Impression   1.  In the region of previously described ill-defined nodules involving the   left lung apex, there appears to be interval development of an ill-defined   consolidation.  Finding is likely post treatment in nature however underlying   infectious process cannot be excluded.  If infectious process is suspected,   repeat CT after therapy is recommended.  No definitive CT evidence of   metastatic disease within the chest.   2. Small hiatal hernia. 3. Hepatic steatosis. 4. Nodules involving the thyroid gland, largest measuring 1.6 cm. Correlation with ultrasound is recommended.       CT chest 1/7/2020  Impression   1. No suspicious nodules or masses. 2. Stable or slightly improved left lung apex reticular interlobular septal   thickening on background of patchy ground-glass attenuation and minor   anterior subpleural reticulations in the left upper lobe attributed to post   treatment scarring.  Attention on follow-up. 3. Bilateral breast implants in right IJ infusion port. ASSESSMENT:     Rafael Farrar has clinical stage II, T2 N1 M0 disease in her left breast and she has clinical stage I T1 N0 M0 disease in her right breast.    Unfortunately she had significant reaction to first cycle of AC chemotherapy. I gave preoperative  weekly paclitaxel for 12 cycles. She underwent surgical resection: I discussed the surgical pathology report. LNs showed no residual cancer, NO residual cancer noted in right breast, But left breast showed smaller residual invasive cancer 1.5 cm and she had negative margins. She  Has completed RT. Now completed Xeloda well. She is tolerating Arimidex very well  During today's visit, the patient and the family had a number of reasonable questions which were answered to their satisfaction. They verbalized understanding of the information provided and they agreed to proceed as outlined above.   PLAN:   I reviewed recent lab work and discussed the treatment plan with patient   continue endocrine therapy with Arimidex  Based on physical examination no evidence of recurrence  She has Stopped Ignacia D and calcium  Return to clinic in 6 months with labs and DExa scan prior    Benson Mcguire MD  Hematologist/Medical Oncologist  I spent more than 25 minutes examining, evaluating, reviewing data and counseling the patient. Greater than 50% of that time was spent face-to-face with the patient in counseling and coordinating her care. This note is created with the assistance of a speech recognition program.  While intending to generate a document that actually reflects the content of the visit, the document can still have some errors including those of syntax and sound a like substitutions which may escape proof reading. It such instances, actual meaning can be extrapolated by contextual diversion.

## 2021-08-26 ENCOUNTER — OFFICE VISIT (OUTPATIENT)
Dept: VASCULAR SURGERY | Age: 71
End: 2021-08-26
Payer: MEDICARE

## 2021-08-26 VITALS
HEART RATE: 68 BPM | WEIGHT: 205.13 LBS | BODY MASS INDEX: 32.2 KG/M2 | SYSTOLIC BLOOD PRESSURE: 130 MMHG | DIASTOLIC BLOOD PRESSURE: 80 MMHG | HEIGHT: 67 IN

## 2021-08-26 DIAGNOSIS — I83.813 VARICOSE VEINS OF BILATERAL LOWER EXTREMITIES WITH PAIN: ICD-10-CM

## 2021-08-26 PROCEDURE — 99203 OFFICE O/P NEW LOW 30 MIN: CPT | Performed by: SURGERY

## 2021-08-26 PROCEDURE — 99214 OFFICE O/P EST MOD 30 MIN: CPT | Performed by: SURGERY

## 2021-08-26 NOTE — PROGRESS NOTES
R Stevie [address] & [place]  [address] [place] [address]  [address]  1950  73 y. o.female       Chief Complaint:  Chief Complaint   Patient presents with    New Patient    Varicose Veins       History of present Illness:  Pt is here today for evaluation and treatment of symptomatic varicose veins. Her main complaint is of heaviness and discomfort in the left ankle. She has a history of breast cancer and chemotherapy which caused some peripheral neuropathy but she describes this as being different. She has had x-rays of her ankle to rule out any orthopedic cause but does noticed increased swelling in the ankle throughout the day when she is on her feet. She gets some relief with leg elevation and says that at night all the swelling goes away as do her symptoms. Certainly her clinical findings are consistent with symptomatic varicose veins. I recommend that she wear knee-high compression stockings which should help relieve some of her symptoms. Also recommend that she elevate her legs as much as possible. 14    Past Medical History:  has a past medical history of Breast cancer ([address] Diverticula of colon, Family history of breast cancer, Osteopenia, Personal history of antineoplastic chemotherapy, PONV (postoperative nausea and vomiting), Unspecified vitamin D deficiency, and Varicose veins of lower extremities. Past Surgical History:   Past Surgical History:   Procedure Laterality Date    BREAST BIOPSY Right 6/7/2017    Right Breast biopsy w/ needle placement -arrive 11am -to x-ray 12pm performed by Daisha Mathew MD at [facility] Bilateral 09/10/2018    BREAST ENHANCEMENT SURGERY Bilateral 2/15/2019    Bilateral breast reconstruction w/ expander placement performed by Ritchie Saab MD at [address] [place] [address]  3/21/2005    Screening. Negative. Due for repeat 2015.     COLONOSCOPY N/A 10/26/2020 mild irritation left colon, biopsies normal. grade 2 internal hemorrhoids. Dr. Geoff Weston at [facility] [address]  7/22/1976    Status post miscarriage.  DILATION AND CURETTAGE OF UTERUS  9/1/1995    bleeding    HYSTERECTOMY, TOTAL ABDOMINAL      LYMPH NODE BIOPSY Left 09/10/2018    MASTECTOMY Bilateral 2/15/2019    Left Modified Radical Mastectomy w/ axillary disection & Right Mastectomy sentinel lymph node bx w/ Nuc med & blue dye Inj's performed by Lydia Dejesus MD at [facility] 71 N/A 1/29/2020    PORT REMOVAL performed by Lydia Dejesus MD at . [address] CTR VAD W/SUBQ PORT AGE 5 YR/> N/A 9/19/2018    PORT INSERTION performed by Lydia Dejesus MD at [facility] [address] / DELAYED W/ TISSUE EXPANDER Bilateral 2/11/2020    BREAST EXPANDER REMOVAL AND CASPULECTOMY  IMPLANT PLACEMENT Evan Mendez performed by Hira Crawley MD at . [address]  11/7/1995    fibroids    TONSILLECTOMY  1978    Subsequent hemorrhage and cautery. Social History:  reports that she quit smoking about 43 years ago. Her smoking use included cigarettes. She has a 3.50 pack-year smoking history. She has never used smokeless tobacco. She reports that she does not drink alcohol and does not use drugs. Family History: family history includes Breast Cancer in her paternal aunt, paternal aunt, paternal cousin, paternal cousin, and paternal grandmother; Breast Cancer (age of onset: 58) in her sister; Coronary Art Dis in her father; Diabetes in her father and mother; Heart Failure in her father; Hypertension in her brother, mother, and sister.     Review of Systems:   Constitutional:  negative for  fevers, chills, sweats, fatigue, and weight loss  HEENT:  negative for vision or hearing changes,   Respiratory:  negative for shortness of breath, cough, or congestion  Cardiovascular:  negative for  chest pain, palpitations  Gastrointestinal: negative for nausea, vomiting, diarrhea, constipation, abdominal pain  Genitourinary:  negative for frequency, dysuria  Integument/Breast:  negative for rash, skin lesions  Musculoskeletal:  negative for muscle aches or joint pain  Neurological:  negative for headaches, dizziness, lightheadedness, numbness, pain and tingling extremities  Behavior/Psych:  negative for depression and anxiety    Allergies: Boniva [ibandronic acid] and Fosamax [alendronate sodium]    Current Meds:  Current Outpatient Medications:     Calcium Carb-Cholecalciferol (CALCIUM 1000 + D PO), Take by mouth, Disp: , Rfl:     Lactobacillus-Inulin (CULTURELLE DIGESTIVE DAILY PO), Take by mouth, Disp: , Rfl:     anastrozole (ARIMIDEX) 1 MG tablet, TAKE 1 TABLET DAILY, Disp: 90 tablet, Rfl: 3    Vital Signs: There were no vitals filed for this visit. Physical Exam:  General appearance - alert, well appearing and in no acute distress  Mental status - oriented to person, place and time with normal affect  Head - normocephalic and atraumatic  Eyes - pupils equal and reactive, extraocular eye movements intact, conjunctiva clear  Ears - hearing appears to be intact  Nose - no drainage noted  Mouth - mucous membranes moist  Neck - supple, no carotid bruits, thyroid not palpable, no JVD  Chest - clear to auscultation, normal effort  Heart - normal rate, regular rhythm, no murmurs  Abdomen - soft, non-tender, non-distended, bowel sounds present all four quadrants, no masses, hepatomegaly, splenomegaly or aortic enlargement  Neurological - normal speech, no focal findings or movement disorder noted, cranial nerves II through XII grossly intact  Extremities - peripheral pulses palpable, nonpitting edema more prominent in the left ankle. Varicose veins across the anterior tibia along the medial thigh and calf bilaterally.     Skin - no gross lesions, rashes, or induration noted      R brachial 2+ L brachial 2+   R radial 2+ L radial 2+   R femoral 2+ L femoral 2+   R popliteal 2+ L popliteal 2+   R posterior tibial 2+ L posterior tibial 2+   R dorsalis pedis 2+ L dorsalis pedis 2+     Labs:   Lab Results   Component Value Date    WBC 4.2 08/16/2021    HGB 13.6 08/16/2021    HCT 42.4 08/16/2021    MCV 94.4 08/16/2021     08/16/2021     Lab Results   Component Value Date     08/16/2021    K 4.0 08/16/2021     08/16/2021    CO2 24 08/16/2021    BUN 15 08/16/2021    CREATININE 0.74 08/16/2021    GLUCOSE 104 06/30/2020    CALCIUM 9.9 08/16/2021     Lab Results   Component Value Date    ALKPHOS 69 08/16/2021    ALT 23 08/16/2021    AST 26 08/16/2021    PROT 7.0 08/16/2021    BILITOT 0.50 08/16/2021    BILIDIR 0.13 02/15/2019    LABALBU 4.3 08/16/2021     No results found for: LACTA  No results found for: AMYLASE  No results found for: LIPASE  Lab Results   Component Value Date    INR 1.0 02/07/2019         Assessment:  3 81-year-old female with symptomatic varicose veins bilateral lower extremities    1. Varicose veins of bilateral lower extremities with pain        Plan:   1. I will check a venous reflux ultrasound to see if this is the underlying cause of her venous disease I plan to see her back in a month to discuss the findings. 2. In the meantime I recommend that she wear over-the-counter compression stockings and to elevate her legs as much as possible.     Orders Placed This Encounter   Procedures    Reflux study/Reflux Venous Insufficiency Bilateral           Electronically signed by Rosa Gray MD on 8/26/2021 at 10:52 AM     Copy sent to Dr. Khalif Ribera MD

## 2021-09-08 ENCOUNTER — HOSPITAL ENCOUNTER (OUTPATIENT)
Dept: INTERVENTIONAL RADIOLOGY/VASCULAR | Age: 71
Discharge: HOME | End: 2021-09-10
Payer: MEDICARE

## 2021-09-08 DIAGNOSIS — I83.813 VARICOSE VEINS OF BILATERAL LOWER EXTREMITIES WITH PAIN: ICD-10-CM

## 2021-09-08 PROCEDURE — 93970 EXTREMITY STUDY: CPT

## 2021-09-21 DIAGNOSIS — C50.411 MALIGNANT NEOPLASM OF UPPER-OUTER QUADRANT OF BOTH BREASTS IN FEMALE, ESTROGEN RECEPTOR POSITIVE (HCC): ICD-10-CM

## 2021-09-21 DIAGNOSIS — C50.412 MALIGNANT NEOPLASM OF UPPER-OUTER QUADRANT OF BOTH BREASTS IN FEMALE, ESTROGEN RECEPTOR POSITIVE (HCC): ICD-10-CM

## 2021-09-21 DIAGNOSIS — Z17.0 MALIGNANT NEOPLASM OF UPPER-OUTER QUADRANT OF BOTH BREASTS IN FEMALE, ESTROGEN RECEPTOR POSITIVE (HCC): ICD-10-CM

## 2021-09-21 RX ORDER — ANASTROZOLE 1 MG/1
TABLET ORAL
Qty: 90 TABLET | Refills: 3 | Status: SHIPPED | OUTPATIENT
Start: 2021-09-21 | End: 2022-09-28

## 2021-09-23 ENCOUNTER — OFFICE VISIT (OUTPATIENT)
Dept: VASCULAR SURGERY | Age: 71
End: 2021-09-23
Payer: MEDICARE

## 2021-09-23 VITALS
HEART RATE: 76 BPM | HEIGHT: 67 IN | WEIGHT: 204 LBS | DIASTOLIC BLOOD PRESSURE: 80 MMHG | SYSTOLIC BLOOD PRESSURE: 145 MMHG | BODY MASS INDEX: 32.02 KG/M2

## 2021-09-23 DIAGNOSIS — I83.813 VARICOSE VEINS OF BILATERAL LOWER EXTREMITIES WITH PAIN: Primary | ICD-10-CM

## 2021-09-23 PROCEDURE — 99212 OFFICE O/P EST SF 10 MIN: CPT | Performed by: SURGERY

## 2021-09-23 PROCEDURE — 99213 OFFICE O/P EST LOW 20 MIN: CPT | Performed by: SURGERY

## 2021-09-23 NOTE — PROGRESS NOTES
[facility] [place] [address] [facility] [address]  [address]  1950  73 y. o.female       Chief Complaint:  Chief Complaint   Patient presents with    Follow-up     1 mo with venous study       History of present Illness:  Pt is here today for evaluation and treatment of symptomatic varicose veins. Her main complaint is of heaviness and discomfort in the left ankle. I reviewed her recent venous reflux ultrasound which shows hemodynamically significant reflux in both lower extremities. Her reflux time is 2267 at the junction, and in the GSV 3919 ms, 3606 ms, 3811 ms,  In the left lower extremity reflux times are 2893 ms at the junction, and in the GSV 2715 ms, 3682 ms, 3935 ms, and 2089 ms. We spent some time discussing chronic venous insufficiency and the reason that bad venous valves can contribute to swelling and aching. Again it should give similar relief to compression stockings and is an outpatient procedure. She does meet criteria for surgical therapy but at this time I think she wants to continue wearing compression stockings and will consider surgery if her symptoms worsen. Past Medical History:  has a past medical history of Breast cancer ([address] Diverticula of colon, Family history of breast cancer, Osteopenia, Personal history of antineoplastic chemotherapy, PONV (postoperative nausea and vomiting), Unspecified vitamin D deficiency, and Varicose veins of lower extremities.     Past Surgical History:   Past Surgical History:   Procedure Laterality Date    BREAST BIOPSY Right 6/7/2017    Right Breast biopsy w/ needle placement -arrive 11am -to x-ray 12pm performed by [facility] [place] [address] Bilateral 09/10/2018    BREAST ENHANCEMENT SURGERY Bilateral 2/15/2019    Bilateral breast reconstruction w/ expander placement performed by Amanda Polanco MD at [address] [place] [address] congestion  Cardiovascular:  negative for  chest pain, palpitations  Gastrointestinal:  negative for nausea, vomiting, diarrhea, constipation, abdominal pain  Genitourinary:  negative for frequency, dysuria  Integument/Breast:  negative for rash, skin lesions  Musculoskeletal:  negative for muscle aches or joint pain  Neurological:  negative for headaches, dizziness, lightheadedness, numbness, pain and tingling extremities  Behavior/Psych:  negative for depression and anxiety    Allergies: Boniva [ibandronic acid] and Fosamax [alendronate sodium]    Current Meds:  Current Outpatient Medications:     anastrozole (ARIMIDEX) 1 MG tablet, TAKE 1 TABLET DAILY, Disp: 90 tablet, Rfl: 3    Calcium Carb-Cholecalciferol (CALCIUM 1000 + D PO), Take by mouth, Disp: , Rfl:     Lactobacillus-Inulin (CULTURELLE DIGESTIVE DAILY PO), Take by mouth, Disp: , Rfl:     Vital Signs:  Vitals:    09/23/21 0954   BP: (!) 145/80   Pulse: 76       Physical Exam:  General appearance - alert, well appearing and in no acute distress  Mental status - oriented to person, place and time with normal affect  Head - normocephalic and atraumatic  Eyes - pupils equal and reactive, extraocular eye movements intact, conjunctiva clear  Ears - hearing appears to be intact  Nose - no drainage noted  Mouth - mucous membranes moist  Neck - supple, no carotid bruits, thyroid not palpable, no JVD  Chest - clear to auscultation, normal effort  Heart - normal rate, regular rhythm, no murmurs  Abdomen - soft, non-tender, non-distended, bowel sounds present all four quadrants, no masses, hepatomegaly, splenomegaly or aortic enlargement  Neurological - normal speech, no focal findings or movement disorder noted, cranial nerves II through XII grossly intact  Extremities - peripheral pulses palpable, nonpitting edema more prominent in the left ankle. Varicose veins across the anterior tibia along the medial thigh and calf bilaterally.     Skin - no gross lesions, rashes, or induration noted      R brachial 2+ L brachial 2+   R radial 2+ L radial 2+   R femoral 2+ L femoral 2+   R popliteal 2+ L popliteal 2+   R posterior tibial 2+ L posterior tibial 2+   R dorsalis pedis 2+ L dorsalis pedis 2+     Labs:   Lab Results   Component Value Date    WBC 4.2 08/16/2021    HGB 13.6 08/16/2021    HCT 42.4 08/16/2021    MCV 94.4 08/16/2021     08/16/2021     Lab Results   Component Value Date     08/16/2021    K 4.0 08/16/2021     08/16/2021    CO2 24 08/16/2021    BUN 15 08/16/2021    CREATININE 0.74 08/16/2021    GLUCOSE 104 06/30/2020    CALCIUM 9.9 08/16/2021     Lab Results   Component Value Date    ALKPHOS 69 08/16/2021    ALT 23 08/16/2021    AST 26 08/16/2021    PROT 7.0 08/16/2021    BILITOT 0.50 08/16/2021    BILIDIR 0.13 02/15/2019    LABALBU 4.3 08/16/2021     No results found for: LACTA  No results found for: AMYLASE  No results found for: LIPASE  Lab Results   Component Value Date    INR 1.0 02/07/2019         Assessment:  3 77-year-old female with symptomatic varicose veins bilateral lower extremities    1. Varicose veins of bilateral lower extremities with pain        Plan:   1. She is considering endovenous ablation of her lower extremity great saphenous veins. If she decides to go this route will be happy to schedule her to help relieve the aching and swelling in both of her legs. No orders of the defined types were placed in this encounter.           Electronically signed by Conchis Baker MD on 9/23/2021 at 10:25 AM     Copy sent to Dr. Zuleyka Feliciano MD

## 2021-12-03 ENCOUNTER — HOSPITAL ENCOUNTER (OUTPATIENT)
Age: 71
Setting detail: SPECIMEN
Discharge: HOME | End: 2021-12-03
Payer: MEDICARE

## 2021-12-03 ENCOUNTER — OFFICE VISIT (OUTPATIENT)
Dept: FAMILY MEDICINE CLINIC | Age: 71
End: 2021-12-03
Payer: MEDICARE

## 2021-12-03 VITALS
HEIGHT: 67 IN | TEMPERATURE: 97 F | SYSTOLIC BLOOD PRESSURE: 136 MMHG | WEIGHT: 200 LBS | OXYGEN SATURATION: 99 % | DIASTOLIC BLOOD PRESSURE: 76 MMHG | HEART RATE: 63 BPM | RESPIRATION RATE: 18 BRPM | BODY MASS INDEX: 31.39 KG/M2

## 2021-12-03 DIAGNOSIS — R30.0 DYSURIA: Primary | ICD-10-CM

## 2021-12-03 DIAGNOSIS — R30.0 DYSURIA: ICD-10-CM

## 2021-12-03 DIAGNOSIS — N39.0 URINARY TRACT INFECTION WITHOUT HEMATURIA, SITE UNSPECIFIED: Primary | ICD-10-CM

## 2021-12-03 PROCEDURE — 99212 OFFICE O/P EST SF 10 MIN: CPT

## 2021-12-03 PROCEDURE — 99213 OFFICE O/P EST LOW 20 MIN: CPT | Performed by: NURSE PRACTITIONER

## 2021-12-03 PROCEDURE — 81001 URINALYSIS AUTO W/SCOPE: CPT

## 2021-12-03 RX ORDER — NITROFURANTOIN 25; 75 MG/1; MG/1
100 CAPSULE ORAL 2 TIMES DAILY
Qty: 10 CAPSULE | Refills: 0 | Status: SHIPPED | OUTPATIENT
Start: 2021-12-03 | End: 2021-12-08

## 2021-12-03 ASSESSMENT — ENCOUNTER SYMPTOMS: ABDOMINAL PAIN: 0

## 2021-12-03 NOTE — PROGRESS NOTES
Subjective:      Patient ID: Catherine Locke is a 73 y.o. female coming in for   Chief Complaint   Patient presents with    Dysuria     urinary frequency and urgency, started about a week ago      Urinary Tract Infection   This is a new problem. The current episode started in the past 7 days. The problem has been gradually worsening. Associated symptoms include frequency and urgency. Pertinent negatives include no discharge or flank pain. She has tried increased fluids for the symptoms. Review of Systems   Constitutional: Negative for fever. Gastrointestinal: Negative for abdominal pain. Genitourinary: Positive for frequency and urgency. Negative for flank pain. All other systems reviewed and are negative. Objective:  /76   Pulse 63   Temp 97 °F (36.1 °C)   Resp 18   Ht 5' 7\" (1.702 m)   Wt 200 lb (90.7 kg)   SpO2 99%   BMI 31.32 kg/m²      Physical Exam  Vitals and nursing note reviewed. Constitutional:       General: She is not in acute distress. Appearance: Normal appearance. She is not ill-appearing. HENT:      Head: Normocephalic. Cardiovascular:      Rate and Rhythm: Normal rate and regular rhythm. Heart sounds: Normal heart sounds. Pulmonary:      Effort: Pulmonary effort is normal.      Breath sounds: Normal breath sounds. Abdominal:      Tenderness: There is no abdominal tenderness. There is no right CVA tenderness or left CVA tenderness. Musculoskeletal:      Right lower leg: No edema. Left lower leg: No edema. Skin:     General: Skin is warm and dry. Findings: No rash. Neurological:      General: No focal deficit present. Mental Status: She is alert and oriented to person, place, and time. Assessment:      1. Dysuria           Plan:    UA pending, will call pt with results.      Orders Placed This Encounter   Procedures    Urinalysis Reflex to Culture     Standing Status:   Future     Number of Occurrences:   1     Standing Expiration Date:   12/3/2022     Order Specific Question:   SPECIFY(EX-CATH,MIDSTREAM,CYSTO,ETC)? Answer:   clean catch      Outpatient Encounter Medications as of 12/3/2021   Medication Sig Dispense Refill    anastrozole (ARIMIDEX) 1 MG tablet TAKE 1 TABLET DAILY 90 tablet 3    Calcium Carb-Cholecalciferol (CALCIUM 1000 + D PO) Take by mouth      Lactobacillus-Inulin (CULTURELLE DIGESTIVE DAILY PO) Take by mouth       No facility-administered encounter medications on file as of 12/3/2021.             Giulia Carr, APRN - CNP

## 2021-12-03 NOTE — PATIENT INSTRUCTIONS
Patient Education        Urinary Tract Infection (UTI) in Women: Care Instructions  Overview     A urinary tract infection, or UTI, is a general term for an infection anywhere between the kidneys and the urethra (where urine comes out). Most UTIs are bladder infections. They often cause pain or burning when you urinate. UTIs are caused by bacteria and can be cured with antibiotics. Be sure to complete your treatment so that the infection does not get worse. Follow-up care is a key part of your treatment and safety. Be sure to make and go to all appointments, and call your doctor if you are having problems. It's also a good idea to know your test results and keep a list of the medicines you take. How can you care for yourself at home? · Take your antibiotics as directed. Do not stop taking them just because you feel better. You need to take the full course of antibiotics. · Drink extra water and other fluids for the next day or two. This will help make the urine less concentrated and help wash out the bacteria that are causing the infection. (If you have kidney, heart, or liver disease and have to limit fluids, talk with your doctor before you increase the amount of fluids you drink.)  · Avoid drinks that are carbonated or have caffeine. They can irritate the bladder. · Urinate often. Try to empty your bladder each time. · To relieve pain, take a hot bath or lay a heating pad set on low over your lower belly or genital area. Never go to sleep with a heating pad in place. To prevent UTIs  · Drink plenty of water each day. This helps you urinate often, which clears bacteria from your system. (If you have kidney, heart, or liver disease and have to limit fluids, talk with your doctor before you increase the amount of fluids you drink.)  · Urinate when you need to. · If you are sexually active, urinate right after you have sex. · Change sanitary pads often.   · Avoid douches, bubble baths, feminine hygiene sprays, and other feminine hygiene products that have deodorants. · After going to the bathroom, wipe from front to back. When should you call for help? Call your doctor now or seek immediate medical care if:    · Symptoms such as fever, chills, nausea, or vomiting get worse or appear for the first time.     · You have new pain in your back just below your rib cage. This is called flank pain.     · There is new blood or pus in your urine.     · You have any problems with your antibiotic medicine. Watch closely for changes in your health, and be sure to contact your doctor if:    · You are not getting better after taking an antibiotic for 2 days.     · Your symptoms go away but then come back. Where can you learn more? Go to https://Phonitive - [email or web address].Rupeetalk. org and sign in to your [employer] account. Enter P213 in the Board[employer]ics box to learn more about \"Urinary Tract Infection (UTI) in Women: Care Instructions. \"     If you do not have an account, please click on the \"Sign Up Now\" link. Current as of: February 10, 2021               Content Version: 13.0  © 7973-6579 [employer]wise, Incorporated. Care instructions adapted under license by [facility] ([facility]). If you have questions about a medical condition or this instruction, always ask your healthcare professional. [address] any warranty or liability for your use of this information.

## 2022-01-05 ENCOUNTER — HOSPITAL ENCOUNTER (OUTPATIENT)
Dept: LAB | Age: 72
Discharge: HOME | End: 2022-01-05
Payer: MEDICARE

## 2022-01-05 DIAGNOSIS — Z00.00 ROUTINE GENERAL MEDICAL EXAMINATION AT A HEALTH CARE FACILITY: ICD-10-CM

## 2022-01-05 LAB
CHOLESTEROL/HDL RATIO: 3.1
CHOLESTEROL: 181 MG/DL
HDLC SERPL-MCNC: 59 MG/DL
LDL CHOLESTEROL: 109 MG/DL (ref 0–130)
TRIGL SERPL-MCNC: 66 MG/DL
VLDLC SERPL CALC-MCNC: NORMAL MG/DL (ref 1–30)

## 2022-01-05 PROCEDURE — 80061 LIPID PANEL: CPT

## 2022-01-05 PROCEDURE — 36415 COLL VENOUS BLD VENIPUNCTURE: CPT

## 2022-02-01 ENCOUNTER — OFFICE VISIT (OUTPATIENT)
Dept: FAMILY MEDICINE CLINIC | Age: 72
End: 2022-02-01
Payer: MEDICARE

## 2022-02-01 VITALS
BODY MASS INDEX: 31.23 KG/M2 | HEIGHT: 67 IN | TEMPERATURE: 98.1 F | DIASTOLIC BLOOD PRESSURE: 70 MMHG | OXYGEN SATURATION: 99 % | HEART RATE: 73 BPM | SYSTOLIC BLOOD PRESSURE: 120 MMHG | WEIGHT: 199 LBS

## 2022-02-01 DIAGNOSIS — Z00.00 ROUTINE GENERAL MEDICAL EXAMINATION AT A HEALTH CARE FACILITY: Primary | ICD-10-CM

## 2022-02-01 PROCEDURE — G0439 PPPS, SUBSEQ VISIT: HCPCS | Performed by: FAMILY MEDICINE

## 2022-02-01 PROCEDURE — 99212 OFFICE O/P EST SF 10 MIN: CPT | Performed by: FAMILY MEDICINE

## 2022-02-01 ASSESSMENT — PATIENT HEALTH QUESTIONNAIRE - PHQ9
1. LITTLE INTEREST OR PLEASURE IN DOING THINGS: 0
2. FEELING DOWN, DEPRESSED OR HOPELESS: 0
SUM OF ALL RESPONSES TO PHQ QUESTIONS 1-9: 0
SUM OF ALL RESPONSES TO PHQ9 QUESTIONS 1 & 2: 0
SUM OF ALL RESPONSES TO PHQ QUESTIONS 1-9: 0

## 2022-02-01 ASSESSMENT — ENCOUNTER SYMPTOMS
COUGH: 0
BLOOD IN STOOL: 0
SHORTNESS OF BREATH: 0
CONSTIPATION: 0
DIARRHEA: 0
CHEST TIGHTNESS: 0
ABDOMINAL PAIN: 0
WHEEZING: 0
COLOR CHANGE: 0

## 2022-02-01 ASSESSMENT — LIFESTYLE VARIABLES: HOW OFTEN DO YOU HAVE A DRINK CONTAINING ALCOHOL: 0

## 2022-02-01 NOTE — PATIENT INSTRUCTIONS
Personalized Preventive Plan for Nacho Majors - 2/1/2022  Medicare offers a range of preventive health benefits. Some of the tests and screenings are paid in full while other may be subject to a deductible, co-insurance, and/or copay. Some of these benefits include a comprehensive review of your medical history including lifestyle, illnesses that may run in your family, and various assessments and screenings as appropriate. After reviewing your medical record and screening and assessments performed today your provider may have ordered immunizations, labs, imaging, and/or referrals for you. A list of these orders (if applicable) as well as your Preventive Care list are included within your After Visit Summary for your review. Other Preventive Recommendations:    · A preventive eye exam performed by an eye specialist is recommended every 1-2 years to screen for glaucoma; cataracts, macular degeneration, and other eye disorders. · A preventive dental visit is recommended every 6 months. · Try to get at least 150 minutes of exercise per week or 10,000 steps per day on a pedometer . · Order or download the FREE \"Exercise & Physical Activity: Your Everyday Guide\" from [employer]. Call 6-729.276.2277 or search [employer] online. · You need 3853-2901 mg of calcium and 8557-3851 IU of vitamin D per day. It is possible to meet your calcium requirement with diet alone, but a vitamin D supplement is usually necessary to meet this goal.  · When exposed to the sun, use a sunscreen that protects against both UVA and UVB radiation with an SPF of 30 or greater. Reapply every 2 to 3 hours or after sweating, drying off with a towel, or swimming. · Always wear a seat belt when traveling in a car. Always wear a helmet when riding a bicycle or motorcycle.

## 2022-02-01 NOTE — PROGRESS NOTES
Medicare Annual Wellness Visit  Name: Jerad Atwood Date: 2022   MRN: [id number] Sex: Female   Age: 73 y.o. Ethnicity: Non- / Non    : 1950 Race: White (non-)      Adam Medrano is here for Medicare AWV and 6 Month Follow-Up    Screenings for behavioral, psychosocial and functional/safety risks, and cognitive dysfunction are all negative except as indicated below. These results, as well as other patient data from the [address] form, are documented in Flowsheets linked to this Encounter. Allergies   Allergen Reactions    Boniva [Ibandronic Acid] Other (See Comments)     Jaw pain.  Fosamax [Alendronate Sodium] Other (See Comments)     Jaw pain. Prior to Visit Medications    Medication Sig Taking? Authorizing Provider   anastrozole (ARIMIDEX) 1 MG tablet TAKE 1 TABLET DAILY Yes Kenzie Andrews MD   Calcium Carb-Cholecalciferol (CALCIUM 1000 + D PO) Take by mouth Yes Historical Provider, MD   Lactobacillus-Inulin (CULTURELLE DIGESTIVE DAILY PO) Take by mouth Yes Historical Provider, MD       Past Medical History:   Diagnosis Date    Breast cancer ([facility] [address]     Diverticula of colon     Mild, sigmoid, asymptomatic.  Family history of breast cancer     paternal GM, sister, two paternal cousins, two paternal aunts    Osteopenia     With low vitamin D level. Insurance doesn't cover routine DEXA scans. Pt took bisphosphonates for a year and didn't tolerate.     Personal history of antineoplastic chemotherapy 10/2018    PONV (postoperative nausea and vomiting)     Unspecified vitamin D deficiency     Varicose veins of lower extremities        Past Surgical History:   Procedure Laterality Date    BREAST BIOPSY Right 2017    Right Breast biopsy w/ needle placement -arrive 11am -to x-ray 12pm performed by Mariam De Leon MD at [address] [facility] Bilateral 09/10/2018    BREAST ENHANCEMENT SURGERY Bilateral 2/15/2019    Bilateral breast reconstruction w/ expander placement performed by Loli Herrera MD at [address]  3/21/2005    Screening. Negative. Due for repeat 2015.  COLONOSCOPY N/A 10/26/2020    mild irritation left colon, biopsies normal. grade 2 internal hemorrhoids. Dr. Milka Abrams at [facility] [address]  7/22/1976    Status post miscarriage.  DILATION AND CURETTAGE OF UTERUS  9/1/1995    bleeding    HYSTERECTOMY, TOTAL ABDOMINAL      LYMPH NODE BIOPSY Left 09/10/2018    MASTECTOMY Bilateral 2/15/2019    Left Modified Radical Mastectomy w/ axillary disection & Right Mastectomy sentinel lymph node bx w/ Nuc med & blue dye Inj's performed by Oneil Castanon MD at [address] N/A 1/29/2020    PORT REMOVAL performed by Oneil Castanon MD at [address] CTR VAD W/SUBQ PORT AGE 5 YR/> N/A 9/19/2018    PORT INSERTION performed by Oneil Castanon MD at [facility] [address] / DELAYED W/ TISSUE EXPANDER Bilateral 2/11/2020    BREAST EXPANDER REMOVAL AND CASPULECTOMY  [facility] by Loli Herrera MD at [address]  11/7/1995    fibroids    TONSILLECTOMY  1978    Subsequent hemorrhage and cautery. Family History   Problem Relation Age of Onset    Hypertension Mother         Renal failure.  Diabetes Mother         Type 2 at end of life.  Heart Failure Father     Coronary Art Dis Father         Status post CABG    Diabetes Father         Type 2, very late in life.     Breast Cancer Sister 58    Hypertension Brother     Hypertension Sister     Breast Cancer Paternal Aunt         62s    Breast Cancer Paternal Grandmother     Breast Cancer Paternal Cousin     Breast Cancer Paternal Aunt         62s    Breast Cancer Paternal Cousin         46s        CareTeam (Including outside providers/suppliers regularly involved in providing care):   Patient Care Team:  Scarlett Bahena MD as PCP - General (Family Medicine)  Michelle Good MD as PCP - [facility]  Chayito Martinez MD as PCP - Hematology/Oncology (Hematology and Oncology)  Jaxon Johnson RN as Registered Nurse (Oncology)    Wt Readings from Last 3 Encounters:   02/01/22 199 lb (90.3 kg)   12/03/21 200 lb (90.7 kg)   09/23/21 204 lb (92.5 kg)     Vitals:    02/01/22 0805   BP: 120/70   Pulse: 73   Temp: 98.1 °F (36.7 °C)   SpO2: 99%   Weight: 199 lb (90.3 kg)   Height: 5' 7\" (1.702 m)     Body mass index is 31.17 kg/m². Based upon direct observation of the patient, evaluation of cognition reveals recent and remote memory intact. Review of Systems   Constitutional: Negative for activity change, appetite change, chills, fatigue and fever. Eyes: Negative for visual disturbance. Respiratory: Negative for cough, chest tightness, shortness of breath and wheezing. Cardiovascular: Negative for chest pain, palpitations and leg swelling. Gastrointestinal: Negative for abdominal pain, blood in stool, constipation and diarrhea. Genitourinary: Negative for difficulty urinating. Musculoskeletal: Positive for arthralgias (knees occasionally). Skin: Negative for color change and wound. Neurological: Negative for dizziness, syncope, weakness, light-headedness and headaches. Psychiatric/Behavioral: Negative for decreased concentration, dysphoric mood and sleep disturbance. The patient is not nervous/anxious. She is walking about 3 miles with friends 3-5 times a week. Patient's complete Health Risk Assessment and screening values have been reviewed and are found in Flowsheets. The following problems were reviewed today and where indicated follow up appointments were made and/or referrals ordered. Positive Risk Factor Screenings with Interventions:            General Health and ACP:  General  In general, how would you say your health is?: Very Good  In the past 7 days, have you experienced any of the following?  New or Increased Pain, New or Increased Fatigue, Loneliness, Social Isolation, Stress or Anger?: None of These  Do you get the social and emotional support that you need?: Yes  Do you have a Living Will?: Yes  Advance Directives     Power of  Living Will ACP-Advance Directive ACP-Power of     Not on File Not on File Not on File Not on File      General Health Risk Interventions:  · No Living Will: ACP documents already completed- patient asked to provide copy to the office    Health Habits/Nutrition:  Health Habits/Nutrition  Do you exercise for at least 20 minutes 2-3 times per week?: Yes  Have you lost any weight without trying in the past 3 months?: No  Do you eat only one meal per day?: No  Have you seen the dentist within the past year?: Yes  Body mass index: (!) 31.16  Health Habits/Nutrition Interventions:  · Nutritional issues:  educational materials to promote weight loss provided     Safety:  Safety  Do you have working smoke detectors?: Yes  Have all throw rugs been removed or fastened?: (!) No  Do you have non-slip mats or surfaces in all bathtubs/showers?: (!) No  Do all of your stairways have a railing or banister?: Yes  Are your doorways, halls and stairs free of clutter?: Yes  Do you always fasten your seatbelt when you are in a car?: Yes  Safety Interventions:  · Home safety tips provided       Personalized Preventive Plan   Current Health Maintenance Status  Immunization History   Administered Date(s) Administered    COVID-19, Pfizer Purple top, DILUTE for use, 12+ yrs, 30mcg/0.3mL dose 02/09/2021, 02/09/2021, 03/04/2021, 03/04/2021, 09/28/2021    Td (Adult), 5 Lf Tetanus Toxoid, Pf (Tenivac, Decavac) 07/21/2006    Td, unspecified formulation 07/21/2006        Health Maintenance   Topic Date Due    Depression Screen  Never done    Annual Wellness Visit (AWV)  01/26/2022    DTaP/Tdap/Td vaccine (1 - Tdap) 02/01/2023 (Originally 7/22/2006)    Flu vaccine (1) 02/01/2023 (Originally 9/1/2021)    Shingles Vaccine (1 of 2) 02/01/2023 (Originally 4/4/2002)    Pneumococcal 65+ years Vaccine (1 of 1 - PPSV23) 02/01/2023 (Originally 4/4/2017)    Diabetes screen  08/16/2024    Lipid screen  01/05/2027    Colon cancer screen colonoscopy  10/26/2030    DEXA (modify frequency per FRAX score)  Completed    COVID-19 Vaccine  Completed    Hepatitis C screen  Completed    Hepatitis A vaccine  Aged Out    Hepatitis B vaccine  Aged Out    Hib vaccine  Aged Out    Meningococcal (ACWY) vaccine  Aged Out     Recommendations for [employer] [facility] Due: see orders and patient instructions/AVS.  . Recommended screening schedule for the next 5-10 years is provided to the patient in written form: see Patient Instructions/AVS.    Idris Ramachandran was seen today for medicare awv and 6 month follow-up. Diagnoses and all orders for this visit:    Routine general medical examination at a health care facility           Return for Medicare Annual Wellness Visit in 1 year.     Adeline Díaz MD  2/1/2022  8:53 AM

## 2022-03-08 ENCOUNTER — HOSPITAL ENCOUNTER (OUTPATIENT)
Dept: BONE DENSITY | Age: 72
Discharge: HOME | End: 2022-03-10
Payer: MEDICARE

## 2022-03-08 ENCOUNTER — HOSPITAL ENCOUNTER (OUTPATIENT)
Dept: LAB | Age: 72
Discharge: HOME | End: 2022-03-08
Payer: MEDICARE

## 2022-03-08 DIAGNOSIS — Z79.811 LONG TERM (CURRENT) USE OF AROMATASE INHIBITORS: ICD-10-CM

## 2022-03-08 LAB
ABSOLUTE EOS #: 0.05 K/UL (ref 0–0.44)
ABSOLUTE IMMATURE GRANULOCYTE: <0.03 K/UL (ref 0–0.3)
ABSOLUTE LYMPH #: 1.19 K/UL (ref 1.1–3.7)
ABSOLUTE MONO #: 0.43 K/UL (ref 0.1–1.2)
ALBUMIN SERPL-MCNC: 4.3 G/DL (ref 3.5–5.2)
ALBUMIN/GLOBULIN RATIO: 1.8 (ref 1–2.5)
ALP BLD-CCNC: 71 U/L (ref 35–104)
ALT SERPL-CCNC: 21 U/L (ref 5–33)
ANION GAP SERPL CALCULATED.3IONS-SCNC: 12 MMOL/L (ref 9–17)
AST SERPL-CCNC: 25 U/L
BASOPHILS # BLD: 1 % (ref 0–2)
BASOPHILS ABSOLUTE: <0.03 K/UL (ref 0–0.2)
BILIRUB SERPL-MCNC: 0.52 MG/DL (ref 0.3–1.2)
BUN BLDV-MCNC: 17 MG/DL (ref 8–23)
BUN/CREAT BLD: 24 (ref 9–20)
CALCIUM SERPL-MCNC: 9.1 MG/DL (ref 8.6–10.4)
CHLORIDE BLD-SCNC: 109 MMOL/L (ref 98–107)
CO2: 23 MMOL/L (ref 20–31)
CREAT SERPL-MCNC: 0.71 MG/DL (ref 0.5–0.9)
EOSINOPHILS RELATIVE PERCENT: 1 % (ref 1–4)
GFR AFRICAN AMERICAN: >60 ML/MIN
GFR NON-AFRICAN AMERICAN: >60 ML/MIN
GFR SERPL CREATININE-BSD FRML MDRD: ABNORMAL ML/MIN/{1.73_M2}
GLUCOSE FASTING: 94 MG/DL (ref 70–99)
HCT VFR BLD CALC: 40.7 % (ref 36.3–47.1)
HEMOGLOBIN: 13.4 G/DL (ref 11.9–15.1)
IMMATURE GRANULOCYTES: 0 %
LYMPHOCYTES # BLD: 30 % (ref 24–43)
MCH RBC QN AUTO: 30.6 PG (ref 25.2–33.5)
MCHC RBC AUTO-ENTMCNC: 32.9 G/DL (ref 25.2–33.5)
MCV RBC AUTO: 92.9 FL (ref 82.6–102.9)
MONOCYTES # BLD: 11 % (ref 3–12)
NRBC AUTOMATED: 0 PER 100 WBC
PDW BLD-RTO: 12.9 % (ref 11.8–14.4)
PLATELET # BLD: 205 K/UL (ref 138–453)
PMV BLD AUTO: 11.5 FL (ref 8.1–13.5)
POTASSIUM SERPL-SCNC: 4.3 MMOL/L (ref 3.7–5.3)
RBC # BLD: 4.38 M/UL (ref 3.95–5.11)
SEG NEUTROPHILS: 57 % (ref 36–65)
SEGMENTED NEUTROPHILS ABSOLUTE COUNT: 2.32 K/UL (ref 1.5–8.1)
SODIUM BLD-SCNC: 144 MMOL/L (ref 135–144)
TOTAL PROTEIN: 6.7 G/DL (ref 6.4–8.3)
WBC # BLD: 4 K/UL (ref 3.5–11.3)

## 2022-03-08 PROCEDURE — 36415 COLL VENOUS BLD VENIPUNCTURE: CPT

## 2022-03-08 PROCEDURE — 85025 COMPLETE CBC W/AUTO DIFF WBC: CPT

## 2022-03-08 PROCEDURE — 77085 DXA BONE DENSITY AXL VRT FX: CPT

## 2022-03-08 PROCEDURE — 80053 COMPREHEN METABOLIC PANEL: CPT

## 2022-03-21 ENCOUNTER — OFFICE VISIT (OUTPATIENT)
Dept: ONCOLOGY | Age: 72
End: 2022-03-21
Payer: MEDICARE

## 2022-03-21 VITALS
SYSTOLIC BLOOD PRESSURE: 110 MMHG | OXYGEN SATURATION: 98 % | TEMPERATURE: 97.4 F | WEIGHT: 197.5 LBS | DIASTOLIC BLOOD PRESSURE: 70 MMHG | BODY MASS INDEX: 31 KG/M2 | HEIGHT: 67 IN | HEART RATE: 72 BPM

## 2022-03-21 DIAGNOSIS — Z79.811 LONG TERM (CURRENT) USE OF AROMATASE INHIBITORS: ICD-10-CM

## 2022-03-21 DIAGNOSIS — C50.411 MALIGNANT NEOPLASM OF UPPER-OUTER QUADRANT OF BOTH BREASTS IN FEMALE, ESTROGEN RECEPTOR POSITIVE (HCC): Primary | ICD-10-CM

## 2022-03-21 DIAGNOSIS — Z17.0 MALIGNANT NEOPLASM OF UPPER-OUTER QUADRANT OF BOTH BREASTS IN FEMALE, ESTROGEN RECEPTOR POSITIVE (HCC): Primary | ICD-10-CM

## 2022-03-21 DIAGNOSIS — C50.412 MALIGNANT NEOPLASM OF UPPER-OUTER QUADRANT OF BOTH BREASTS IN FEMALE, ESTROGEN RECEPTOR POSITIVE (HCC): Primary | ICD-10-CM

## 2022-03-21 DIAGNOSIS — M54.50 MIDLINE LOW BACK PAIN WITHOUT SCIATICA, UNSPECIFIED CHRONICITY: ICD-10-CM

## 2022-03-21 PROCEDURE — 99214 OFFICE O/P EST MOD 30 MIN: CPT | Performed by: INTERNAL MEDICINE

## 2022-03-21 NOTE — PROGRESS NOTES
Patient ID: Jeffery Rod, 1950, [id number], 73 y.o. Surgeon : Dr. Piter Scott  Diagnosis:   Diagnosis of bilateral breast cancer  Left invasive ductal carcinoma, ER/WI positive and HER-2/kelsy Negative, 2 foci 2.0 cm and 2.1 cm, clinical staging T2 N1 M0, stage II  Right breast cancer, lower inner quadrant, T1b N0 M0, stage I, ER/WI positive HER-2 negative  Started on neoadjuvant chemotherapy with Adriamycin plus cyclophosphamide on 10/3/18 unfortunately she had significant reaction with generalized tremors, shortness of breath and chest tightness after chemotherapy  Her chemotherapy was changed to weekly Taxol and she completed 12 cycles on 1/2/19  Had bilateral mastectomies on 2/15/19  Adjuvant radiation therapy started on 4/2/19 and will complete 5/9/19  Xeloda  Started 5/27/19 And completed on 9/22/19  Currently on Arimidex. Last DEXA scan 3/8/2022 showed osteopenia  HISTORY OF PRESENT ILLNESS:    Oncologic History:  Dari Sibley is a 16-year-old female with a family history of breast cancer was seen during initial consultation visit for newly diagnosed bilateral breast cancer. Patient had abnormal mammogram about a year ago and the biopsy showed ductal papilloma so she had excision and was being followed by Gen. surgery. Recently she noted a lump in her left breast and had a mammogram and ultrasound which confirmed 2 lesions in her left breast measuring 2.0 and 2.1 cm and one lesion in the right breast measuring 0.8 cm. Also 2 lymph nodes noted in the left axilla. Patient underwent biopsy of these agents including axillary lymph node which confirmed presence of invasive ductal carcinoma and on the biopsies. She is here for further recommendations. She does not have many medical conditions and her ECOG performance status is 0. She is physically active and not on any regular medications.   She has history of breast cancer in her sister was diagnosed at the age of 58 and also reports history of breast cancer in her grandmother. She quit smoking in  and drinks alcohol occasionally. She has had hysterectomy and oophorectomy. Interval history:  Patient is returning for follow-up visit and to discuss lab results, DEXA scan results and further recommendations. Her recent DEXA scan showed osteopenia. She denies any new chest pain or shortness of breath. She does complain of worsening of his low back pain which she had for years. Recently for the past few weeks the pain has been occurring every day. She denies any trauma or fall. She is on Arimidex and tolerating well. She denies any hot flashes. During this visit patient's allergy, social, medical, surgical history and medications were reviewed and updated. Allergies   Allergen Reactions    Boniva [Ibandronic Acid] Other (See Comments)     Jaw pain.  Fosamax [Alendronate Sodium] Other (See Comments)     Jaw pain. Current Outpatient Medications   Medication Sig Dispense Refill    anastrozole (ARIMIDEX) 1 MG tablet TAKE 1 TABLET DAILY 90 tablet 3    Calcium Carb-Cholecalciferol (CALCIUM 1000 + D PO) Take by mouth      Lactobacillus-Inulin (CULTURELLE DIGESTIVE DAILY PO) Take by mouth       No current facility-administered medications for this visit. Social History     Socioeconomic History    Marital status:      Spouse name: Not on file    Number of children: Not on file    Years of education: Not on file    Highest education level: Not on file   Occupational History    Occupation: retired     Employer: [employer]   Tobacco Use    Smoking status: Former Smoker     Packs/day: 0.50     Years: 7.00     Pack years: 3.50     Types: Cigarettes     Quit date: 1978     Years since quittin.2    Smokeless tobacco: Never Used    Tobacco comment: Quit smoking in . Vaping Use    Vaping Use: Never used   Substance and Sexual Activity    Alcohol use: No     Comment: Occasional alcohol.     Drug use: No    Sexual activity: Not on file   Other Topics Concern    Not on file   Social History Narrative    Not on file     Social Determinants of Health     Financial Resource Strain: Unknown    Difficulty of Paying Living Expenses: Patient refused   Food Insecurity: Unknown    Worried About Running Out of Food in the Last Year: Patient refused    [address] in the Last Year: Patient refused   Transportation Needs:     Lack of Transportation (Medical): Not on file    Lack of Transportation (Non-Medical): Not on file   Physical Activity:     Days of Exercise per Week: Not on file    Minutes of Exercise per Session: Not on file   Stress:     Feeling of Stress : Not on file   Social Connections:     Frequency of Communication with Friends and Family: Not on file    Frequency of Social Gatherings with Friends and Family: Not on file    Attends [employer] Services: Not on file    Active Member of [address] [place] [address] [employer] or Organizations: Not on file    Attends Club or Organization Meetings: Not on file    Marital Status: Not on file   Intimate Partner Violence:     Fear of Current or Ex-Partner: Not on file    Emotionally Abused: Not on file    Physically Abused: Not on file    Sexually Abused: Not on file   Housing Stability:     Unable to Pay for Housing in the Last Year: Not on file    Number of [place] in the Last Year: Not on file    Unstable Housing in the Last Year: Not on file       Family History   Problem Relation Age of Onset    Hypertension Mother         Renal failure.  Diabetes Mother         Type 2 at end of life.  Heart Failure Father     Coronary Art Dis Father         Status post CABG    Diabetes Father         Type 2, very late in life.     Breast Cancer Sister 58    Hypertension Brother     Hypertension Sister     Breast Cancer Paternal Aunt         62s    Breast Cancer Paternal Grandmother     Breast Cancer Paternal Cousin     Breast Cancer Paternal Aunt         62s    Breast Cancer Paternal Cousin         46s      REVIEW OF SYSTEM:   Constitutional: No fever or chills. No night sweats, no weight loss   Eyes: No eye discharge, double vision, or eye pain   HEENT: negative for sore mouth, sore throat, hoarseness and voice change   Respiratory: negative for cough , sputum, dyspnea, wheezing, hemoptysis, chest pain   Cardiovascular: negative for chest pain, dyspnea, palpitations, orthopnea, PND   Gastrointestinal: negative for nausea, vomiting, diarrhea, constipation, abdominal pain, Dysphagia, hematemesis and hematochezia   Genitourinary: negative for frequency, dysuria, nocturia, urinary incontinence, and hematuria   Integument: negative for rash, skin lesions, bruises.    Hematologic/Lymphatic: negative for easy bruising, bleeding, lymphadenopathy, petechiae and swelling/edema   Endocrine: negative for heat or cold intolerance, tremor, weight changes, change in bowel habits and hair loss   Musculoskeletal: negative for myalgias, arthralgias, pain, joint swelling,and bone pain   Neurological: negative for headaches, dizziness, seizures, weakness, numbness   OBJECTIVE:         Vitals:    03/21/22 0901   BP: 110/70   Pulse: 72   Temp: 97.4 °F (36.3 °C)   SpO2: 98%   PHYSICAL EXAM:   General appearance - well appearing, no in pain or distress   Mental status - alert and cooperative   Eyes - pupils equal and reactive, extraocular eye movements intact   Ears - bilateral TM's and external ear canals normal   Mouth - mucous membranes moist, pharynx normal without lesions   Neck - supple, no significant adenopathy   Lymphatics - Mild palpable lymphadenopathy in the left axilla, no hepatosplenomegaly   Chest - clear to auscultation, no wheezes, rales or rhonchi, symmetric air entry   Breast: biat breast surgically absent, drains in place  2 cm lump palpable in left breast  Heart - normal rate, regular rhythm, normal S1, S2, no murmurs, rubs, clicks or gallops   Abdomen - soft, nontender, nondistended, no masses or organomegaly   Neurological - alert, oriented, normal speech, no focal findings or movement disorder noted   Musculoskeletal - no joint tenderness, deformity or swelling   Extremities - peripheral pulses normal, no pedal edema, no clubbing or cyanosis   Skin - normal coloration and turgor, no rashes, no suspicious skin lesions noted   LABORATORY DATA:     Lab Results   Component Value Date    WBC 4.0 03/08/2022    HGB 13.4 03/08/2022    HCT 40.7 03/08/2022    MCV 92.9 03/08/2022     03/08/2022    LYMPHOPCT 30 03/08/2022    RBC 4.38 03/08/2022    MCH 30.6 03/08/2022    MCHC 32.9 03/08/2022    RDW 12.9 03/08/2022    MONOPCT 11 03/08/2022    BASOPCT 1 03/08/2022    NEUTROABS 2.32 03/08/2022    LYMPHSABS 1.19 03/08/2022    MONOSABS 0.43 03/08/2022    EOSABS 0.05 03/08/2022    BASOSABS <0.03 03/08/2022       Chemistry        Component Value Date/Time     03/08/2022 0829    K 4.3 03/08/2022 0829     (H) 03/08/2022 0829    CO2 23 03/08/2022 0829    BUN 17 03/08/2022 0829    CREATININE 0.71 03/08/2022 0829        Component Value Date/Time    CALCIUM 9.1 03/08/2022 0829    ALKPHOS 71 03/08/2022 0829    AST 25 03/08/2022 0829    ALT 21 03/08/2022 0829    BILITOT 0.52 03/08/2022 0829        PATHOLOGY DATA:   Received: 9/11/2018   Reported: 9/12/2018 17:30     -- Diagnosis --   1.  RIGHT BREAST, CORE NEEDLE BIOPSIES:            -  WELL-DIFFERENTIATED INFILTRATING DUCTAL CARCINOMA, 0.6 CM   IN LARGEST INTACT CORE FRAGMENT.            -  ESTROGEN AND PROGESTERONE RECEPTOR IMMUNOSTAINS POSITIVE   IN INVASIVE TUMOR.       -  HER-2 FISH PENDING. 2.  LEFT BREAST, 2:00, N+4, CORE NEEDLE BIOPSIES:            -  MODERATELY DIFFERENTIATED INFILTRATING DUCTAL CARCINOMA,   0.6 CM IN LARGEST INTACT CORE FRAGMENT.       -  FOCAL DUCTAL CARCINOMA IN SITU, INTERMEDIATE GRADE, CRIBRIFORM   AND SOLID TYPE.            -  ESTROGEN AND PROGESTERONE RECEPTOR IMMUNOSTAINS POSITIVE   IN INVASIVE TUMOR.     -  HER-2 FISH PENDING. 3.  LEFT BREAST, 1:00, N+6, CORE NEEDLE BIOPSIES:            -  POORLY DIFFERENTIATED INFILTRATING DUCTAL CARCINOMA, 1.3   CM GREATEST DIMENSION IN LARGEST INTACT CORE FRAGMENT.       -  FOCAL DUCTAL CARCINOMA IN SITU, INTERMEDIATE GRADE, SOLID   TYPE.            -  ESTROGEN AND PROGESTERONE RECEPTOR IMMUNOSTAINS POSITIVE   IN INVASIVE TUMOR.       -  HER-2 FISH PENDING. 4.  LYMPH NODE, LEFT AXILLA, CORE NEEDLE BIOPSIES:       -  INFILTRATING DUCTAL CARCINOMA.          -  INVASIVE TUMOR POSITIVE FOR ESTROGEN RECEPTOR AND   NEGATIVE FOR PROGESTERONE RECEPTOR IMMUNOSTAINS.     -  LYMPH NODE AND BENIGN BREAST ARCHITECTURE NOT IDENTIFIED. -- Diagnosis Comment --   SPECIMEN #4: CORRELATION WITH THE RADIOLOGIC PLACEMENT OF THE BIOPSY   WOULD BE NECESSARY TO DETERMINE IF THIS REPRESENTS A LYMPH NODE WITH   METASTATIC CARCINOMA, VERSUS CARCINOMA INVOLVING THE AXILLARY TAIL OF   THE BREAST.  NO INTRINSIC LYMPH NODE OR BENIGN BREAST ARCHITECTURE IS   PRESENT FOR CONFIRMATION. 2/15/19: Final Diagnosis   1. LYMPH NODE, EXCISION (RIGHT SENTINEL):        - NO NEOPLASM DETECTED. 2. BREAST, MASTECTOMY (RIGHT):        - SCANT RESIDUAL FOCUS OF INVASIVE DUCTAL CARCINOMA.      - DUCTAL CARCINOMA IN SITU.        - EXCISION MARGINS NEGATIVE FOR NEOPLASM. 3. BREAST, MASTECTOMY, INCLUDING AXILLARY LYMPH NODES (LEFT):        - INVASIVE DUCTAL CARCINOMA, 1.5 CM        - DUCTAL CARCINOMA IN SITU.        - EXCISION MARGINS NEGATIVE FOR NEOPLASM. Diagnosis Comment   BIOMARKER STUDIES WERE PERFORMED ON PRIOR BIOPSIES FROM TUMORS FROM   BOTH BREASTS ([id number]) -     RIGHT:        - POSITIVE ESTROGEN RECEPTOR.        - POSITIVE PROGESTERONE RECEPTOR.        - NEGATIVE FOR HER2 GENE AMPLIFICATION. LEFT:        - POSITIVE ESTROGEN RECEPTOR.        - POSITIVE PROGESTERONE RECEPTOR.        - NEGATIVE FOR HER2 GENE AMPLIFICATION.    Procedures/Addenda   ADDENDUM     Date Ordered:     2/21/2019     Status: Signed Out        Date Complete:     2/21/2019     By: Jesus Gomez M.D.        Date Reported:     2/21/2019       ADDENDUM DIAGNOSIS   3.  BREAST, MASTECTOMY, WITH AXILLARY LYMPH NODES (LEFT):        - NO CHANGE IN DIAGNOSIS. ADDENDUM COMMENT   THE CASE IS DISCUSSED WITH DR. CID, FEBRUARY 20, 2019.  SLIDES ARE   REVIEWED, WITH SPECIFIC ATTENTION TO THE 6 SLIDES FROM AXILLARY   REGION.  THESE REPRESENT 2 LYMPH NODES, INCLUDING A LARGER 4.5 CM   NODE, AND NEITHER CONTAINS NEOPLASM ON REVIEW.  THE GROSS MASTECTOMY   SPECIMEN IS THEN REEXAMINED WITH SPECIFIC ATTENTION TO THE AXILLARY   TISSUE, AS WELL AS THE BREAST ADJACENT TO THE AXILLARY REGION.  NO   ADDITIONAL GROSSLY IDENTIFIABLE LYMPH NODES ARE FOUND.  REPRESENTATIVE   SAMPLING OF 8 ADDITIONAL BLOCKS FROM THIS AXILLARY REGION IS SUBMITTED   FOR POSSIBLE IDENTIFICATION OF MINUTE NODES THAT MAY NOT BE GROSSLY   EVIDENT.  HOWEVER, NO ADDITIONAL NODES ARE DETECTED ON MICROSCOPIC   EXAM OF THESE ADDITIONAL BLOCKS.   IMAGING DATA:    Ultrasound breast 9/10/18  FINDINGS:   DIAGNOSTIC MAMMOGRAM: The breasts are composed of scattered fibroglandular   tissue.  Underlying the area palpable concern in the upper outer left breast,   2 masses are identified measuring up to 1.6 cm anteriorly and with a more   suspicious spiculated 2.4 cm mass with adjacent distortion.  No abnormal   calcifications are identified.  In the right breast, a spiculated 1 cm mass   is identified in the lower inner quadrant, 6 cm from the nipple.  No   associated microcalcifications.       TARGETED LEFT ULTRASOUND: Ultrasound was performed in the area of palpable   concern, identifying 2 adjacent irregular hypoechoic masses measuring 1.7 x   2.0 x 1.4 cm in the 2 o'clock position, 4 cm from the nipple and 1.8 x 2.1 x   2.0 cm in the 1 o'clock position, 6 cm from the nipple.       Ultrasound in the left axilla was also performed identifying a probable   abnormal lymph node in the axillary tail measuring 2.1 x 1.6 x 2.2 cm.  This   has an irregular appearance with a hyperechoic rim and no identifiable fatty   hilum. Andrew Conradi is an adjacent abnormal lymph node measuring 1.7 x 1.0 x 2.0 cm   with a small fatty hilum and focal cortical thickening.       TARGETED RIGHT ULTRASOUND: Ultrasound was performed in the area of   mammographic concern in the lower inner quadrant.  A suspicious irregular   hypoechoic mass measuring 0.7 x 0.8 x 0.8 cm is present in the 3 o'clock   position, 7 cm from the nipple.  This corresponds to the mammographic finding.       Ultrasound imaging of the right axilla was also performed without   identifiable lymph nodes.           Impression   1. 2 noncalcified suspicious masses in the area palpable concern in the 1-2   o'clock position of the left breast with sonographic correlate measuring up   to 2.0 cm and 2.1 cm.       2. Left axillary ultrasound identifies 2 abnormal lymph nodes.       3.  New suspicious noncalcified mass in the lower inner quadrant of the right   breast with corresponding sonographic 0.8 cm mass in the 3 o'clock position,   7 cm from the nipple.       4.  No identifiable lymph nodes in the right axilla.       Ultrasound-guided biopsy of the left breast masses, a left axillary lymph   node, and right breast mass is recommended.       BIRADS:   BIRADS - CATEGORY 5       Findings are highly suggestive of malignancy.  Biopsy should be considered at   this time. Bone scan 9/21/18  Impression   No evidence to suggest osseous metastatic disease.      CT chest abdomen pelvis 9/21/18  Impression   1.  Known bilateral breast masses and left axillary lymphadenopathy are   demonstrated.  No evidence for distant metastatic disease in the chest,   abdomen, or pelvis.       2.  Tiny right hydropneumothorax, presumed related to recent right chest port   placement.       3.  Tiny hiatal hernia.       4.  There is a 1.5 cm right thyroid nodule.  Thyroid ultrasound is   recommended.       The result was communicated to the ordering physician, Dr. Jeanne Rg, at 1:42   p.m. on 09/21/2018.  This was communicated by the radiology communication   center [facility], as I was not available when Dr. Jeanne Rg returned the   phone call. Mammogram 1/4/19  Impression   Mammographic evidence of response of therapy within both breasts as described   above.  No new lesions are identified.       CT chest 11/11/2019:  Impression   1. In the region of previously described ill-defined nodules involving the   left lung apex, there appears to be interval development of an ill-defined   consolidation.  Finding is likely post treatment in nature however underlying   infectious process cannot be excluded.  If infectious process is suspected,   repeat CT after therapy is recommended.  No definitive CT evidence of   metastatic disease within the chest.   2. Small hiatal hernia. 3. Hepatic steatosis. 4. Nodules involving the thyroid gland, largest measuring 1.6 cm. Correlation with ultrasound is recommended.       CT chest 1/7/2020  Impression   1. No suspicious nodules or masses. 2. Stable or slightly improved left lung apex reticular interlobular septal   thickening on background of patchy ground-glass attenuation and minor   anterior subpleural reticulations in the left upper lobe attributed to post   treatment scarring.  Attention on follow-up. 3. Bilateral breast implants in right IJ infusion port. ASSESSMENT:     Brittney Hinds has clinical stage II, T2 N1 M0 disease in her left breast and she has clinical stage I T1 N0 M0 disease in her right breast.    Unfortunately she had significant reaction to first cycle of AC chemotherapy. I gave preoperative  weekly paclitaxel for 12 cycles. She underwent surgical resection: I discussed the surgical pathology report.  LNs showed no residual cancer, NO residual cancer noted in right breast, But left breast showed smaller residual invasive cancer 1.5 cm and she had negative margins. She  Has completed RT. Now completed Xeloda well. She is tolerating Arimidex very well  During today's visit, the patient and the family had a number of reasonable questions which were answered to their satisfaction. They verbalized understanding of the information provided and they agreed to proceed as outlined above. PLAN:   I reviewed her lab work, imaging studies and discuss diagnosis treatment recommendations  Her DEXA scan showed osteopenia  Given worsening of her low back pain, I will get MRI of spine to rule out any bone metastasis  Return to clinic after MRI      Benson Olvera MD  Hematologist/Medical Oncologist  I spent more than 25 minutes examining, evaluating, reviewing data and counseling the patient. Greater than 50% of that time was spent face-to-face with the patient in counseling and coordinating her care. This note is created with the assistance of a speech recognition program.  While intending to generate a document that actually reflects the content of the visit, the document can still have some errors including those of syntax and sound a like substitutions which may escape proof reading. It such instances, actual meaning can be extrapolated by contextual diversion.

## 2022-04-08 ENCOUNTER — HOSPITAL ENCOUNTER (OUTPATIENT)
Dept: MRI IMAGING | Age: 72
Discharge: HOME | End: 2022-04-10
Payer: MEDICARE

## 2022-04-08 DIAGNOSIS — C50.411 MALIGNANT NEOPLASM OF UPPER-OUTER QUADRANT OF BOTH BREASTS IN FEMALE, ESTROGEN RECEPTOR POSITIVE (HCC): ICD-10-CM

## 2022-04-08 DIAGNOSIS — Z17.0 MALIGNANT NEOPLASM OF UPPER-OUTER QUADRANT OF BOTH BREASTS IN FEMALE, ESTROGEN RECEPTOR POSITIVE (HCC): ICD-10-CM

## 2022-04-08 DIAGNOSIS — C50.412 MALIGNANT NEOPLASM OF UPPER-OUTER QUADRANT OF BOTH BREASTS IN FEMALE, ESTROGEN RECEPTOR POSITIVE (HCC): ICD-10-CM

## 2022-04-08 DIAGNOSIS — M54.50 MIDLINE LOW BACK PAIN WITHOUT SCIATICA, UNSPECIFIED CHRONICITY: ICD-10-CM

## 2022-04-08 PROCEDURE — 6360000004 HC RX CONTRAST MEDICATION: Performed by: INTERNAL MEDICINE

## 2022-04-08 PROCEDURE — A9579 GAD-BASE MR CONTRAST NOS,1ML: HCPCS | Performed by: INTERNAL MEDICINE

## 2022-04-08 PROCEDURE — 72158 MRI LUMBAR SPINE W/O & W/DYE: CPT

## 2022-04-08 RX ADMIN — GADOTERIDOL 15 ML: 279.3 INJECTION, SOLUTION INTRAVENOUS at 11:50

## 2022-04-11 ENCOUNTER — OFFICE VISIT (OUTPATIENT)
Dept: ONCOLOGY | Age: 72
End: 2022-04-11
Payer: MEDICARE

## 2022-04-11 VITALS
HEART RATE: 82 BPM | OXYGEN SATURATION: 96 % | WEIGHT: 195.2 LBS | SYSTOLIC BLOOD PRESSURE: 120 MMHG | HEIGHT: 67 IN | BODY MASS INDEX: 30.64 KG/M2 | DIASTOLIC BLOOD PRESSURE: 64 MMHG | TEMPERATURE: 97.9 F

## 2022-04-11 DIAGNOSIS — Z17.0 MALIGNANT NEOPLASM OF UPPER-OUTER QUADRANT OF BOTH BREASTS IN FEMALE, ESTROGEN RECEPTOR POSITIVE (HCC): Primary | ICD-10-CM

## 2022-04-11 DIAGNOSIS — Z79.811 LONG TERM (CURRENT) USE OF AROMATASE INHIBITORS: ICD-10-CM

## 2022-04-11 DIAGNOSIS — C50.411 MALIGNANT NEOPLASM OF UPPER-OUTER QUADRANT OF BOTH BREASTS IN FEMALE, ESTROGEN RECEPTOR POSITIVE (HCC): Primary | ICD-10-CM

## 2022-04-11 DIAGNOSIS — C50.412 MALIGNANT NEOPLASM OF UPPER-OUTER QUADRANT OF BOTH BREASTS IN FEMALE, ESTROGEN RECEPTOR POSITIVE (HCC): Primary | ICD-10-CM

## 2022-04-11 PROCEDURE — 99211 OFF/OP EST MAY X REQ PHY/QHP: CPT | Performed by: INTERNAL MEDICINE

## 2022-04-11 PROCEDURE — 99214 OFFICE O/P EST MOD 30 MIN: CPT | Performed by: INTERNAL MEDICINE

## 2022-04-11 NOTE — PROGRESS NOTES
Patient ID: Anand Dominguez, 1950, [id number], 81 y.o. Surgeon : Dr. Yayo Cullen  Diagnosis:   Diagnosis of bilateral breast cancer  Left invasive ductal carcinoma, ER/[place] positive and HER-2/kelsy Negative, 2 foci 2.0 cm and 2.1 cm, clinical staging T2 N1 M0, stage II  Right breast cancer, lower inner quadrant, T1b N0 M0, stage I, ER/[place] positive HER-2 negative  Started on neoadjuvant chemotherapy with Adriamycin plus cyclophosphamide on 10/3/18 unfortunately she had significant reaction with generalized tremors, shortness of breath and chest tightness after chemotherapy  Her chemotherapy was changed to weekly Taxol and she completed 12 cycles on 1/2/19  Had bilateral mastectomies on 2/15/19  Adjuvant radiation therapy started on 4/2/19 and will complete 5/9/19  Xeloda  Started 5/27/19 And completed on 9/22/19  Currently on Arimidex. Last DEXA scan 3/8/2022 showed osteopenia  HISTORY OF PRESENT ILLNESS:    Oncologic History:  Janiya Severino is a 29-year-old female with a family history of breast cancer was seen during initial consultation visit for newly diagnosed bilateral breast cancer. Patient had abnormal mammogram about a year ago and the biopsy showed ductal papilloma so she had excision and was being followed by Gen. surgery. Recently she noted a lump in her left breast and had a mammogram and ultrasound which confirmed 2 lesions in her left breast measuring 2.0 and 2.1 cm and one lesion in the right breast measuring 0.8 cm. Also 2 lymph nodes noted in the left axilla. Patient underwent biopsy of these agents including axillary lymph node which confirmed presence of invasive ductal carcinoma and on the biopsies. She is here for further recommendations. She does not have many medical conditions and her ECOG performance status is 0. She is physically active and not on any regular medications.   She has history of breast cancer in her sister was diagnosed at the age of 58 and also reports history of breast cancer in her grandmother. She quit smoking in  and drinks alcohol occasionally. She has had hysterectomy and oophorectomy. Interval history:  Patient is returning for follow-up visit and to discuss lab results, imaging studies and further recommendations. Her MRI of the spine showed no evidence of metastatic disease. Her MRI showed degenerative changes. She is using Aleve for her low back pain. She denies any chest pain or shortness of breath. She is tolerating Arimidex well denies any significant hot flashes. During this visit patient's allergy, social, medical, surgical history and medications were reviewed and updated. Allergies   Allergen Reactions    Boniva [Ibandronic Acid] Other (See Comments)     Jaw pain.  Fosamax [Alendronate Sodium] Other (See Comments)     Jaw pain. Current Outpatient Medications   Medication Sig Dispense Refill    anastrozole (ARIMIDEX) 1 MG tablet TAKE 1 TABLET DAILY 90 tablet 3    Calcium Carb-Cholecalciferol (CALCIUM 1000 + D PO) Take by mouth      Lactobacillus-Inulin (CULTURELLE DIGESTIVE DAILY PO) Take by mouth       No current facility-administered medications for this visit. Social History     Socioeconomic History    Marital status:      Spouse name: Not on file    Number of children: Not on file    Years of education: Not on file    Highest education level: Not on file   Occupational History    Occupation: retired     Employer: [employer]   Tobacco Use    Smoking status: Former Smoker     Packs/day: 0.50     Years: 7.00     Pack years: 3.50     Types: Cigarettes     Quit date: 1978     Years since quittin.3    Smokeless tobacco: Never Used    Tobacco comment: Quit smoking in . Vaping Use    Vaping Use: Never used   Substance and Sexual Activity    Alcohol use: No     Comment: Occasional alcohol.     Drug use: No    Sexual activity: Not on file   Other Topics Concern    Not on file Social History Narrative    Not on file     Social Determinants of Health     Financial Resource Strain: Unknown    Difficulty of Paying Living Expenses: Patient refused   Food Insecurity: Unknown    Worried About Running Out of Food in the Last Year: Patient refused    [address] in the Last Year: Patient refused   Transportation Needs:     Lack of Transportation (Medical): Not on file    Lack of Transportation (Non-Medical): Not on file   Physical Activity:     Days of Exercise per Week: Not on file    Minutes of Exercise per Session: Not on file   Stress:     Feeling of Stress : Not on file   Social Connections:     Frequency of Communication with Friends and Family: Not on file    Frequency of Social Gatherings with Friends and Family: Not on file    Attends [employer] Services: Not on file    Active Member of [address] [place] [address] [place] or Organizations: Not on file    Attends Club or Organization Meetings: Not on file    Marital Status: Not on file   Intimate Partner Violence:     Fear of Current or Ex-Partner: Not on file    Emotionally Abused: Not on file    Physically Abused: Not on file    Sexually Abused: Not on file   Housing Stability:     Unable to Pay for Housing in the Last Year: Not on file    Number of [place] in the Last Year: Not on file    Unstable Housing in the Last Year: Not on file       Family History   Problem Relation Age of Onset    Hypertension Mother         Renal failure.  Diabetes Mother         Type 2 at end of life.  Heart Failure Father     Coronary Art Dis Father         Status post CABG    Diabetes Father         Type 2, very late in life.     Breast Cancer Sister 58    Hypertension Brother     Hypertension Sister     Breast Cancer Paternal Aunt         62s    Breast Cancer Paternal Grandmother     Breast Cancer Paternal Cousin     Breast Cancer Paternal Aunt         62s    Breast Cancer Paternal Cousin         46s      REVIEW OF SYSTEM: Constitutional: No fever or chills. No night sweats, no weight loss   Eyes: No eye discharge, double vision, or eye pain   HEENT: negative for sore mouth, sore throat, hoarseness and voice change   Respiratory: negative for cough , sputum, dyspnea, wheezing, hemoptysis, chest pain   Cardiovascular: negative for chest pain, dyspnea, palpitations, orthopnea, PND   Gastrointestinal: negative for nausea, vomiting, diarrhea, constipation, abdominal pain, Dysphagia, hematemesis and hematochezia   Genitourinary: negative for frequency, dysuria, nocturia, urinary incontinence, and hematuria   Integument: negative for rash, skin lesions, bruises.    Hematologic/Lymphatic: negative for easy bruising, bleeding, lymphadenopathy, petechiae and swelling/edema   Endocrine: negative for heat or cold intolerance, tremor, weight changes, change in bowel habits and hair loss   Musculoskeletal: negative for myalgias, arthralgias, pain, joint swelling,and bone pain   Neurological: negative for headaches, dizziness, seizures, weakness, numbness   OBJECTIVE:         Vitals:    04/11/22 0917   BP: 120/64   Pulse: 82   Temp: 97.9 °F (36.6 °C)   SpO2: 96%   PHYSICAL EXAM:   General appearance - well appearing, no in pain or distress   Mental status - alert and cooperative   Eyes - pupils equal and reactive, extraocular eye movements intact   Ears - bilateral TM's and external ear canals normal   Mouth - mucous membranes moist, pharynx normal without lesions   Neck - supple, no significant adenopathy   Lymphatics - Mild palpable lymphadenopathy in the left axilla, no hepatosplenomegaly   Chest - clear to auscultation, no wheezes, rales or rhonchi, symmetric air entry   Breast: biat breast surgically absent, drains in place  2 cm lump palpable in left breast  Heart - normal rate, regular rhythm, normal S1, S2, no murmurs, rubs, clicks or gallops   Abdomen - soft, nontender, nondistended, no masses or organomegaly   Neurological - alert, oriented, normal speech, no focal findings or movement disorder noted   Musculoskeletal - no joint tenderness, deformity or swelling   Extremities - peripheral pulses normal, no pedal edema, no clubbing or cyanosis   Skin - normal coloration and turgor, no rashes, no suspicious skin lesions noted   LABORATORY DATA:     Lab Results   Component Value Date    WBC 4.0 03/08/2022    HGB 13.4 03/08/2022    HCT 40.7 03/08/2022    MCV 92.9 03/08/2022     03/08/2022    LYMPHOPCT 30 03/08/2022    RBC 4.38 03/08/2022    MCH 30.6 03/08/2022    MCHC 32.9 03/08/2022    RDW 12.9 03/08/2022    MONOPCT 11 03/08/2022    BASOPCT 1 03/08/2022    NEUTROABS 2.32 03/08/2022    LYMPHSABS 1.19 03/08/2022    MONOSABS 0.43 03/08/2022    EOSABS 0.05 03/08/2022    BASOSABS <0.03 03/08/2022       Chemistry        Component Value Date/Time     03/08/2022 0829    K 4.3 03/08/2022 0829     (H) 03/08/2022 0829    CO2 23 03/08/2022 0829    BUN 17 03/08/2022 0829    CREATININE 0.71 03/08/2022 0829        Component Value Date/Time    CALCIUM 9.1 03/08/2022 0829    ALKPHOS 71 03/08/2022 0829    AST 25 03/08/2022 0829    ALT 21 03/08/2022 0829    BILITOT 0.52 03/08/2022 0829        PATHOLOGY DATA:   Received: 9/11/2018   Reported: 9/12/2018 17:30     -- Diagnosis --   1.  RIGHT BREAST, CORE NEEDLE BIOPSIES:            -  WELL-DIFFERENTIATED INFILTRATING DUCTAL CARCINOMA, 0.6 CM   IN LARGEST INTACT CORE FRAGMENT.            -  ESTROGEN AND PROGESTERONE RECEPTOR IMMUNOSTAINS POSITIVE   IN INVASIVE TUMOR.       -  HER-2 FISH PENDING. 2.  LEFT BREAST, 2:00, N+4, CORE NEEDLE BIOPSIES:            -  MODERATELY DIFFERENTIATED INFILTRATING DUCTAL CARCINOMA,   0.6 CM IN LARGEST INTACT CORE FRAGMENT.       -  FOCAL DUCTAL CARCINOMA IN SITU, INTERMEDIATE GRADE, CRIBRIFORM   AND SOLID TYPE.            -  ESTROGEN AND PROGESTERONE RECEPTOR IMMUNOSTAINS POSITIVE   IN INVASIVE TUMOR.       -  HER-2 FISH PENDING.      3.  LEFT BREAST, 1:00, N+6, CORE NEEDLE BIOPSIES:            -  POORLY DIFFERENTIATED INFILTRATING DUCTAL CARCINOMA, 1.3   CM GREATEST DIMENSION IN LARGEST INTACT CORE FRAGMENT.       -  FOCAL DUCTAL CARCINOMA IN SITU, INTERMEDIATE GRADE, SOLID   TYPE.            -  ESTROGEN AND PROGESTERONE RECEPTOR IMMUNOSTAINS POSITIVE   IN INVASIVE TUMOR.       -  HER-2 FISH PENDING. 4.  LYMPH NODE, LEFT AXILLA, CORE NEEDLE BIOPSIES:       -  INFILTRATING DUCTAL CARCINOMA.          -  INVASIVE TUMOR POSITIVE FOR ESTROGEN RECEPTOR AND   NEGATIVE FOR PROGESTERONE RECEPTOR IMMUNOSTAINS.     -  LYMPH NODE AND BENIGN BREAST ARCHITECTURE NOT IDENTIFIED. -- Diagnosis Comment --   SPECIMEN #4: CORRELATION WITH THE RADIOLOGIC PLACEMENT OF THE BIOPSY   WOULD BE NECESSARY TO DETERMINE IF THIS REPRESENTS A LYMPH NODE WITH   METASTATIC CARCINOMA, VERSUS CARCINOMA INVOLVING THE AXILLARY TAIL OF   THE BREAST.  NO INTRINSIC LYMPH NODE OR BENIGN BREAST ARCHITECTURE IS   PRESENT FOR CONFIRMATION. 2/15/19: Final Diagnosis   1. LYMPH NODE, EXCISION (RIGHT SENTINEL):        - NO NEOPLASM DETECTED. 2. BREAST, MASTECTOMY (RIGHT):        - SCANT RESIDUAL FOCUS OF INVASIVE DUCTAL CARCINOMA.      - DUCTAL CARCINOMA IN SITU.        - EXCISION MARGINS NEGATIVE FOR NEOPLASM. 3. BREAST, MASTECTOMY, INCLUDING AXILLARY LYMPH NODES (LEFT):        - INVASIVE DUCTAL CARCINOMA, 1.5 CM        - DUCTAL CARCINOMA IN SITU.        - EXCISION MARGINS NEGATIVE FOR NEOPLASM. Diagnosis Comment   BIOMARKER STUDIES WERE PERFORMED ON PRIOR BIOPSIES FROM TUMORS FROM   BOTH BREASTS ([id number]) -     RIGHT:        - POSITIVE ESTROGEN RECEPTOR.        - POSITIVE PROGESTERONE RECEPTOR.        - NEGATIVE FOR HER2 GENE AMPLIFICATION. LEFT:        - POSITIVE ESTROGEN RECEPTOR.        - POSITIVE PROGESTERONE RECEPTOR.        - NEGATIVE FOR HER2 GENE AMPLIFICATION.    Procedures/Addenda   ADDENDUM     Date Ordered:     2/21/2019     Status: Signed Out        Date Complete:     2/21/2019     By: Mariela Zuniga M.D.        Date Reported:     2/21/2019       ADDENDUM DIAGNOSIS   3.  BREAST, MASTECTOMY, WITH AXILLARY LYMPH NODES (LEFT):        - NO CHANGE IN DIAGNOSIS. ADDENDUM COMMENT   THE CASE IS DISCUSSED WITH DR. CID, FEBRUARY 20, 2019.  SLIDES ARE   REVIEWED, WITH SPECIFIC ATTENTION TO THE 6 SLIDES FROM AXILLARY   REGION.  THESE REPRESENT 2 LYMPH NODES, INCLUDING A LARGER 4.5 CM   NODE, AND NEITHER CONTAINS NEOPLASM ON REVIEW.  THE GROSS MASTECTOMY   SPECIMEN IS THEN REEXAMINED WITH SPECIFIC ATTENTION TO THE AXILLARY   TISSUE, AS WELL AS THE BREAST ADJACENT TO THE AXILLARY REGION.  NO   ADDITIONAL GROSSLY IDENTIFIABLE LYMPH NODES ARE FOUND.  REPRESENTATIVE   SAMPLING OF 8 ADDITIONAL BLOCKS FROM THIS AXILLARY REGION IS SUBMITTED   FOR POSSIBLE IDENTIFICATION OF MINUTE NODES THAT MAY NOT BE GROSSLY   EVIDENT.  HOWEVER, NO ADDITIONAL NODES ARE DETECTED ON MICROSCOPIC   EXAM OF THESE ADDITIONAL BLOCKS.   IMAGING DATA:    Ultrasound breast 9/10/18  FINDINGS:   DIAGNOSTIC MAMMOGRAM: The breasts are composed of scattered fibroglandular   tissue.  Underlying the area palpable concern in the upper outer left breast,   2 masses are identified measuring up to 1.6 cm anteriorly and with a more   suspicious spiculated 2.4 cm mass with adjacent distortion.  No abnormal   calcifications are identified.  In the right breast, a spiculated 1 cm mass   is identified in the lower inner quadrant, 6 cm from the nipple.  No   associated microcalcifications.       TARGETED LEFT ULTRASOUND: Ultrasound was performed in the area of palpable   concern, identifying 2 adjacent irregular hypoechoic masses measuring 1.7 x   2.0 x 1.4 cm in the 2 o'clock position, 4 cm from the nipple and 1.8 x 2.1 x   2.0 cm in the 1 o'clock position, 6 cm from the nipple.       Ultrasound in the left axilla was also performed identifying a probable   abnormal lymph node in the axillary tail measuring 2.1 x 1.6 x 2.2 cm.  This has an irregular appearance with a hyperechoic rim and no identifiable fatty   hilum. Irma Shereen is an adjacent abnormal lymph node measuring 1.7 x 1.0 x 2.0 cm   with a small fatty hilum and focal cortical thickening.       TARGETED RIGHT ULTRASOUND: Ultrasound was performed in the area of   mammographic concern in the lower inner quadrant.  A suspicious irregular   hypoechoic mass measuring 0.7 x 0.8 x 0.8 cm is present in the 3 o'clock   position, 7 cm from the nipple.  This corresponds to the mammographic finding.       Ultrasound imaging of the right axilla was also performed without   identifiable lymph nodes.           Impression   1. 2 noncalcified suspicious masses in the area palpable concern in the 1-2   o'clock position of the left breast with sonographic correlate measuring up   to 2.0 cm and 2.1 cm.       2. Left axillary ultrasound identifies 2 abnormal lymph nodes.       3.  New suspicious noncalcified mass in the lower inner quadrant of the right   breast with corresponding sonographic 0.8 cm mass in the 3 o'clock position,   7 cm from the nipple.       4.  No identifiable lymph nodes in the right axilla.       Ultrasound-guided biopsy of the left breast masses, a left axillary lymph   node, and right breast mass is recommended.       BIRADS:   BIRADS - CATEGORY 5       Findings are highly suggestive of malignancy.  Biopsy should be considered at   this time. Bone scan 9/21/18  Impression   No evidence to suggest osseous metastatic disease.      CT chest abdomen pelvis 9/21/18  Impression   1.  Known bilateral breast masses and left axillary lymphadenopathy are   demonstrated.  No evidence for distant metastatic disease in the chest,   abdomen, or pelvis.       2.  Tiny right hydropneumothorax, presumed related to recent right chest port   placement.       3.  Tiny hiatal hernia.       4.  There is a 1.5 cm right thyroid nodule.  Thyroid ultrasound is   recommended.       The result was communicated to the ordering physician, Dr. Morgan Bolanos, at 1:42   p.m. on 09/21/2018.  This was communicated by the radiology communication   center [facility]), as I was not available when Dr. Morgan Bolanos returned the   phone call. Mammogram 1/4/19  Impression   Mammographic evidence of response of therapy within both breasts as described   above.  No new lesions are identified.       CT chest 11/11/2019:  Impression   1. In the region of previously described ill-defined nodules involving the   left lung apex, there appears to be interval development of an ill-defined   consolidation.  Finding is likely post treatment in nature however underlying   infectious process cannot be excluded.  If infectious process is suspected,   repeat CT after therapy is recommended.  No definitive CT evidence of   metastatic disease within the chest.   2. Small hiatal hernia. 3. Hepatic steatosis. 4. Nodules involving the thyroid gland, largest measuring 1.6 cm. Correlation with ultrasound is recommended.       CT chest 1/7/2020  Impression   1. No suspicious nodules or masses. 2. Stable or slightly improved left lung apex reticular interlobular septal   thickening on background of patchy ground-glass attenuation and minor   anterior subpleural reticulations in the left upper lobe attributed to post   treatment scarring.  Attention on follow-up. 3. Bilateral breast implants in right IJ infusion port. ASSESSMENT:     Indra Brush has clinical stage II, T2 N1 M0 disease in her left breast and she has clinical stage I T1 N0 M0 disease in her right breast.    Unfortunately she had significant reaction to first cycle of AC chemotherapy. I gave preoperative  weekly paclitaxel for 12 cycles. She underwent surgical resection: I discussed the surgical pathology report. LNs showed no residual cancer, NO residual cancer noted in right breast, But left breast showed smaller residual invasive cancer 1.5 cm and she had negative margins.  She  Has completed RT. Now completed Xeloda well. She is tolerating Arimidex very well  During today's visit, the patient and the family had a number of reasonable questions which were answered to their satisfaction. They verbalized understanding of the information provided and they agreed to proceed as outlined above. PLAN:   I reviewed recent lab work, imaging studies, discussed diagnosis and treatment recommendations   MRI spine showed no metastatic disease or acute pathology   It showed degenerative changes and advised her to follow-up with PCP   Continue Arimidex   Return to clinic in 6 months or earlier if needed     Benson Serna MD  Hematologist/Medical Oncologist  I spent more than 35 minutes examining, evaluating, reviewing data and counseling the patient. Greater than 50% of that time was spent face-to-face with the patient in counseling and coordinating her care. This note is created with the assistance of a speech recognition program.  While intending to generate a document that actually reflects the content of the visit, the document can still have some errors including those of syntax and sound a like substitutions which may escape proof reading. It such instances, actual meaning can be extrapolated by contextual diversion.

## 2022-06-09 ENCOUNTER — OFFICE VISIT (OUTPATIENT)
Dept: PRIMARY CARE CLINIC | Age: 72
End: 2022-06-09
Payer: MEDICARE

## 2022-06-09 VITALS
SYSTOLIC BLOOD PRESSURE: 124 MMHG | BODY MASS INDEX: 30.86 KG/M2 | OXYGEN SATURATION: 97 % | WEIGHT: 192 LBS | RESPIRATION RATE: 18 BRPM | TEMPERATURE: 98.7 F | HEIGHT: 66 IN | HEART RATE: 72 BPM | DIASTOLIC BLOOD PRESSURE: 76 MMHG

## 2022-06-09 DIAGNOSIS — B34.9 VIRAL ILLNESS: Primary | ICD-10-CM

## 2022-06-09 DIAGNOSIS — R11.2 NON-INTRACTABLE VOMITING WITH NAUSEA, UNSPECIFIED VOMITING TYPE: ICD-10-CM

## 2022-06-09 DIAGNOSIS — R19.7 DIARRHEA, UNSPECIFIED TYPE: ICD-10-CM

## 2022-06-09 PROCEDURE — 99213 OFFICE O/P EST LOW 20 MIN: CPT | Performed by: NURSE PRACTITIONER

## 2022-06-09 PROCEDURE — 1123F ACP DISCUSS/DSCN MKR DOCD: CPT | Performed by: NURSE PRACTITIONER

## 2022-06-09 PROCEDURE — 99212 OFFICE O/P EST SF 10 MIN: CPT | Performed by: NURSE PRACTITIONER

## 2022-06-09 RX ORDER — ONDANSETRON 4 MG/1
4 TABLET, ORALLY DISINTEGRATING ORAL EVERY 8 HOURS PRN
Qty: 9 TABLET | Refills: 0 | Status: SHIPPED | OUTPATIENT
Start: 2022-06-09 | End: 2022-06-12

## 2022-06-09 ASSESSMENT — ENCOUNTER SYMPTOMS
NAUSEA: 1
COUGH: 0
BLOOD IN STOOL: 0
SORE THROAT: 0
VOMITING: 1
ABDOMINAL PAIN: 0
CONSTIPATION: 0
DIARRHEA: 1
RESPIRATORY NEGATIVE: 1

## 2022-06-09 NOTE — PATIENT INSTRUCTIONS
Patient Education        Nausea and Vomiting: Care Instructions  Overview     When you are nauseated, you may feel weak and sweaty and notice a lot of saliva in your mouth. Nausea often leads to vomiting. Most of the time you do not needto worry about nausea and vomiting, but they can be signs of other illnesses. Two common causes of nausea and vomiting are a stomach infection and food poisoning. Nausea and vomiting from a viral stomach infection will usually start to improve within 24 hours. Nausea and vomiting from food poisoning maylast from 12 to 48 hours. The doctor has checked you carefully, but problems can develop later. If you notice any problems or new symptoms, get medical treatment right away. Follow-up care is a key part of your treatment and safety. Be sure to make and go to all appointments, and call your doctor if you are having problems. It's also a good idea to know your test results and keep alist of the medicines you take. How can you care for yourself at home?  To prevent dehydration, drink plenty of fluids. Choose water and other clear liquids until you feel better. If you have kidney, heart, or liver disease and have to limit fluids, talk with your doctor before you increase the amount of fluids you drink.  Rest in bed until you feel better.  When you are able to eat, try clear soups, mild foods, and liquids until all symptoms are gone for 12 to 48 hours. Other good choices include dry toast, crackers, cooked cereal, and gelatin dessert, such as Jell-O. When should you call for help? Call 911 anytime you think you may need emergency care. For example, call if:     You passed out (lost consciousness). Call your doctor now or seek immediate medical care if:     You have symptoms of dehydration, such as:  ? Dry eyes and a dry mouth. ? Passing only a little urine. ? Feeling thirstier than usual.      You have new or worsening belly pain.      You have a new or higher fever.    You vomit blood or what looks like coffee grounds. Watch closely for changes in your health, and be sure to contact your doctor if:     You have ongoing nausea and vomiting.      Your vomiting is getting worse.      Your vomiting lasts longer than 2 days.      You are not getting better as expected. Where can you learn more? Go to [email or web address] org and sign in to your [employer] account. Enter 68 [id number] in the [employer] box to learn more about \"Nausea and Vomiting: Care Instructions. \"     If you do not have an account, please click on the \"Sign Up Now\" link. Current as of: July 1, 2021               Content Version: 13.2  © 2006-2022 [employer]. Care instructions adapted under license by [facility] ([facility]). If you have questions about a medical condition or this instruction, always ask your healthcare professional. [place] [address] any warranty or liability for your use of this information.

## 2022-06-09 NOTE — PROGRESS NOTES
[facility]             [address]                        Telephone [phone]             Fax [phone]     Kelly Jaramillo  1950  [id number]   Date of visit:  6/9/2022    Subjective:    Kelly Jaramillo is a 81 y.o.  female who presents to [facility] today (6/9/2022) for evaluation of:    Chief Complaint   Patient presents with    Nausea & Vomiting     x 3 days. No emesis since yesterday. Continues with diarrhea and nausea. Nausea & Vomiting  This is a new problem. The current episode started in the past 7 days (06/07/22). The problem occurs intermittently. The problem has been unchanged. Associated symptoms include chills, headaches (yesterday, now resolved), myalgias, nausea and vomiting (last episode yesterday morning). Pertinent negatives include no abdominal pain, chest pain, congestion, coughing, fatigue, fever, rash or sore throat. Associated symptoms comments: Diarrhea 5-10 episodes yesterday. Nothing aggravates the symptoms. Treatments tried: pepto bismol, aleve. The treatment provided mild relief.        She has the following problem list:  Patient Active Problem List   Diagnosis    Osteopenia    Diverticula of colon    Varicose veins of both lower extremities    Vitamin D deficiency    Postoperative pain    Malignant neoplasm of upper-outer quadrant of left breast in female, estrogen receptor positive ([address]    Nausea & vomiting    Hiatal hernia    Breast cancer in female [facility])    S/P bilateral mastectomy    S/P breast reconstruction, bilateral    Long term current use of aromatase inhibitor    Varicose veins of bilateral lower extremities with pain        Current medications are:  Current Outpatient Medications   Medication Sig Dispense Refill    ondansetron (ZOFRAN-ODT) 4 MG disintegrating tablet Take 1 tablet by mouth every 8 hours as needed for Nausea or Vomiting 9 tablet 0    anastrozole (ARIMIDEX) 1 MG tablet TAKE 1 TABLET DAILY 90 tablet 3    Calcium Carb-Cholecalciferol (CALCIUM 1000 + D PO) Take by mouth      Lactobacillus-Inulin (CULTURELLE DIGESTIVE DAILY PO) Take by mouth       No current facility-administered medications for this visit. She is allergic to boniva [ibandronic acid] and fosamax [alendronate sodium]. .    She  reports that she quit smoking about 44 years ago. Her smoking use included cigarettes. She has a 3.50 pack-year smoking history. She has never used smokeless tobacco.      Objective:    Vitals:    06/09/22 1055   BP: 124/76   Site: Right Upper Arm   Position: Sitting   Cuff Size: Large Adult   Pulse: 72   Resp: 18   Temp: 98.7 °F (37.1 °C)   TempSrc: Tympanic   SpO2: 97%   Weight: 192 lb (87.1 kg)   Height: 5' 6\" (1.676 m)     Body mass index is 30.99 kg/m². Review of Systems   Constitutional: Positive for appetite change and chills. Negative for fatigue and fever. HENT: Negative. Negative for congestion and sore throat. Respiratory: Negative. Negative for cough. Cardiovascular: Negative. Negative for chest pain. Gastrointestinal: Positive for diarrhea, nausea and vomiting (last episode yesterday morning). Negative for abdominal pain, blood in stool and constipation. Musculoskeletal: Positive for myalgias. Skin: Negative for rash. Neurological: Positive for headaches (yesterday, now resolved). Physical Exam  Vitals and nursing note reviewed. Constitutional:       Appearance: She is well-developed. HENT:      Head: Normocephalic. Jaw: There is normal jaw occlusion. Mouth/Throat:      Lips: Pink. Mouth: Mucous membranes are moist.      Pharynx: Oropharynx is clear. Uvula midline. Eyes:      Pupils: Pupils are equal, round, and reactive to light. Cardiovascular:      Rate and Rhythm: Normal rate and regular rhythm. Heart sounds: Normal heart sounds.    Pulmonary:      Effort: Pulmonary effort is normal.      Breath sounds: Normal breath sounds and air entry. Abdominal:      General: Abdomen is flat. Bowel sounds are increased. Palpations: Abdomen is soft. Tenderness: There is no abdominal tenderness. There is no right CVA tenderness or left CVA tenderness. Musculoskeletal:      Cervical back: Normal range of motion and neck supple. Lymphadenopathy:      Cervical: No cervical adenopathy. Skin:     General: Skin is warm and dry. Neurological:      General: No focal deficit present. Mental Status: She is alert and oriented to person, place, and time. Psychiatric:         Behavior: Behavior normal.         Thought Content: Thought content normal.       Assessment and Plan:    No results found for this visit on 06/09/22. Diagnosis Orders   1. Viral illness     2. Non-intractable vomiting with nausea, unspecified vomiting type  ondansetron (ZOFRAN-ODT) 4 MG disintegrating tablet   3. Diarrhea, unspecified type       Clear liquid diet and increase as tolerated. I recommended the BRAT diet and increase as tolerated. Take Zofran as directed. Take OTC Imodium as needed for diarrhea per package instruction. We discussed the symptoms of dehydration. Instructed to follow up with PCP if symptoms have not improved or worsen. The use, risks, benefits, and side effects of prescribed or recommended medications were discussed. All questions were answered and the patient/caregiver voiced understanding. No orders of the defined types were placed in this encounter.         Electronically signed by ELICIA James CNP on 6/9/22 at 11:26 AM EDT

## 2022-07-29 ENCOUNTER — HOSPITAL ENCOUNTER (OUTPATIENT)
Age: 72
Setting detail: SPECIMEN
Discharge: HOME | End: 2022-07-29
Payer: MEDICARE

## 2022-07-29 ENCOUNTER — OFFICE VISIT (OUTPATIENT)
Dept: PRIMARY CARE CLINIC | Age: 72
End: 2022-07-29
Payer: MEDICARE

## 2022-07-29 VITALS
HEART RATE: 80 BPM | BODY MASS INDEX: 31.15 KG/M2 | SYSTOLIC BLOOD PRESSURE: 120 MMHG | WEIGHT: 193 LBS | OXYGEN SATURATION: 98 % | DIASTOLIC BLOOD PRESSURE: 72 MMHG | TEMPERATURE: 98 F

## 2022-07-29 DIAGNOSIS — N30.00 ACUTE CYSTITIS WITHOUT HEMATURIA: Primary | ICD-10-CM

## 2022-07-29 DIAGNOSIS — R35.0 URINARY FREQUENCY: ICD-10-CM

## 2022-07-29 LAB
-: ABNORMAL
BACTERIA: ABNORMAL
BILIRUBIN URINE: NEGATIVE
EPITHELIAL CELLS UA: ABNORMAL /HPF (ref 0–5)
GLUCOSE URINE: NEGATIVE
KETONES, URINE: NEGATIVE
LEUKOCYTE ESTERASE, URINE: ABNORMAL
NITRITE, URINE: NEGATIVE
OTHER OBSERVATIONS UA: ABNORMAL
PH UA: 6 (ref 5–6)
PROTEIN UA: ABNORMAL
RBC UA: ABNORMAL /HPF (ref 0–4)
SPECIFIC GRAVITY UA: 1.02 (ref 1.01–1.02)
URINE HGB: ABNORMAL
UROBILINOGEN, URINE: NORMAL
WBC UA: >100 /HPF (ref 0–4)

## 2022-07-29 PROCEDURE — 1123F ACP DISCUSS/DSCN MKR DOCD: CPT | Performed by: NURSE PRACTITIONER

## 2022-07-29 PROCEDURE — 87086 URINE CULTURE/COLONY COUNT: CPT

## 2022-07-29 PROCEDURE — 99213 OFFICE O/P EST LOW 20 MIN: CPT | Performed by: NURSE PRACTITIONER

## 2022-07-29 PROCEDURE — 81001 URINALYSIS AUTO W/SCOPE: CPT

## 2022-07-29 PROCEDURE — 99212 OFFICE O/P EST SF 10 MIN: CPT | Performed by: NURSE PRACTITIONER

## 2022-07-29 RX ORDER — NITROFURANTOIN 25; 75 MG/1; MG/1
100 CAPSULE ORAL 2 TIMES DAILY
Qty: 14 CAPSULE | Refills: 0 | Status: SHIPPED | OUTPATIENT
Start: 2022-07-29 | End: 2022-08-05

## 2022-07-29 ASSESSMENT — PATIENT HEALTH QUESTIONNAIRE - PHQ9
2. FEELING DOWN, DEPRESSED OR HOPELESS: 0
SUM OF ALL RESPONSES TO PHQ QUESTIONS 1-9: 0
1. LITTLE INTEREST OR PLEASURE IN DOING THINGS: 0
SUM OF ALL RESPONSES TO PHQ QUESTIONS 1-9: 0
SUM OF ALL RESPONSES TO PHQ QUESTIONS 1-9: 0
SUM OF ALL RESPONSES TO PHQ9 QUESTIONS 1 & 2: 0
SUM OF ALL RESPONSES TO PHQ QUESTIONS 1-9: 0

## 2022-07-29 ASSESSMENT — ENCOUNTER SYMPTOMS
NAUSEA: 0
VOMITING: 0
RESPIRATORY NEGATIVE: 1

## 2022-07-29 NOTE — PROGRESS NOTES
[facility]             [address], [address]                        Telephone [phone]             Fax [phone]     Bogdan Nicole  1950  YTZ:[id number]   Date of visit:  7/29/2022    Subjective:    Bogdan Nicole is a 81 y.o.  female who presents to [facility] today (7/29/2022) for evaluation of:    Chief Complaint   Patient presents with    Dysuria     Started last night        Dysuria   This is a new problem. The current episode started yesterday. The problem occurs every urination. The problem has been gradually worsening. The quality of the pain is described as burning. The pain is mild. There has been no fever. She is Not sexually active. There is No history of pyelonephritis. Associated symptoms include frequency and urgency. Pertinent negatives include no chills, discharge, flank pain, hematuria, nausea, possible pregnancy or vomiting. She has tried nothing for the symptoms. The treatment provided no relief. She has the following problem list:  Patient Active Problem List   Diagnosis    Osteopenia    Diverticula of colon    Varicose veins of both lower extremities    Vitamin D deficiency    Postoperative pain    Malignant neoplasm of upper-outer quadrant of left breast in female, estrogen receptor positive ([facility])    Nausea & vomiting    Hiatal hernia    Breast cancer in female [facility])    S/P bilateral mastectomy    S/P breast reconstruction, bilateral    Long term current use of aromatase inhibitor    Varicose veins of bilateral lower extremities with pain        Current medications are:  Current Outpatient Medications   Medication Sig Dispense Refill    nitrofurantoin, macrocrystal-monohydrate, (MACROBID) 100 MG capsule Take 1 capsule by mouth in the morning and 1 capsule before bedtime. Do all this for 7 days.  14 capsule 0    anastrozole (ARIMIDEX) 1 MG tablet TAKE 1 TABLET DAILY 90 tablet 3    Calcium Carb-Cholecalciferol (CALCIUM 1000 + D PO) Take by mouth      Lactobacillus-Inulin (CULTURELLE DIGESTIVE DAILY PO) Take by mouth       No current facility-administered medications for this visit. She is allergic to boniva [ibandronic acid] and fosamax [alendronate sodium]. .    She  reports that she quit smoking about 44 years ago. Her smoking use included cigarettes. She has a 3.50 pack-year smoking history. She has never used smokeless tobacco.      Objective:    Vitals:    07/29/22 0826   BP: 120/72   Site: Right Upper Arm   Position: Sitting   Cuff Size: Large Adult   Pulse: 80   Temp: 98 °F (36.7 °C)   TempSrc: Tympanic   SpO2: 98%   Weight: 193 lb (87.5 kg)     Body mass index is 31.15 kg/m². Review of Systems   Constitutional: Negative. Negative for chills. Respiratory: Negative. Cardiovascular: Negative. Gastrointestinal:  Negative for nausea and vomiting. Genitourinary:  Positive for dysuria, frequency and urgency. Negative for flank pain, hematuria and vaginal discharge. Physical Exam  Vitals and nursing note reviewed. Constitutional:       Appearance: She is well-developed. HENT:      Head: Normocephalic. Eyes:      Pupils: Pupils are equal, round, and reactive to light. Cardiovascular:      Rate and Rhythm: Normal rate and regular rhythm. Heart sounds: Normal heart sounds. Pulmonary:      Effort: Pulmonary effort is normal.      Breath sounds: Normal breath sounds and air entry. Abdominal:      General: Abdomen is flat. Bowel sounds are normal.      Palpations: Abdomen is soft. Tenderness: There is no abdominal tenderness. There is no right CVA tenderness or left CVA tenderness. Musculoskeletal:      Cervical back: Normal range of motion and neck supple. Skin:     General: Skin is warm and dry. Neurological:      General: No focal deficit present. Mental Status: She is alert and oriented to person, place, and time. Psychiatric:         Behavior: Behavior normal.         Thought Content: Thought content normal.       Assessment and Plan:    No results found for this visit on 07/29/22. Diagnosis Orders   1. Acute cystitis without hematuria  nitrofurantoin, macrocrystal-monohydrate, (MACROBID) 100 MG capsule      2. Urinary frequency  Urinalysis with Reflex to Culture        I reviewed labs with patient. Complete full course of antibiotic. Increase water intake. Call or return to clinic as needed if these symptoms worsen or fail to improve in the next 48 hours. If urine culture was sent, the patient will be notified of results. The use, risks, benefits, and side effects of prescribed or recommended medications were discussed. All questions were answered and the patient/caregiver voiced understanding. No orders of the defined types were placed in this encounter.         Electronically signed by ELICIA Mancera CNP on 7/29/22 at 8:45 AM EDT

## 2022-07-30 LAB
CULTURE: NORMAL
SPECIMEN DESCRIPTION: NORMAL

## 2022-07-31 NOTE — RESULT ENCOUNTER NOTE
Spoke with patient and informed her that no significant growth was noted in her UA, but to continue the Macrobid if symptoms are improving. Patient stated that her symptoms are improving and she will finish the course of treatment. Instructed patient to follow up with PCP if symptoms continue or worsen. Patient stated understanding.

## 2022-08-12 ENCOUNTER — OFFICE VISIT (OUTPATIENT)
Dept: SURGERY | Age: 72
End: 2022-08-12
Payer: MEDICARE

## 2022-08-12 VITALS
WEIGHT: 194 LBS | BODY MASS INDEX: 31.18 KG/M2 | SYSTOLIC BLOOD PRESSURE: 118 MMHG | HEIGHT: 66 IN | DIASTOLIC BLOOD PRESSURE: 64 MMHG | HEART RATE: 80 BPM

## 2022-08-12 DIAGNOSIS — Z98.890 S/P BREAST RECONSTRUCTION, BILATERAL: Primary | ICD-10-CM

## 2022-08-12 PROCEDURE — 99999 PR OFFICE/OUTPT VISIT,PROCEDURE ONLY: CPT | Performed by: PLASTIC SURGERY

## 2022-08-12 PROCEDURE — 99211 OFF/OP EST MAY X REQ PHY/QHP: CPT | Performed by: PLASTIC SURGERY

## 2022-08-12 NOTE — PROGRESS NOTES
Subjective:      Patient ID: Danya Fernández is a 81 y.o. female. HPI  Patient presents in the office today for a one year follow up from her breast reconstruction on 2/11/2020. Patient denies any concerns or issues at this time. Review of Systems    Objective:   Physical Exam  Vitals and nursing note reviewed. Exam conducted with a chaperone present. Constitutional:       Appearance: Normal appearance. HENT:      Head: Normocephalic and atraumatic. Mouth/Throat:      Mouth: Mucous membranes are moist.   Eyes:      Extraocular Movements: Extraocular movements intact. Conjunctiva/sclera: Conjunctivae normal.      Pupils: Pupils are equal, round, and reactive to light. Pulmonary:      Effort: Pulmonary effort is normal.   Chest:      Comments:   Left breast slightly higher. Soft, slight firmer than right, Richards 1.5. Right with good contour. Soft, Baker 1. No evidence of leak. No masses. No axillary adenopathy palpable. Skin:     General: Skin is warm and dry. Neurological:      General: No focal deficit present. Mental Status: She is alert and oriented to person, place, and time. Assessment:      Bilateral breast reconstruction. Stable exam.      Plan:      Recheck in 1 year.         Fer Hale MD

## 2022-09-27 DIAGNOSIS — Z17.0 MALIGNANT NEOPLASM OF UPPER-OUTER QUADRANT OF BOTH BREASTS IN FEMALE, ESTROGEN RECEPTOR POSITIVE (HCC): ICD-10-CM

## 2022-09-27 DIAGNOSIS — C50.412 MALIGNANT NEOPLASM OF UPPER-OUTER QUADRANT OF BOTH BREASTS IN FEMALE, ESTROGEN RECEPTOR POSITIVE (HCC): ICD-10-CM

## 2022-09-27 DIAGNOSIS — C50.411 MALIGNANT NEOPLASM OF UPPER-OUTER QUADRANT OF BOTH BREASTS IN FEMALE, ESTROGEN RECEPTOR POSITIVE (HCC): ICD-10-CM

## 2022-09-28 RX ORDER — ANASTROZOLE 1 MG/1
TABLET ORAL
Qty: 90 TABLET | Refills: 3 | Status: SHIPPED | OUTPATIENT
Start: 2022-09-28

## 2022-10-17 ENCOUNTER — OFFICE VISIT (OUTPATIENT)
Dept: ONCOLOGY | Age: 72
End: 2022-10-17
Payer: MEDICARE

## 2022-10-17 VITALS
WEIGHT: 193 LBS | HEART RATE: 74 BPM | DIASTOLIC BLOOD PRESSURE: 74 MMHG | HEIGHT: 66 IN | SYSTOLIC BLOOD PRESSURE: 126 MMHG | TEMPERATURE: 97.7 F | BODY MASS INDEX: 31.02 KG/M2 | OXYGEN SATURATION: 96 %

## 2022-10-17 DIAGNOSIS — C50.412 MALIGNANT NEOPLASM OF UPPER-OUTER QUADRANT OF BOTH BREASTS IN FEMALE, ESTROGEN RECEPTOR POSITIVE (HCC): Primary | ICD-10-CM

## 2022-10-17 DIAGNOSIS — Z17.0 MALIGNANT NEOPLASM OF UPPER-OUTER QUADRANT OF BOTH BREASTS IN FEMALE, ESTROGEN RECEPTOR POSITIVE (HCC): Primary | ICD-10-CM

## 2022-10-17 DIAGNOSIS — C50.411 MALIGNANT NEOPLASM OF UPPER-OUTER QUADRANT OF BOTH BREASTS IN FEMALE, ESTROGEN RECEPTOR POSITIVE (HCC): Primary | ICD-10-CM

## 2022-10-17 PROCEDURE — 1123F ACP DISCUSS/DSCN MKR DOCD: CPT | Performed by: INTERNAL MEDICINE

## 2022-10-17 PROCEDURE — 99214 OFFICE O/P EST MOD 30 MIN: CPT | Performed by: INTERNAL MEDICINE

## 2022-10-17 PROCEDURE — 99211 OFF/OP EST MAY X REQ PHY/QHP: CPT | Performed by: INTERNAL MEDICINE

## 2022-10-17 NOTE — PROGRESS NOTES
Patient ID: Gosia Almaguer, 1950, [id number], 81 y.o. Surgeon : Dr. Diego Morrison  Diagnosis:   Diagnosis of bilateral breast cancer  Left invasive ductal carcinoma, ER/VT positive and HER-2/kelsy Negative, 2 foci 2.0 cm and 2.1 cm, clinical staging T2 N1 M0, stage II  Right- breast cancer, lower inner quadrant, T1b N0 M0, stage I, ER/VT positive HER-2 negative  Started on neoadjuvant chemotherapy with Adriamycin plus cyclophosphamide on 10/3/18 unfortunately she had significant reaction with generalized tremors, shortness of breath and chest tightness after chemotherapy  Her chemotherapy was changed to weekly Taxol and she completed 12 cycles on 1/2/19  Had bilateral mastectomies on 2/15/19  Adjuvant radiation therapy started on 4/2/19 and will complete 5/9/19  Xeloda  Started 5/27/19 And completed on 9/22/19  Currently on Arimidex. Last DEXA scan 3/8/2022 showed osteopenia  HISTORY OF PRESENT ILLNESS:    Oncologic History:  Shane Lepe is a 72-year-old female with a family history of breast cancer was seen during initial consultation visit for newly diagnosed bilateral breast cancer. Patient had abnormal mammogram about a year ago and the biopsy showed ductal papilloma so she had excision and was being followed by Gen. surgery. Recently she noted a lump in her left breast and had a mammogram and ultrasound which confirmed 2 lesions in her left breast measuring 2.0 and 2.1 cm and one lesion in the right breast measuring 0.8 cm. Also 2 lymph nodes noted in the left axilla. Patient underwent biopsy of these agents including axillary lymph node which confirmed presence of invasive ductal carcinoma and on the biopsies. She is here for further recommendations. She does not have many medical conditions and her ECOG performance status is 0. She is physically active and not on any regular medications.   She has history of breast cancer in her sister was diagnosed at the age of 58 and also reports history of breast cancer in her grandmother. She quit smoking in  and drinks alcohol occasionally. She has had hysterectomy and oophorectomy. Interval history:  Patient is returning for follow-up visit and to discuss further recommendations. She denies any new bone pain back pain. She has chronic back pain but has been stable and takes Aleve occasionally. She denies any new lump or mass. She denies any chest pain shortness of breath. During this visit patient's allergy, social, medical, surgical history and medications were reviewed and updated. Allergies   Allergen Reactions    Boniva [Ibandronic Acid] Other (See Comments)     Jaw pain. Fosamax [Alendronate Sodium] Other (See Comments)     Jaw pain. Current Outpatient Medications   Medication Sig Dispense Refill    anastrozole (ARIMIDEX) 1 MG tablet TAKE 1 TABLET DAILY 90 tablet 3    Calcium Carb-Cholecalciferol (CALCIUM 1000 + D PO) Take by mouth      Lactobacillus-Inulin (CULTURELLE DIGESTIVE DAILY PO) Take by mouth       No current facility-administered medications for this visit. Social History     Socioeconomic History    Marital status:      Spouse name: Not on file    Number of children: Not on file    Years of education: Not on file    Highest education level: Not on file   Occupational History    Occupation: retired     Employer: [email or web address]   Tobacco Use    Smoking status: Former     Packs/day: 0.50     Years: 7.00     Pack years: 3.50     Types: Cigarettes     Quit date: 1978     Years since quittin.8    Smokeless tobacco: Never    Tobacco comments:     Quit smoking in . Vaping Use    Vaping Use: Never used   Substance and Sexual Activity    Alcohol use: No     Comment: Occasional alcohol.     Drug use: No    Sexual activity: Not on file   Other Topics Concern    Not on file   Social History Narrative    Not on file     Social Determinants of Health     Financial Resource Strain: Not on file   Food Insecurity: Not on file   Transportation Needs: Not on file   Physical Activity: Not on file   Stress: Not on file   Social Connections: Not on file   Intimate Partner Violence: Not on file   Housing Stability: Not on file       Family History   Problem Relation Age of Onset    Hypertension Mother         Renal failure. Diabetes Mother         Type 2 at end of life. Heart Failure Father     Coronary Art Dis Father         Status post CABG    Diabetes Father         Type 2, very late in life. Breast Cancer Sister 58    Hypertension Brother     Hypertension Sister     Breast Cancer Paternal Aunt         62s    Breast Cancer Paternal Grandmother     Breast Cancer Paternal Cousin     Breast Cancer Paternal Aunt         62s    Breast Cancer Paternal Cousin         46s      REVIEW OF SYSTEM:   Constitutional: No fever or chills. No night sweats, no weight loss   Eyes: No eye discharge, double vision, or eye pain   HEENT: negative for sore mouth, sore throat, hoarseness and voice change   Respiratory: negative for cough , sputum, dyspnea, wheezing, hemoptysis, chest pain   Cardiovascular: negative for chest pain, dyspnea, palpitations, orthopnea, PND   Gastrointestinal: negative for nausea, vomiting, diarrhea, constipation, abdominal pain, Dysphagia, hematemesis and hematochezia   Genitourinary: negative for frequency, dysuria, nocturia, urinary incontinence, and hematuria   Integument: negative for rash, skin lesions, bruises.    Hematologic/Lymphatic: negative for easy bruising, bleeding, lymphadenopathy, petechiae and swelling/edema   Endocrine: negative for heat or cold intolerance, tremor, weight changes, change in bowel habits and hair loss   Musculoskeletal: negative for myalgias, arthralgias, pain, joint swelling,and bone pain   Neurological: negative for headaches, dizziness, seizures, weakness, numbness   OBJECTIVE:         Vitals:    10/17/22 0923   BP: 126/74   Pulse: 74   Temp: 97.7 °F (36.5 °C) SpO2: 96%   PHYSICAL EXAM:   General appearance - well appearing, no in pain or distress   Mental status - alert and cooperative   Eyes - pupils equal and reactive, extraocular eye movements intact   Ears - bilateral TM's and external ear canals normal   Mouth - mucous membranes moist, pharynx normal without lesions   Neck - supple, no significant adenopathy   Lymphatics - Mild palpable lymphadenopathy in the left axilla, no hepatosplenomegaly   Chest - clear to auscultation, no wheezes, rales or rhonchi, symmetric air entry   Breast: biat breast surgically absent, drains in place  2 cm lump palpable in left breast  Heart - normal rate, regular rhythm, normal S1, S2, no murmurs, rubs, clicks or gallops   Abdomen - soft, nontender, nondistended, no masses or organomegaly   Neurological - alert, oriented, normal speech, no focal findings or movement disorder noted   Musculoskeletal - no joint tenderness, deformity or swelling   Extremities - peripheral pulses normal, no pedal edema, no clubbing or cyanosis   Skin - normal coloration and turgor, no rashes, no suspicious skin lesions noted   LABORATORY DATA:     Lab Results   Component Value Date    WBC 4.0 03/08/2022    HGB 13.4 03/08/2022    HCT 40.7 03/08/2022    MCV 92.9 03/08/2022     03/08/2022    LYMPHOPCT 30 03/08/2022    RBC 4.38 03/08/2022    MCH 30.6 03/08/2022    MCHC 32.9 03/08/2022    RDW 12.9 03/08/2022    MONOPCT 11 03/08/2022    BASOPCT 1 03/08/2022    NEUTROABS 2.32 03/08/2022    LYMPHSABS 1.19 03/08/2022    MONOSABS 0.43 03/08/2022    EOSABS 0.05 03/08/2022    BASOSABS <0.03 03/08/2022       Chemistry        Component Value Date/Time     03/08/2022 0829    K 4.3 03/08/2022 0829     (H) 03/08/2022 0829    CO2 23 03/08/2022 0829    BUN 17 03/08/2022 0829    CREATININE 0.71 03/08/2022 0829        Component Value Date/Time    CALCIUM 9.1 03/08/2022 0829    ALKPHOS 71 03/08/2022 0829    AST 25 03/08/2022 0829    ALT 21 03/08/2022 0829 BOOT 0.52 03/08/2022 0829        PATHOLOGY DATA:   Received: 9/11/2018   Reported: 9/12/2018 17:30     -- Diagnosis --   1.  RIGHT BREAST, CORE NEEDLE BIOPSIES:            -  WELL-DIFFERENTIATED INFILTRATING DUCTAL CARCINOMA, 0.6 CM   IN LARGEST INTACT CORE FRAGMENT.            -  ESTROGEN AND PROGESTERONE RECEPTOR IMMUNOSTAINS POSITIVE   IN INVASIVE TUMOR.       -  HER-2 FISH PENDING. 2.  LEFT BREAST, 2:00, N+4, CORE NEEDLE BIOPSIES:            -  MODERATELY DIFFERENTIATED INFILTRATING DUCTAL CARCINOMA,   0.6 CM IN LARGEST INTACT CORE FRAGMENT.       -  FOCAL DUCTAL CARCINOMA IN SITU, INTERMEDIATE GRADE, CRIBRIFORM   AND SOLID TYPE.            -  ESTROGEN AND PROGESTERONE RECEPTOR IMMUNOSTAINS POSITIVE   IN INVASIVE TUMOR.       -  HER-2 FISH PENDING. 3.  LEFT BREAST, 1:00, N+6, CORE NEEDLE BIOPSIES:            -  POORLY DIFFERENTIATED INFILTRATING DUCTAL CARCINOMA, 1.3   CM GREATEST DIMENSION IN LARGEST INTACT CORE FRAGMENT.       -  FOCAL DUCTAL CARCINOMA IN SITU, INTERMEDIATE GRADE, SOLID   TYPE.            -  ESTROGEN AND PROGESTERONE RECEPTOR IMMUNOSTAINS POSITIVE   IN INVASIVE TUMOR.       -  HER-2 FISH PENDING. 4.  LYMPH NODE, LEFT AXILLA, CORE NEEDLE BIOPSIES:       -  INFILTRATING DUCTAL CARCINOMA. -  INVASIVE TUMOR POSITIVE FOR ESTROGEN RECEPTOR AND   NEGATIVE FOR PROGESTERONE RECEPTOR IMMUNOSTAINS.       -  LYMPH NODE AND BENIGN BREAST ARCHITECTURE NOT IDENTIFIED. -- Diagnosis Comment --   SPECIMEN #4: CORRELATION WITH THE RADIOLOGIC PLACEMENT OF THE BIOPSY   WOULD BE NECESSARY TO DETERMINE IF THIS REPRESENTS A LYMPH NODE WITH   METASTATIC CARCINOMA, VERSUS CARCINOMA INVOLVING THE AXILLARY TAIL OF   THE BREAST. NO INTRINSIC LYMPH NODE OR BENIGN BREAST ARCHITECTURE IS   PRESENT FOR CONFIRMATION. 2/15/19: Final Diagnosis   1. LYMPH NODE, EXCISION (RIGHT SENTINEL):        - NO NEOPLASM DETECTED.      2. BREAST, MASTECTOMY (RIGHT):        - SCANT RESIDUAL FOCUS OF INVASIVE DUCTAL CARCINOMA. - DUCTAL CARCINOMA IN SITU. - EXCISION MARGINS NEGATIVE FOR NEOPLASM. 3. BREAST, MASTECTOMY, INCLUDING AXILLARY LYMPH NODES (LEFT):        - INVASIVE DUCTAL CARCINOMA, 1.5 CM        - DUCTAL CARCINOMA IN SITU. - EXCISION MARGINS NEGATIVE FOR NEOPLASM. Diagnosis Comment   BIOMARKER STUDIES WERE PERFORMED ON PRIOR BIOPSIES FROM TUMORS FROM   BOTH BREASTS ([id number]) -     RIGHT:        - POSITIVE ESTROGEN RECEPTOR.        - POSITIVE PROGESTERONE RECEPTOR.        - NEGATIVE FOR HER2 GENE AMPLIFICATION. LEFT:        - POSITIVE ESTROGEN RECEPTOR.        - POSITIVE PROGESTERONE RECEPTOR.        - NEGATIVE FOR HER2 GENE AMPLIFICATION. Procedures/Addenda   ADDENDUM     Date Ordered:     2/21/2019     Status: Signed Out        Date Complete:     2/21/2019     By: Jaimee Smith M.D. Date Reported:     2/21/2019       ADDENDUM DIAGNOSIS   3. BREAST, MASTECTOMY, WITH AXILLARY LYMPH NODES (LEFT):        - NO CHANGE IN DIAGNOSIS. ADDENDUM COMMENT   THE CASE IS DISCUSSED WITH  [address] [place] [address], FEBRUARY 20, 2019. SLIDES ARE   REVIEWED, WITH SPECIFIC ATTENTION TO THE 6 SLIDES FROM AXILLARY   REGION. THESE REPRESENT 2 LYMPH NODES, INCLUDING A LARGER 4.5 CM   NODE, AND NEITHER CONTAINS NEOPLASM ON REVIEW. THE GROSS MASTECTOMY   SPECIMEN IS THEN REEXAMINED WITH SPECIFIC ATTENTION TO THE AXILLARY   TISSUE, AS WELL AS THE BREAST ADJACENT TO THE AXILLARY REGION. NO   ADDITIONAL GROSSLY IDENTIFIABLE LYMPH NODES ARE FOUND. REPRESENTATIVE   SAMPLING OF 8 ADDITIONAL BLOCKS FROM THIS AXILLARY REGION IS SUBMITTED   FOR POSSIBLE IDENTIFICATION OF MINUTE NODES THAT MAY NOT BE GROSSLY   EVIDENT. HOWEVER, NO ADDITIONAL NODES ARE DETECTED ON MICROSCOPIC   EXAM OF THESE ADDITIONAL BLOCKS. IMAGING DATA:    Ultrasound breast 9/10/18  FINDINGS:   DIAGNOSTIC MAMMOGRAM: The breasts are composed of scattered fibroglandular   tissue.   Underlying the area palpable concern in the upper outer left breast,   2 masses are identified measuring up to 1.6 cm anteriorly and with a more   suspicious spiculated 2.4 cm mass with adjacent distortion. No abnormal   calcifications are identified. In the right breast, a spiculated 1 cm mass   is identified in the lower inner quadrant, 6 cm from the nipple. No   associated microcalcifications. TARGETED LEFT ULTRASOUND: Ultrasound was performed in the area of palpable   concern, identifying 2 adjacent irregular hypoechoic masses measuring 1.7 x   2.0 x 1.4 cm in the 2 o'clock position, 4 cm from the nipple and 1.8 x 2.1 x   2.0 cm in the 1 o'clock position, 6 cm from the nipple. Ultrasound in the left axilla was also performed identifying a probable   abnormal lymph node in the axillary tail measuring 2.1 x 1.6 x 2.2 cm. This   has an irregular appearance with a hyperechoic rim and no identifiable fatty   hilum. There is an adjacent abnormal lymph node measuring 1.7 x 1.0 x 2.0 cm   with a small fatty hilum and focal cortical thickening. TARGETED RIGHT ULTRASOUND: Ultrasound was performed in the area of   mammographic concern in the lower inner quadrant. A suspicious irregular   hypoechoic mass measuring 0.7 x 0.8 x 0.8 cm is present in the 3 o'clock   position, 7 cm from the nipple. This corresponds to the mammographic finding. Ultrasound imaging of the right axilla was also performed without   identifiable lymph nodes. Impression   1. 2 noncalcified suspicious masses in the area palpable concern in the 1-2   o'clock position of the left breast with sonographic correlate measuring up   to 2.0 cm and 2.1 cm.       2. Left axillary ultrasound identifies 2 abnormal lymph nodes. 3.  New suspicious noncalcified mass in the lower inner quadrant of the right   breast with corresponding sonographic 0.8 cm mass in the 3 o'clock position,   7 cm from the nipple. 4.  No identifiable lymph nodes in the right axilla. Ultrasound-guided biopsy of the left breast masses, a left axillary lymph   node, and right breast mass is recommended. BIRADS:   BIRADS - CATEGORY 5       Findings are highly suggestive of malignancy. Biopsy should be considered at   this time. Bone scan 9/21/18  Impression   No evidence to suggest osseous metastatic disease. CT chest abdomen pelvis 9/21/18  Impression   1. Known bilateral breast masses and left axillary lymphadenopathy are   demonstrated. No evidence for distant metastatic disease in the chest,   abdomen, or pelvis. 2.  Tiny right hydropneumothorax, presumed related to recent right chest port   placement. 3.  Tiny hiatal hernia. 4.  There is a 1.5 cm right thyroid nodule. Thyroid ultrasound is   recommended. The result was communicated to the ordering physician, Dr. Latesha Pisano, at 1:42   p.m. on 09/21/2018. This was communicated by the radiology communication   center [facility]o), as I was not available when Dr. Latesha Pisano returned the   phone call. Mammogram 1/4/19  Impression   Mammographic evidence of response of therapy within both breasts as described   above. No new lesions are identified. CT chest 11/11/2019:  Impression   1. In the region of previously described ill-defined nodules involving the   left lung apex, there appears to be interval development of an ill-defined   consolidation. Finding is likely post treatment in nature however underlying   infectious process cannot be excluded. If infectious process is suspected,   repeat CT after therapy is recommended. No definitive CT evidence of   metastatic disease within the chest.   2. Small hiatal hernia. 3. Hepatic steatosis. 4. Nodules involving the thyroid gland, largest measuring 1.6 cm. Correlation with ultrasound is recommended. CT chest 1/7/2020  Impression   1. No suspicious nodules or masses.    2. Stable or slightly improved left lung apex reticular interlobular septal   thickening on background of patchy ground-glass attenuation and minor   anterior subpleural reticulations in the left upper lobe attributed to post   treatment scarring. Attention on follow-up. 3. Bilateral breast implants in right IJ infusion port. ASSESSMENT:     Adebayo Thomas has clinical stage II, T2 N1 M0 disease in her left breast and she has clinical stage I T1 N0 M0 disease in her right breast.    Unfortunately she had significant reaction to first cycle of AC chemotherapy. I gave preoperative  weekly paclitaxel for 12 cycles. She underwent surgical resection: I discussed the surgical pathology report. LNs showed no residual cancer, NO residual cancer noted in right breast, But left breast showed smaller residual invasive cancer 1.5 cm and she had negative margins. She  Has completed RT. Now completed Xeloda well. She is tolerating Arimidex very well. She is agreeable to continue for 5 years and will likely to consider breast cancer index at that time to see if she is agreeable to continue up to 10 years. During today's visit, the patient and the family had a number of reasonable questions which were answered to their satisfaction. They verbalized understanding of the information provided and they agreed to proceed as outlined above. PLAN:   I reviewed her recent lab work, imaging studies, discussed diagnosis and treatment recommendations   Clinically she is doing well   Continue Arimidex   Return to clinic in 6 months or earlier if needed     Benson Giraldo MD  Hematologist/Medical Oncologist  I spent more than 35 minutes examining, evaluating, reviewing data and counseling the patient. Greater than 50% of that time was spent face-to-face with the patient in counseling and coordinating her care.       This note is created with the assistance of a speech recognition program.  While intending to generate a document that actually reflects the content of the visit, the document can still have some errors including those of syntax and sound a like substitutions which may escape proof reading. It such instances, actual meaning can be extrapolated by contextual diversion.

## 2022-10-25 ENCOUNTER — IMMUNIZATION (OUTPATIENT)
Dept: LAB | Age: 72
End: 2022-10-25
Payer: MEDICARE

## 2022-10-25 PROCEDURE — 0134A COVID-19, MODERNA BIVALENT BOOSTER, (AGE 18Y+), IM, 50 MCG/0.5 ML: CPT | Performed by: FAMILY MEDICINE

## 2022-10-25 PROCEDURE — PBSHW COVID-19, MODERNA BIVALENT BOOSTER, (AGE 18Y+), IM, 50 MCG/0.5 ML: Performed by: FAMILY MEDICINE

## 2023-01-23 ENCOUNTER — HOSPITAL ENCOUNTER (OUTPATIENT)
Age: 73
Discharge: HOME | End: 2023-01-23
Payer: MEDICARE

## 2023-01-23 DIAGNOSIS — Z79.899 ENCOUNTER FOR LONG-TERM (CURRENT) USE OF MEDICATIONS: ICD-10-CM

## 2023-01-23 LAB
ANION GAP SERPL CALCULATED.3IONS-SCNC: 9 MMOL/L (ref 9–17)
BUN BLDV-MCNC: 19 MG/DL (ref 8–23)
BUN/CREAT BLD: 29 (ref 9–20)
CALCIUM SERPL-MCNC: 9.8 MG/DL (ref 8.6–10.4)
CHLORIDE BLD-SCNC: 107 MMOL/L (ref 98–107)
CHOLESTEROL/HDL RATIO: 3.1
CHOLESTEROL: 198 MG/DL
CO2: 27 MMOL/L (ref 20–31)
CREAT SERPL-MCNC: 0.65 MG/DL (ref 0.5–0.9)
GFR SERPL CREATININE-BSD FRML MDRD: >60 ML/MIN/1.73M2
GLUCOSE BLD-MCNC: 92 MG/DL (ref 70–99)
HDLC SERPL-MCNC: 64 MG/DL
LDL CHOLESTEROL: 122 MG/DL (ref 0–130)
POTASSIUM SERPL-SCNC: 4.3 MMOL/L (ref 3.7–5.3)
SODIUM BLD-SCNC: 143 MMOL/L (ref 135–144)
TRIGL SERPL-MCNC: 61 MG/DL

## 2023-01-23 PROCEDURE — 80048 BASIC METABOLIC PNL TOTAL CA: CPT

## 2023-01-23 PROCEDURE — 80061 LIPID PANEL: CPT

## 2023-01-23 PROCEDURE — 36415 COLL VENOUS BLD VENIPUNCTURE: CPT

## 2023-02-03 ENCOUNTER — OFFICE VISIT (OUTPATIENT)
Dept: FAMILY MEDICINE CLINIC | Age: 73
End: 2023-02-03
Payer: MEDICARE

## 2023-02-03 VITALS
OXYGEN SATURATION: 98 % | SYSTOLIC BLOOD PRESSURE: 130 MMHG | DIASTOLIC BLOOD PRESSURE: 70 MMHG | BODY MASS INDEX: 32.14 KG/M2 | HEART RATE: 75 BPM | HEIGHT: 66 IN | WEIGHT: 200 LBS | TEMPERATURE: 96.9 F

## 2023-02-03 DIAGNOSIS — Z00.00 MEDICARE ANNUAL WELLNESS VISIT, SUBSEQUENT: Primary | ICD-10-CM

## 2023-02-03 PROCEDURE — 1123F ACP DISCUSS/DSCN MKR DOCD: CPT | Performed by: FAMILY MEDICINE

## 2023-02-03 PROCEDURE — 3017F COLORECTAL CA SCREEN DOC REV: CPT | Performed by: FAMILY MEDICINE

## 2023-02-03 PROCEDURE — G0439 PPPS, SUBSEQ VISIT: HCPCS | Performed by: FAMILY MEDICINE

## 2023-02-03 PROCEDURE — G8484 FLU IMMUNIZE NO ADMIN: HCPCS | Performed by: FAMILY MEDICINE

## 2023-02-03 SDOH — ECONOMIC STABILITY: FOOD INSECURITY: WITHIN THE PAST 12 MONTHS, YOU WORRIED THAT YOUR FOOD WOULD RUN OUT BEFORE YOU GOT MONEY TO BUY MORE.: PATIENT DECLINED

## 2023-02-03 SDOH — ECONOMIC STABILITY: FOOD INSECURITY: WITHIN THE PAST 12 MONTHS, THE FOOD YOU BOUGHT JUST DIDN'T LAST AND YOU DIDN'T HAVE MONEY TO GET MORE.: PATIENT DECLINED

## 2023-02-03 SDOH — ECONOMIC STABILITY: INCOME INSECURITY: HOW HARD IS IT FOR YOU TO PAY FOR THE VERY BASICS LIKE FOOD, HOUSING, MEDICAL CARE, AND HEATING?: PATIENT DECLINED

## 2023-02-03 SDOH — ECONOMIC STABILITY: HOUSING INSECURITY
IN THE LAST 12 MONTHS, WAS THERE A TIME WHEN YOU DID NOT HAVE A STEADY PLACE TO SLEEP OR SLEPT IN A SHELTER (INCLUDING NOW)?: PATIENT REFUSED

## 2023-02-03 ASSESSMENT — ENCOUNTER SYMPTOMS
WHEEZING: 0
DIARRHEA: 0
COLOR CHANGE: 0
COUGH: 0
ABDOMINAL PAIN: 0
CONSTIPATION: 0
SHORTNESS OF BREATH: 0
CHEST TIGHTNESS: 0
BLOOD IN STOOL: 0
BACK PAIN: 0

## 2023-02-03 ASSESSMENT — PATIENT HEALTH QUESTIONNAIRE - PHQ9
1. LITTLE INTEREST OR PLEASURE IN DOING THINGS: 0
SUM OF ALL RESPONSES TO PHQ QUESTIONS 1-9: 0
SUM OF ALL RESPONSES TO PHQ9 QUESTIONS 1 & 2: 0
2. FEELING DOWN, DEPRESSED OR HOPELESS: 0
SUM OF ALL RESPONSES TO PHQ QUESTIONS 1-9: 0

## 2023-02-03 ASSESSMENT — LIFESTYLE VARIABLES
HOW MANY STANDARD DRINKS CONTAINING ALCOHOL DO YOU HAVE ON A TYPICAL DAY: PATIENT DOES NOT DRINK
HOW OFTEN DO YOU HAVE A DRINK CONTAINING ALCOHOL: NEVER

## 2023-02-03 NOTE — PATIENT INSTRUCTIONS
Advance Directives: Care Instructions  Overview  An advance directive is a legal way to state your wishes at the end of your life. It tells your family and your doctor what to do if you can't say what you want. There are two main types of advance directives. You can change them any time your wishes change. Living will. This form tells your family and your doctor your wishes about life support and other treatment. The form is also called a declaration. Medical power of . This form lets you name a person to make treatment decisions for you when you can't speak for yourself. This person is called a health care agent (health care proxy, health care surrogate). The form is also called a durable power of  for health care. If you do not have an advance directive, decisions about your medical care may be made by a family member, or by a doctor or a  who doesn't know you. It may help to think of an advance directive as a gift to the people who care for you. If you have one, they won't have to make tough decisions by themselves. For more information, including forms for your state, see the [address] website (www.caringinfo.org/planning/advance-directives/). Follow-up care is a key part of your treatment and safety. Be sure to make and go to all appointments, and call your doctor if you are having problems. It's also a good idea to know your test results and keep a list of the medicines you take. What should you include in an advance directive? Many states have a unique advance directive form. (It may ask you to address specific issues.) Or you might use a universal form that's approved by many states. If your form doesn't tell you what to address, it may be hard to know what to include in your advance directive. Use the questions below to help you get started. Who do you want to make decisions about your medical care if you are not able to?   What life-support measures do you want if you have a serious illness that gets worse over time or can't be cured? What are you most afraid of that might happen? (Maybe you're afraid of having pain, losing your independence, or being kept alive by machines.)  Where would you prefer to die? (Your home? A hospital? A nursing home?)  Do you want to donate your organs when you die? Do you want certain [employer] practices performed before you die? When should you call for help? Be sure to contact your doctor if you have any questions. Where can you learn more? Go to http://www.stephens.com/ and enter R264 to learn more about \"Advance Directives: Care Instructions. \"  Current as of: June 16, 2022               Content Version: 13.5  © 2099-9271 Healthwise, [employer]. Care instructions adapted under license by [facility] ([facility]). If you have questions about a medical condition or this instruction, always ask your healthcare professional. [place] [address] any warranty or liability for your use of this information. Starting a Weight Loss Plan: Care Instructions  Overview     If you're thinking about losing weight, it can be hard to know where to start. Your doctor can help you set up a weight loss plan that best meets your needs. You may want to take a class on nutrition or exercise, or you could join a weight loss support group. If you have questions about how to make changes to your eating or exercise habits, ask your doctor about seeing a registered dietitian or an exercise specialist.  It can be a big challenge to lose weight. But you don't have to make huge changes at once. Make small changes, and stick with them. When those changes become habit, add a few more changes. If you don't think you're ready to make changes right now, try to pick a date in the future. Make an appointment to see your doctor to discuss whether the time is right for you to start a plan. Follow-up care is a key part of your treatment and safety.  Be sure to make and go to all appointments, and call your doctor if you are having problems. It's also a good idea to know your test results and keep a list of the medicines you take. How can you care for yourself at home? Set realistic goals. Many people expect to lose much more weight than is likely. A weight loss of 5% to 10% of your body weight may be enough to improve your health. Get family and friends involved to provide support. Talk to them about why you are trying to lose weight, and ask them to help. They can help by participating in exercise and having meals with you, even if they may be eating something different. Find what works best for you. If you do not have time or do not like to cook, a program that offers meal replacement bars or shakes may be better for you. Or if you like to prepare meals, finding a plan that includes daily menus and recipes may be best.  Ask your doctor about other health professionals who can help you achieve your weight loss goals. A dietitian can help you make healthy changes in your diet. An exercise specialist or  can help you develop a safe and effective exercise program.  A counselor or psychiatrist can help you cope with issues such as depression, anxiety, or family problems that can make it hard to focus on weight loss. Consider joining a support group for people who are trying to lose weight. Your doctor can suggest groups in your area. Where can you learn more? Go to http://www.woods.com/ and enter U357 to learn more about \"Starting a Weight Loss Plan: Care Instructions. \"  Current as of: August 25, 2022               Content Version: 13.5  © 2006-2022 Healthwise, Incorporated. Care instructions adapted under license by [facility] ([facility]).  If you have questions about a medical condition or this instruction, always ask your healthcare professional. [address] any warranty or liability for your use of this information. A Healthy Heart: Care Instructions  Your Care Instructions     Coronary artery disease, also called heart disease, occurs when a substance called plaque builds up in the vessels that supply oxygen-rich blood to your heart muscle. This can narrow the blood vessels and reduce blood flow. A heart attack happens when blood flow is completely blocked. A high-fat diet, smoking, and other factors increase the risk of heart disease. Your doctor has found that you have a chance of having heart disease. You can do lots of things to keep your heart healthy. It may not be easy, but you can change your diet, exercise more, and quit smoking. These steps really work to lower your chance of heart disease. Follow-up care is a key part of your treatment and safety. Be sure to make and go to all appointments, and call your doctor if you are having problems. It's also a good idea to know your test results and keep a list of the medicines you take. How can you care for yourself at home? Diet    Use less salt when you cook and eat. This helps lower your blood pressure. Taste food before salting. Add only a little salt when you think you need it. With time, your taste buds will adjust to less salt.     Eat fewer snack items, fast foods, canned soups, and other high-salt, high-fat, processed foods.     Read food labels and try to avoid saturated and trans fats. They increase your risk of heart disease by raising cholesterol levels.     Limit the amount of solid fat-butter, margarine, and shortening-you eat. Use olive, peanut, or canola oil when you cook. Bake, broil, and steam foods instead of frying them.     Eat a variety of fruit and vegetables every day. Dark green, deep orange, red, or yellow fruits and vegetables are especially good for you. Examples include spinach, carrots, peaches, and berries.     Foods high in fiber can reduce your cholesterol and provide important vitamins and minerals.  High-fiber foods include whole-grain cereals and breads, oatmeal, beans, brown rice, citrus fruits, and apples.     Eat lean proteins. Heart-healthy proteins include seafood, lean meats and poultry, eggs, beans, peas, nuts, seeds, and soy products.     Limit drinks and foods with added sugar. These include candy, desserts, and soda pop. Lifestyle changes    If your doctor recommends it, get more exercise. Walking is a good choice. Bit by bit, increase the amount you walk every day. Try for at least 30 minutes on most days of the week. You also may want to swim, bike, or do other activities.     Do not smoke. If you need help quitting, talk to your doctor about stop-smoking programs and medicines. These can increase your chances of quitting for good. Quitting smoking may be the most important step you can take to protect your heart. It is never too late to quit.     Limit alcohol to 2 drinks a day for men and 1 drink a day for women. Too much alcohol can cause health problems.     Manage other health problems such as diabetes, high blood pressure, and high cholesterol. If you think you may have a problem with alcohol or drug use, talk to your doctor. Medicines    Take your medicines exactly as prescribed. Call your doctor if you think you are having a problem with your medicine.     If your doctor recommends aspirin, take the amount directed each day. Make sure you take aspirin and not another kind of pain reliever, such as acetaminophen (Tylenol). When should you call for help? Call 911 if you have symptoms of a heart attack. These may include:    Chest pain or pressure, or a strange feeling in the chest.     Sweating.     Shortness of breath.     Pain, pressure, or a strange feeling in the back, neck, jaw, or upper belly or in one or both shoulders or arms.     Lightheadedness or sudden weakness.     A fast or irregular heartbeat.    After you call 911, the  may tell you to chew 1 adult-strength or 2 to 4 low-dose aspirin. Wait for an ambulance. Do not try to drive yourself. Watch closely for changes in your health, and be sure to contact your doctor if you have any problems. Where can you learn more? Go to http://www.stephens.com/ and enter F075 to learn more about \"A Healthy Heart: Care Instructions. \"  Current as of: September 7, 2022               Content Version: 13.5  © 2006-2022 [employer]. Care instructions adapted under license by [facility] ([facility]). If you have questions about a medical condition or this instruction, always ask your healthcare professional. [place] [address] any warranty or liability for your use of this information. Personalized Preventive Plan for Maged Bishop - 2/3/2023  Medicare offers a range of preventive health benefits. Some of the tests and screenings are paid in full while other may be subject to a deductible, co-insurance, and/or copay. Some of these benefits include a comprehensive review of your medical history including lifestyle, illnesses that may run in your family, and various assessments and screenings as appropriate. After reviewing your medical record and screening and assessments performed today your provider may have ordered immunizations, labs, imaging, and/or referrals for you. A list of these orders (if applicable) as well as your Preventive Care list are included within your After Visit Summary for your review. Other Preventive Recommendations:    A preventive eye exam performed by an eye specialist is recommended every 1-2 years to screen for glaucoma; cataracts, macular degeneration, and other eye disorders. A preventive dental visit is recommended every 6 months. Try to get at least 150 minutes of exercise per week or 10,000 steps per day on a pedometer . Order or download the FREE \"Exercise & Physical Activity: Your Everyday Guide\" from [employer].  Call 6-147.276.2107 or search The [employer] on [employer]. You need 1442-3372 mg of calcium and 9029-2561 IU of vitamin D per day. It is possible to meet your calcium requirement with diet alone, but a vitamin D supplement is usually necessary to meet this goal.  When exposed to the sun, use a sunscreen that protects against both UVA and UVB radiation with an SPF of 30 or greater. Reapply every 2 to 3 hours or after sweating, drying off with a towel, or swimming. Always wear a seat belt when traveling in a car. Always wear a helmet when riding a bicycle or motorcycle.

## 2023-02-03 NOTE — PROGRESS NOTES
Medicare Annual Wellness Visit    Michaela Bello is here for Medicare AWV and Annual Exam    Assessment & Plan   Medicare annual wellness visit, subsequent    Recommendations for Preventive Services Due: see orders and patient instructions/AVS.  Recommended screening schedule for the next 5-10 years is provided to the patient in written form: see Patient Instructions/AVS.     Return for Medicare Annual Wellness Visit in 1 year. Subjective       Review of Systems   Constitutional:  Negative for activity change, appetite change, chills, fatigue and fever. Eyes:  Negative for visual disturbance. Respiratory:  Negative for cough, chest tightness, shortness of breath and wheezing. Cardiovascular:  Negative for chest pain, palpitations and leg swelling. Gastrointestinal:  Negative for abdominal pain, blood in stool, constipation and diarrhea. Endocrine: Negative for polydipsia, polyphagia and polyuria. Genitourinary:  Negative for difficulty urinating and dysuria. Musculoskeletal:  Negative for arthralgias, back pain and myalgias. Skin:  Negative for color change and rash. Allergic/Immunologic: Negative for environmental allergies. Neurological:  Negative for dizziness, syncope, weakness, light-headedness and headaches. Psychiatric/Behavioral:  Positive for sleep disturbance (chronic). Negative for dysphoric mood. The patient is not nervous/anxious. She is walking with her dog daily    Patient's complete Health Risk Assessment and screening values have been reviewed and are found in Flowsheets. The following problems were reviewed today and where indicated follow up appointments were made and/or referrals ordered.     Positive Risk Factor Screenings with Interventions:                 Weight and Activity:  Physical Activity: Insufficiently Active    Days of Exercise per Week: 3 days    Minutes of Exercise per Session: 30 min     On average, how many days per week do you engage in moderate to strenuous exercise (like a brisk walk)?: 3 days  Have you lost any weight without trying in the past 3 months?: No  Body mass index: (!) 32.28  Obesity Interventions:  low carbohydrate diet                           Objective   Vitals:    02/03/23 0804   BP: 130/70   Pulse: 75   Temp: 96.9 °F (36.1 °C)   SpO2: 98%   Weight: 200 lb (90.7 kg)   Height: 5' 6\" (1.676 m)      Body mass index is 32.28 kg/m². General Appearance: alert and oriented to person, place and time, well-developed and well-nourished, in no acute distress  Skin: warm and dry, no rash or erythema  Head: normocephalic and atraumatic  Eyes: pupils equal, round, and reactive to light, extraocular eye movements intact, conjunctivae normal  ENT: tympanic membrane, external ear and ear canal normal bilaterally, oropharynx clear and moist with normal mucous membranes  Neck: neck supple and non tender without mass, no thyromegaly or thyroid nodules, no cervical lymphadenopathy   Pulmonary/Chest: clear to auscultation bilaterally- no wheezes, rales or rhonchi, normal air movement, no respiratory distress  Cardiovascular: normal rate, normal S1 and S2, no gallops, and intact distal pulses  Extremities: no cyanosis, no clubbing, and no edema  Musculoskeletal: normal range of motion, no joint swelling, deformity or tenderness  Neurologic: gait and coordination normal and speech normal       Allergies   Allergen Reactions    Boniva [Ibandronic Acid] Other (See Comments)     Jaw pain. Fosamax [Alendronate Sodium] Other (See Comments)     Jaw pain. Prior to Visit Medications    Medication Sig Taking?  Authorizing Provider   anastrozole (ARIMIDEX) 1 MG tablet TAKE 1 TABLET DAILY Yes Jenny Simmons MD   Calcium Carb-Cholecalciferol (CALCIUM 1000 + D PO) Take by mouth Yes Historical Provider, MD   Lactobacillus-Inulin (CULTURELLE DIGESTIVE DAILY PO) Take by mouth Yes Historical ProviderMD Ordoñez (Including outside providers/suppliers regularly involved in providing care):   Patient Care Team:  Lyssa Duran MD as PCP - General (Family Medicine)  Lyssa Duran MD as PCP - Empaneled Provider  Marisol Ta MD as PCP - Hematology/Oncology (Hematology and Oncology)  Sanjiv Coreas, RN as Registered Nurse (Oncology)     Reviewed and updated this visit:  Tobacco  Allergies  Meds  Problems  Med Hx  Surg Hx  Soc Hx  Fam Hx        No visits with results within 1 Day(s) from this visit.    Latest known visit with results is:   Hospital Outpatient Visit on 01/23/2023   Component Date Value Ref Range Status    Cholesterol 01/23/2023 198  <200 mg/dL Final    HDL 01/23/2023 64  >40 mg/dL Final    LDL Cholesterol 01/23/2023 122  0 - 130 mg/dL Final    Chol/HDL Ratio 01/23/2023 3.1  <5 Final    Triglycerides 01/23/2023 61  <150 mg/dL Final    Glucose 01/23/2023 92  70 - 99 mg/dL Final    BUN 01/23/2023 19  8 - 23 mg/dL Final    Creatinine 01/23/2023 0.65  0.50 - 0.90 mg/dL Final    Est, Glom Filt Rate 01/23/2023 >60  >60 mL/min/1.73m2 Final    Bun/Cre Ratio 01/23/2023 29 (A)  9 - 20 Final    Calcium 01/23/2023 9.8  8.6 - 10.4 mg/dL Final    Sodium 01/23/2023 143  135 - 144 mmol/L Final    Potassium 01/23/2023 4.3  3.7 - 5.3 mmol/L Final    Chloride 01/23/2023 107  98 - 107 mmol/L Final    CO2 01/23/2023 27  20 - 31 mmol/L Final    Anion Gap 01/23/2023 9  9 - 17 mmol/L Final            Lyssa Duran MD

## 2023-05-22 ENCOUNTER — OFFICE VISIT (OUTPATIENT)
Dept: ONCOLOGY | Age: 73
End: 2023-05-22
Payer: MEDICARE

## 2023-05-22 ENCOUNTER — HOSPITAL ENCOUNTER (OUTPATIENT)
Age: 73
Discharge: HOME | End: 2023-05-22
Payer: MEDICARE

## 2023-05-22 VITALS
WEIGHT: 203 LBS | DIASTOLIC BLOOD PRESSURE: 76 MMHG | BODY MASS INDEX: 32.62 KG/M2 | HEART RATE: 66 BPM | SYSTOLIC BLOOD PRESSURE: 122 MMHG | TEMPERATURE: 98.4 F | HEIGHT: 66 IN | OXYGEN SATURATION: 96 %

## 2023-05-22 DIAGNOSIS — Z79.811 LONG TERM (CURRENT) USE OF AROMATASE INHIBITORS: ICD-10-CM

## 2023-05-22 DIAGNOSIS — C50.412 MALIGNANT NEOPLASM OF UPPER-OUTER QUADRANT OF BOTH BREASTS IN FEMALE, ESTROGEN RECEPTOR POSITIVE (HCC): Primary | ICD-10-CM

## 2023-05-22 DIAGNOSIS — Z17.0 MALIGNANT NEOPLASM OF UPPER-OUTER QUADRANT OF BOTH BREASTS IN FEMALE, ESTROGEN RECEPTOR POSITIVE (HCC): Primary | ICD-10-CM

## 2023-05-22 DIAGNOSIS — C50.411 MALIGNANT NEOPLASM OF UPPER-OUTER QUADRANT OF BOTH BREASTS IN FEMALE, ESTROGEN RECEPTOR POSITIVE (HCC): Primary | ICD-10-CM

## 2023-05-22 LAB
ALBUMIN SERPL-MCNC: 4.5 G/DL (ref 3.5–5.2)
ALBUMIN/GLOB SERPL: 1.7 {RATIO} (ref 1–2.5)
ALP SERPL-CCNC: 70 U/L (ref 35–104)
ALT SERPL-CCNC: 20 U/L (ref 5–33)
ANION GAP SERPL CALCULATED.3IONS-SCNC: 10 MMOL/L (ref 9–17)
AST SERPL-CCNC: 23 U/L
BASOPHILS # BLD: 0.03 K/UL (ref 0–0.2)
BASOPHILS NFR BLD: 1 % (ref 0–2)
BILIRUB SERPL-MCNC: 0.3 MG/DL (ref 0.3–1.2)
BUN SERPL-MCNC: 21 MG/DL (ref 8–23)
BUN/CREAT SERPL: 28 (ref 9–20)
CALCIUM SERPL-MCNC: 10.3 MG/DL (ref 8.6–10.4)
CHLORIDE SERPL-SCNC: 104 MMOL/L (ref 98–107)
CO2 SERPL-SCNC: 28 MMOL/L (ref 20–31)
CREAT SERPL-MCNC: 0.75 MG/DL (ref 0.5–0.9)
EOSINOPHIL # BLD: 0.08 K/UL (ref 0–0.44)
EOSINOPHILS RELATIVE PERCENT: 2 % (ref 1–4)
ERYTHROCYTE [DISTWIDTH] IN BLOOD BY AUTOMATED COUNT: 13.3 % (ref 11.8–14.4)
GFR SERPL CREATININE-BSD FRML MDRD: >60 ML/MIN/1.73M2
GLUCOSE SERPL-MCNC: 97 MG/DL (ref 70–99)
HCT VFR BLD AUTO: 40.1 % (ref 36.3–47.1)
HGB BLD-MCNC: 13.1 G/DL (ref 11.9–15.1)
IMM GRANULOCYTES # BLD AUTO: <0.03 K/UL (ref 0–0.3)
IMM GRANULOCYTES NFR BLD: 0 %
LYMPHOCYTES # BLD: 34 % (ref 24–43)
LYMPHOCYTES NFR BLD: 1.81 K/UL (ref 1.1–3.7)
MCH RBC QN AUTO: 30.3 PG (ref 25.2–33.5)
MCHC RBC AUTO-ENTMCNC: 32.7 G/DL (ref 25.2–33.5)
MCV RBC AUTO: 92.8 FL (ref 82.6–102.9)
MONOCYTES NFR BLD: 0.57 K/UL (ref 0.1–1.2)
MONOCYTES NFR BLD: 11 % (ref 3–12)
NEUTROPHILS NFR BLD: 52 % (ref 36–65)
NEUTS SEG NFR BLD: 2.89 K/UL (ref 1.5–8.1)
NRBC AUTOMATED: 0 PER 100 WBC
PLATELET # BLD AUTO: 219 K/UL (ref 138–453)
PMV BLD AUTO: 11.4 FL (ref 8.1–13.5)
POTASSIUM SERPL-SCNC: 4.3 MMOL/L (ref 3.7–5.3)
PROT SERPL-MCNC: 7.1 G/DL (ref 6.4–8.3)
RBC # BLD AUTO: 4.32 M/UL (ref 3.95–5.11)
SODIUM SERPL-SCNC: 142 MMOL/L (ref 135–144)
WBC OTHER # BLD: 5.4 K/UL (ref 3.5–11.3)

## 2023-05-22 PROCEDURE — 1036F TOBACCO NON-USER: CPT | Performed by: INTERNAL MEDICINE

## 2023-05-22 PROCEDURE — 1090F PRES/ABSN URINE INCON ASSESS: CPT | Performed by: INTERNAL MEDICINE

## 2023-05-22 PROCEDURE — 99214 OFFICE O/P EST MOD 30 MIN: CPT | Performed by: INTERNAL MEDICINE

## 2023-05-22 PROCEDURE — 85025 COMPLETE CBC W/AUTO DIFF WBC: CPT

## 2023-05-22 PROCEDURE — G8400 PT W/DXA NO RESULTS DOC: HCPCS | Performed by: INTERNAL MEDICINE

## 2023-05-22 PROCEDURE — 80053 COMPREHEN METABOLIC PANEL: CPT

## 2023-05-22 PROCEDURE — 36415 COLL VENOUS BLD VENIPUNCTURE: CPT

## 2023-05-22 PROCEDURE — G8417 CALC BMI ABV UP PARAM F/U: HCPCS | Performed by: INTERNAL MEDICINE

## 2023-05-22 PROCEDURE — G8427 DOCREV CUR MEDS BY ELIG CLIN: HCPCS | Performed by: INTERNAL MEDICINE

## 2023-05-22 PROCEDURE — 1123F ACP DISCUSS/DSCN MKR DOCD: CPT | Performed by: INTERNAL MEDICINE

## 2023-05-22 PROCEDURE — 99213 OFFICE O/P EST LOW 20 MIN: CPT | Performed by: INTERNAL MEDICINE

## 2023-05-22 PROCEDURE — 3017F COLORECTAL CA SCREEN DOC REV: CPT | Performed by: INTERNAL MEDICINE

## 2023-05-22 RX ORDER — VITAMIN B COMPLEX
1 CAPSULE ORAL DAILY
COMMUNITY

## 2023-05-22 NOTE — PROGRESS NOTES
is doing well   We will check lab work today  Continue endocrine therapy with Arimidex   We will get her DEXA scan in August   Return to clinic in 6 months or earlier if needed       Benson Coto MD  Hematologist/Medical Oncologist    I spent more than 35 minutes examining, evaluating, reviewing data and counseling the patient. Greater than 50% of that time was spent face-to-face with the patient in counseling and coordinating her care. This note is created with the assistance of a speech recognition program.  While intending to generate a document that actually reflects the content of the visit, the document can still have some errors including those of syntax and sound a like substitutions which may escape proof reading. It such instances, actual meaning can be extrapolated by contextual diversion.

## 2023-05-23 ENCOUNTER — TELEPHONE (OUTPATIENT)
Dept: ONCOLOGY | Age: 73
End: 2023-05-23

## 2023-08-11 ENCOUNTER — OFFICE VISIT (OUTPATIENT)
Dept: SURGERY | Age: 73
End: 2023-08-11

## 2023-08-11 VITALS
DIASTOLIC BLOOD PRESSURE: 66 MMHG | RESPIRATION RATE: 16 BRPM | HEIGHT: 66 IN | HEART RATE: 80 BPM | OXYGEN SATURATION: 97 % | BODY MASS INDEX: 32.62 KG/M2 | WEIGHT: 203 LBS | SYSTOLIC BLOOD PRESSURE: 114 MMHG

## 2023-08-11 DIAGNOSIS — Z98.890 S/P BREAST RECONSTRUCTION, BILATERAL: Primary | ICD-10-CM

## 2023-08-11 NOTE — PROGRESS NOTES
Subjective:      Patient ID: Joss Beasley is a 72 y.o. female. HPI  Patient presents in the office today for a one year follow up from her breast reconstruction on 2/11/2020. Patient denies any concerns or issues at this time. Review of Systems    Objective:   Physical Exam  Vitals and nursing note reviewed. Exam conducted with a chaperone present. Chest:      Comments:     Left still sits a bit high. No change. Left slight firm, Richards 1.5. Right soft, Richards 1. No evidence of leak. No masses either breast.  No axillary adenopathy. Assessment:      Bilateral breast reconstruction. Plan:      Recheck in 1 year.         Jerry Howard MD

## 2023-08-11 NOTE — PROGRESS NOTES
Patient presents in the office today for a one year follow up from her breast reconstruction on 2/11/2020. Patient denies any concerns or issues at this time.

## 2023-09-22 DIAGNOSIS — C50.411 MALIGNANT NEOPLASM OF UPPER-OUTER QUADRANT OF BOTH BREASTS IN FEMALE, ESTROGEN RECEPTOR POSITIVE (HCC): ICD-10-CM

## 2023-09-22 DIAGNOSIS — Z17.0 MALIGNANT NEOPLASM OF UPPER-OUTER QUADRANT OF BOTH BREASTS IN FEMALE, ESTROGEN RECEPTOR POSITIVE (HCC): ICD-10-CM

## 2023-09-22 DIAGNOSIS — C50.412 MALIGNANT NEOPLASM OF UPPER-OUTER QUADRANT OF BOTH BREASTS IN FEMALE, ESTROGEN RECEPTOR POSITIVE (HCC): ICD-10-CM

## 2023-09-22 RX ORDER — ANASTROZOLE 1 MG/1
TABLET ORAL
Qty: 90 TABLET | Refills: 3 | Status: SHIPPED | OUTPATIENT
Start: 2023-09-22

## 2023-11-27 ENCOUNTER — OFFICE VISIT (OUTPATIENT)
Dept: ONCOLOGY | Age: 73
End: 2023-11-27
Payer: MEDICARE

## 2023-11-27 VITALS
WEIGHT: 208 LBS | HEIGHT: 66 IN | DIASTOLIC BLOOD PRESSURE: 72 MMHG | SYSTOLIC BLOOD PRESSURE: 124 MMHG | RESPIRATION RATE: 16 BRPM | BODY MASS INDEX: 33.43 KG/M2 | HEART RATE: 85 BPM | TEMPERATURE: 97.6 F | OXYGEN SATURATION: 97 %

## 2023-11-27 DIAGNOSIS — C50.411 MALIGNANT NEOPLASM OF UPPER-OUTER QUADRANT OF BOTH BREASTS IN FEMALE, ESTROGEN RECEPTOR POSITIVE (HCC): Primary | ICD-10-CM

## 2023-11-27 DIAGNOSIS — Z17.0 MALIGNANT NEOPLASM OF UPPER-OUTER QUADRANT OF BOTH BREASTS IN FEMALE, ESTROGEN RECEPTOR POSITIVE (HCC): Primary | ICD-10-CM

## 2023-11-27 DIAGNOSIS — C50.412 MALIGNANT NEOPLASM OF UPPER-OUTER QUADRANT OF BOTH BREASTS IN FEMALE, ESTROGEN RECEPTOR POSITIVE (HCC): Primary | ICD-10-CM

## 2023-11-27 PROCEDURE — G8417 CALC BMI ABV UP PARAM F/U: HCPCS | Performed by: INTERNAL MEDICINE

## 2023-11-27 PROCEDURE — 3017F COLORECTAL CA SCREEN DOC REV: CPT | Performed by: INTERNAL MEDICINE

## 2023-11-27 PROCEDURE — G8484 FLU IMMUNIZE NO ADMIN: HCPCS | Performed by: INTERNAL MEDICINE

## 2023-11-27 PROCEDURE — 1090F PRES/ABSN URINE INCON ASSESS: CPT | Performed by: INTERNAL MEDICINE

## 2023-11-27 PROCEDURE — 1036F TOBACCO NON-USER: CPT | Performed by: INTERNAL MEDICINE

## 2023-11-27 PROCEDURE — G8427 DOCREV CUR MEDS BY ELIG CLIN: HCPCS | Performed by: INTERNAL MEDICINE

## 2023-11-27 PROCEDURE — 99214 OFFICE O/P EST MOD 30 MIN: CPT | Performed by: INTERNAL MEDICINE

## 2023-11-27 PROCEDURE — 1123F ACP DISCUSS/DSCN MKR DOCD: CPT | Performed by: INTERNAL MEDICINE

## 2023-11-27 PROCEDURE — G8400 PT W/DXA NO RESULTS DOC: HCPCS | Performed by: INTERNAL MEDICINE

## 2023-11-27 NOTE — PROGRESS NOTES
Chir scheduled scan prior to follow up
Marco Antonio returned the   phone call. Mammogram 1/4/19  Impression   Mammographic evidence of response of therapy within both breasts as described   above. No new lesions are identified. CT chest 11/11/2019:  Impression   1. In the region of previously described ill-defined nodules involving the   left lung apex, there appears to be interval development of an ill-defined   consolidation. Finding is likely post treatment in nature however underlying   infectious process cannot be excluded. If infectious process is suspected,   repeat CT after therapy is recommended. No definitive CT evidence of   metastatic disease within the chest.   2. Small hiatal hernia. 3. Hepatic steatosis. 4. Nodules involving the thyroid gland, largest measuring 1.6 cm. Correlation with ultrasound is recommended. CT chest 1/7/2020  Impression   1. No suspicious nodules or masses. 2. Stable or slightly improved left lung apex reticular interlobular septal   thickening on background of patchy ground-glass attenuation and minor   anterior subpleural reticulations in the left upper lobe attributed to post   treatment scarring. Attention on follow-up. 3. Bilateral breast implants in right IJ infusion port. 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    Unfortunately she had significant reaction to first cycle of AC chemotherapy. I gave preoperative  weekly paclitaxel for 12 cycles. She underwent surgical resection: I discussed the surgical pathology report. LNs showed no residual cancer, NO residual cancer noted in right breast, But left breast showed smaller residual invasive cancer 1.5 cm and she had negative margins. She  Has completed RT. Now completed Xeloda well. She is tolerating Arimidex very well.   She is agreeable to continue for 5 years and will likely to consider breast cancer index at that time to

## 2024-01-30 ENCOUNTER — HOSPITAL ENCOUNTER (OUTPATIENT)
Age: 74
Discharge: HOME | End: 2024-01-30
Payer: MEDICARE

## 2024-01-30 DIAGNOSIS — Z79.811 LONG TERM (CURRENT) USE OF AROMATASE INHIBITORS: ICD-10-CM

## 2024-01-30 LAB
ANION GAP SERPL CALCULATED.3IONS-SCNC: 9 MMOL/L (ref 9–17)
BUN SERPL-MCNC: 16 MG/DL (ref 8–23)
BUN/CREAT SERPL: 23 (ref 9–20)
CALCIUM SERPL-MCNC: 9.7 MG/DL (ref 8.6–10.4)
CHLORIDE SERPL-SCNC: 107 MMOL/L (ref 98–107)
CO2 SERPL-SCNC: 28 MMOL/L (ref 20–31)
CREAT SERPL-MCNC: 0.7 MG/DL (ref 0.5–0.9)
GFR SERPL CREATININE-BSD FRML MDRD: >60 ML/MIN/1.73M2
GLUCOSE SERPL-MCNC: 93 MG/DL (ref 70–99)
POTASSIUM SERPL-SCNC: 4.1 MMOL/L (ref 3.7–5.3)
SODIUM SERPL-SCNC: 144 MMOL/L (ref 135–144)

## 2024-01-30 PROCEDURE — 80048 BASIC METABOLIC PNL TOTAL CA: CPT

## 2024-01-30 PROCEDURE — 36415 COLL VENOUS BLD VENIPUNCTURE: CPT

## 2024-02-05 ENCOUNTER — OFFICE VISIT (OUTPATIENT)
Dept: FAMILY MEDICINE CLINIC | Age: 74
End: 2024-02-05
Payer: MEDICARE

## 2024-02-05 VITALS
DIASTOLIC BLOOD PRESSURE: 78 MMHG | HEIGHT: 67 IN | BODY MASS INDEX: 32.85 KG/M2 | SYSTOLIC BLOOD PRESSURE: 130 MMHG | HEART RATE: 72 BPM | WEIGHT: 209.3 LBS | OXYGEN SATURATION: 96 %

## 2024-02-05 DIAGNOSIS — Z00.00 MEDICARE ANNUAL WELLNESS VISIT, SUBSEQUENT: Primary | ICD-10-CM

## 2024-02-05 PROBLEM — R11.2 NAUSEA & VOMITING: Status: RESOLVED | Noted: 2018-10-04 | Resolved: 2024-02-05

## 2024-02-05 PROCEDURE — 3017F COLORECTAL CA SCREEN DOC REV: CPT | Performed by: FAMILY MEDICINE

## 2024-02-05 PROCEDURE — G8484 FLU IMMUNIZE NO ADMIN: HCPCS | Performed by: FAMILY MEDICINE

## 2024-02-05 PROCEDURE — 1123F ACP DISCUSS/DSCN MKR DOCD: CPT | Performed by: FAMILY MEDICINE

## 2024-02-05 PROCEDURE — G0439 PPPS, SUBSEQ VISIT: HCPCS | Performed by: FAMILY MEDICINE

## 2024-02-05 SDOH — ECONOMIC STABILITY: HOUSING INSECURITY
IN THE LAST 12 MONTHS, WAS THERE A TIME WHEN YOU DID NOT HAVE A STEADY PLACE TO SLEEP OR SLEPT IN A SHELTER (INCLUDING NOW)?: NO

## 2024-02-05 SDOH — ECONOMIC STABILITY: INCOME INSECURITY: HOW HARD IS IT FOR YOU TO PAY FOR THE VERY BASICS LIKE FOOD, HOUSING, MEDICAL CARE, AND HEATING?: NOT HARD AT ALL

## 2024-02-05 SDOH — ECONOMIC STABILITY: FOOD INSECURITY: WITHIN THE PAST 12 MONTHS, YOU WORRIED THAT YOUR FOOD WOULD RUN OUT BEFORE YOU GOT MONEY TO BUY MORE.: NEVER TRUE

## 2024-02-05 SDOH — ECONOMIC STABILITY: FOOD INSECURITY: WITHIN THE PAST 12 MONTHS, THE FOOD YOU BOUGHT JUST DIDN'T LAST AND YOU DIDN'T HAVE MONEY TO GET MORE.: NEVER TRUE

## 2024-02-05 ASSESSMENT — ENCOUNTER SYMPTOMS
ABDOMINAL PAIN: 0
SHORTNESS OF BREATH: 0
BLOOD IN STOOL: 0
BACK PAIN: 0
COLOR CHANGE: 0
DIARRHEA: 0
WHEEZING: 0
CONSTIPATION: 0
CHEST TIGHTNESS: 0
COUGH: 0

## 2024-02-05 ASSESSMENT — PATIENT HEALTH QUESTIONNAIRE - PHQ9
2. FEELING DOWN, DEPRESSED OR HOPELESS: 0
SUM OF ALL RESPONSES TO PHQ9 QUESTIONS 1 & 2: 0
SUM OF ALL RESPONSES TO PHQ QUESTIONS 1-9: 0
1. LITTLE INTEREST OR PLEASURE IN DOING THINGS: 0
SUM OF ALL RESPONSES TO PHQ QUESTIONS 1-9: 0

## 2024-02-05 ASSESSMENT — LIFESTYLE VARIABLES
HOW OFTEN DO YOU HAVE A DRINK CONTAINING ALCOHOL: NEVER
HOW MANY STANDARD DRINKS CONTAINING ALCOHOL DO YOU HAVE ON A TYPICAL DAY: PATIENT DOES NOT DRINK

## 2024-02-05 NOTE — PROGRESS NOTES
Medicare Annual Wellness Visit    Riddhi Coon is here for Medicare AWV    Assessment & Plan   Medicare annual wellness visit, subsequent    Recommendations for Preventive Services Due: see orders and patient instructions/AVS.  Recommended screening schedule for the next 5-10 years is provided to the patient in written form: see Patient Instructions/AVS.     Return in about 1 year (around 2/5/2025) for MWV.     Subjective       Review of Systems   Constitutional:  Negative for activity change, appetite change, chills, fatigue and fever.   Eyes:  Negative for visual disturbance.   Respiratory:  Negative for cough, chest tightness, shortness of breath and wheezing.    Cardiovascular:  Negative for chest pain, palpitations and leg swelling.   Gastrointestinal:  Negative for abdominal pain, blood in stool, constipation and diarrhea.   Endocrine: Negative for polydipsia, polyphagia and polyuria.   Genitourinary:  Negative for difficulty urinating.   Musculoskeletal:  Negative for arthralgias, back pain and myalgias.   Skin:  Negative for color change and rash.   Allergic/Immunologic: Negative for environmental allergies.   Neurological:  Negative for dizziness, syncope, weakness, light-headedness and headaches.   Psychiatric/Behavioral:  Positive for sleep disturbance (chronic). Negative for dysphoric mood. The patient is not nervous/anxious.        Patient's complete Health Risk Assessment and screening values have been reviewed and are found in Flowsheets. The following problems were reviewed today and where indicated follow up appointments were made and/or referrals ordered.    Positive Risk Factor Screenings with Interventions:                Activity, Diet, and Weight:  On average, how many days per week do you engage in moderate to strenuous exercise (like a brisk walk)?: 6 days  On average, how many minutes do you engage in exercise at this level?: 20 min    Do you eat balanced/healthy meals regularly?:

## 2024-02-21 NOTE — PROGRESS NOTES
VAD accessed per protocol using 20 gauge 3/4\" gramajo needle. Flushes easily. Good blood return. Flushed with 10 ml normal saline and 5 ml Heplock solution. Gramajo needle dc'd. Bandaid to site. Patient tolerated procedure with out difficulty. Patient discharged to home.
No

## 2024-03-12 ENCOUNTER — HOSPITAL ENCOUNTER (OUTPATIENT)
Dept: BONE DENSITY | Age: 74
Discharge: HOME | End: 2024-03-14
Attending: INTERNAL MEDICINE
Payer: MEDICARE

## 2024-03-12 DIAGNOSIS — Z79.811 LONG TERM (CURRENT) USE OF AROMATASE INHIBITORS: ICD-10-CM

## 2024-03-12 PROCEDURE — 77080 DXA BONE DENSITY AXIAL: CPT

## 2024-05-28 ENCOUNTER — HOSPITAL ENCOUNTER (OUTPATIENT)
Age: 74
Discharge: HOME | End: 2024-05-28
Payer: MEDICARE

## 2024-05-28 DIAGNOSIS — Z17.0 MALIGNANT NEOPLASM OF UPPER-OUTER QUADRANT OF BOTH BREASTS IN FEMALE, ESTROGEN RECEPTOR POSITIVE (HCC): ICD-10-CM

## 2024-05-28 DIAGNOSIS — C50.411 MALIGNANT NEOPLASM OF UPPER-OUTER QUADRANT OF BOTH BREASTS IN FEMALE, ESTROGEN RECEPTOR POSITIVE (HCC): ICD-10-CM

## 2024-05-28 DIAGNOSIS — C50.412 MALIGNANT NEOPLASM OF UPPER-OUTER QUADRANT OF BOTH BREASTS IN FEMALE, ESTROGEN RECEPTOR POSITIVE (HCC): ICD-10-CM

## 2024-05-28 LAB
ALBUMIN SERPL-MCNC: 4.2 G/DL (ref 3.5–5.2)
ALBUMIN/GLOB SERPL: 1.7 {RATIO} (ref 1–2.5)
ALP SERPL-CCNC: 68 U/L (ref 35–104)
ALT SERPL-CCNC: 20 U/L (ref 5–33)
ANION GAP SERPL CALCULATED.3IONS-SCNC: 9 MMOL/L (ref 9–17)
AST SERPL-CCNC: 22 U/L
BILIRUB SERPL-MCNC: 0.3 MG/DL (ref 0.3–1.2)
BUN SERPL-MCNC: 21 MG/DL (ref 8–23)
BUN/CREAT SERPL: 30 (ref 9–20)
CALCIUM SERPL-MCNC: 9.7 MG/DL (ref 8.6–10.4)
CHLORIDE SERPL-SCNC: 108 MMOL/L (ref 98–107)
CO2 SERPL-SCNC: 26 MMOL/L (ref 20–31)
CREAT SERPL-MCNC: 0.7 MG/DL (ref 0.5–0.9)
GFR, ESTIMATED: >90 ML/MIN/1.73M2
GLUCOSE SERPL-MCNC: 97 MG/DL (ref 70–99)
POTASSIUM SERPL-SCNC: 4.4 MMOL/L (ref 3.7–5.3)
PROT SERPL-MCNC: 6.7 G/DL (ref 6.4–8.3)
SODIUM SERPL-SCNC: 143 MMOL/L (ref 135–144)

## 2024-05-28 PROCEDURE — 36415 COLL VENOUS BLD VENIPUNCTURE: CPT

## 2024-05-28 PROCEDURE — 80053 COMPREHEN METABOLIC PANEL: CPT

## 2024-06-03 ENCOUNTER — OFFICE VISIT (OUTPATIENT)
Dept: ONCOLOGY | Age: 74
End: 2024-06-03
Payer: MEDICARE

## 2024-06-03 VITALS
DIASTOLIC BLOOD PRESSURE: 70 MMHG | SYSTOLIC BLOOD PRESSURE: 136 MMHG | HEART RATE: 62 BPM | WEIGHT: 208 LBS | HEIGHT: 67 IN | OXYGEN SATURATION: 97 % | BODY MASS INDEX: 32.65 KG/M2

## 2024-06-03 DIAGNOSIS — C50.412 MALIGNANT NEOPLASM OF UPPER-OUTER QUADRANT OF BOTH BREASTS IN FEMALE, ESTROGEN RECEPTOR POSITIVE (HCC): Primary | ICD-10-CM

## 2024-06-03 DIAGNOSIS — C50.411 MALIGNANT NEOPLASM OF UPPER-OUTER QUADRANT OF BOTH BREASTS IN FEMALE, ESTROGEN RECEPTOR POSITIVE (HCC): Primary | ICD-10-CM

## 2024-06-03 DIAGNOSIS — Z17.0 MALIGNANT NEOPLASM OF UPPER-OUTER QUADRANT OF BOTH BREASTS IN FEMALE, ESTROGEN RECEPTOR POSITIVE (HCC): Primary | ICD-10-CM

## 2024-06-03 PROCEDURE — 99213 OFFICE O/P EST LOW 20 MIN: CPT | Performed by: INTERNAL MEDICINE

## 2024-06-03 PROCEDURE — 99214 OFFICE O/P EST MOD 30 MIN: CPT | Performed by: INTERNAL MEDICINE

## 2024-06-03 PROCEDURE — 3017F COLORECTAL CA SCREEN DOC REV: CPT | Performed by: INTERNAL MEDICINE

## 2024-06-03 PROCEDURE — G8399 PT W/DXA RESULTS DOCUMENT: HCPCS | Performed by: INTERNAL MEDICINE

## 2024-06-03 PROCEDURE — 1123F ACP DISCUSS/DSCN MKR DOCD: CPT | Performed by: INTERNAL MEDICINE

## 2024-06-03 PROCEDURE — 1090F PRES/ABSN URINE INCON ASSESS: CPT | Performed by: INTERNAL MEDICINE

## 2024-06-03 PROCEDURE — 1036F TOBACCO NON-USER: CPT | Performed by: INTERNAL MEDICINE

## 2024-06-03 PROCEDURE — G8417 CALC BMI ABV UP PARAM F/U: HCPCS | Performed by: INTERNAL MEDICINE

## 2024-06-03 PROCEDURE — G8427 DOCREV CUR MEDS BY ELIG CLIN: HCPCS | Performed by: INTERNAL MEDICINE

## 2024-06-03 NOTE — PROGRESS NOTES
Patient ID: Riddhi Coon, 1950, [id number], 74 y.o.  Surgeon : Dr. Dmitriy Baron  Diagnosis:   Diagnosis of bilateral breast cancer  Left invasive ductal carcinoma, ER/MS positive and HER-2/kelsy Negative, 2 foci 2.0 cm and 2.1 cm, clinical staging T2 N1 M0, stage II  Right- breast cancer, lower inner quadrant, T1b N0 M0, stage I, ER/MS positive HER-2 negative  Started on neoadjuvant chemotherapy with Adriamycin plus cyclophosphamide on 10/3/18 unfortunately she had significant reaction with generalized tremors, shortness of breath and chest tightness after chemotherapy  Her chemotherapy was changed to weekly Taxol and she completed 12 cycles on 1/2/19  Had bilateral mastectomies on 2/15/19  Adjuvant radiation therapy started on 4/2/19 and will complete 5/9/19  Xeloda  Started 5/27/19 And completed on 9/22/19  Currently on Arimidex.    Last DEXA scan 3/8/2022 showed osteopenia  HISTORY OF PRESENT ILLNESS:    Oncologic History:  Riddhi is a 68-year-old female with a family history of breast cancer was seen during initial consultation visit for newly diagnosed bilateral breast cancer.  Patient had abnormal mammogram about a year ago and the biopsy showed ductal papilloma so she had excision and was being followed by Gen. surgery.  Recently she noted a lump in her left breast and had a mammogram and ultrasound which confirmed 2 lesions in her left breast measuring 2.0 and 2.1 cm and one lesion in the right breast measuring 0.8 cm.  Also 2 lymph nodes noted in the left axilla.  Patient underwent biopsy of these agents including axillary lymph node which confirmed presence of invasive ductal carcinoma and on the biopsies.  She is here for further recommendations.  She does not have many medical conditions and her ECOG performance status is 0.  She is physically active and not on any regular medications.  She has history of breast cancer in her sister was diagnosed at the age of 62 and also reports history of breast

## 2024-06-12 ENCOUNTER — HOSPITAL ENCOUNTER (OUTPATIENT)
Age: 74
Setting detail: SPECIMEN
Discharge: HOME | End: 2024-06-12

## 2024-06-21 LAB — SURGICAL PATHOLOGY REPORT: NORMAL

## 2024-07-06 ENCOUNTER — OFFICE VISIT (OUTPATIENT)
Dept: PRIMARY CARE CLINIC | Age: 74
End: 2024-07-06
Payer: MEDICARE

## 2024-07-06 ENCOUNTER — HOSPITAL ENCOUNTER (OUTPATIENT)
Age: 74
Setting detail: SPECIMEN
Discharge: HOME | End: 2024-07-06
Payer: MEDICARE

## 2024-07-06 VITALS
HEART RATE: 70 BPM | OXYGEN SATURATION: 98 % | BODY MASS INDEX: 32.73 KG/M2 | TEMPERATURE: 97.6 F | WEIGHT: 209 LBS | SYSTOLIC BLOOD PRESSURE: 118 MMHG | DIASTOLIC BLOOD PRESSURE: 80 MMHG

## 2024-07-06 DIAGNOSIS — R30.0 DYSURIA: ICD-10-CM

## 2024-07-06 DIAGNOSIS — N30.01 ACUTE CYSTITIS WITH HEMATURIA: Primary | ICD-10-CM

## 2024-07-06 LAB
BACTERIA URNS QL MICRO: ABNORMAL
BILIRUB UR QL STRIP: NEGATIVE
CHARACTER UR: ABNORMAL
CLARITY UR: ABNORMAL
COLOR UR: YELLOW
EPI CELLS #/AREA URNS HPF: ABNORMAL /HPF (ref 0–5)
GLUCOSE UR STRIP-MCNC: NEGATIVE MG/DL
HGB UR QL STRIP.AUTO: ABNORMAL
KETONES UR STRIP-MCNC: NEGATIVE MG/DL
LEUKOCYTE ESTERASE UR QL STRIP: ABNORMAL
NITRITE UR QL STRIP: NEGATIVE
PH UR STRIP: 6 [PH] (ref 5–6)
PROT UR STRIP-MCNC: NEGATIVE MG/DL
RBC #/AREA URNS HPF: ABNORMAL /HPF (ref 0–4)
SP GR UR STRIP: 1 (ref 1.01–1.02)
UROBILINOGEN UR STRIP-ACNC: NORMAL EU/DL (ref 0–1)
WBC #/AREA URNS HPF: ABNORMAL /HPF (ref 0–4)

## 2024-07-06 PROCEDURE — G8417 CALC BMI ABV UP PARAM F/U: HCPCS

## 2024-07-06 PROCEDURE — 87086 URINE CULTURE/COLONY COUNT: CPT

## 2024-07-06 PROCEDURE — 1036F TOBACCO NON-USER: CPT

## 2024-07-06 PROCEDURE — 3017F COLORECTAL CA SCREEN DOC REV: CPT

## 2024-07-06 PROCEDURE — G8427 DOCREV CUR MEDS BY ELIG CLIN: HCPCS

## 2024-07-06 PROCEDURE — 99212 OFFICE O/P EST SF 10 MIN: CPT

## 2024-07-06 PROCEDURE — 81001 URINALYSIS AUTO W/SCOPE: CPT

## 2024-07-06 PROCEDURE — 1090F PRES/ABSN URINE INCON ASSESS: CPT

## 2024-07-06 PROCEDURE — G8399 PT W/DXA RESULTS DOCUMENT: HCPCS

## 2024-07-06 PROCEDURE — 1123F ACP DISCUSS/DSCN MKR DOCD: CPT

## 2024-07-06 PROCEDURE — 99213 OFFICE O/P EST LOW 20 MIN: CPT

## 2024-07-06 RX ORDER — NITROFURANTOIN 25; 75 MG/1; MG/1
100 CAPSULE ORAL 2 TIMES DAILY
Qty: 10 CAPSULE | Refills: 0 | Status: SHIPPED | OUTPATIENT
Start: 2024-07-06 | End: 2024-07-11

## 2024-07-06 ASSESSMENT — PATIENT HEALTH QUESTIONNAIRE - PHQ9
SUM OF ALL RESPONSES TO PHQ QUESTIONS 1-9: 0
1. LITTLE INTEREST OR PLEASURE IN DOING THINGS: NOT AT ALL
2. FEELING DOWN, DEPRESSED OR HOPELESS: NOT AT ALL
SUM OF ALL RESPONSES TO PHQ QUESTIONS 1-9: 0
SUM OF ALL RESPONSES TO PHQ9 QUESTIONS 1 & 2: 0

## 2024-07-06 ASSESSMENT — ENCOUNTER SYMPTOMS
NAUSEA: 0
VOMITING: 0

## 2024-07-06 NOTE — PATIENT INSTRUCTIONS
Urine sent for culture, will call with results  Complete full course of antibiotics  May use AZO, ibuprofen or tylenol for pain  Increase water intake  Decrease caffeine and sugary drinks  If symptoms worsen follow up with PCP  Patient verbalized understanding and agrees with plan of care

## 2024-07-06 NOTE — PROGRESS NOTES
Objective:     Vitals:    07/06/24 1355   BP: 118/80   Site: Right Upper Arm   Position: Sitting   Cuff Size: Large Adult   Pulse: 70   Temp: 97.6 °F (36.4 °C)   TempSrc: Tympanic   SpO2: 98%   Weight: 94.8 kg (209 lb)     Body mass index is 32.73 kg/m².    Physical Exam  Vitals and nursing note reviewed.   Constitutional:       Appearance: Normal appearance. She is not ill-appearing.   HENT:      Head: Normocephalic and atraumatic.      Nose: Nose normal.   Eyes:      Conjunctiva/sclera: Conjunctivae normal.   Cardiovascular:      Rate and Rhythm: Normal rate and regular rhythm.      Heart sounds: Normal heart sounds. No murmur heard.     No friction rub. No gallop.   Pulmonary:      Effort: Pulmonary effort is normal.      Breath sounds: Normal breath sounds. No wheezing or rhonchi.   Abdominal:      General: Abdomen is flat. Bowel sounds are normal.      Palpations: Abdomen is soft.      Tenderness: There is no abdominal tenderness. There is no right CVA tenderness, left CVA tenderness or guarding.   Skin:     General: Skin is warm and dry.      Findings: No rash.   Neurological:      General: No focal deficit present.      Mental Status: She is alert.   Psychiatric:         Mood and Affect: Mood normal.         Behavior: Behavior normal.         Thought Content: Thought content normal.           Assessment and Plan     Hospital Outpatient Visit on 07/06/2024   Component Date Value Ref Range Status    Color, UA 07/06/2024 Yellow  Yellow Final    Turbidity UA 07/06/2024 Turbid (A)  Clear Final    Glucose, Ur 07/06/2024 NEGATIVE  NEGATIVE mg/dL Final    Bilirubin, Urine 07/06/2024 NEGATIVE  NEGATIVE Final    Ketones, Urine 07/06/2024 NEGATIVE  NEGATIVE mg/dL Final    Specific Gravity, UA 07/06/2024 1.002 (L)  1.010 - 1.025 Final    Urine Hgb 07/06/2024 2+ (A)  NEGATIVE Final    pH, Urine 07/06/2024 6.0  5.0 - 6.0 Final    Protein, UA 07/06/2024 NEGATIVE  NEGATIVE mg/dL Final    Urobilinogen, Urine 07/06/2024

## 2024-07-08 LAB
MICROORGANISM SPEC CULT: ABNORMAL
SPECIMEN DESCRIPTION: ABNORMAL

## 2024-09-01 DIAGNOSIS — C50.412 MALIGNANT NEOPLASM OF UPPER-OUTER QUADRANT OF BOTH BREASTS IN FEMALE, ESTROGEN RECEPTOR POSITIVE (HCC): ICD-10-CM

## 2024-09-01 DIAGNOSIS — C50.411 MALIGNANT NEOPLASM OF UPPER-OUTER QUADRANT OF BOTH BREASTS IN FEMALE, ESTROGEN RECEPTOR POSITIVE (HCC): ICD-10-CM

## 2024-09-01 DIAGNOSIS — Z17.0 MALIGNANT NEOPLASM OF UPPER-OUTER QUADRANT OF BOTH BREASTS IN FEMALE, ESTROGEN RECEPTOR POSITIVE (HCC): ICD-10-CM

## 2024-09-03 RX ORDER — ANASTROZOLE 1 MG/1
1 TABLET ORAL DAILY
Qty: 90 TABLET | Refills: 3 | Status: SHIPPED | OUTPATIENT
Start: 2024-09-03

## 2024-09-16 ENCOUNTER — OFFICE VISIT (OUTPATIENT)
Dept: ONCOLOGY | Age: 74
End: 2024-09-16
Payer: MEDICARE

## 2024-09-16 VITALS
HEIGHT: 67 IN | TEMPERATURE: 97.1 F | WEIGHT: 207.2 LBS | OXYGEN SATURATION: 96 % | RESPIRATION RATE: 16 BRPM | HEART RATE: 72 BPM | DIASTOLIC BLOOD PRESSURE: 62 MMHG | SYSTOLIC BLOOD PRESSURE: 120 MMHG | BODY MASS INDEX: 32.52 KG/M2

## 2024-09-16 DIAGNOSIS — C50.411 MALIGNANT NEOPLASM OF UPPER-OUTER QUADRANT OF BOTH BREASTS IN FEMALE, ESTROGEN RECEPTOR POSITIVE (HCC): Primary | ICD-10-CM

## 2024-09-16 DIAGNOSIS — C50.412 MALIGNANT NEOPLASM OF UPPER-OUTER QUADRANT OF BOTH BREASTS IN FEMALE, ESTROGEN RECEPTOR POSITIVE (HCC): Primary | ICD-10-CM

## 2024-09-16 DIAGNOSIS — Z17.0 MALIGNANT NEOPLASM OF UPPER-OUTER QUADRANT OF BOTH BREASTS IN FEMALE, ESTROGEN RECEPTOR POSITIVE (HCC): Primary | ICD-10-CM

## 2024-09-16 PROCEDURE — G8417 CALC BMI ABV UP PARAM F/U: HCPCS | Performed by: INTERNAL MEDICINE

## 2024-09-16 PROCEDURE — 1036F TOBACCO NON-USER: CPT | Performed by: INTERNAL MEDICINE

## 2024-09-16 PROCEDURE — 99211 OFF/OP EST MAY X REQ PHY/QHP: CPT | Performed by: INTERNAL MEDICINE

## 2024-09-16 PROCEDURE — G8399 PT W/DXA RESULTS DOCUMENT: HCPCS | Performed by: INTERNAL MEDICINE

## 2024-09-16 PROCEDURE — 3017F COLORECTAL CA SCREEN DOC REV: CPT | Performed by: INTERNAL MEDICINE

## 2024-09-16 PROCEDURE — 1090F PRES/ABSN URINE INCON ASSESS: CPT | Performed by: INTERNAL MEDICINE

## 2024-09-16 PROCEDURE — 1123F ACP DISCUSS/DSCN MKR DOCD: CPT | Performed by: INTERNAL MEDICINE

## 2024-09-16 PROCEDURE — 99214 OFFICE O/P EST MOD 30 MIN: CPT | Performed by: INTERNAL MEDICINE

## 2024-09-16 PROCEDURE — G8427 DOCREV CUR MEDS BY ELIG CLIN: HCPCS | Performed by: INTERNAL MEDICINE

## 2024-09-24 ENCOUNTER — TELEPHONE (OUTPATIENT)
Dept: SURGERY | Age: 74
End: 2024-09-24

## 2025-02-06 ENCOUNTER — OFFICE VISIT (OUTPATIENT)
Dept: FAMILY MEDICINE CLINIC | Age: 75
End: 2025-02-06

## 2025-02-06 VITALS
HEART RATE: 70 BPM | HEIGHT: 67 IN | WEIGHT: 217 LBS | BODY MASS INDEX: 34.06 KG/M2 | OXYGEN SATURATION: 98 % | DIASTOLIC BLOOD PRESSURE: 80 MMHG | SYSTOLIC BLOOD PRESSURE: 134 MMHG

## 2025-02-06 DIAGNOSIS — K21.9 GASTROESOPHAGEAL REFLUX DISEASE, UNSPECIFIED WHETHER ESOPHAGITIS PRESENT: ICD-10-CM

## 2025-02-06 DIAGNOSIS — Z13.220 ENCOUNTER FOR SCREENING FOR LIPID DISORDER: ICD-10-CM

## 2025-02-06 DIAGNOSIS — Z00.00 MEDICARE ANNUAL WELLNESS VISIT, SUBSEQUENT: Primary | ICD-10-CM

## 2025-02-06 DIAGNOSIS — E55.9 VITAMIN D DEFICIENCY: ICD-10-CM

## 2025-02-06 RX ORDER — PANTOPRAZOLE SODIUM 40 MG/1
40 TABLET, DELAYED RELEASE ORAL
Qty: 90 TABLET | Refills: 1 | Status: SHIPPED | OUTPATIENT
Start: 2025-02-06

## 2025-02-06 SDOH — ECONOMIC STABILITY: FOOD INSECURITY: WITHIN THE PAST 12 MONTHS, YOU WORRIED THAT YOUR FOOD WOULD RUN OUT BEFORE YOU GOT MONEY TO BUY MORE.: NEVER TRUE

## 2025-02-06 SDOH — ECONOMIC STABILITY: FOOD INSECURITY: WITHIN THE PAST 12 MONTHS, THE FOOD YOU BOUGHT JUST DIDN'T LAST AND YOU DIDN'T HAVE MONEY TO GET MORE.: NEVER TRUE

## 2025-02-06 ASSESSMENT — PATIENT HEALTH QUESTIONNAIRE - PHQ9
SUM OF ALL RESPONSES TO PHQ QUESTIONS 1-9: 0
2. FEELING DOWN, DEPRESSED OR HOPELESS: NOT AT ALL
SUM OF ALL RESPONSES TO PHQ QUESTIONS 1-9: 0
SUM OF ALL RESPONSES TO PHQ QUESTIONS 1-9: 0
SUM OF ALL RESPONSES TO PHQ9 QUESTIONS 1 & 2: 0
1. LITTLE INTEREST OR PLEASURE IN DOING THINGS: NOT AT ALL
SUM OF ALL RESPONSES TO PHQ QUESTIONS 1-9: 0

## 2025-02-06 ASSESSMENT — ENCOUNTER SYMPTOMS
SHORTNESS OF BREATH: 0
RHINORRHEA: 1
SORE THROAT: 0
CONSTIPATION: 0
BLOOD IN STOOL: 0
CHEST TIGHTNESS: 0
DIARRHEA: 0
ABDOMINAL PAIN: 0

## 2025-02-06 NOTE — PROGRESS NOTES
[facility]  [address] [facility] [address]  Dept: [phone]  Dept Fax: [phone]  Loc: [phone]    Riddhi Coon is a 74 y.o. female who presents today for her medical conditions/complaints as noted below.  Riddhi Coon is c/o of   Chief Complaint   Patient presents with    Medicare AWV     Discuss pantoprazole        HPI:     HPI Here today for her MWV    GERD: she has been having some heartburn for the past couple of months. She took her 's pantoprazole and it helps a lot so she wants to try. No morning nausea or sore throat. No unintentional weight loss. No blood in the stool.      Her dentist wants to do peridontal surgery but she does not want to do it. They told her to take vit K to help with her gums.     Her recent visit with oncology went well, she is on anastrozole and not having any problems - he will keep her on it for another 2 yrs. No c/o side effects     Diet: dont eat after dinner which around 4pm, tries to eat balanced diet, drinks 2.5-3 bottles of water a day  Exercise: not as much as she used to, but she wants to get back into walking  Sleep: wake up at night to use bathroom, feels rested in morning, 10PM-6AM     Past Medical History:   Diagnosis Date    Breast cancer (HCC)     Diverticula of colon     Mild, sigmoid, asymptomatic.    Family history of breast cancer     paternal GM, sister, two paternal cousins, two paternal aunts    Osteopenia     With low vitamin D level. Insurance doesn't cover routine DEXA scans. Pt took bisphosphonates for a year and didn't tolerate.    Personal history of antineoplastic chemotherapy 10/2018    PONV (postoperative nausea and vomiting)     Unspecified vitamin D deficiency     Varicose veins of lower extremities           Social History     Tobacco Use    Smoking status: Former     Current packs/day: 0.00     Average packs/day: 0.5 packs/day for 7.0

## 2025-02-06 NOTE — PROGRESS NOTES
Medicare Annual Wellness Visit    Riddhi Coon is here for Medicare AWV (Discuss pantoprazole )    Assessment & Plan   Medicare annual wellness visit, subsequent  Gastroesophageal reflux disease, unspecified whether esophagitis present  Encounter for screening for lipid disorder  -     Lipid Panel; Future  Vitamin D deficiency  -     Vitamin D 25 Hydroxy; Future       Return in about 1 year (around 2/6/2026) for MWV.     Subjective   The following acute and/or chronic problems were also addressed today:  Gerd      Patient's complete Health Risk Assessment and screening values have been reviewed and are found in Flowsheets. The following problems were reviewed today and where indicated follow up appointments were made and/or referrals ordered.    Positive Risk Factor Screenings with Interventions:                Abnormal BMI (obese):  Body mass index is 33.99 kg/m². (!) Abnormal  Interventions:  low carbohydrate diet                       Objective   Vitals:    02/06/25 0813   BP: 134/80   Site: Right Upper Arm   Position: Sitting   Cuff Size: Medium Adult   Pulse: 70   SpO2: 98%   Weight: 98.4 kg (217 lb)   Height: 1.702 m (5' 7\")      Body mass index is 33.99 kg/m².                  Allergies   Allergen Reactions    Boniva [Ibandronic Acid] Other (See Comments)     Jaw pain.    Fosamax [Alendronate Sodium] Other (See Comments)     Jaw pain.     Prior to Visit Medications    Medication Sig Taking? Authorizing Provider   Magnesium Carbonate, Antacid, (MAGNESIUM CARBONATE PO) Take by mouth Daily Yes Kristel Hull MD   Cholecalciferol (VITAMIN D-3 PO) Take by mouth Daily Yes Kristel Hull MD   Menaquinone-7 (VITAMIN K2 PO) Take by mouth Daily Yes Kristel Hull MD   pantoprazole (PROTONIX) 40 MG tablet Take 1 tablet by mouth every morning (before breakfast) Yes Mehnaz Copeland MD   anastrozole (ARIMIDEX) 1 MG tablet TAKE 1 TABLET BY MOUTH DAILY Yes Benson Bernard MD   b complex vitamins

## 2025-06-26 ENCOUNTER — RESULTS FOLLOW-UP (OUTPATIENT)
Dept: PRIMARY CARE CLINIC | Age: 75
End: 2025-06-26

## 2025-06-26 ENCOUNTER — OFFICE VISIT (OUTPATIENT)
Dept: PRIMARY CARE CLINIC | Age: 75
End: 2025-06-26
Payer: MEDICARE

## 2025-06-26 ENCOUNTER — HOSPITAL ENCOUNTER (OUTPATIENT)
Age: 75
Discharge: HOME | End: 2025-06-26
Payer: MEDICARE

## 2025-06-26 VITALS
OXYGEN SATURATION: 97 % | SYSTOLIC BLOOD PRESSURE: 118 MMHG | DIASTOLIC BLOOD PRESSURE: 80 MMHG | WEIGHT: 210 LBS | TEMPERATURE: 98.1 F | HEART RATE: 99 BPM | BODY MASS INDEX: 32.89 KG/M2 | RESPIRATION RATE: 16 BRPM

## 2025-06-26 DIAGNOSIS — N30.01 ACUTE CYSTITIS WITH HEMATURIA: Primary | ICD-10-CM

## 2025-06-26 DIAGNOSIS — R35.0 URINARY FREQUENCY: ICD-10-CM

## 2025-06-26 LAB
BACTERIA URNS QL MICRO: ABNORMAL
BILIRUB UR QL STRIP: NEGATIVE
CHARACTER UR: ABNORMAL
CLARITY UR: ABNORMAL
COLOR UR: YELLOW
EPI CELLS #/AREA URNS HPF: ABNORMAL /HPF (ref 0–5)
GLUCOSE UR STRIP-MCNC: NEGATIVE MG/DL
HGB UR QL STRIP.AUTO: ABNORMAL
KETONES UR STRIP-MCNC: NEGATIVE MG/DL
LEUKOCYTE ESTERASE UR QL STRIP: ABNORMAL
NITRITE UR QL STRIP: NEGATIVE
PH UR STRIP: 6.5 [PH] (ref 5–6)
PROT UR STRIP-MCNC: ABNORMAL MG/DL
RBC #/AREA URNS HPF: ABNORMAL /HPF (ref 0–4)
SP GR UR STRIP: 1 (ref 1.01–1.02)
UROBILINOGEN UR STRIP-ACNC: NORMAL EU/DL (ref 0–1)
WBC #/AREA URNS HPF: ABNORMAL /HPF (ref 0–4)

## 2025-06-26 PROCEDURE — 1123F ACP DISCUSS/DSCN MKR DOCD: CPT

## 2025-06-26 PROCEDURE — G8417 CALC BMI ABV UP PARAM F/U: HCPCS

## 2025-06-26 PROCEDURE — 99213 OFFICE O/P EST LOW 20 MIN: CPT

## 2025-06-26 PROCEDURE — G8399 PT W/DXA RESULTS DOCUMENT: HCPCS

## 2025-06-26 PROCEDURE — 87086 URINE CULTURE/COLONY COUNT: CPT

## 2025-06-26 PROCEDURE — 81001 URINALYSIS AUTO W/SCOPE: CPT

## 2025-06-26 PROCEDURE — 87077 CULTURE AEROBIC IDENTIFY: CPT

## 2025-06-26 PROCEDURE — 1159F MED LIST DOCD IN RCRD: CPT

## 2025-06-26 PROCEDURE — 3017F COLORECTAL CA SCREEN DOC REV: CPT

## 2025-06-26 PROCEDURE — 87186 SC STD MICRODIL/AGAR DIL: CPT

## 2025-06-26 PROCEDURE — G8427 DOCREV CUR MEDS BY ELIG CLIN: HCPCS

## 2025-06-26 PROCEDURE — 1036F TOBACCO NON-USER: CPT

## 2025-06-26 PROCEDURE — 1160F RVW MEDS BY RX/DR IN RCRD: CPT

## 2025-06-26 PROCEDURE — 1090F PRES/ABSN URINE INCON ASSESS: CPT

## 2025-06-26 RX ORDER — CEPHALEXIN 500 MG/1
500 CAPSULE ORAL 2 TIMES DAILY
Qty: 14 CAPSULE | Refills: 0 | Status: SHIPPED | OUTPATIENT
Start: 2025-06-26 | End: 2025-07-03

## 2025-06-26 ASSESSMENT — ENCOUNTER SYMPTOMS
VOMITING: 0
NAUSEA: 0

## 2025-06-28 LAB
MICROORGANISM SPEC CULT: ABNORMAL
SPECIMEN DESCRIPTION: ABNORMAL

## 2025-06-30 RX ORDER — PANTOPRAZOLE SODIUM 40 MG/1
40 TABLET, DELAYED RELEASE ORAL
Qty: 90 TABLET | Refills: 1 | Status: SHIPPED | OUTPATIENT
Start: 2025-06-30

## 2025-06-30 NOTE — TELEPHONE ENCOUNTER
Riddhi called requesting a refill of the below medication which has been pended for you:     Requested Prescriptions     Pending Prescriptions Disp Refills    pantoprazole (PROTONIX) 40 MG tablet [Pharmacy Med Name: Pantoprazole Sodium 40 MG Oral Tablet Delayed Release] 90 tablet 1     Sig: TAKE 1 TABLET BY MOUTH IN THE  MORNING BEFORE BREAKFAST       Last Appointment Date: 2/6/2025  Next Appointment Date: 2/9/2026    Allergies   Allergen Reactions    Boniva [Ibandronate] Other (See Comments)     Jaw pain.    Fosamax [Alendronate Sodium] Other (See Comments)     Jaw pain.

## 2025-07-08 ENCOUNTER — RESULTS FOLLOW-UP (OUTPATIENT)
Dept: PRIMARY CARE CLINIC | Age: 75
End: 2025-07-08

## 2025-07-08 ENCOUNTER — HOSPITAL ENCOUNTER (OUTPATIENT)
Age: 75
Discharge: HOME | End: 2025-07-08
Payer: MEDICARE

## 2025-07-08 ENCOUNTER — OFFICE VISIT (OUTPATIENT)
Dept: PRIMARY CARE CLINIC | Age: 75
End: 2025-07-08
Payer: MEDICARE

## 2025-07-08 VITALS
SYSTOLIC BLOOD PRESSURE: 130 MMHG | RESPIRATION RATE: 16 BRPM | TEMPERATURE: 98.1 F | DIASTOLIC BLOOD PRESSURE: 84 MMHG | OXYGEN SATURATION: 98 % | HEIGHT: 67 IN | WEIGHT: 209 LBS | HEART RATE: 74 BPM | BODY MASS INDEX: 32.8 KG/M2

## 2025-07-08 DIAGNOSIS — R35.0 URINARY FREQUENCY: ICD-10-CM

## 2025-07-08 DIAGNOSIS — N30.01 ACUTE CYSTITIS WITH HEMATURIA: Primary | ICD-10-CM

## 2025-07-08 LAB
BACTERIA URNS QL MICRO: ABNORMAL
BILIRUB UR QL STRIP: NEGATIVE
CHARACTER UR: ABNORMAL
CLARITY UR: ABNORMAL
COLOR UR: YELLOW
EPI CELLS #/AREA URNS HPF: ABNORMAL /HPF (ref 0–5)
GLUCOSE UR STRIP-MCNC: NEGATIVE MG/DL
HGB UR QL STRIP.AUTO: ABNORMAL
KETONES UR STRIP-MCNC: NEGATIVE MG/DL
LEUKOCYTE ESTERASE UR QL STRIP: ABNORMAL
NITRITE UR QL STRIP: NEGATIVE
PH UR STRIP: 6.5 [PH] (ref 5–6)
PROT UR STRIP-MCNC: ABNORMAL MG/DL
RBC #/AREA URNS HPF: ABNORMAL /HPF (ref 0–4)
SP GR UR STRIP: 1.01 (ref 1.01–1.02)
UROBILINOGEN UR STRIP-ACNC: NORMAL EU/DL (ref 0–1)
WBC #/AREA URNS HPF: ABNORMAL /HPF (ref 0–4)

## 2025-07-08 PROCEDURE — 1090F PRES/ABSN URINE INCON ASSESS: CPT | Performed by: PHYSICIAN ASSISTANT

## 2025-07-08 PROCEDURE — 1160F RVW MEDS BY RX/DR IN RCRD: CPT | Performed by: PHYSICIAN ASSISTANT

## 2025-07-08 PROCEDURE — 1159F MED LIST DOCD IN RCRD: CPT | Performed by: PHYSICIAN ASSISTANT

## 2025-07-08 PROCEDURE — 81001 URINALYSIS AUTO W/SCOPE: CPT

## 2025-07-08 PROCEDURE — 99213 OFFICE O/P EST LOW 20 MIN: CPT | Performed by: PHYSICIAN ASSISTANT

## 2025-07-08 PROCEDURE — 87186 SC STD MICRODIL/AGAR DIL: CPT

## 2025-07-08 PROCEDURE — G8399 PT W/DXA RESULTS DOCUMENT: HCPCS | Performed by: PHYSICIAN ASSISTANT

## 2025-07-08 PROCEDURE — 87086 URINE CULTURE/COLONY COUNT: CPT

## 2025-07-08 PROCEDURE — 87077 CULTURE AEROBIC IDENTIFY: CPT

## 2025-07-08 PROCEDURE — 1123F ACP DISCUSS/DSCN MKR DOCD: CPT | Performed by: PHYSICIAN ASSISTANT

## 2025-07-08 PROCEDURE — G8427 DOCREV CUR MEDS BY ELIG CLIN: HCPCS | Performed by: PHYSICIAN ASSISTANT

## 2025-07-08 PROCEDURE — G8417 CALC BMI ABV UP PARAM F/U: HCPCS | Performed by: PHYSICIAN ASSISTANT

## 2025-07-08 PROCEDURE — 1036F TOBACCO NON-USER: CPT | Performed by: PHYSICIAN ASSISTANT

## 2025-07-08 PROCEDURE — 3017F COLORECTAL CA SCREEN DOC REV: CPT | Performed by: PHYSICIAN ASSISTANT

## 2025-07-08 RX ORDER — SULFAMETHOXAZOLE AND TRIMETHOPRIM 800; 160 MG/1; MG/1
1 TABLET ORAL 2 TIMES DAILY
Qty: 14 TABLET | Refills: 0 | Status: SHIPPED | OUTPATIENT
Start: 2025-07-08 | End: 2025-07-15

## 2025-07-08 ASSESSMENT — ENCOUNTER SYMPTOMS
NAUSEA: 0
VOMITING: 0
SHORTNESS OF BREATH: 0

## 2025-07-08 NOTE — PROGRESS NOTES
[facility]  [address]  [facility] [address]  Dept: [phone]  Dept Fax: [phone]    Riddhi Coon is a 75 y.o. female who presents today for her medical conditions/complaints as noted below. Riddhi Coon is c/o of   Chief Complaint   Patient presents with    Urinary Urgency     Frequency and urgency x 3 days. Recently treated for UTI 6/26/25 with Keflex.   .    HPI:     Patient is a 76 yo female presenting today due to frequency and urgency. Her symptoms began 3 days ago. She was previously seen in Walk in on 6/26/25 where she was treated for a UTI with Keflex.     Urinary Frequency   This is a new problem. The current episode started in the past 7 days. The problem occurs intermittently. There has been no fever. Associated symptoms include frequency and urgency. Pertinent negatives include no chills, flank pain, hematuria, hesitancy, nausea or vomiting.         Past Medical History:   Diagnosis Date    Breast cancer (HCC)     Diverticula of colon     Mild, sigmoid, asymptomatic.    Family history of breast cancer     paternal GM, sister, two paternal cousins, two paternal aunts    Osteopenia     With low vitamin D level. Insurance doesn't cover routine DEXA scans. Pt took bisphosphonates for a year and didn't tolerate.    Personal history of antineoplastic chemotherapy 10/2018    PONV (postoperative nausea and vomiting)     Unspecified vitamin D deficiency     Varicose veins of lower extremities      Past Surgical History:   Procedure Laterality Date    BREAST BIOPSY Right 6/7/2017    Right Breast biopsy w/ needle placement -arrive 11am -to x-ray 12pm performed by Dmitriy Baron MD at [facility] OR    BREAST BIOPSY Bilateral 09/10/2018    BREAST ENHANCEMENT SURGERY Bilateral 2/15/2019    Bilateral breast reconstruction w/ expander placement performed by Dora Aguilar MD at [facility]

## 2025-07-09 LAB
MICROORGANISM SPEC CULT: ABNORMAL
SPECIMEN DESCRIPTION: ABNORMAL

## 2025-07-16 DIAGNOSIS — Z17.0 MALIGNANT NEOPLASM OF UPPER-OUTER QUADRANT OF BOTH BREASTS IN FEMALE, ESTROGEN RECEPTOR POSITIVE (HCC): ICD-10-CM

## 2025-07-16 DIAGNOSIS — C50.411 MALIGNANT NEOPLASM OF UPPER-OUTER QUADRANT OF BOTH BREASTS IN FEMALE, ESTROGEN RECEPTOR POSITIVE (HCC): ICD-10-CM

## 2025-07-16 DIAGNOSIS — C50.412 MALIGNANT NEOPLASM OF UPPER-OUTER QUADRANT OF BOTH BREASTS IN FEMALE, ESTROGEN RECEPTOR POSITIVE (HCC): ICD-10-CM

## 2025-07-18 RX ORDER — ANASTROZOLE 1 MG/1
1 TABLET ORAL DAILY
Qty: 90 TABLET | Refills: 3 | Status: SHIPPED | OUTPATIENT
Start: 2025-07-18

## (undated) DEVICE — CHLORAPREP 26ML ORANGE

## (undated) DEVICE — YANKAUER,BULB TIP,W/O VENT,RIGID,STERILE: Brand: MEDLINE

## (undated) DEVICE — CANNULA NSL AD L2IN ETCO2 SAMP SFT CRUSH RESIST FEM AIRLFE

## (undated) DEVICE — TUBING, SUCTION, 3/16" X 20', STRAIGHT: Brand: MEDLINE

## (undated) DEVICE — PACK,LAPAROTOMY,PK IV,AURORA: Brand: MEDLINE

## (undated) DEVICE — WEBRIL COTTON UNDERCAST PADDING: Brand: WEBRIL

## (undated) DEVICE — STRIP,CLOSURE,WOUND,MEDI-STRIP,1/2X4: Brand: MEDLINE

## (undated) DEVICE — SUTURE VCRL SZ 3-0 L27IN ABSRB UD L19MM PS-2 3/8 CIR PRIM J427H

## (undated) DEVICE — Device

## (undated) DEVICE — SPONGE GZ W4XL4IN 16 PLY DATA MSTR TAGGED DBL RADPQ

## (undated) DEVICE — SUTURE VCRL SZ 3-0 L18IN ABSRB UD W/O NDL POLYGLACTIN 910 J110T

## (undated) DEVICE — Z INACTIVE USE 2653177 SPONGE GZ W2XL2IN NONWOVEN 4 PLY FASTER WICKING ABIL AVANT

## (undated) DEVICE — GOWN,AURORA,NONRNF,XL,30/CS: Brand: MEDLINE

## (undated) DEVICE — SUTURE VCRL SZ 4-0 L18IN ABSRB UD L19MM PS-2 3/8 CIR PRIM J496H

## (undated) DEVICE — BASIN SET: Brand: MEDLINE INDUSTRIES, INC.

## (undated) DEVICE — GAUZE,SPONGE,4"X4",16PLY,XRAY,STRL,LF: Brand: MEDLINE

## (undated) DEVICE — COVER LT HNDL BLU PLAS

## (undated) DEVICE — SUTURE VCRL SZ 4-0 L18IN ABSRB UD L16MM PS-4 1/2 CIR PRIM J507G

## (undated) DEVICE — GLOVE SURG SZ 6 THK91MIL LTX FREE SYN POLYISOPRENE ANTI

## (undated) DEVICE — DECANTER FLD 9IN ST BG FOR ASEP TRNSF OF FLD

## (undated) DEVICE — ELECTRODE PT RET AD L9FT HI MOIST COND ADH HYDRGEL CORDED

## (undated) DEVICE — 3M™ TEGADERM™ TRANSPARENT FILM DRESSING FRAME STYLE, 1626W, 4 IN X 4-3/4 IN (10 CM X 12 CM), 50/CT 4CT/CASE: Brand: 3M™ TEGADERM™

## (undated) DEVICE — SUTURE VCRL SZ 3-0 L27IN ABSRB UD L26MM CT-2 1/2 CIR J232H

## (undated) DEVICE — MARKER W/PRE PRINTED CUSTOM LABEL

## (undated) DEVICE — SUTURE VCRL SZ 3-0 L27IN ABSRB VLT L26MM SH 1/2 CIR J316H

## (undated) DEVICE — GLOVE ORANGE PI 7   MSG9070

## (undated) DEVICE — 1200CC GUARDIAN II: Brand: GUARDIAN

## (undated) DEVICE — CONVERTED USE 248063 TOWELS OR BL ST

## (undated) DEVICE — STERILE PVP: Brand: MEDLINE INDUSTRIES, INC.

## (undated) DEVICE — Z DISCONTINUED USE 2624853 GLOVE SURG SZ 75 L12IN THK91MIL BRN LTX FREE

## (undated) DEVICE — SUTURE NONABSORBABLE MONOFILAMENT 3-0 PS-1 18 IN BLK ETHILON 1663H

## (undated) DEVICE — GLOVE ORANGE PI 7 1/2   MSG9075

## (undated) DEVICE — PENCIL ES L10FT COAT BLDE HOLSTER

## (undated) DEVICE — 3M™ WARMING BLANKET, LOWER BODY, 10 PER CASE, 42568: Brand: BAIR HUGGER™

## (undated) DEVICE — KIT TISS EXP W/ 60ML SYR 122CM TRNSF SET PIERCING DEV L25CM

## (undated) DEVICE — 9165 UNIVERSAL PATIENT PLATE: Brand: 3M™

## (undated) DEVICE — Z DISCONTINUED USE 2624852 GLOVE SURG 7 PF TEXT NEOPRNE BRN STRL NEOLON 2G LF

## (undated) DEVICE — MASTISOL ADHESIVE LIQ 2/3ML

## (undated) DEVICE — PACK SURG PROC REMINDER N WVN DISPOSABLE BEAC TIME OUT

## (undated) DEVICE — CONNECTOR TBNG AUX H2O JET DISP FOR OLY 160/180 SER

## (undated) DEVICE — SOLUTION SURG PREP POV IOD 7.5% 4 OZ

## (undated) DEVICE — SOLUTION IV IRRIG WATER 1000ML POUR BRL 2F7114

## (undated) DEVICE — SYRINGE MED 10ML LUERLOCK TIP W/O SFTY DISP

## (undated) DEVICE — SYRINGE BLB 2 PC STER 50

## (undated) DEVICE — SOLUTION SCRB 4OZ 7.5% POVIDONE IOD FLIP TOP CAP

## (undated) DEVICE — SUTURE VCRL SZ 3-0 L18IN ABSRB UD PS-2 L19MM 3/8 CRV PRIM J497H

## (undated) DEVICE — SUTURE VCRL SZ 4-0 L18IN ABSRB UD L13MM P-3 3/8 CIR PRIM J494H

## (undated) DEVICE — AGENT HEMSTAT 5GM ARISTA AH

## (undated) DEVICE — GLOVE SURG SZ 65 THK91MIL LTX FREE SYN POLYISOPRENE

## (undated) DEVICE — TOWEL,OR,DSP,ST,BLUE,STD,4/PK,20PK/CS: Brand: MEDLINE

## (undated) DEVICE — GARMENT COMPR L FOR 23IN CALF FLOTRN

## (undated) DEVICE — 60 ML SYRINGE REGULAR TIP: Brand: MONOJECT

## (undated) DEVICE — GAUZE,SPONGE,2"X2",8PLY,STERILE,LF,2'S: Brand: MEDLINE

## (undated) DEVICE — DRAPE C ARM W104XL188CM FLD MOB XR

## (undated) DEVICE — INTENDED FOR TISSUE SEPARATION, AND OTHER PROCEDURES THAT REQUIRE A SHARP SURGICAL BLADE TO PUNCTURE OR CUT.: Brand: BARD-PARKER ® CARBON RIB-BACK BLADES

## (undated) DEVICE — CLIP INT M 2 7MM TRAD TANT LAT TRNSVRS GRV FRIC FIT LD FOR

## (undated) DEVICE — SUTURE VIC + UD PS-2 3-0 18IN VCP497G

## (undated) DEVICE — SOLUTION IV 100ML 0.9% SOD CHL PLAS CONT USP VIAFLX 1 PER

## (undated) DEVICE — Z DISCONTINUED BY MEDLINE USE 2711682 TRAY SKIN PREP DRY W/ PREM GLV

## (undated) DEVICE — GLOVE SURG SZ 65 L12IN THK91MIL BRN LTX FREE

## (undated) DEVICE — SYRINGE, LUER LOCK, 10ML: Brand: MEDLINE

## (undated) DEVICE — SUTURE VCRL SZ 2-0 L27IN ABSRB VLT L26MM SH 1/2 CIR J317H

## (undated) DEVICE — GAUZE,SPONGE,FLUFF,6"X6.75",STRL,5/TRAY: Brand: MEDLINE

## (undated) DEVICE — DRAPE,REIN 53X77,STERILE: Brand: MEDLINE

## (undated) DEVICE — SHEET, T, LAPAROTOMY, STERILE: Brand: MEDLINE

## (undated) DEVICE — SYRINGE IRRIG 60ML SFT PLIABLE BLB EZ TO GRP 1 HND USE W/

## (undated) DEVICE — KIT DRAIN 100CC W/10MM PERF: Brand: CARDINAL HEALTH

## (undated) DEVICE — SOLUTION IV IRRIG POUR BRL 0.9% SODIUM CHL 2F7124

## (undated) DEVICE — DRESSING TRNSPAR W2XL2.75IN FLM SHT SEMIPERMEABLE WIND

## (undated) DEVICE — MERCY DEFIANCE ENDO KIT: Brand: MEDLINE INDUSTRIES, INC.

## (undated) DEVICE — 3M™ STERI-STRIP™ REINFORCED ADHESIVE SKIN CLOSURES, R1547, 1/2 IN X 4 IN (12 MM X 100 MM), 6 STRIPS/ENVELOPE: Brand: 3M™ STERI-STRIP™

## (undated) DEVICE — GLOVE SURG SZ 7 L12IN THK7.5MIL DK GRN LTX FREE MSG6570] MEDLINE INDUSTRIES INC]

## (undated) DEVICE — GARMENT,MEDLINE,DVT,INT,CALF,MED, GEN2: Brand: MEDLINE

## (undated) DEVICE — SPONGE LAP W18XL18IN WHT COT 4 PLY FLD STRUNG RADPQ DISP ST

## (undated) DEVICE — FORCEPS BX L240CM JAW DIA2.4MM ORNG L CAP W/ NDL DISP RAD

## (undated) DEVICE — SOLUTION IV 1000ML 0.9% SOD CHL PH 5 INJ USP VIAFLX PLAS

## (undated) DEVICE — Z DISCONTINUED USE 2624850 GLOVE SURG SZ 6 L12IN THK91MIL BRN LTX FREE POLYCHLOROPRENE